# Patient Record
Sex: FEMALE | Race: BLACK OR AFRICAN AMERICAN | NOT HISPANIC OR LATINO | Employment: OTHER | URBAN - METROPOLITAN AREA
[De-identification: names, ages, dates, MRNs, and addresses within clinical notes are randomized per-mention and may not be internally consistent; named-entity substitution may affect disease eponyms.]

---

## 2017-04-10 ENCOUNTER — TELEPHONE (OUTPATIENT)
Dept: OBSTETRICS AND GYNECOLOGY | Facility: CLINIC | Age: 67
End: 2017-04-10

## 2017-04-10 NOTE — TELEPHONE ENCOUNTER
I spoke to the pt and she states that she is still in rehab and Dr sanchez wanted the rehab to consult a gyn to come kervin nd see the pt. When she gets out of rehab she can call and schedule an appointment to come in and see us.

## 2017-04-10 NOTE — TELEPHONE ENCOUNTER
Pt states that she had a stroke in the hospital and she is therapy and she had a cycle  And she had a D&C and the cycle came down again. Last month last part of march for 7 days and again in April and she had the cycle for 7 days. She has not had a cycle for 6 months and she thought it was over and now it cam back down and she wanted her records request from [facility].

## 2017-04-10 NOTE — TELEPHONE ENCOUNTER
----- Message from Jordyn Westbrook sent at 4/10/2017  1:58 PM CDT -----  Contact: CORINNA SPANN [[id number]]  x_  1st Request  _  2nd Request  _  3rd Request        Who: CORINNA SPANN [[id number]]    Why: Pt would like a call back says she is experiencing vaginal bleeding after stroke. Please call, Thanks!    What Number to Call Back: [phone]    When to Expect a call back: (Before the end of the day)   -- if the call is after 12:00, the call back will be tomorrow.

## 2019-10-02 ENCOUNTER — OFFICE VISIT (OUTPATIENT)
Dept: FAMILY MEDICINE | Facility: CLINIC | Age: 69
End: 2019-10-02
Payer: MEDICARE

## 2019-10-02 ENCOUNTER — LAB VISIT (OUTPATIENT)
Dept: LAB | Facility: HOSPITAL | Age: 69
End: 2019-10-02
Attending: INTERNAL MEDICINE
Payer: MEDICARE

## 2019-10-02 VITALS
TEMPERATURE: 98 F | BODY MASS INDEX: 44.87 KG/M2 | OXYGEN SATURATION: 96 % | WEIGHT: 279.19 LBS | DIASTOLIC BLOOD PRESSURE: 84 MMHG | HEART RATE: 52 BPM | HEIGHT: 66 IN | SYSTOLIC BLOOD PRESSURE: 144 MMHG

## 2019-10-02 DIAGNOSIS — Z79.4 TYPE 2 DIABETES MELLITUS WITH OTHER CIRCULATORY COMPLICATION, WITH LONG-TERM CURRENT USE OF INSULIN: ICD-10-CM

## 2019-10-02 DIAGNOSIS — E78.5 DYSLIPIDEMIA: ICD-10-CM

## 2019-10-02 DIAGNOSIS — I10 ESSENTIAL HYPERTENSION: ICD-10-CM

## 2019-10-02 DIAGNOSIS — E11.59 TYPE 2 DIABETES MELLITUS WITH OTHER CIRCULATORY COMPLICATION, WITH LONG-TERM CURRENT USE OF INSULIN: ICD-10-CM

## 2019-10-02 DIAGNOSIS — Z11.59 NEED FOR HEPATITIS C SCREENING TEST: ICD-10-CM

## 2019-10-02 DIAGNOSIS — E03.9 ACQUIRED HYPOTHYROIDISM: ICD-10-CM

## 2019-10-02 DIAGNOSIS — G89.29 CHRONIC LEFT SHOULDER PAIN: ICD-10-CM

## 2019-10-02 DIAGNOSIS — Z79.4 TYPE 2 DIABETES MELLITUS WITH OTHER CIRCULATORY COMPLICATION, WITH LONG-TERM CURRENT USE OF INSULIN: Primary | ICD-10-CM

## 2019-10-02 DIAGNOSIS — K59.04 CHRONIC IDIOPATHIC CONSTIPATION: ICD-10-CM

## 2019-10-02 DIAGNOSIS — E11.59 TYPE 2 DIABETES MELLITUS WITH OTHER CIRCULATORY COMPLICATION, WITH LONG-TERM CURRENT USE OF INSULIN: Primary | ICD-10-CM

## 2019-10-02 DIAGNOSIS — M25.512 CHRONIC LEFT SHOULDER PAIN: ICD-10-CM

## 2019-10-02 LAB
ALBUMIN SERPL BCP-MCNC: 3.5 G/DL (ref 3.5–5.2)
ALP SERPL-CCNC: 96 U/L (ref 55–135)
ALT SERPL W/O P-5'-P-CCNC: 9 U/L (ref 10–44)
ANION GAP SERPL CALC-SCNC: 9 MMOL/L (ref 8–16)
AST SERPL-CCNC: 11 U/L (ref 10–40)
BASOPHILS # BLD AUTO: 0.03 K/UL (ref 0–0.2)
BASOPHILS NFR BLD: 0.3 % (ref 0–1.9)
BILIRUB SERPL-MCNC: 0.4 MG/DL (ref 0.1–1)
BUN SERPL-MCNC: 21 MG/DL (ref 8–23)
CALCIUM SERPL-MCNC: 10 MG/DL (ref 8.7–10.5)
CHLORIDE SERPL-SCNC: 101 MMOL/L (ref 95–110)
CHOLEST SERPL-MCNC: 145 MG/DL (ref 120–199)
CHOLEST/HDLC SERPL: 3.2 {RATIO} (ref 2–5)
CO2 SERPL-SCNC: 28 MMOL/L (ref 23–29)
CREAT SERPL-MCNC: 1.7 MG/DL (ref 0.5–1.4)
DIFFERENTIAL METHOD: ABNORMAL
EOSINOPHIL # BLD AUTO: 0.2 K/UL (ref 0–0.5)
EOSINOPHIL NFR BLD: 1.8 % (ref 0–8)
ERYTHROCYTE [DISTWIDTH] IN BLOOD BY AUTOMATED COUNT: 19 % (ref 11.5–14.5)
EST. GFR  (AFRICAN AMERICAN): 34.7 ML/MIN/1.73 M^2
EST. GFR  (NON AFRICAN AMERICAN): 30.1 ML/MIN/1.73 M^2
ESTIMATED AVG GLUCOSE: 151 MG/DL (ref 68–131)
GLUCOSE SERPL-MCNC: 120 MG/DL (ref 70–110)
HBA1C MFR BLD HPLC: 6.9 % (ref 4–5.6)
HCT VFR BLD AUTO: 41.2 % (ref 37–48.5)
HDLC SERPL-MCNC: 45 MG/DL (ref 40–75)
HDLC SERPL: 31 % (ref 20–50)
HGB BLD-MCNC: 12 G/DL (ref 12–16)
IMM GRANULOCYTES # BLD AUTO: 0.03 K/UL (ref 0–0.04)
IMM GRANULOCYTES NFR BLD AUTO: 0.3 % (ref 0–0.5)
LDLC SERPL CALC-MCNC: 84 MG/DL (ref 63–159)
LYMPHOCYTES # BLD AUTO: 1.3 K/UL (ref 1–4.8)
LYMPHOCYTES NFR BLD: 14.8 % (ref 18–48)
MCH RBC QN AUTO: 19.6 PG (ref 27–31)
MCHC RBC AUTO-ENTMCNC: 29.1 G/DL (ref 32–36)
MCV RBC AUTO: 67 FL (ref 82–98)
MONOCYTES # BLD AUTO: 0.7 K/UL (ref 0.3–1)
MONOCYTES NFR BLD: 7.5 % (ref 4–15)
NEUTROPHILS # BLD AUTO: 6.6 K/UL (ref 1.8–7.7)
NEUTROPHILS NFR BLD: 75.3 % (ref 38–73)
NONHDLC SERPL-MCNC: 100 MG/DL
NRBC BLD-RTO: 0 /100 WBC
PLATELET # BLD AUTO: 166 K/UL (ref 150–350)
PMV BLD AUTO: ABNORMAL FL (ref 9.2–12.9)
POTASSIUM SERPL-SCNC: 4.1 MMOL/L (ref 3.5–5.1)
PROT SERPL-MCNC: 8 G/DL (ref 6–8.4)
RBC # BLD AUTO: 6.12 M/UL (ref 4–5.4)
SODIUM SERPL-SCNC: 138 MMOL/L (ref 136–145)
TRIGL SERPL-MCNC: 80 MG/DL (ref 30–150)
WBC # BLD AUTO: 8.8 K/UL (ref 3.9–12.7)

## 2019-10-02 PROCEDURE — 99204 PR OFFICE/OUTPT VISIT, NEW, LEVL IV, 45-59 MIN: ICD-10-PCS | Mod: S$PBB,,, | Performed by: INTERNAL MEDICINE

## 2019-10-02 PROCEDURE — 84439 ASSAY OF FREE THYROXINE: CPT

## 2019-10-02 PROCEDURE — 84443 ASSAY THYROID STIM HORMONE: CPT

## 2019-10-02 PROCEDURE — 80053 COMPREHEN METABOLIC PANEL: CPT

## 2019-10-02 PROCEDURE — 99999 PR PBB SHADOW E&M-EST. PATIENT-LVL III: CPT | Mod: PBBFAC,,, | Performed by: INTERNAL MEDICINE

## 2019-10-02 PROCEDURE — 99204 OFFICE O/P NEW MOD 45 MIN: CPT | Mod: S$PBB,,, | Performed by: INTERNAL MEDICINE

## 2019-10-02 PROCEDURE — 80061 LIPID PANEL: CPT

## 2019-10-02 PROCEDURE — 99999 PR PBB SHADOW E&M-EST. PATIENT-LVL III: ICD-10-PCS | Mod: PBBFAC,,, | Performed by: INTERNAL MEDICINE

## 2019-10-02 PROCEDURE — 85025 COMPLETE CBC W/AUTO DIFF WBC: CPT

## 2019-10-02 PROCEDURE — 86803 HEPATITIS C AB TEST: CPT

## 2019-10-02 PROCEDURE — 36415 COLL VENOUS BLD VENIPUNCTURE: CPT | Mod: PO

## 2019-10-02 PROCEDURE — 99213 OFFICE O/P EST LOW 20 MIN: CPT | Mod: PBBFAC,PO | Performed by: INTERNAL MEDICINE

## 2019-10-02 PROCEDURE — 83036 HEMOGLOBIN GLYCOSYLATED A1C: CPT

## 2019-10-02 RX ORDER — ROSUVASTATIN CALCIUM 20 MG/1
20 TABLET, COATED ORAL DAILY
Status: ON HOLD | COMMUNITY
End: 2019-10-29 | Stop reason: HOSPADM

## 2019-10-02 RX ORDER — LEVOTHYROXINE SODIUM 125 UG/1
125 TABLET ORAL DAILY
COMMUNITY
End: 2019-10-02

## 2019-10-02 RX ORDER — INSULIN ASPART 100 [IU]/ML
10 INJECTION, SOLUTION INTRAVENOUS; SUBCUTANEOUS
Status: ON HOLD | COMMUNITY
End: 2019-10-29 | Stop reason: HOSPADM

## 2019-10-02 RX ORDER — ASPIRIN 325 MG
325 TABLET, DELAYED RELEASE (ENTERIC COATED) ORAL
COMMUNITY
End: 2019-10-02

## 2019-10-02 RX ORDER — CLONIDINE HYDROCHLORIDE 0.3 MG/1
0.3 TABLET ORAL 3 TIMES DAILY
COMMUNITY
End: 2019-10-03 | Stop reason: ALTCHOICE

## 2019-10-02 RX ORDER — LUBIPROSTONE 24 UG/1
24 CAPSULE ORAL 2 TIMES DAILY WITH MEALS
Qty: 180 CAPSULE | Refills: 0 | Status: ON HOLD | OUTPATIENT
Start: 2019-10-02 | End: 2019-10-29 | Stop reason: HOSPADM

## 2019-10-02 RX ORDER — MELATONIN 10 MG
1 TABLET, SUBLINGUAL SUBLINGUAL DAILY
Status: ON HOLD | COMMUNITY
End: 2019-10-29 | Stop reason: HOSPADM

## 2019-10-02 RX ORDER — HYDRALAZINE HYDROCHLORIDE 50 MG/1
50 TABLET, FILM COATED ORAL 3 TIMES DAILY
Status: ON HOLD | COMMUNITY
End: 2019-10-29 | Stop reason: SDUPTHER

## 2019-10-02 RX ORDER — HYDRALAZINE HYDROCHLORIDE 100 MG/1
100 TABLET, FILM COATED ORAL 2 TIMES DAILY
Qty: 180 TABLET | Refills: 0 | Status: ON HOLD | OUTPATIENT
Start: 2019-10-02 | End: 2019-10-29 | Stop reason: HOSPADM

## 2019-10-02 RX ORDER — LEVOTHYROXINE SODIUM 125 UG/1
125 TABLET ORAL DAILY
COMMUNITY
End: 2019-10-04

## 2019-10-02 RX ORDER — HYDRALAZINE HYDROCHLORIDE 50 MG/1
50 TABLET, FILM COATED ORAL
COMMUNITY
End: 2019-10-02

## 2019-10-02 RX ORDER — RAMIPRIL 5 MG/1
5 CAPSULE ORAL DAILY
COMMUNITY
End: 2019-10-02

## 2019-10-02 RX ORDER — MECLIZINE HYDROCHLORIDE 25 MG/1
25 TABLET ORAL 3 TIMES DAILY PRN
Status: ON HOLD | COMMUNITY
End: 2019-10-29 | Stop reason: HOSPADM

## 2019-10-02 RX ORDER — CETIRIZINE HYDROCHLORIDE 5 MG/1
5 TABLET ORAL
COMMUNITY
End: 2019-10-02

## 2019-10-02 RX ORDER — NITROGLYCERIN 0.4 MG/1
0.4 TABLET SUBLINGUAL
Status: ON HOLD | COMMUNITY
End: 2019-10-29 | Stop reason: HOSPADM

## 2019-10-02 RX ORDER — CETIRIZINE HYDROCHLORIDE 5 MG/1
5 TABLET ORAL DAILY
Status: ON HOLD | COMMUNITY
End: 2019-10-29 | Stop reason: HOSPADM

## 2019-10-02 RX ORDER — INSULIN ASPART 100 [IU]/ML
12 INJECTION, SOLUTION INTRAVENOUS; SUBCUTANEOUS
COMMUNITY
End: 2019-10-02

## 2019-10-02 NOTE — PROGRESS NOTES
Subjective:        Chief Complaint  Chief Complaint   Patient presents with    Establish Care       HPI  Sherrie Alex is a 69 y.o. female with multiple medical diagnoses as listed in the medical history and problem list that presents for establishment of care.  Patient is new to me.    Patient has had diabetes over 20 years  No diabetic nerve pain   Diabetes is well controlled - less than 120 FBGs - checks twice daily  Right eye - possible cataract - Dr Alvarez  Seeing Neurology - Dr Combs  Had a fall 2-3 weeks   Left leg will 'give way'    BP regimen  Clonidine 0.3 mg TID  Hydralazine 75 mg TID  Amlodipine 5 mg QD    Has hospital bed    Chronic wound of leg      PAST MEDICAL HISTORY:  Past Medical History:   Diagnosis Date    Anemia     Anxiety     Diabetes mellitus     Hyperlipidemia     Hypertension     Obesity     Proteinuria     Sleep apnea        PAST SURGICAL HISTORY:  Past Surgical History:   Procedure Laterality Date    APPENDECTOMY       SECTION      CHOLECYSTECTOMY      DILATION AND CURETTAGE OF UTERUS      TUBAL LIGATION         SOCIAL HISTORY:  Social History     Socioeconomic History    Marital status:      Spouse name: Not on file    Number of children: Not on file    Years of education: Not on file    Highest education level: Not on file   Occupational History    Not on file   Social Needs    Financial resource strain: Not on file    Food insecurity:     Worry: Not on file     Inability: Not on file    Transportation needs:     Medical: Not on file     Non-medical: Not on file   Tobacco Use    Smoking status: Never Smoker   Substance and Sexual Activity    Alcohol use: No    Drug use: No    Sexual activity: Yes     Partners: Male   Lifestyle    Physical activity:     Days per week: Not on file     Minutes per session: Not on file    Stress: Not on file   Relationships    Social connections:     Talks on phone: Not on file     Gets together: Not on  "file     Attends [employer] service: Not on file     Active member of club or organization: Not on file     Attends meetings of clubs or organizations: Not on file     Relationship status: Not on file   Other Topics Concern    Not on file   Social History Narrative    Not on file       FAMILY HISTORY:  Family History   Problem Relation Age of Onset    Liver disease Father     Diabetes Brother     Hypertension Brother     Diabetes Sister        ALLERGIES AND MEDICATIONS: updated and reviewed.  Review of patient's allergies indicates:   Allergen Reactions    Codeine Anxiety    Ace inhibitors     Diphenhydramine hcl      Current Outpatient Medications   Medication Sig Dispense Refill    amlodipine (NORVASC) 5 MG tablet Take 5 mg by mouth once daily.        blood sugar diagnostic (GLUCOSE BLOOD) Strp by Misc.(Non-Drug; Combo Route) route.        clonidine (CATAPRES) 0.2 MG tablet Take 0.3 mg by mouth 3 (three) times daily.       ergocalciferol (VITAMIN D2) 50,000 unit Cap Take 50,000 Units by mouth every 7 days.        insulin glargine (LANTUS SOLOSTAR) 100 unit/mL (3 mL) InPn Inject 24 Units into the skin every evening.       insulin needles, disposable, (PEN NEEDLE) 31 X 5/16 " Ndle by Misc.(Non-Drug; Combo Route) route.        lacosamide (VIMPAT) 100 mg Tab Take 50 mg by mouth 2 (two) times daily.        LANCETS MISC by Misc.(Non-Drug; Combo Route) route 4 (four) times daily.        levothyroxine (SYNTHROID) 125 MCG tablet Take 125 mcg by mouth once daily.      linagliptin (TRADJENTA) 5 mg Tab tablet Take 5 mg by mouth once daily.        lubiprostone (AMITIZA) 24 MCG Cap Take 24 mcg by mouth 2 (two) times daily with meals.        meclizine (ANTIVERT) 25 mg tablet Take 25 mg by mouth 3 (three) times daily as needed.      metoprolol succinate (TOPROL-XL) 100 MG 24 hr tablet Take 100 mg by mouth 2 (two) times daily.        nitroGLYCERIN (NITROSTAT) 0.4 MG SL tablet Place 0.4 mg under the tongue.   " "   aspirin (ECOTRIN) 325 MG EC tablet Take 325 mg by mouth.      cetirizine (ZYRTEC) 5 MG tablet Take 5 mg by mouth.      doxazosin (CARDURA) 4 MG tablet Take 4 mg by mouth every evening.        hydrALAZINE (APRESOLINE) 50 MG tablet Take 50 mg by mouth.      insulin aspart U-100 (NOVOLOG) 100 unit/mL injection Inject 12 Units into the skin.      levothyroxine (SYNTHROID) 112 MCG tablet Take 112 mcg by mouth once daily.        lovastatin (MEVACOR) 40 MG tablet Take 40 mg by mouth every evening.        meclizine 12.5 mg Chew Take 2 tablets by mouth every 6 (six) hours as needed.        nystatin (MYCOSTATIN) powder Apply to affected area 3 times daily 60 g 0    pantoprazole (PROTONIX) 40 MG tablet Take 40 mg by mouth once daily.      ramipril (ALTACE) 5 MG capsule Take 5 mg by mouth once daily.      rosuvastatin (CRESTOR) 20 MG tablet Take 20 mg by mouth once daily.      spironolactone (ALDACTONE) 25 MG tablet Take 25 mg by mouth once daily.       No current facility-administered medications for this visit.          ROS  Review of Systems   Constitutional: Negative for appetite change, chills, fever and unexpected weight change.   Respiratory: Negative for cough and shortness of breath.    Cardiovascular: Negative for chest pain, palpitations and leg swelling.   Gastrointestinal: Negative for abdominal pain, constipation, diarrhea, nausea and vomiting.   Musculoskeletal: Positive for arthralgias.   Skin: Negative.    Neurological: Positive for weakness. Negative for dizziness, light-headedness and headaches.   Psychiatric/Behavioral: Negative for dysphoric mood. The patient is not nervous/anxious.          Objective:     Physical Exam  Vitals:    10/02/19 1123   BP: (!) 144/84   BP Location: Right arm   Patient Position: Sitting   BP Method: Large (Manual)   Pulse: (!) 52   Temp: 97.8 °F (36.6 °C)   TempSrc: Oral   SpO2: 96%   Weight: 126.6 kg (279 lb 3.4 oz)   Height: 5' 6" (1.676 m)    Body mass index is " "45.07 kg/m².  Weight: 126.6 kg (279 lb 3.4 oz)   Height: 5' 6" (167.6 cm)   Physical Exam   Constitutional: She appears well-developed. No distress.   Obese habitus, sitting in wheelchair   HENT:   Head: Normocephalic and atraumatic.   Right Ear: External ear normal.   Left Ear: External ear normal.   Nose: Nose normal.   Mouth/Throat: Oropharynx is clear and moist. No oropharyngeal exudate.   Eyes: Pupils are equal, round, and reactive to light. Conjunctivae and EOM are normal.   Neck: Normal range of motion. Neck supple. No thyromegaly present.   Cardiovascular: Normal rate, normal heart sounds and intact distal pulses. Exam reveals no gallop and no friction rub.   No murmur heard.  Pulmonary/Chest: Effort normal and breath sounds normal. No respiratory distress. She has no wheezes.   Abdominal: Soft. She exhibits no distension and no mass. There is no tenderness. There is no rebound and no guarding. No hernia.   Musculoskeletal: She exhibits no edema or deformity.   Limitation of ROM of left shoulder    Lymphadenopathy:     She has no cervical adenopathy.   Neurological: She is alert. No cranial nerve deficit.   Weakness of LLE with hip flexion/extension and weakness of LUE with elevation   Skin: Skin is warm and dry. No rash noted. No erythema.   Psychiatric: She has a normal mood and affect. Her behavior is normal.   Vitals reviewed.      Assessment:     1. Type 2 diabetes mellitus with other circulatory complication, with long-term current use of insulin    2. Essential hypertension    3. Need for hepatitis C screening test    4. Chronic left shoulder pain    5. Chronic idiopathic constipation    6. Dyslipidemia    7. Acquired hypothyroidism      Plan:     Sherrie Castaneda was seen today for establish care.    Diagnoses and all orders for this visit:    Type 2 diabetes mellitus with other circulatory complication, with long-term current use of insulin  Dyslipidemia  A1c ordered - well controlled  The current " medical regimen is effective;  continue present plan and medications.  -     Hemoglobin A1c; Future    Essential hypertension  Blood pressure is not controlled today. We discussed low salt diet and regular exercise. Medication changes made today: discontinue clonidine, increase hydralazine as noted. Patient will come back in 2-4 weeks for recheck of blood pressure. Patient also asked to check blood pressure at home and bring recordings to next office visit.   -     hydrALAZINE (APRESOLINE) 100 MG tablet; Take 1 tablet (100 mg total) by mouth 2 (two) times daily. (Patient not taking: Reported on 10/2/2019)  -     CBC auto differential; Future  -     Comprehensive metabolic panel; Future  -     Lipid panel; Future    Need for hepatitis C screening test  Patient born between 1946 and 1966 and consents to screening as recommended by USPSTF - Hepatitis C screening obtained  -     Hepatitis C antibody; Future    Hypothyroidism  Patient on thyroid replacement therapy with recent thyroid indices ordered - continue current Synthroid dosage    Chronic left shoulder pain  Discussed - obtain imaging as noted prior to disposition  -     X-Ray Shoulder 2 or More Views Left; Future    Chronic idiopathic constipation  Discussed - Sent medication refill to patient's preferred pharmacy on file.  -     lubiprostone (AMITIZA) 24 MCG Cap; Take 1 capsule (24 mcg total) by mouth 2 (two) times daily with meals.        Health Maintenance       Date Due Completion Date    Hepatitis C Screening 1950 ---    TETANUS VACCINE 07/25/1967 ---    DEXA SCAN 07/25/1989 ---    Shingles Vaccine (1 of 2) 07/25/1999 ---    Colonoscopy 07/25/1999 ---    Pneumococcal Vaccine (65+ Low/Medium Risk) (1 of 2 - PCV13) 07/25/2014 ---    Mammogram 02/10/2016 2/10/2014    Influenza Vaccine (1) 09/01/2019 ---    Lipid Panel 09/22/2022 9/22/2017            Health Maintenance reviewed, addressed as per orders    Follow-up: Follow up in about 2 weeks (around  10/16/2019) for shoulder pain.    The patient expressed understanding and no barriers to adherence were identified.     1. The patient indicates understanding of these issues and agrees with the plan. Brief care plan is updated and reviewed with the patient as applicable.     2. The patient is given an After Visit Summary that lists all medications with directions, allergies, orders placed during this encounter and follow-up instructions.     3. I have reviewed the patient's medical information including past medical, family, and social history sections including the medications and allergies.     4. We discussed the patient's current medications. I reconciled the patient's medication list and prepared and supplied needed refills.       Desmond Yang MD  Internal Medicine-Pediatrics

## 2019-10-03 PROBLEM — E03.9 ACQUIRED HYPOTHYROIDISM: Status: ACTIVE | Noted: 2019-10-03

## 2019-10-03 LAB — HCV AB SERPL QL IA: NEGATIVE

## 2019-10-04 ENCOUNTER — TELEPHONE (OUTPATIENT)
Dept: FAMILY MEDICINE | Facility: CLINIC | Age: 69
End: 2019-10-04

## 2019-10-04 DIAGNOSIS — E11.9 TYPE 2 DIABETES MELLITUS WITHOUT COMPLICATION: ICD-10-CM

## 2019-10-04 DIAGNOSIS — E03.9 ACQUIRED HYPOTHYROIDISM: Primary | ICD-10-CM

## 2019-10-04 DIAGNOSIS — E11.9 TYPE 2 DIABETES MELLITUS WITHOUT COMPLICATION, UNSPECIFIED WHETHER LONG TERM INSULIN USE: ICD-10-CM

## 2019-10-04 DIAGNOSIS — Z12.39 BREAST CANCER SCREENING: ICD-10-CM

## 2019-10-04 LAB
T4 FREE SERPL-MCNC: 1.02 NG/DL (ref 0.71–1.51)
TSH SERPL DL<=0.005 MIU/L-ACNC: 5.72 UIU/ML (ref 0.4–4)

## 2019-10-04 RX ORDER — LEVOTHYROXINE SODIUM 137 UG/1
137 TABLET ORAL DAILY
Qty: 90 TABLET | Refills: 1 | Status: SHIPPED | OUTPATIENT
Start: 2019-10-04 | End: 2019-10-04 | Stop reason: SDUPTHER

## 2019-10-04 RX ORDER — LEVOTHYROXINE SODIUM 137 UG/1
137 TABLET ORAL DAILY
Qty: 90 TABLET | Refills: 1 | Status: SHIPPED | OUTPATIENT
Start: 2019-10-04

## 2019-10-04 NOTE — TELEPHONE ENCOUNTER
----- Message from Cheryl Wright sent at 10/4/2019  9:04 AM CDT -----  Contact: Self   Type: Patient Call Back    What is the request in detail: Pt calling to speak to a nurse regarding what pharmacy was her meds sent.      Can the clinic reply by [facility]? No    Would the patient rather a call back or a response via [facility]? Call back     Best call back number: [phone]

## 2019-10-04 NOTE — TELEPHONE ENCOUNTER
Pt was concerned where medication was sent to. Pt wanted medication to be sent to [facility] in [place].

## 2019-10-04 NOTE — PROGRESS NOTES
Please call the patient regarding results:    Thyroid hormone level is out of range - I am INCREASING Synthroid to 137 mcg daily. Patient's prescription sent to [facility] on [address].    A1c is 6.9% - diabetes and cholesterol control is excellent. Continue current medications    Hepatitis C screening is negative    Complete blood count is within acceptable limits    Let me know if you have any questions or concerns.    Desmond Yang MD  Internal Medicine-Pediatrics

## 2019-10-04 NOTE — TELEPHONE ENCOUNTER
----- Message from Desmond Yang MD sent at 10/4/2019  8:18 AM CDT -----  Please call the patient regarding results:    Thyroid hormone level is out of range - I am INCREASING Synthroid to 137 mcg daily. Patient's prescription sent to [facility] on [address].    A1c is 6.9% - diabetes and cholesterol control is excellent. Continue current medications    Hepatitis C screening is negative    Complete blood count is within acceptable limits    Let me know if you have any questions or concerns.    Desmond Yang MD  Internal Medicine-Pediatrics

## 2019-10-11 ENCOUNTER — HOSPITAL ENCOUNTER (INPATIENT)
Facility: HOSPITAL | Age: 69
LOS: 18 days | Discharge: NURSING, REHAB OR LONG-TERM CARE | DRG: 004 | End: 2019-10-29
Attending: EMERGENCY MEDICINE | Admitting: HOSPITALIST
Payer: MEDICARE

## 2019-10-11 ENCOUNTER — ANESTHESIA EVENT (OUTPATIENT)
Dept: EMERGENCY MEDICINE | Facility: HOSPITAL | Age: 69
DRG: 004 | End: 2019-10-11
Payer: MEDICARE

## 2019-10-11 ENCOUNTER — ANESTHESIA (OUTPATIENT)
Dept: EMERGENCY MEDICINE | Facility: HOSPITAL | Age: 69
DRG: 004 | End: 2019-10-11
Payer: MEDICARE

## 2019-10-11 DIAGNOSIS — T78.40XA ALLERGIC REACTION, INITIAL ENCOUNTER: ICD-10-CM

## 2019-10-11 DIAGNOSIS — Z99.11 VENTILATOR DEPENDENT: ICD-10-CM

## 2019-10-11 DIAGNOSIS — T78.3XXA ANGIO-EDEMA: ICD-10-CM

## 2019-10-11 DIAGNOSIS — Z98.890 REQUIRED EMERGENCY INTUBATION: ICD-10-CM

## 2019-10-11 DIAGNOSIS — T78.3XXA ANGIOEDEMA, INITIAL ENCOUNTER: Primary | ICD-10-CM

## 2019-10-11 DIAGNOSIS — J96.90 RESPIRATORY FAILURE: ICD-10-CM

## 2019-10-11 DIAGNOSIS — G93.41 ENCEPHALOPATHY, METABOLIC: ICD-10-CM

## 2019-10-11 DIAGNOSIS — J96.20 ACUTE ON CHRONIC RESPIRATORY FAILURE, UNSPECIFIED WHETHER WITH HYPOXIA OR HYPERCAPNIA: ICD-10-CM

## 2019-10-11 DIAGNOSIS — I63.9 STROKE: ICD-10-CM

## 2019-10-11 DIAGNOSIS — J96.20 ACUTE ON CHRONIC RESPIRATORY FAILURE: ICD-10-CM

## 2019-10-11 DIAGNOSIS — R56.9 SEIZURE: ICD-10-CM

## 2019-10-11 DIAGNOSIS — I63.9 CEREBROVASCULAR ACCIDENT (CVA), UNSPECIFIED MECHANISM: ICD-10-CM

## 2019-10-11 PROBLEM — N17.9 AKI (ACUTE KIDNEY INJURY): Status: ACTIVE | Noted: 2019-10-11

## 2019-10-11 LAB
ALBUMIN SERPL BCP-MCNC: 3.5 G/DL (ref 3.5–5.2)
ALLENS TEST: ABNORMAL
ALP SERPL-CCNC: 87 U/L (ref 55–135)
ALT SERPL W/O P-5'-P-CCNC: 9 U/L (ref 10–44)
ANION GAP SERPL CALC-SCNC: 10 MMOL/L (ref 8–16)
AST SERPL-CCNC: 11 U/L (ref 10–40)
BASOPHILS # BLD AUTO: 0.03 K/UL (ref 0–0.2)
BASOPHILS NFR BLD: 0.3 % (ref 0–1.9)
BILIRUB SERPL-MCNC: 0.7 MG/DL (ref 0.1–1)
BUN SERPL-MCNC: 24 MG/DL (ref 8–23)
CALCIUM SERPL-MCNC: 9.9 MG/DL (ref 8.7–10.5)
CHLORIDE SERPL-SCNC: 103 MMOL/L (ref 95–110)
CO2 SERPL-SCNC: 24 MMOL/L (ref 23–29)
CREAT SERPL-MCNC: 1.8 MG/DL (ref 0.5–1.4)
DELSYS: ABNORMAL
DIFFERENTIAL METHOD: ABNORMAL
EOSINOPHIL # BLD AUTO: 0.1 K/UL (ref 0–0.5)
EOSINOPHIL NFR BLD: 1 % (ref 0–8)
ERYTHROCYTE [DISTWIDTH] IN BLOOD BY AUTOMATED COUNT: 18.1 % (ref 11.5–14.5)
ERYTHROCYTE [SEDIMENTATION RATE] IN BLOOD BY WESTERGREN METHOD: 12 MM/H
EST. GFR  (AFRICAN AMERICAN): 32 ML/MIN/1.73 M^2
EST. GFR  (NON AFRICAN AMERICAN): 28 ML/MIN/1.73 M^2
FIO2: 40
GLUCOSE SERPL-MCNC: 143 MG/DL (ref 70–110)
HCO3 UR-SCNC: 23 MMOL/L (ref 24–28)
HCT VFR BLD AUTO: 38.8 % (ref 37–48.5)
HGB BLD-MCNC: 11.5 G/DL (ref 12–16)
IMM GRANULOCYTES # BLD AUTO: 0.04 K/UL (ref 0–0.04)
IMM GRANULOCYTES NFR BLD AUTO: 0.4 % (ref 0–0.5)
INR PPP: 1.1 (ref 0.8–1.2)
LYMPHOCYTES # BLD AUTO: 1.6 K/UL (ref 1–4.8)
LYMPHOCYTES NFR BLD: 16 % (ref 18–48)
MCH RBC QN AUTO: 19.8 PG (ref 27–31)
MCHC RBC AUTO-ENTMCNC: 29.6 G/DL (ref 32–36)
MCV RBC AUTO: 67 FL (ref 82–98)
MODE: ABNORMAL
MONOCYTES # BLD AUTO: 0.8 K/UL (ref 0.3–1)
MONOCYTES NFR BLD: 8.4 % (ref 4–15)
NEUTROPHILS # BLD AUTO: 7.2 K/UL (ref 1.8–7.7)
NEUTROPHILS NFR BLD: 73.9 % (ref 38–73)
NRBC BLD-RTO: 0 /100 WBC
PCO2 BLDA: 37.9 MMHG (ref 35–45)
PEEP: 5
PH SMN: 7.39 [PH] (ref 7.35–7.45)
PLATELET # BLD AUTO: 158 K/UL (ref 150–350)
PMV BLD AUTO: ABNORMAL FL (ref 9.2–12.9)
PO2 BLDA: 107 MMHG (ref 80–100)
POC BE: -2 MMOL/L
POC SATURATED O2: 98 % (ref 95–100)
POC TCO2: 24 MMOL/L (ref 23–27)
POCT GLUCOSE: 185 MG/DL (ref 70–110)
POCT GLUCOSE: 198 MG/DL (ref 70–110)
POCT GLUCOSE: 204 MG/DL (ref 70–110)
POCT GLUCOSE: 219 MG/DL (ref 70–110)
POTASSIUM SERPL-SCNC: 3.7 MMOL/L (ref 3.5–5.1)
PROT SERPL-MCNC: 7.6 G/DL (ref 6–8.4)
PROTHROMBIN TIME: 11.9 SEC (ref 9–12.5)
RBC # BLD AUTO: 5.81 M/UL (ref 4–5.4)
SAMPLE: ABNORMAL
SITE: ABNORMAL
SODIUM SERPL-SCNC: 137 MMOL/L (ref 136–145)
TROPONIN I SERPL DL<=0.01 NG/ML-MCNC: 0.01 NG/ML (ref 0–0.03)
TSH SERPL DL<=0.005 MIU/L-ACNC: 3.66 UIU/ML (ref 0.4–4)
VT: 450
WBC # BLD AUTO: 9.69 K/UL (ref 3.9–12.7)

## 2019-10-11 PROCEDURE — 63600175 PHARM REV CODE 636 W HCPCS: Performed by: HOSPITALIST

## 2019-10-11 PROCEDURE — 20000000 HC ICU ROOM

## 2019-10-11 PROCEDURE — 93010 EKG 12-LEAD: ICD-10-PCS | Mod: ,,, | Performed by: INTERNAL MEDICINE

## 2019-10-11 PROCEDURE — 93005 ELECTROCARDIOGRAM TRACING: CPT

## 2019-10-11 PROCEDURE — 25000003 PHARM REV CODE 250: Performed by: ANESTHESIOLOGY

## 2019-10-11 PROCEDURE — 36600 WITHDRAWAL OF ARTERIAL BLOOD: CPT

## 2019-10-11 PROCEDURE — 99291 CRITICAL CARE FIRST HOUR: CPT | Mod: 25

## 2019-10-11 PROCEDURE — 94761 N-INVAS EAR/PLS OXIMETRY MLT: CPT

## 2019-10-11 PROCEDURE — 27000221 HC OXYGEN, UP TO 24 HOURS

## 2019-10-11 PROCEDURE — 96375 TX/PRO/DX INJ NEW DRUG ADDON: CPT

## 2019-10-11 PROCEDURE — 51702 INSERT TEMP BLADDER CATH: CPT

## 2019-10-11 PROCEDURE — 94003 VENT MGMT INPAT SUBQ DAY: CPT

## 2019-10-11 PROCEDURE — 25000003 PHARM REV CODE 250: Performed by: HOSPITALIST

## 2019-10-11 PROCEDURE — 99291 PR CRITICAL CARE, E/M 30-74 MINUTES: ICD-10-PCS | Mod: ,,, | Performed by: EMERGENCY MEDICINE

## 2019-10-11 PROCEDURE — 31500 INSERT EMERGENCY AIRWAY: CPT | Performed by: ANESTHESIOLOGY

## 2019-10-11 PROCEDURE — 99900035 HC TECH TIME PER 15 MIN (STAT)

## 2019-10-11 PROCEDURE — 63600175 PHARM REV CODE 636 W HCPCS: Performed by: INTERNAL MEDICINE

## 2019-10-11 PROCEDURE — 25000003 PHARM REV CODE 250: Performed by: EMERGENCY MEDICINE

## 2019-10-11 PROCEDURE — C1769 GUIDE WIRE: HCPCS | Performed by: ANESTHESIOLOGY

## 2019-10-11 PROCEDURE — 99900026 HC AIRWAY MAINTENANCE (STAT)

## 2019-10-11 PROCEDURE — 82962 GLUCOSE BLOOD TEST: CPT

## 2019-10-11 PROCEDURE — 94002 VENT MGMT INPAT INIT DAY: CPT

## 2019-10-11 PROCEDURE — 63600175 PHARM REV CODE 636 W HCPCS

## 2019-10-11 PROCEDURE — S0028 INJECTION, FAMOTIDINE, 20 MG: HCPCS | Performed by: EMERGENCY MEDICINE

## 2019-10-11 PROCEDURE — 25000003 PHARM REV CODE 250: Performed by: INTERNAL MEDICINE

## 2019-10-11 PROCEDURE — 25000003 PHARM REV CODE 250

## 2019-10-11 PROCEDURE — C9399 UNCLASSIFIED DRUGS OR BIOLOG: HCPCS | Performed by: HOSPITALIST

## 2019-10-11 PROCEDURE — 97802 MEDICAL NUTRITION INDIV IN: CPT

## 2019-10-11 PROCEDURE — 93010 ELECTROCARDIOGRAM REPORT: CPT | Mod: ,,, | Performed by: INTERNAL MEDICINE

## 2019-10-11 PROCEDURE — 99222 1ST HOSP IP/OBS MODERATE 55: CPT | Mod: ,,, | Performed by: PSYCHIATRY & NEUROLOGY

## 2019-10-11 PROCEDURE — S0028 INJECTION, FAMOTIDINE, 20 MG: HCPCS | Performed by: INTERNAL MEDICINE

## 2019-10-11 PROCEDURE — 82803 BLOOD GASES ANY COMBINATION: CPT

## 2019-10-11 PROCEDURE — 63600175 PHARM REV CODE 636 W HCPCS: Performed by: ANESTHESIOLOGY

## 2019-10-11 PROCEDURE — 99222 PR INITIAL HOSPITAL CARE,LEVL II: ICD-10-PCS | Mod: ,,, | Performed by: PSYCHIATRY & NEUROLOGY

## 2019-10-11 PROCEDURE — 63600175 PHARM REV CODE 636 W HCPCS: Performed by: EMERGENCY MEDICINE

## 2019-10-11 PROCEDURE — 31500 INSERT EMERGENCY AIRWAY: CPT

## 2019-10-11 PROCEDURE — 31500 INTUBATION: ICD-10-PCS | Mod: ,,, | Performed by: ANESTHESIOLOGY

## 2019-10-11 PROCEDURE — 96374 THER/PROPH/DIAG INJ IV PUSH: CPT

## 2019-10-11 PROCEDURE — 99291 CRITICAL CARE FIRST HOUR: CPT | Mod: ,,, | Performed by: EMERGENCY MEDICINE

## 2019-10-11 PROCEDURE — 27200966 HC CLOSED SUCTION SYSTEM

## 2019-10-11 RX ORDER — GLUCAGON 1 MG
1 KIT INJECTION
Status: DISCONTINUED | OUTPATIENT
Start: 2019-10-11 | End: 2019-10-13

## 2019-10-11 RX ORDER — MIDAZOLAM HYDROCHLORIDE 1 MG/ML
INJECTION, SOLUTION INTRAMUSCULAR; INTRAVENOUS
Status: DISCONTINUED | OUTPATIENT
Start: 2019-10-11 | End: 2019-10-16 | Stop reason: HOSPADM

## 2019-10-11 RX ORDER — FAMOTIDINE 10 MG/ML
20 INJECTION INTRAVENOUS DAILY
Status: DISCONTINUED | OUTPATIENT
Start: 2019-10-11 | End: 2019-10-25

## 2019-10-11 RX ORDER — AMLODIPINE BESYLATE 5 MG/1
5 TABLET ORAL DAILY
Status: DISCONTINUED | OUTPATIENT
Start: 2019-10-11 | End: 2019-10-12

## 2019-10-11 RX ORDER — HEPARIN SODIUM 5000 [USP'U]/ML
5000 INJECTION, SOLUTION INTRAVENOUS; SUBCUTANEOUS EVERY 12 HOURS
Status: DISCONTINUED | OUTPATIENT
Start: 2019-10-11 | End: 2019-10-28

## 2019-10-11 RX ORDER — HYDRALAZINE HYDROCHLORIDE 20 MG/ML
10 INJECTION INTRAMUSCULAR; INTRAVENOUS ONCE
Status: COMPLETED | OUTPATIENT
Start: 2019-10-11 | End: 2019-10-11

## 2019-10-11 RX ORDER — MORPHINE SULFATE 10 MG/ML
2 INJECTION INTRAMUSCULAR; INTRAVENOUS; SUBCUTANEOUS EVERY 4 HOURS PRN
Status: DISCONTINUED | OUTPATIENT
Start: 2019-10-11 | End: 2019-10-16

## 2019-10-11 RX ORDER — AMOXICILLIN 250 MG
1 CAPSULE ORAL 2 TIMES DAILY
Status: DISCONTINUED | OUTPATIENT
Start: 2019-10-11 | End: 2019-10-29

## 2019-10-11 RX ORDER — MIDAZOLAM HYDROCHLORIDE 1 MG/ML
INJECTION INTRAMUSCULAR; INTRAVENOUS
Status: COMPLETED
Start: 2019-10-11 | End: 2019-10-11

## 2019-10-11 RX ORDER — ACETAMINOPHEN 325 MG/1
650 TABLET ORAL EVERY 8 HOURS PRN
Status: DISCONTINUED | OUTPATIENT
Start: 2019-10-11 | End: 2019-10-29 | Stop reason: HOSPADM

## 2019-10-11 RX ORDER — PROPOFOL 10 MG/ML
10 INJECTION, EMULSION INTRAVENOUS CONTINUOUS
Status: DISCONTINUED | OUTPATIENT
Start: 2019-10-11 | End: 2019-10-13

## 2019-10-11 RX ORDER — LACOSAMIDE 50 MG/1
50 TABLET ORAL 2 TIMES DAILY
Status: DISCONTINUED | OUTPATIENT
Start: 2019-10-11 | End: 2019-10-29 | Stop reason: HOSPADM

## 2019-10-11 RX ORDER — INSULIN ASPART 100 [IU]/ML
0-5 INJECTION, SOLUTION INTRAVENOUS; SUBCUTANEOUS EVERY 4 HOURS
Status: DISCONTINUED | OUTPATIENT
Start: 2019-10-11 | End: 2019-10-13

## 2019-10-11 RX ORDER — KETAMINE HYDROCHLORIDE 100 MG/ML
INJECTION, SOLUTION INTRAMUSCULAR; INTRAVENOUS
Status: DISCONTINUED | OUTPATIENT
Start: 2019-10-11 | End: 2019-10-16 | Stop reason: HOSPADM

## 2019-10-11 RX ORDER — FAMOTIDINE 20 MG/1
20 TABLET, FILM COATED ORAL 2 TIMES DAILY
Status: DISCONTINUED | OUTPATIENT
Start: 2019-10-11 | End: 2019-10-11

## 2019-10-11 RX ORDER — ONDANSETRON 2 MG/ML
4 INJECTION INTRAMUSCULAR; INTRAVENOUS EVERY 8 HOURS PRN
Status: DISCONTINUED | OUTPATIENT
Start: 2019-10-11 | End: 2019-10-29 | Stop reason: HOSPADM

## 2019-10-11 RX ORDER — HYDRALAZINE HYDROCHLORIDE 20 MG/ML
10 INJECTION INTRAMUSCULAR; INTRAVENOUS EVERY 8 HOURS PRN
Status: DISCONTINUED | OUTPATIENT
Start: 2019-10-11 | End: 2019-10-12

## 2019-10-11 RX ORDER — FAMOTIDINE 20 MG/1
20 TABLET, FILM COATED ORAL DAILY
Status: DISCONTINUED | OUTPATIENT
Start: 2019-10-11 | End: 2019-10-11

## 2019-10-11 RX ORDER — SUCCINYLCHOLINE CHLORIDE 20 MG/ML
INJECTION INTRAMUSCULAR; INTRAVENOUS
Status: DISCONTINUED | OUTPATIENT
Start: 2019-10-11 | End: 2019-10-16 | Stop reason: HOSPADM

## 2019-10-11 RX ORDER — EPINEPHRINE 0.3 MG/.3ML
INJECTION SUBCUTANEOUS
Status: COMPLETED
Start: 2019-10-11 | End: 2019-10-11

## 2019-10-11 RX ORDER — FAMOTIDINE 10 MG/ML
20 INJECTION INTRAVENOUS
Status: COMPLETED | OUTPATIENT
Start: 2019-10-11 | End: 2019-10-11

## 2019-10-11 RX ORDER — SODIUM CHLORIDE 9 MG/ML
INJECTION, SOLUTION INTRAVENOUS CONTINUOUS
Status: DISCONTINUED | OUTPATIENT
Start: 2019-10-11 | End: 2019-10-14

## 2019-10-11 RX ORDER — CLONIDINE HYDROCHLORIDE 0.1 MG/1
0.3 TABLET ORAL 2 TIMES DAILY
Status: DISCONTINUED | OUTPATIENT
Start: 2019-10-11 | End: 2019-10-29 | Stop reason: HOSPADM

## 2019-10-11 RX ORDER — HYDRALAZINE HYDROCHLORIDE 25 MG/1
100 TABLET, FILM COATED ORAL 2 TIMES DAILY
Status: DISCONTINUED | OUTPATIENT
Start: 2019-10-11 | End: 2019-10-12

## 2019-10-11 RX ORDER — SODIUM CHLORIDE 0.9 % (FLUSH) 0.9 %
10 SYRINGE (ML) INJECTION
Status: DISCONTINUED | OUTPATIENT
Start: 2019-10-11 | End: 2019-10-29 | Stop reason: HOSPADM

## 2019-10-11 RX ORDER — METHYLPREDNISOLONE SOD SUCC 125 MG
125 VIAL (EA) INJECTION
Status: COMPLETED | OUTPATIENT
Start: 2019-10-11 | End: 2019-10-11

## 2019-10-11 RX ADMIN — SENNOSIDES, DOCUSATE SODIUM 1 TABLET: 50; 8.6 TABLET, FILM COATED ORAL at 09:10

## 2019-10-11 RX ADMIN — INSULIN DETEMIR 12 UNITS: 100 INJECTION, SOLUTION SUBCUTANEOUS at 09:10

## 2019-10-11 RX ADMIN — EPINEPHRINE 0.3 MG: 0.3 INJECTION INTRAMUSCULAR at 12:10

## 2019-10-11 RX ADMIN — HEPARIN SODIUM 5000 UNITS: 5000 INJECTION INTRAVENOUS; SUBCUTANEOUS at 09:10

## 2019-10-11 RX ADMIN — HYDRALAZINE HYDROCHLORIDE 100 MG: 25 TABLET ORAL at 08:10

## 2019-10-11 RX ADMIN — METHYLPREDNISOLONE SODIUM SUCCINATE 40 MG: 40 INJECTION, POWDER, FOR SOLUTION INTRAMUSCULAR; INTRAVENOUS at 01:10

## 2019-10-11 RX ADMIN — SODIUM CHLORIDE: 0.9 INJECTION, SOLUTION INTRAVENOUS at 01:10

## 2019-10-11 RX ADMIN — INSULIN ASPART 2 UNITS: 100 INJECTION, SOLUTION INTRAVENOUS; SUBCUTANEOUS at 07:10

## 2019-10-11 RX ADMIN — CLONIDINE HYDROCHLORIDE 0.3 MG: 0.1 TABLET ORAL at 08:10

## 2019-10-11 RX ADMIN — SENNOSIDES, DOCUSATE SODIUM 1 TABLET: 50; 8.6 TABLET, FILM COATED ORAL at 08:10

## 2019-10-11 RX ADMIN — PROPOFOL 15 MCG/KG/MIN: 10 INJECTION, EMULSION INTRAVENOUS at 05:10

## 2019-10-11 RX ADMIN — MIDAZOLAM HYDROCHLORIDE 2 MG: 1 INJECTION, SOLUTION INTRAMUSCULAR; INTRAVENOUS at 01:10

## 2019-10-11 RX ADMIN — FAMOTIDINE 20 MG: 10 INJECTION INTRAVENOUS at 01:10

## 2019-10-11 RX ADMIN — HYDRALAZINE HYDROCHLORIDE 100 MG: 25 TABLET ORAL at 09:10

## 2019-10-11 RX ADMIN — KETAMINE HYDROCHLORIDE 5 MG: 100 INJECTION, SOLUTION, CONCENTRATE INTRAMUSCULAR; INTRAVENOUS at 01:10

## 2019-10-11 RX ADMIN — FAMOTIDINE 20 MG: 10 INJECTION INTRAVENOUS at 09:10

## 2019-10-11 RX ADMIN — PROPOFOL 10 MCG/KG/MIN: 10 INJECTION, EMULSION INTRAVENOUS at 02:10

## 2019-10-11 RX ADMIN — AMLODIPINE BESYLATE 5 MG: 5 TABLET ORAL at 09:10

## 2019-10-11 RX ADMIN — PROPOFOL 10 MCG/KG/MIN: 10 INJECTION, EMULSION INTRAVENOUS at 05:10

## 2019-10-11 RX ADMIN — SODIUM CHLORIDE: 0.9 INJECTION, SOLUTION INTRAVENOUS at 05:10

## 2019-10-11 RX ADMIN — SUCCINYLCHOLINE CHLORIDE 120 MG: 20 INJECTION, SOLUTION INTRAMUSCULAR; INTRAVENOUS at 02:10

## 2019-10-11 RX ADMIN — LACOSAMIDE 50 MG: 50 TABLET, FILM COATED ORAL at 09:10

## 2019-10-11 RX ADMIN — KETAMINE HYDROCHLORIDE 5 MG: 100 INJECTION, SOLUTION, CONCENTRATE INTRAMUSCULAR; INTRAVENOUS at 02:10

## 2019-10-11 RX ADMIN — LACOSAMIDE 50 MG: 50 TABLET, FILM COATED ORAL at 08:10

## 2019-10-11 RX ADMIN — Medication 1 SPRAY: at 01:10

## 2019-10-11 RX ADMIN — METHYLPREDNISOLONE SODIUM SUCCINATE 125 MG: 125 INJECTION, POWDER, FOR SOLUTION INTRAMUSCULAR; INTRAVENOUS at 01:10

## 2019-10-11 RX ADMIN — METHYLPREDNISOLONE SODIUM SUCCINATE 40 MG: 40 INJECTION, POWDER, FOR SOLUTION INTRAMUSCULAR; INTRAVENOUS at 06:10

## 2019-10-11 RX ADMIN — HYDRALAZINE HYDROCHLORIDE 10 MG: 20 INJECTION INTRAMUSCULAR; INTRAVENOUS at 07:10

## 2019-10-11 RX ADMIN — HYDRALAZINE HYDROCHLORIDE 10 MG: 20 INJECTION INTRAMUSCULAR; INTRAVENOUS at 11:10

## 2019-10-11 RX ADMIN — PROPOFOL 40 MCG/KG/MIN: 10 INJECTION, EMULSION INTRAVENOUS at 05:10

## 2019-10-11 RX ADMIN — INSULIN ASPART 2 UNITS: 100 INJECTION, SOLUTION INTRAVENOUS; SUBCUTANEOUS at 12:10

## 2019-10-11 RX ADMIN — BENZOCAINE, BUTAMBEN, AND TETRACAINE HYDROCHLORIDE 1 SPRAY: .028; .004; .004 AEROSOL, SPRAY TOPICAL at 01:10

## 2019-10-11 RX ADMIN — PROPOFOL 20 MCG/KG/MIN: 10 INJECTION, EMULSION INTRAVENOUS at 11:10

## 2019-10-11 RX ADMIN — SODIUM CHLORIDE: 0.9 INJECTION, SOLUTION INTRAVENOUS at 08:10

## 2019-10-11 RX ADMIN — CLONIDINE HYDROCHLORIDE 0.3 MG: 0.1 TABLET ORAL at 09:10

## 2019-10-11 RX ADMIN — HEPARIN SODIUM 5000 UNITS: 5000 INJECTION INTRAVENOUS; SUBCUTANEOUS at 06:10

## 2019-10-11 RX ADMIN — METHYLPREDNISOLONE SODIUM SUCCINATE 40 MG: 40 INJECTION, POWDER, FOR SOLUTION INTRAMUSCULAR; INTRAVENOUS at 09:10

## 2019-10-11 NOTE — ANESTHESIA PROCEDURE NOTES
Intubation    Diagnosis: Angioedema  Doctor requesting consult: ED physician  Patient location during procedure: ED  Procedure start time: 10/11/2019 1:40 AM  Timeout: 10/11/2019 1:30 AM  Procedure end time: 10/11/2019 2:05 AM    Staffing  Authorizing Provider: Teetee Duncan MD  Performing Provider: Teetee Duncan MD    Anesthesiologist was present at the time of the procedure.  Preanesthetic Checklist  Completed: patient identified, site marked, surgical consent, pre-op evaluation, timeout performed, IV checked, risks and benefits discussed, monitors and equipment checked and anesthesia consent given  Intubation  Indication: airway protection  Pre-oxygenation. Induction: intravenous, mask ventilation: easy with oral airway.  Intubation: postinduction, laryngoscopy glidescope, Glidescope.  Endotracheal Tube: oral, 7.0 mm ID, cuffed (inflated to minimal occlusive pressure)  Attempts: 4 or more, Grade II - cords partially seen  Complicating Factors: large/floppy epiglottis, obesity, short neck and anterior larynx  Tube secured at 25 cm at the lips.  Findings post-intubation: bilateral breath sounds, atraumatic / condition of teeth unchanged  Position Confirmation: auscultation  Additional Notes  Attempted fiberoptic intubation awake nasal then oral with multiple attempts due to copious secretions and patient movement. Tongue swelling not severe and patient maintained patent airway and sats % throughout the procedure. ED physician elected to try oral ETT with glidescope S3 blade and got it on second attempt.

## 2019-10-11 NOTE — ASSESSMENT & PLAN NOTE
Suspect mostly related to sedation. Baseline LUE weakness from prior CVA. CT imaging brain is normal. W/D to pain on all extremities on my evaluation. Does have reported underlying seizure d/o on vimpat.     -- Hold all sedation  -- If no appropriate improvement would consider MRI +/- EEG   -- Neurology consult noted   -- Continue Vimpat for now  -- As above, her MS remains main barrier to extubation at present.

## 2019-10-11 NOTE — HPI
"patient is a 69 y.o female who presents to the ED complaining of tongue swelling that began PTA. Patient has slurred speech, drooling, and tongue swelling. Patient denies any pain, itching, CP, nausea, vomiting, or fever. Per sisters, patient is normally talkative and active. Her sisters are unaware of any cause of onset, stating that the patient lives alone. PMHx of CVA 1 yr ago, DM, HTN, HLD, and obesity. Patient is allergic to codeine and ace inhibitors.      The history is provided by the patient and a relative. History limited by: Acuity of condition. No  was used.     Pt intubated in ED for airway protection ?laryngeal edema - not noted in ED documentation.  Pt started on IV steroids and H2 blocker on vent. In ICU, pt showed sluggish neuro response to pain and unable to follow commands. Propofol taken off and neuro status improved. Pulm consulted for vent management. Neuro consulted - h/o seizures.  Seen in ED yesterday am with "leg shaking" and weakness and dc'd home. H/o seizures on vimpat.    "

## 2019-10-11 NOTE — PLAN OF CARE
SW contacted patient's son Davis to complete discharge assessment over the phone. Patient was intubated and could not complete assessment. Prior to beginning the discharge planning assessment, patient verified name and date of birth. SW educated patient on the discharge process and explained that discharge planning begins at admission. Son stated that he would like to put patient in an nursing home. Son stated that it is getting really hard to care for patient. SW inquired about placement preferences.Son stated that he would create a list and call SW back.      10/11/19 8636   Discharge Assessment   Assessment Type Discharge Planning Assessment   Confirmed/corrected address and phone number on facesheet? Yes   Assessment information obtained from? Caregiver   Expected Length of Stay (days) 3   Communicated expected length of stay with patient/caregiver no   Prior to hospitilization cognitive status: Alert/Oriented   Prior to hospitalization functional status: Needs Assistance   Current cognitive status: Unable to Assess   Current Functional Status: Completely Dependent   Facility Arrived From: Home   Lives With alone   Able to Return to Prior Arrangements yes   Is patient able to care for self after discharge? Yes   Patient's perception of discharge disposition home or selfcare   Readmission Within the Last 30 Days no previous admission in last 30 days   Patient currently being followed by outpatient case management? No   Patient currently receives any other outside agency services? No   Equipment Currently Used at Home bedside commode;walker, rolling;wheelchair;hospital bed   Do you have any problems affording any of your prescribed medications? No   Is the patient taking medications as prescribed? yes   Does the patient have transportation home? Yes   Transportation Anticipated family or friend will provide   Does the patient receive services at the [facility]? No   Discharge Plan A New Nursing Home placement  - [facility] care facility   Discharge Plan B New Nursing Home placement - [facility] care facility   DME Needed Upon Discharge  none   Patient/Family in Agreement with Plan yes   Desmond Yang MD    [facility] - [place], [place] [address]  [address]  [place] [address]  Phone: [phone] Fax: [phone]    [facility] [address] [facility]  [address] [place] [address]  [address]  [facility] [address]  Phone: [phone] Fax: [phone]

## 2019-10-11 NOTE — H&P
"[facility] Medicine  History & Physical    Patient Name: Sherrie Alex  MRN: [id number]  Admission Date: 10/11/2019  Attending Physician: Elena Bauer MD   Primary Care Provider: Desmond Yang MD         Patient information was obtained from ER records.     Subjective:     Principal Problem:Angio-edema    Chief Complaint:   Chief Complaint   Patient presents with    Angioedema     pt in with complaint of throat and tongue swelling.denies pain or sob at present         HPI: patient is a 69 y.o female who presents to the ED complaining of tongue swelling that began PTA. Patient has slurred speech, drooling, and tongue swelling. Patient denies any pain, itching, CP, nausea, vomiting, or fever. Per sisters, patient is normally talkative and active. Her sisters are unaware of any cause of onset, stating that the patient lives alone. PMHx of CVA 1 yr ago, DM, HTN, HLD, and obesity. Patient is allergic to codeine and ace inhibitors.      The history is provided by the patient and a relative. History limited by: Acuity of condition. No  was used.     Pt intubated in ED for airway protection ?laryngeal edema - not noted in ED documentation.  Pt started on IV steroids and H2 blocker on vent. In ICU, pt showed sluggish neuro response to pain and unable to follow commands. Propofol taken off and neuro status improved. Pulm consulted for vent management. Neuro consulted - h/o seizures.  Seen in ED yesterday am with "leg shaking" and weakness and dc'd home. H/o seizures on vimpat.      Past Medical History:   Diagnosis Date    Anemia     Anxiety     Diabetes mellitus     Hyperlipidemia     Hypertension     Obesity     Proteinuria     Sleep apnea        Past Surgical History:   Procedure Laterality Date    APPENDECTOMY       SECTION      CHOLECYSTECTOMY      DILATION AND CURETTAGE OF UTERUS      TUBAL LIGATION         Review of patient's allergies " "indicates:   Allergen Reactions    Codeine Anxiety    Ace inhibitors     Diphenhydramine hcl        Current Facility-Administered Medications on File Prior to Encounter   Medication    [COMPLETED] cloNIDine tablet 0.3 mg     Current Outpatient Medications on File Prior to Encounter   Medication Sig    amlodipine (NORVASC) 5 MG tablet Take 5 mg by mouth once daily.      blood sugar diagnostic (GLUCOSE BLOOD) Strp by Misc.(Non-Drug; Combo Route) route.      cetirizine (ZYRTEC) 5 MG tablet Take 5 mg by mouth once daily.    cholecalciferol, vitamin D3, 10,000 unit Tab Take 1 tablet by mouth once daily.    cloNIDine (CATAPRES) 0.3 MG tablet Take 0.3 mg by mouth 2 (two) times daily.    ergocalciferol (VITAMIN D2) 50,000 unit Cap Take 50,000 Units by mouth every 7 days.      hydrALAZINE (APRESOLINE) 100 MG tablet Take 1 tablet (100 mg total) by mouth 2 (two) times daily.    hydrALAZINE (APRESOLINE) 50 MG tablet Take 50 mg by mouth 3 (three) times daily.    insulin aspart U-100 (NOVOLOG) 100 unit/mL injection Inject 10 Units into the skin 3 (three) times daily before meals.    insulin detemir U-100 (LEVEMIR) 100 unit/mL injection Inject 23 Units into the skin every evening.    insulin glargine (LANTUS SOLOSTAR) 100 unit/mL (3 mL) InPn Inject 24 Units into the skin every evening.     insulin needles, disposable, (PEN NEEDLE) 31 X 5/16 " Ndle by Misc.(Non-Drug; Combo Route) route.      lacosamide (VIMPAT) 100 mg Tab Take 50 mg by mouth 2 (two) times daily.      LANCETS MISC by Misc.(Non-Drug; Combo Route) route 4 (four) times daily.      levothyroxine (SYNTHROID) 137 MCG Tab tablet Take 1 tablet (137 mcg total) by mouth once daily.    linagliptin (TRADJENTA) 5 mg Tab tablet Take 5 mg by mouth once daily.      lubiprostone (AMITIZA) 24 MCG Cap Take 1 capsule (24 mcg total) by mouth 2 (two) times daily with meals.    meclizine (ANTIVERT) 25 mg tablet Take 25 mg by mouth 3 (three) times daily as needed.    " metoprolol succinate (TOPROL-XL) 100 MG 24 hr tablet Take 100 mg by mouth 2 (two) times daily.      nitroGLYCERIN (NITROSTAT) 0.4 MG SL tablet Place 0.4 mg under the tongue.    rosuvastatin (CRESTOR) 20 MG tablet Take 20 mg by mouth once daily.     Family History     Problem Relation (Age of Onset)    Diabetes Brother, Sister    Hypertension Brother    Liver disease Father        Tobacco Use    Smoking status: Never Smoker   Substance and Sexual Activity    Alcohol use: No    Drug use: No    Sexual activity: Yes     Partners: Male     Review of Systems   Unable to perform ROS: Intubated     Objective:     Vital Signs (Most Recent):  Temp: 97.8 °F (36.6 °C) (10/11/19 1104)  Pulse: 70 (10/11/19 1145)  Resp: 17 (10/11/19 1145)  BP: (!) 213/96 (10/11/19 1145)  SpO2: 99 % (10/11/19 1145) Vital Signs (24h Range):  Temp:  [97.4 °F (36.3 °C)-98.2 °F (36.8 °C)] 97.8 °F (36.6 °C)  Pulse:  [52-70] 70  Resp:  [13-22] 17  SpO2:  [97 %-100 %] 99 %  BP: (128-242)/() 213/96     Weight: 125.9 kg (277 lb 9 oz)  Body mass index is 46.19 kg/m².    Physical Exam   Constitutional: She appears well-developed and well-nourished.   HENT:   Head: Normocephalic and atraumatic.   ETT in place with drooling from mouth   Eyes: Pupils are equal, round, and reactive to light. EOM are normal.   Neck: Normal range of motion. Neck supple.   Cardiovascular: Normal rate, regular rhythm, normal heart sounds and intact distal pulses.   Pulmonary/Chest: No respiratory distress. She has no wheezes. She has no rales.   Abdominal: Soft. Bowel sounds are normal. She exhibits no distension.   Musculoskeletal: Normal range of motion. She exhibits no edema.   Neurological:   On vent, off sedation, following commands, Left  < right. Strong thumbs up of right than left. Bilateral strength equal with plantar and dorsiflexion of  Feet. Responds to pain x 4    Skin: Skin is warm and dry. Capillary refill takes less than 2 seconds.         CRANIAL  NERVES     CN III, IV, VI   Pupils are equal, round, and reactive to light.  Extraocular motions are normal.        Significant Labs:   A1C:   Recent Labs   Lab 10/02/19  1220   HGBA1C 6.9*     ABGs:   Recent Labs   Lab 10/11/19  0426   PH 7.392   PCO2 37.9   HCO3 23.0*   POCSATURATED 98   BE -2     CBC:   Recent Labs   Lab 10/10/19  0053 10/10/19  1015   WBC 9.69 8.78   HGB 11.5* 11.7*   HCT 38.8 39.5    153     CMP:   Recent Labs   Lab 10/10/19  0053 10/10/19  1015    138   K 3.7 3.9    102   CO2 24 25   * 121*   BUN 24* 28*   CREATININE 1.8* 1.9*   CALCIUM 9.9 10.2   PROT 7.6 8.0   ALBUMIN 3.5 3.7   BILITOT 0.7 0.7   ALKPHOS 87 94   AST 11 11   ALT 9* 10   ANIONGAP 10 11   EGFRNONAA 28* 26*     Cardiac Markers: No results for input(s): CKMB, MYOGLOBIN, BNP, TROPISTAT in the last 48 hours.  Coagulation:   Recent Labs   Lab 10/10/19  0053   INR 1.1     Lactic Acid: No results for input(s): LACTATE in the last 48 hours.  Magnesium:   Recent Labs   Lab 10/10/19  1015   MG 2.0     POCT Glucose:   Recent Labs   Lab 10/11/19  0650   POCTGLUCOSE 204*     Troponin:   Recent Labs   Lab 10/10/19  0053   TROPONINI 0.015     TSH:   Recent Labs   Lab 10/10/19  0053   TSH 3.658     Urine Studies:   Recent Labs   Lab 10/10/19  1015   COLORU Yellow   APPEARANCEUA Clear   PHUR 5.0   SPECGRAV 1.010   PROTEINUA 1+*   GLUCUA Negative   KETONESU Negative   BILIRUBINUA Negative   OCCULTUA Negative   NITRITE Negative   UROBILINOGEN Negative   LEUKOCYTESUR Trace*   RBCUA 0   WBCUA 6*   BACTERIA Occasional   SQUAMEPITHEL 2   HYALINECASTS 3*       Significant Imaging: Head CT  Impression       No CT evidence of acute intracranial abnormality. Clinical correlation and further evaluation as warranted.    Chronic senescent and microvascular ischemic changes.     Impression CT soft tissue of neck       1.  Limited assessment of the aerodigestive tract secondary to the presence of an endotracheal tube and lack of IV  contrast.  No evidence of subcutaneous emphysema or focal fluid collection within the deep spaces of the neck.    2.  Secretions within the posterior nasopharynx presumably relating to intubated state.    3.  Subcentimeter low-attenuation right thyroid lobe nodule.    4.  Mild bilateral dependent opacities within the visualized lungs which may relate to atelectasis, although edema or developing infection not excluded.     FINDINGS: CXR  There has been interval placement of an endotracheal tube with tip terminating approximately 2.0 cm above the cici.  There is an esophagogastric tube now present coursing below the diaphragm and outside the field of view of the examination.  Cardiomediastinal silhouette remains mildly enlarged.  There are low lung volumes bilaterally limiting assessment with patchy bibasilar subsegmental opacities.  No evidence of pneumothorax.      Assessment/Plan:     * Angio-edema  Possible etiology to compromised airway- unclear at this point  IV steroid x 2 days  Allergy to benadryl  On H2 blocker  Intubated and pulm consulted for vent management       RYLEE (acute kidney injury)  Cr slightly more elevated at 1.9, baseline 1.4-1.7  IVF and bmp in am       Seizure  Resume vimpat  Unsure if seizure activity was involved for this admission  Neurology consulted       Acquired hypothyroidism  Resume synthroid   Check TFTs    Essential hypertension  Uncontrolled  Resume home meds: norvasc, clonidine, hydralazine, toprol  IV PRN hydralazine      Type 2 diabetes mellitus with circulatory disorder, with long-term current use of insulin  Uncontrolled  A1c 6.9%  Resume levemir and SSI      Anxiety  Propofol for sedation       Morbid obesity  Discuss weight management after extubation       Dyslipidemia  Resume statin        VTE Risk Mitigation (From admission, onward)         Ordered     heparin (porcine) injection 5,000 Units  Every 12 hours      10/11/19 0617     IP VTE HIGH RISK PATIENT  Once       10/11/19 0532     Place ELIEZER hose  Until discontinued      10/11/19 0532     Place sequential compression device  Until discontinued      10/11/19 0532              Critical care time spent on the evaluation and treatment of severe organ dysfunction, review of pertinent labs and imaging studies, discussions with consulting providers and discussions with patient/family: 30 minutes.     Elena Bauer MD  Department of Hospital Medicine   [facility]

## 2019-10-11 NOTE — ASSESSMENT & PLAN NOTE
Possible etiology to compromised airway- unclear at this point  IV steroid x 2 days  Allergy to benadryl  On H2 blocker  Intubated and pulm consulted for vent management

## 2019-10-11 NOTE — HPI
68 y/o female with multiple prior CVA in past. Baseline wheelchair bound with residual Lt. Sided deficits. HTN. HLD and DM. Presented to ED yesterday morning secondary to LE weakness and difficulty getting out of recliner at home. She was evaluated in ED with slightly elevated Cr and managed as volume depletion. RT ED latter that evening with onset of reported tongue swelling drooling and dysarthria. No clear precipitant prior to onset. Does have h/o ACEi related angioedema in past but not currently on medication. Due to concern for potential airway compromise she underwent intubation- 4 attempts (fiber optic, nasotracheal, 2 DL.). No reported edema or significant hypoxia with procedure. Family is currently at bedside and reports adjustments to meds by PCP about 1 week prior.     She remains intubated in ICU. I have been asked to evaluate patient and assist in patient care, vent cortney.

## 2019-10-11 NOTE — NURSING
Patient hypertensive; current pressure 187/92, MD Ricketts aware; verbalized permissive HTN <210 okay, pending MRI head.

## 2019-10-11 NOTE — SIGNIFICANT EVENT
Therapist called to ED room 11 for patient intubation.  Upon arrival, patient was pre-oxygenated with 100% oxygen.  Patient was then intubated by ED doctor - Dr. Colón.  A size 7.0 ET tube was placed and was secured at the 25 cm erick at the lips in the center of the mouth.  Tube placement was verified via use of a CO2 detector device with positive color change.  Patient was then placed on a Servo I Ventilator with settings as follows:  PRVC 12/ 400/ +5 PEEP/ 40%.  Ventilator alarms are set and functional and the AMBU bag is at the HOB.

## 2019-10-11 NOTE — CARE UPDATE
[facility]  ICU Multidisciplinary Bedside Rounds     UPDATE     Date: 10/11/2019      Plan of care reviewed with the following, Nurse, Charge Nurse, Physician, , Resp. Therapist, Infection Prevention and Dietician.       Needs/ Goals for the day: Daily awakening trial and full neuro assessment pending. Will continue on ventilator since she was technically intubated this AM. Hypertension resolved with one time hydralazine IV. Son was at bedside this morning to review POC with CC doctor.       Level of Care: Critical Care

## 2019-10-11 NOTE — ASSESSMENT & PLAN NOTE
Unclear precipitant. Oropharyngeal exam with minimal edema on my assessment. + cuff leak.     -- continue H2 antagonist, corticosteroids.  -- Allergy to benadryl.. Can initiate Zyrtec as alternative   -- Maintain airway protection today as mental status remains main barrier.

## 2019-10-11 NOTE — PROGRESS NOTES
Pt received on Servo I vent with settings as followed: PRVC 12/450/+5 and 40%.  Size 6.5 ETT in place and secure at 24 cm at the lip.  Ambu bag and mask at bedside and all alarms on and functioning. NARN. RT will continue to monitor pt status.

## 2019-10-11 NOTE — PROVIDER TRANSFER
Patient returned to room _17_ from transport.  Patient placed back on ventilator on previous settings.  Ventilator and alarms on and functioning.  AMBU bag and mask at bedside.  Nurse notified

## 2019-10-11 NOTE — PLAN OF CARE
Recommendations    Recommendation/Intervention:   1. If medically able initate TF of  Diabetisource @ 40mL/hr to provide 1152 kcal, 58g pro, 785 mL free fluid. Additional fluid per MD.   2. If extubated, recommend 1500 ADA diet.     Goals: Initate nutrition intervention within 48 hrs  Nutrition Goal Status: new  Communication of RD Recs: other (comment)(POC)

## 2019-10-11 NOTE — CARE UPDATE
[facility]  ICU Multidisciplinary Bedside Rounds   SUMMARY     Date: 10/11/2019    Prehospitalization: Home  Admit Date / LOS : 10/11/2019/ 0 days    Diagnosis: <principal problem not specified>    Consults:        Active: Pulm CC       Needed: N/A     Code Status: Full Code   Advanced Directive: <no information>    LDA: Guevara, OG, PIV and Vent       Central Lines/Site/Justification:Patient Does Not Have Central Line       Urinary Cath/Order/Justification:Critically Ill in ICU    Vasopressors/Infusions:    sodium chloride 0.9% 125 mL/hr at 10/11/19 0600    propofol 25 mcg/kg/min (10/11/19 0800)          GOALS: Volume/ Hemodynamic: N/A                     RASS: -2  light sedation, briefly awakes to voice (eye opening)    CAM ICU: N/A  Pain Management: IV       Pain Controlled: yes     Rhythm: SB    Respiratory Device: Vent    Vent Mode: PRVC  Oxygen Concentration (%):  [40] 40  Resp Rate Total:  [13 br/min-17 br/min] 14 br/min  Vt Set:  [400 mL-450 mL] 450 mL  PEEP/CPAP:  [5 cmH20] 5 cmH20  Mean Airway Pressure:  [8 cmH20-10.1 cmH20] 9 cmH20             Most Recent SBT/ SAT: N/A       MOVE Screen: FAIL    VTE Prophylaxis: Pharm and Mechanical  Mobility: Bedrest  Stress Ulcer Prophylaxis: Yes    Dietary: NPO  Tolerance: n/a  /  Advancement: no    Isolation: No active isolations    Restraints: Yes    Significant Dates:  Post Op Date: N/A  Rescue Date: N/A  Imaging/ Diagnostics: CT head and neck 10/11/19    Noteworthy Labs:  BUN 28, creat 1.9    CBC/Anemia Labs: Coags:    Recent Labs   Lab 10/10/19  0053 10/10/19  1015   WBC 9.69 8.78   HGB 11.5* 11.7*   HCT 38.8 39.5    153   MCV 67* 67*   RDW 18.1* 17.6*    Recent Labs   Lab 10/10/19  0053   INR 1.1        Chemistries:   Recent Labs   Lab 10/10/19  0053 10/10/19  1015    138   K 3.7 3.9    102   CO2 24 25   BUN 24* 28*   CREATININE 1.8* 1.9*   CALCIUM 9.9 10.2   PROT 7.6 8.0   BILITOT 0.7 0.7   ALKPHOS 87 94   ALT 9* 10   AST 11  11   MG  --  2.0        Cardiac Enzymes: Ejection Fractions:    Recent Labs     10/10/19  0053   TROPONINI 0.015    No results found for: EF     POCT Glucose: HbA1c:    Recent Labs   Lab 10/11/19  0650   POCTGLUCOSE 204*    Hemoglobin A1C   Date Value Ref Range Status   10/02/2019 6.9 (H) 4.0 - 5.6 % Final     Comment:     ADA Screening Guidelines:  5.7-6.4%  Consistent with prediabetes  >or=6.5%  Consistent with diabetes  High levels of fetal hemoglobin interfere with the HbA1C  assay. Heterozygous hemoglobin variants (HbS, HgC, etc)do  not significantly interfere with this assay.   However, presence of multiple variants may affect accuracy.     06/03/2008 6.7 (H) 4.0 - 6.2 % Final   02/13/2006 6.2 4.5 - 6.2 % Final        Needs from Care Team: monitor swelling, wean vent, control blood pressure     ICU LOS 2h  Level of Care: Critical Care

## 2019-10-11 NOTE — ED PROVIDER NOTES
Encounter Date: 10/11/2019       History     Chief Complaint   Patient presents with    Angioedema     pt in with complaint of throat and tongue swelling.denies pain or sob at present      CC: Angioedema    HPI: The patient is a 69 y.o female who presents to the ED complaining of tongue swelling that began PTA. Patient has slurred speech, drooling, and tongue swelling. Patient denies any pain, itching, CP, nausea, vomiting, or fever. Per sisters, patient is normally talkative and active. Her sisters are unaware of any cause of onset, stating that the patient lives alone. PMHx of CVA 1 yr ago, DM, HTN, HLD, and obesity. Patient is allergic to codeine and ace inhibitors.     The history is provided by the patient and a relative. History limited by: Acuity of condition. No  was used.     Review of patient's allergies indicates:   Allergen Reactions    Codeine Anxiety    Ace inhibitors     Diphenhydramine hcl      Past Medical History:   Diagnosis Date    Anemia     Anxiety     Diabetes mellitus     Hyperlipidemia     Hypertension     Obesity     Proteinuria     Sleep apnea      Past Surgical History:   Procedure Laterality Date    APPENDECTOMY       SECTION      CHOLECYSTECTOMY      DILATION AND CURETTAGE OF UTERUS      TUBAL LIGATION       Family History   Problem Relation Age of Onset    Liver disease Father     Diabetes Brother     Hypertension Brother     Diabetes Sister      Social History     Tobacco Use    Smoking status: Never Smoker   Substance Use Topics    Alcohol use: No    Drug use: No     Review of Systems   Unable to perform ROS: Acuity of condition   HENT:        Tongue swelling.    Neurological: Positive for speech difficulty.       Physical Exam     Initial Vitals [10/11/19 0041]   BP Pulse Resp Temp SpO2   (!) 196/93 (!) 56 20 98.1 °F (36.7 °C) 98 %      MAP       --         Physical Exam    Nursing note and vitals reviewed.  Constitutional: She  appears well-developed and well-nourished. She is not diaphoretic. No distress.   HENT:   Head: Normocephalic and atraumatic.   Tongue hanging out of mouth. Appears to be edematous. No lip swelling. Unable to tolerate secretions. Muffled voice. No definite elevation of the floor of her mouth.    Eyes: EOM are normal. Pupils are equal, round, and reactive to light.   Neck: Normal range of motion. Neck supple.   Cardiovascular: Normal rate and regular rhythm.   Pulmonary/Chest: She has rales.   Bilateral rales.    Abdominal: Soft. There is no tenderness.   Musculoskeletal: She exhibits no edema.   Neurological: She is alert and oriented to person, place, and time. She has normal strength. No cranial nerve deficit. GCS score is 15. GCS eye subscore is 4. GCS verbal subscore is 5. GCS motor subscore is 6.   Moving all extremities. No facial asymmetry. Left upper and left lower extremity strength decreased compared to right, chronic per patient and patient's sisters   Skin: Skin is warm and dry. Capillary refill takes less than 2 seconds.   Psychiatric: She has a normal mood and affect. Her behavior is normal.         ED Course   Critical Care  Date/Time: 10/11/2019 3:31 AM  Performed by: Raza Colón MD  Authorized by: Raza Colón MD   Direct patient critical care time: 45 minutes  Total critical care time (exclusive of procedural time) : 45 minutes  Critical care was necessary to treat or prevent imminent or life-threatening deterioration of the following conditions: respiratory failure.    Intubation  Date/Time: 10/11/2019 3:32 AM  Performed by: Raza Colón MD  Authorized by: Raza Colón MD   Consent Done: Emergent Situation  Indications: respiratory distress and  airway protection  Intubation method: video-assisted  Patient status: paralyzed (RSI)  Preoxygenation: BVM  Tube size: 7.0 mm  Tube type: cuffed  Number of attempts: 2  Cricoid pressure: yes  Cords visualized:  yes  Post-procedure assessment: chest rise and CO2 detector  Breath sounds: equal and absent over the epigastrium  Cuff inflated: yes  Tube secured with: ETT melo  Chest x-ray findings: endotracheal tube in appropriate position  Comments: Intubation performed as part of attempt for awake fiberoptic intubation.  Please see medical decision making note for course.  Patient sedated with midazolam and ketamine per anesthesia.  After failed fiberoptic intubation, initially attempted intubation without paralysis by glide scope.  Patient did not tolerate.  Succinylcholine administered.  Sec attempt successful without difficulty.  No desaturation in between attempts.        Labs Reviewed   CBC W/ AUTO DIFFERENTIAL - Abnormal; Notable for the following components:       Result Value    RBC 5.81 (*)     Hemoglobin 11.5 (*)     Mean Corpuscular Volume 67 (*)     Mean Corpuscular Hemoglobin 19.8 (*)     Mean Corpuscular Hemoglobin Conc 29.6 (*)     RDW 18.1 (*)     Gran% 73.9 (*)     Lymph% 16.0 (*)     All other components within normal limits   COMPREHENSIVE METABOLIC PANEL - Abnormal; Notable for the following components:    Glucose 143 (*)     BUN, Bld 24 (*)     Creatinine 1.8 (*)     ALT 9 (*)     eGFR if  32 (*)     eGFR if non  28 (*)     All other components within normal limits   PROTIME-INR   TSH   TROPONIN I   POCT GLUCOSE, HAND-HELD DEVICE          Imaging Results          X-Ray Chest AP Portable (Final result)  Result time 10/11/19 03:07:13    Final result by Edgardo Donaldson MD (10/11/19 03:07:13)                 Impression:      As above.      Electronically signed by: Edgardo Donaldson MD  Date:    10/11/2019  Time:    03:07             Narrative:    EXAMINATION:  XR CHEST AP PORTABLE    CLINICAL HISTORY:  Other specified postprocedural states    TECHNIQUE:  Single frontal view of the chest was performed.    COMPARISON:  10/11/2019 0109 hours    FINDINGS:  There has been  interval placement of an endotracheal tube with tip terminating approximately 2.0 cm above the cici.  There is an esophagogastric tube now present coursing below the diaphragm and outside the field of view of the examination.  Cardiomediastinal silhouette remains mildly enlarged.  There are low lung volumes bilaterally limiting assessment with patchy bibasilar subsegmental opacities.  No evidence of pneumothorax.                               CT Head Without Contrast (In process)                CT Soft Tissue Neck WO Contrast (In process)                X-Ray Chest AP Portable (Final result)  Result time 10/11/19 01:13:53    Final result by Vasile Cha MD (10/11/19 01:13:53)                 Impression:      No acute cardiopulmonary process identified.      Electronically signed by: Vasile Cha MD  Date:    10/11/2019  Time:    01:13             Narrative:    EXAMINATION:  XR CHEST AP PORTABLE    CLINICAL HISTORY:  Stroke;    TECHNIQUE:  Single frontal view of the chest was performed.    COMPARISON:  None    FINDINGS:  Cardiac silhouette is mildly enlarged, noting magnification on this single AP view.  Lungs are symmetrically expanded.  There is mild right hilar prominence.  No evidence of focal consolidative process, pneumothorax, or significant effusion.  No acute osseous abnormality identified.                                 Medical Decision Making:   History:   Old Medical Records: I decided to obtain old medical records.  Initial Assessment:   69-year-old female with history of stroke, hypertension, diabetes, obesity presenting with acute onset tongue swelling. On arrival, patient has tongue hanging out of mouth.  Not tolerating secretions.  Muffled voice, unable to significantly keep tongue inside mouth.  No floor edema or swelling. Compartments underneath the chin soft.  Symptoms rapidly progressing since approximately 830 at night.  Patient with some wheezing, rales.  Patient reports shortness of  breath. Differential includes angioedema versus anaphylaxis.  Less likely Jean's.  Afebrile, not tachycardic.  Anesthesia consulted for awake fiberoptic intubation.  Verbal consent given given emergent situation.  Anesthesia attempted fiberoptic intubation both nasally and orally with difficulty for approximately 45 min.  During this time, adequate sedation was maintained using midazolam and ketamine. Given difficulty fiberoptically, we elected to attempt intubation with glide scope.  The patient did not desaturate during any of the attempts and was breathing spontaneously on her own.  A good grade 1 view was obtained on initial attempt without paralysis.  The tube was unable to be passed due to significant cough and gag reflex. Succinylcholine was administered with a 2nd attempt easily successful.  The patient did not desaturate in between attempts.     The patient was recently evaluated here for lower extremity shaking or weakness. I suspect angioedema versus acute allergic reaction.  I have less of a concern for stroke given she is at her neurologic baseline.  Presentation was slightly odd given her reluctance to talk, and some degree of somnolence.  Family at bedside states this is her baseline.  The patient denies any acute complaints besides the tongue swelling. Vital signs are normal or slightly hypertensive.  Patient was sedated using propofol with good sedation achieved.  She will be admitted to the ICU for likely angioedema.  CT of her head neck is being performed to further rule out stroke as well as assess for other causes of airway swelling. Patient will be admitted to the ICU for further evaluation and treatment of airway swelling.            Scribe Attestation:   Scribe #1: I performed the above scribed service and the documentation accurately describes the services I performed. I attest to the accuracy of the note.           I, Raza Colón, personally performed the services described in this  documentation. All medical record entries made by the scribe were at my direction and in my presence.  I have reviewed the chart and agree that the record reflects my personal performance and is accurate and complete.       Clinical Impression:       ICD-10-CM ICD-9-CM   1. Stroke I63.9 434.91   2. Required emergency intubation Z98.890 V72.83         Disposition:   Disposition: Admitted  Condition: Stable                        Rzaa Colón MD  10/11/19 0341

## 2019-10-11 NOTE — PROGRESS NOTES
Patient returned to room _280___ from transport.  Patient placed back on ventilator on previous settings.  Ventilator and alarms on and functioning.  AMBU bag and mask at bedside.  Nurse notified

## 2019-10-11 NOTE — ASSESSMENT & PLAN NOTE
Intubated for airway protection and concern for angioedema. Cuff leak this AM. Minimal vent support and gas exchange/lung mechanics appropriate. Mental status remains main barrier to extubation at present.. CT imaging with motion artifact. Some scattered GGO likely atelectasis in setting of profound obesity. Normal EF on prior echo with DD and mildly elevated PAP    -- LPV. Wean FiO2 to maintain SpO2 >88%   -- SAT/SBT when mental status allows.   -- Has history of DEYANIRA without evidence of significant prior hypercapnia.. Plan for extubation to NIPPV.   -- Check BNP   -- Repeat echo   -- Keep euvolemic

## 2019-10-11 NOTE — CONSULTS
[facility]  Pulmonology  Consult Note    Patient Name: Sherrie Alex  MRN: [id number]  Admission Date: 10/11/2019  Hospital Length of Stay: 0 days  Code Status: Full Code  Attending Physician: Elena Bauer MD  Primary Care Provider: Desmond Yang MD   Principal Problem: Angio-edema    Consults  Subjective:     HPI:  70 y/o female with multiple prior CVA in past. Baseline wheelchair bound with residual Lt. Sided deficits. HTN. HLD and DM. Presented to ED yesterday morning secondary to LE weakness and difficulty getting out of recliner at home. She was evaluated in ED with slightly elevated Cr and managed as volume depletion. RT ED latter that evening with onset of reported tongue swelling drooling and dysarthria. No clear precipitant prior to onset. Does have h/o ACEi related angioedema in past but not currently on medication. Due to concern for potential airway compromise she underwent intubation- 4 attempts (fiber optic, nasotracheal, 2 DL.). No reported edema or significant hypoxia with procedure. Family is currently at bedside and reports adjustments to meds by PCP about 1 week prior.     She remains intubated in ICU. I have been asked to evaluate patient and assist in patient care, vent [facility].     Past Medical History:   Diagnosis Date    Anemia     Anxiety     Diabetes mellitus     Hyperlipidemia     Hypertension     Obesity     Proteinuria     Sleep apnea        Past Surgical History:   Procedure Laterality Date    APPENDECTOMY       SECTION      CHOLECYSTECTOMY      DILATION AND CURETTAGE OF UTERUS      TUBAL LIGATION         Review of patient's allergies indicates:   Allergen Reactions    Codeine Anxiety    Ace inhibitors     Diphenhydramine hcl        Family History     Problem Relation (Age of Onset)    Diabetes Brother, Sister    Hypertension Brother    Liver disease Father        Tobacco Use    Smoking status: Never Smoker   Substance and Sexual  Activity    Alcohol use: No    Drug use: No    Sexual activity: Yes     Partners: Male         Review of Systems   Unable to perform ROS: Intubated     Objective:     Vital Signs (Most Recent):  Temp: 97.8 °F (36.6 °C) (10/11/19 1104)  Pulse: (!) 58 (10/11/19 1104)  Resp: 15 (10/11/19 1104)  BP: (!) 187/92 (10/11/19 1104)  SpO2: 100 % (10/11/19 1104) Vital Signs (24h Range):  Temp:  [97.4 °F (36.3 °C)-98.2 °F (36.8 °C)] 97.8 °F (36.6 °C)  Pulse:  [52-67] 58  Resp:  [13-22] 15  SpO2:  [97 %-100 %] 100 %  BP: (128-222)/() 187/92     Weight: 125.9 kg (277 lb 9 oz)  Body mass index is 46.19 kg/m².      Intake/Output Summary (Last 24 hours) at 10/11/2019 1109  Last data filed at 10/11/2019 0900  Gross per 24 hour   Intake 106.43 ml   Output 1100 ml   Net -993.57 ml       Physical Exam   Constitutional: She appears well-developed. No distress.   HENT:   Head: Normocephalic and atraumatic.   Right Ear: External ear normal.   Left Ear: External ear normal.   Nose: Nose normal.   ET tube in place. Lips/tongue without significant edema. Mild tongue protrusion. + slight audible cuff cleak    Eyes: Pupils are equal, round, and reactive to light. Conjunctivae and EOM are normal. Right eye exhibits no discharge. Left eye exhibits no discharge. No scleral icterus.   Flutters eyes to voice.    Neck: Normal range of motion. Neck supple. No JVD present. No tracheal deviation present. No thyromegaly present.   Cardiovascular: Normal rate, regular rhythm and normal heart sounds. Exam reveals no gallop and no friction rub.   No murmur heard.  Pulmonary/Chest: No stridor. She has no wheezes. She has no rales. She exhibits no tenderness.   Intubated on MV    Abdominal: Soft. Bowel sounds are normal. She exhibits no distension and no mass. There is no tenderness. There is no rebound and no guarding. No hernia.   Musculoskeletal: She exhibits no edema, tenderness or deformity.   Lymphadenopathy:     She has no cervical adenopathy.    Neurological:   She flutters eyes to voice.   No command following on my assessment.   Withdraws to pain in all 4 extremities.    Skin: Skin is warm and dry. Capillary refill takes less than 2 seconds. No rash noted. She is not diaphoretic. No erythema. No pallor.   Nursing note and vitals reviewed.      Vents:  Vent Mode: PRVC (10/11/19 0747)  Ventilator Initiated: Yes (10/11/19 0213)  Set Rate: 12 bmp (10/11/19 0747)  Vt Set: 450 mL (10/11/19 0747)  PEEP/CPAP: 5 cmH20 (10/11/19 0747)  Oxygen Concentration (%): 30 (10/11/19 1104)  Peak Airway Pressure: 25.9 cmH2O (10/11/19 0747)  Total Ve: 6.1 mL (10/11/19 0747)  F/VT Ratio<105 (RSBI): (!) 32.26 (10/11/19 0213)    Lines/Drains/Airways     Drain                 NG/OG Tube 10/11/19 0215 orogastric 18 Fr. Left mouth less than 1 day         Urethral Catheter 10/11/19 0230 16 Fr. less than 1 day          Airway                 Airway - Non-Surgical 10/11/19 0204 Endotracheal Tube-Hi/Lo less than 1 day          Peripheral Intravenous Line                 Peripheral IV - Single Lumen 10/10/19 0901 18 G Right Antecubital 1 day         Peripheral IV - Single Lumen 10/11/19 18 G Right Antecubital less than 1 day                Significant Labs:    CBC/Anemia Profile:  Recent Labs   Lab 10/10/19  0053 10/10/19  1015   WBC 9.69 8.78   HGB 11.5* 11.7*   HCT 38.8 39.5    153   MCV 67* 67*   RDW 18.1* 17.6*        Chemistries:  Recent Labs   Lab 10/10/19  0053 10/10/19  1015    138   K 3.7 3.9    102   CO2 24 25   BUN 24* 28*   CREATININE 1.8* 1.9*   CALCIUM 9.9 10.2   ALBUMIN 3.5 3.7   PROT 7.6 8.0   BILITOT 0.7 0.7   ALKPHOS 87 94   ALT 9* 10   AST 11 11   MG  --  2.0       POC PH 7.35 - 7.45 7.392    POC PCO2 35 - 45 mmHg 37.9    POC PO2 80 - 100 mmHg 107High     POC HCO3 24 - 28 mmol/L 23.0Low     POC BE -2 to 2 mmol/L -2    POC SATURATED O2 95 - 100 % 98    POC TCO2 23 - 27 mmol/L 24    Rate  12    Sample  ARTERIAL      Troponin I 0.000 - 0.026 ng/mL  0.015      TSH 0.400 - 4.000 uIU/mL 3.658      Prothrombin Time 9.0 - 12.5 sec 11.9    INR 0.8 - 1.2 1.1      UA- normal       Microbiology-- None     Significant Imaging:   I have reviewed all pertinent imaging results/findings within the past 24 hours.  I have reviewed and interpreted all pertinent imaging results/findings within the past 24 hours.     CXR- There has been interval placement of an endotracheal tube with tip terminating approximately 2.0 cm above the cici.  There is an esophagogastric tube now present coursing below the diaphragm and outside the field of view of the examination.  Cardiomediastinal silhouette remains mildly enlarged.  There are low lung volumes bilaterally limiting assessment with patchy bibasilar subsegmental opacities.  No evidence of pneumothorax    CT Neck soft tissue-   1.  Limited assessment of the aerodigestive tract secondary to the presence of an endotracheal tube and lack of IV contrast.  No evidence of subcutaneous emphysema or focal fluid collection within the deep spaces of the neck.  2.  Secretions within the posterior nasopharynx presumably relating to intubated state.  3.  Subcentimeter low-attenuation right thyroid lobe nodule.  4.  Mild bilateral dependent opacities within the visualized lungs which may relate to atelectasis, although edema or developing infection not excluded.    CT Chest-   No CT evidence of acute intracranial abnormality. Clinical correlation and further evaluation as warranted.  Chronic senescent and microvascular ischemic changes.    Assessment/Plan:     * Angio-edema  Unclear precipitant. Oropharyngeal exam with minimal edema on my assessment. + cuff leak.     -- continue H2 antagonist, corticosteroids.  -- Allergy to benadryl.. Can initiate Zyrtec as alternative   -- Maintain airway protection today as mental status remains main barrier.     Encephalopathy, metabolic  Suspect mostly related to sedation. Baseline LUE weakness from prior CVA.  CT imaging brain is normal. W/D to pain on all extremities on my evaluation. Does have reported underlying seizure d/o on vimpat.     -- Hold all sedation  -- If no appropriate improvement would consider MRI +/- EEG   -- Neurology consult noted   -- Continue Vimpat for now  -- As above, her MS remains main barrier to extubation at present.     On mechanically assisted ventilation  Intubated for airway protection and concern for angioedema. Cuff leak this AM. Minimal vent support and gas exchange/lung mechanics appropriate. Mental status remains main barrier to extubation at present.. CT imaging with motion artifact. Some scattered GGO likely atelectasis in setting of profound obesity. Normal EF on prior echo with DD and mildly elevated PAP    -- LPV. Wean FiO2 to maintain SpO2 >88%   -- SAT/SBT when mental status allows.   -- Has history of DEYANIRA without evidence of significant prior hypercapnia.. Plan for extubation to NIPPV.   -- Check BNP   -- Repeat echo   -- Keep euvolemic     Essential hypertension  Anti-HTN per primary.     Type 2 diabetes mellitus with circulatory disorder, with long-term current use of insulin  SSI- goal 140-180     GI PPX- H2 antagonist     DVT PPX- heparin     Nutrition- NPO D#1       Thank you for your consult. I will follow-up with patient. Please contact us if you have any additional questions.       Critical Care Time: 50 minutes  Critical care was time spent personally by me on the following activities: evaluating this patient's organ dysfunction, development of treatment plan, discussing treatment plan with patient or surrogate and bedside caregivers, discussions with consultants, evaluation of patient's response to treatment, examination of patient, ordering and performing treatments and interventions, ordering and review of laboratory studies, ordering and review of radiographic studies, re-evaluation of patient's condition. This critical care time did not overlap with that of any  other provider or involve time for any procedures.      Gold Ricketts MD  Pulmonology/Critical Care Medicine   [facility]

## 2019-10-11 NOTE — PLAN OF CARE
Pt admitted overnight for angioedema. Intubated in ER, remains on vent. Propofol for sedation. Small amount of swelling to lower lip and tongue. Sinus josy, HR 50s. Hypertensive this AM, received 10 mg hydralazine IV once. Afebrile. Soft wrist restraints to prevent removal of artificial airway. Free from falls, trauma, and injury.

## 2019-10-11 NOTE — PROGRESS NOTES
Pharmacist Renal Dose Adjustment Note    Sherrie Alex is a 69 y.o. female being treated with the medication pepcid    Patient Data:    Vital Signs (Most Recent):  Temp: 98.2 °F (36.8 °C) (10/11/19 0434)  Pulse: (!) 54 (10/11/19 0440)  Resp: 14 (10/11/19 0434)  BP: (!) 172/97 (10/11/19 0434)  SpO2: 100 % (10/11/19 0440)   Vital Signs (72h Range):  Temp:  [97.8 °F (36.6 °C)-98.2 °F (36.8 °C)]   Pulse:  [53-67]   Resp:  [13-20]   BP: (128-215)/()   SpO2:  [97 %-100 %]      Recent Labs   Lab 10/10/19  0053 10/10/19  1015   CREATININE 1.8* 1.9*     Serum creatinine: 1.9 mg/dL (H) 10/10/19 1015  Estimated creatinine clearance: 36.7 mL/min (A)    Medication:Pepcid 20mg Twice a day will be changed to pepcid 20mg daily.    Pharmacist's Name: Tommy Hill  Pharmacist's Extension: [phone]

## 2019-10-11 NOTE — EICU
Brief new admit note:    69 yr old female, obese with CC: tongue/throat swelling.  HTN, CVA, DM, HLD.  Allergy to ACE/Codeine/benadryl.  Hypertensive in ED.    Awake nasal/oral Fiberoptic intubation-multiple attempts, then oral intubation with 7 ET, 2 nd attempt.     Data:  Reviewed. Creatinine 1.8,   CxR report seen, film seen. Bi basla haziness. OG tube traversing not clear below chest due to body habitus/obesity. ET in place. No Pneumo.   UA neg.CT head: no acute changes. CT neck-sub optimal due to ET tube.  Basal atelectasis.   7.39/37/107. 23.   EKG: sinus josy. qtc 418.   sec  Video:  On Vent, sedated on propofol.  450/5/12/40%. PIP 26, Ve 6.  HR 52, 162/89, 100% sats.    A/P:  1. Angioedema. Intubated for airway protection. Not clear from what?. Home meds seen. On Vimpat.  - Methylprednisone 40 mg q 8 hr for 2 days.  - POCT Glucose q 4 hrs  - Pepcid IV as SUP  - Heparin q12 sq BID as VTE ordered.    2. Unlikely  CVA. Hx of CVA. Obese.  Consider MRI if not better.    3. HTN.   - has BP meds. Goal to keep < 140.    4. DM.  Start SSI. Got steroids.  - goal to keep < 180.     5. Get KUB for OG placement confirmation.

## 2019-10-11 NOTE — SUBJECTIVE & OBJECTIVE
"Past Medical History:   Diagnosis Date    Anemia     Anxiety     Diabetes mellitus     Hyperlipidemia     Hypertension     Obesity     Proteinuria     Sleep apnea        Past Surgical History:   Procedure Laterality Date    APPENDECTOMY       SECTION      CHOLECYSTECTOMY      DILATION AND CURETTAGE OF UTERUS      TUBAL LIGATION         Review of patient's allergies indicates:   Allergen Reactions    Codeine Anxiety    Ace inhibitors     Diphenhydramine hcl        Current Facility-Administered Medications on File Prior to Encounter   Medication    [COMPLETED] cloNIDine tablet 0.3 mg     Current Outpatient Medications on File Prior to Encounter   Medication Sig    amlodipine (NORVASC) 5 MG tablet Take 5 mg by mouth once daily.      blood sugar diagnostic (GLUCOSE BLOOD) Strp by Misc.(Non-Drug; Combo Route) route.      cetirizine (ZYRTEC) 5 MG tablet Take 5 mg by mouth once daily.    cholecalciferol, vitamin D3, 10,000 unit Tab Take 1 tablet by mouth once daily.    cloNIDine (CATAPRES) 0.3 MG tablet Take 0.3 mg by mouth 2 (two) times daily.    ergocalciferol (VITAMIN D2) 50,000 unit Cap Take 50,000 Units by mouth every 7 days.      hydrALAZINE (APRESOLINE) 100 MG tablet Take 1 tablet (100 mg total) by mouth 2 (two) times daily.    hydrALAZINE (APRESOLINE) 50 MG tablet Take 50 mg by mouth 3 (three) times daily.    insulin aspart U-100 (NOVOLOG) 100 unit/mL injection Inject 10 Units into the skin 3 (three) times daily before meals.    insulin detemir U-100 (LEVEMIR) 100 unit/mL injection Inject 23 Units into the skin every evening.    insulin glargine (LANTUS SOLOSTAR) 100 unit/mL (3 mL) InPn Inject 24 Units into the skin every evening.     insulin needles, disposable, (PEN NEEDLE) 31 X 5/16 " Ndle by Misc.(Non-Drug; Combo Route) route.      lacosamide (VIMPAT) 100 mg Tab Take 50 mg by mouth 2 (two) times daily.      LANCETS MISC by Misc.(Non-Drug; Combo Route) route 4 (four) times " daily.      levothyroxine (SYNTHROID) 137 MCG Tab tablet Take 1 tablet (137 mcg total) by mouth once daily.    linagliptin (TRADJENTA) 5 mg Tab tablet Take 5 mg by mouth once daily.      lubiprostone (AMITIZA) 24 MCG Cap Take 1 capsule (24 mcg total) by mouth 2 (two) times daily with meals.    meclizine (ANTIVERT) 25 mg tablet Take 25 mg by mouth 3 (three) times daily as needed.    metoprolol succinate (TOPROL-XL) 100 MG 24 hr tablet Take 100 mg by mouth 2 (two) times daily.      nitroGLYCERIN (NITROSTAT) 0.4 MG SL tablet Place 0.4 mg under the tongue.    rosuvastatin (CRESTOR) 20 MG tablet Take 20 mg by mouth once daily.     Family History     Problem Relation (Age of Onset)    Diabetes Brother, Sister    Hypertension Brother    Liver disease Father        Tobacco Use    Smoking status: Never Smoker   Substance and Sexual Activity    Alcohol use: No    Drug use: No    Sexual activity: Yes     Partners: Male     Review of Systems   Unable to perform ROS: Intubated     Objective:     Vital Signs (Most Recent):  Temp: 97.8 °F (36.6 °C) (10/11/19 1104)  Pulse: 70 (10/11/19 1145)  Resp: 17 (10/11/19 1145)  BP: (!) 213/96 (10/11/19 1145)  SpO2: 99 % (10/11/19 1145) Vital Signs (24h Range):  Temp:  [97.4 °F (36.3 °C)-98.2 °F (36.8 °C)] 97.8 °F (36.6 °C)  Pulse:  [52-70] 70  Resp:  [13-22] 17  SpO2:  [97 %-100 %] 99 %  BP: (128-242)/() 213/96     Weight: 125.9 kg (277 lb 9 oz)  Body mass index is 46.19 kg/m².    Physical Exam   Constitutional: She appears well-developed and well-nourished.   HENT:   Head: Normocephalic and atraumatic.   ETT in place with drooling from mouth   Eyes: Pupils are equal, round, and reactive to light. EOM are normal.   Neck: Normal range of motion. Neck supple.   Cardiovascular: Normal rate, regular rhythm, normal heart sounds and intact distal pulses.   Pulmonary/Chest: No respiratory distress. She has no wheezes. She has no rales.   Abdominal: Soft. Bowel sounds are normal.  She exhibits no distension.   Musculoskeletal: Normal range of motion. She exhibits no edema.   Neurological:   On vent, off sedation, following commands, Left  < right. Strong thumbs up of right than left. Bilateral strength equal with plantar and dorsiflexion of  Feet. Responds to pain x 4    Skin: Skin is warm and dry. Capillary refill takes less than 2 seconds.         CRANIAL NERVES     CN III, IV, VI   Pupils are equal, round, and reactive to light.  Extraocular motions are normal.        Significant Labs:   A1C:   Recent Labs   Lab 10/02/19  1220   HGBA1C 6.9*     ABGs:   Recent Labs   Lab 10/11/19  0426   PH 7.392   PCO2 37.9   HCO3 23.0*   POCSATURATED 98   BE -2     CBC:   Recent Labs   Lab 10/10/19  0053 10/10/19  1015   WBC 9.69 8.78   HGB 11.5* 11.7*   HCT 38.8 39.5    153     CMP:   Recent Labs   Lab 10/10/19  0053 10/10/19  1015    138   K 3.7 3.9    102   CO2 24 25   * 121*   BUN 24* 28*   CREATININE 1.8* 1.9*   CALCIUM 9.9 10.2   PROT 7.6 8.0   ALBUMIN 3.5 3.7   BILITOT 0.7 0.7   ALKPHOS 87 94   AST 11 11   ALT 9* 10   ANIONGAP 10 11   EGFRNONAA 28* 26*     Cardiac Markers: No results for input(s): CKMB, MYOGLOBIN, BNP, TROPISTAT in the last 48 hours.  Coagulation:   Recent Labs   Lab 10/10/19  0053   INR 1.1     Lactic Acid: No results for input(s): LACTATE in the last 48 hours.  Magnesium:   Recent Labs   Lab 10/10/19  1015   MG 2.0     POCT Glucose:   Recent Labs   Lab 10/11/19  0650   POCTGLUCOSE 204*     Troponin:   Recent Labs   Lab 10/10/19  0053   TROPONINI 0.015     TSH:   Recent Labs   Lab 10/10/19  0053   TSH 3.658     Urine Studies:   Recent Labs   Lab 10/10/19  1015   COLORU Yellow   APPEARANCEUA Clear   PHUR 5.0   SPECGRAV 1.010   PROTEINUA 1+*   GLUCUA Negative   KETONESU Negative   BILIRUBINUA Negative   OCCULTUA Negative   NITRITE Negative   UROBILINOGEN Negative   LEUKOCYTESUR Trace*   RBCUA 0   WBCUA 6*   BACTERIA Occasional   SQUAMEPITHEL 2    HYALINECASTS 3*       Significant Imaging: Head CT  Impression       No CT evidence of acute intracranial abnormality. Clinical correlation and further evaluation as warranted.    Chronic senescent and microvascular ischemic changes.     Impression CT soft tissue of neck       1.  Limited assessment of the aerodigestive tract secondary to the presence of an endotracheal tube and lack of IV contrast.  No evidence of subcutaneous emphysema or focal fluid collection within the deep spaces of the neck.    2.  Secretions within the posterior nasopharynx presumably relating to intubated state.    3.  Subcentimeter low-attenuation right thyroid lobe nodule.    4.  Mild bilateral dependent opacities within the visualized lungs which may relate to atelectasis, although edema or developing infection not excluded.     FINDINGS: CXR  There has been interval placement of an endotracheal tube with tip terminating approximately 2.0 cm above the cici.  There is an esophagogastric tube now present coursing below the diaphragm and outside the field of view of the examination.  Cardiomediastinal silhouette remains mildly enlarged.  There are low lung volumes bilaterally limiting assessment with patchy bibasilar subsegmental opacities.  No evidence of pneumothorax.

## 2019-10-11 NOTE — CONSULTS
"  [facility]  Adult Nutrition  Consult Note    SUMMARY     Recommendations    Recommendation/Intervention:   1. If medically able initate TF of  Diabetisource @ 40mL/hr to provide 1152 kcal, 58g pro, 785 mL free fluid. Additional fluid per MD.   2. If extubated, recommend 1500 ADA diet.     Goals: Initate nutrition intervention within 48 hrs  Nutrition Goal Status: new  Communication of RD Recs: other (comment)(POC)    Reason for Assessment    Reason For Assessment: consult  Diagnosis: other (see comments)(angio-edema)  Relevant Medical History: DM2, hypothroidism, dyslipidemia, CVA, seizures  Interdisciplinary Rounds: did not attend  General Information Comments: Pt intubated this AM possibly due to a compromised airway.  Pts weight has remained stable over the past 10 months. NFPE completed, no s/s of malnutrition at this time.   Nutrition Discharge Planning: d/c on diabetic diet     Nutrition Risk Screen    Nutrition Risk Screen: no indicators present    Nutrition/Diet History    Factors Affecting Nutritional Intake: NPO, on mechanical ventilation    Anthropometrics    Temp: 97.8 °F (36.6 °C)  Height Method: Estimated  Height: 5' 5" (165.1 cm)  Height (inches): 65 in  Weight Method: Stated  Weight: 125.9 kg (277 lb 9 oz)  Weight (lb): 277.56 lb  Ideal Body Weight (IBW), Female: 125 lb  % Ideal Body Weight, Female (lb): 222.05 lb  BMI (Calculated): 46.3  BMI Grade: greater than 40 - morbid obesity  Usual Body Weight (UBW), k.6 kg  % Usual Body Weight: 98.11  % Weight Change From Usual Weight: -2.1 %    Lab/Procedures/Meds    Pertinent Labs Reviewed: reviewed  Pertinent Labs Comments: BUN 28, Crt 1.9, Glu 121  Pertinent Medications Reviewed: reviewed  Pertinent Medications Comments:    amLODIPine    cloNIDine    famotidine (PF)    hydrALAZINE    insulin aspart U-100    insulin detemir U-100    senna-docusate 8.6-50 mg     Estimated/Assessed Needs    Weight Used For Calorie " Calculations: 74.1 kg (163 lb 5.4 oz)(ABW)  Energy Calorie Requirements (kcal): 1301.77 kcal/day  Energy Need Method: [place] (modified)  Protein Requirements: 114 g/day(2.0 g/kg)  Weight Used For Protein Calculations: 56.8 kg (125 lb 4.3 oz)(IBW)  Fluid Requirements (mL): 1 mL/kcal or PER MD   Estimated Fluid Requirement Method: RDA Method  RDA Method (mL): 1301.77  CHO Requirement: 50% of meals    Nutrition Prescription Ordered    Current Diet Order: NPO    Evaluation of Received Nutrient/Fluid Intake    Other Calories (kcal): 396(propofol @ 15mL/hr)  I/O: 107/700  Energy Calories Required: not meeting needs  Protein Required: not meeting needs  Fluid Required: not meeting needs  % Intake of Estimated Energy Needs: 0 - 25 %  % Meal Intake: NPO    Nutrition Risk    Level of Risk/Frequency of Follow-up: high(2x/week)     Assessment and Plan    Nutrition Problem  Inadequate energy intake    Related to (etiology):   Obstructed airway    Signs and Symptoms (as evidenced by):   NPO/intubation without nutrition support    Interventions:  Collaboration with other providers     Nutrition Diagnosis Status:   New    Monitor and Evaluation    Food and Nutrient Intake: energy intake, food and beverage intake, enteral nutrition intake  Food and Nutrient Adminstration: diet order, enteral and parenteral nutrition administration  Physical Activity and Function: nutrition-related ADLs and IADLs  Anthropometric Measurements: weight, weight change, body mass index  Biochemical Data, Medical Tests and Procedures: electrolyte and renal panel, gastrointestinal profile, glucose/endocrine profile, inflammatory profile, lipid profile  Nutrition-Focused Physical Findings: overall appearance     Malnutrition Assessment    Subcutaneous Fat Loss (Final Summary): well nourished  Muscle Loss Evaluation (Final Summary): well nourished       Nutrition Follow-Up    RD Follow-up?: Yes

## 2019-10-11 NOTE — SUBJECTIVE & OBJECTIVE
Past Medical History:   Diagnosis Date    Anemia     Anxiety     Diabetes mellitus     Hyperlipidemia     Hypertension     Obesity     Proteinuria     Sleep apnea        Past Surgical History:   Procedure Laterality Date    APPENDECTOMY       SECTION      CHOLECYSTECTOMY      DILATION AND CURETTAGE OF UTERUS      TUBAL LIGATION         Review of patient's allergies indicates:   Allergen Reactions    Codeine Anxiety    Ace inhibitors     Diphenhydramine hcl        Family History     Problem Relation (Age of Onset)    Diabetes Brother, Sister    Hypertension Brother    Liver disease Father        Tobacco Use    Smoking status: Never Smoker   Substance and Sexual Activity    Alcohol use: No    Drug use: No    Sexual activity: Yes     Partners: Male         Review of Systems   Unable to perform ROS: Intubated     Objective:     Vital Signs (Most Recent):  Temp: 97.8 °F (36.6 °C) (10/11/19 1104)  Pulse: (!) 58 (10/11/19 110)  Resp: 15 (10/11/19 1104)  BP: (!) 187/92 (10/11/19 1104)  SpO2: 100 % (10/11/19 1104) Vital Signs (24h Range):  Temp:  [97.4 °F (36.3 °C)-98.2 °F (36.8 °C)] 97.8 °F (36.6 °C)  Pulse:  [52-67] 58  Resp:  [13-22] 15  SpO2:  [97 %-100 %] 100 %  BP: (128-222)/() 187/92     Weight: 125.9 kg (277 lb 9 oz)  Body mass index is 46.19 kg/m².      Intake/Output Summary (Last 24 hours) at 10/11/2019 1109  Last data filed at 10/11/2019 0900  Gross per 24 hour   Intake 106.43 ml   Output 1100 ml   Net -993.57 ml       Physical Exam   Constitutional: She appears well-developed. No distress.   HENT:   Head: Normocephalic and atraumatic.   Right Ear: External ear normal.   Left Ear: External ear normal.   Nose: Nose normal.   ET tube in place. Lips/tongue without significant edema. Mild tongue protrusion. + slight audible cuff cleak    Eyes: Pupils are equal, round, and reactive to light. Conjunctivae and EOM are normal. Right eye exhibits no discharge. Left eye exhibits no  discharge. No scleral icterus.   Flutters eyes to voice.    Neck: Normal range of motion. Neck supple. No JVD present. No tracheal deviation present. No thyromegaly present.   Cardiovascular: Normal rate, regular rhythm and normal heart sounds. Exam reveals no gallop and no friction rub.   No murmur heard.  Pulmonary/Chest: No stridor. She has no wheezes. She has no rales. She exhibits no tenderness.   Intubated on MV    Abdominal: Soft. Bowel sounds are normal. She exhibits no distension and no mass. There is no tenderness. There is no rebound and no guarding. No hernia.   Musculoskeletal: She exhibits no edema, tenderness or deformity.   Lymphadenopathy:     She has no cervical adenopathy.   Neurological:   She flutters eyes to voice.   No command following on my assessment.   Withdraws to pain in all 4 extremities.    Skin: Skin is warm and dry. Capillary refill takes less than 2 seconds. No rash noted. She is not diaphoretic. No erythema. No pallor.   Nursing note and vitals reviewed.      Vents:  Vent Mode: PRVC (10/11/19 0747)  Ventilator Initiated: Yes (10/11/19 0213)  Set Rate: 12 bmp (10/11/19 0747)  Vt Set: 450 mL (10/11/19 0747)  PEEP/CPAP: 5 cmH20 (10/11/19 0747)  Oxygen Concentration (%): 30 (10/11/19 1104)  Peak Airway Pressure: 25.9 cmH2O (10/11/19 0747)  Total Ve: 6.1 mL (10/11/19 0747)  F/VT Ratio<105 (RSBI): (!) 32.26 (10/11/19 0213)    Lines/Drains/Airways     Drain                 NG/OG Tube 10/11/19 0215 orogastric 18 Fr. Left mouth less than 1 day         Urethral Catheter 10/11/19 0230 16 Fr. less than 1 day          Airway                 Airway - Non-Surgical 10/11/19 0204 Endotracheal Tube-Hi/Lo less than 1 day          Peripheral Intravenous Line                 Peripheral IV - Single Lumen 10/10/19 0901 18 G Right Antecubital 1 day         Peripheral IV - Single Lumen 10/11/19 18 G Right Antecubital less than 1 day                Significant Labs:    CBC/Anemia Profile:  Recent Labs   Lab  10/10/19  0053 10/10/19  1015   WBC 9.69 8.78   HGB 11.5* 11.7*   HCT 38.8 39.5    153   MCV 67* 67*   RDW 18.1* 17.6*        Chemistries:  Recent Labs   Lab 10/10/19  0053 10/10/19  1015    138   K 3.7 3.9    102   CO2 24 25   BUN 24* 28*   CREATININE 1.8* 1.9*   CALCIUM 9.9 10.2   ALBUMIN 3.5 3.7   PROT 7.6 8.0   BILITOT 0.7 0.7   ALKPHOS 87 94   ALT 9* 10   AST 11 11   MG  --  2.0       POC PH 7.35 - 7.45 7.392    POC PCO2 35 - 45 mmHg 37.9    POC PO2 80 - 100 mmHg 107High     POC HCO3 24 - 28 mmol/L 23.0Low     POC BE -2 to 2 mmol/L -2    POC SATURATED O2 95 - 100 % 98    POC TCO2 23 - 27 mmol/L 24    Rate  12    Sample  ARTERIAL      Troponin I 0.000 - 0.026 ng/mL 0.015      TSH 0.400 - 4.000 uIU/mL 3.658      Prothrombin Time 9.0 - 12.5 sec 11.9    INR 0.8 - 1.2 1.1      UA- normal       Microbiology-- None     Significant Imaging:   I have reviewed all pertinent imaging results/findings within the past 24 hours.  I have reviewed and interpreted all pertinent imaging results/findings within the past 24 hours.     CXR- There has been interval placement of an endotracheal tube with tip terminating approximately 2.0 cm above the cici.  There is an esophagogastric tube now present coursing below the diaphragm and outside the field of view of the examination.  Cardiomediastinal silhouette remains mildly enlarged.  There are low lung volumes bilaterally limiting assessment with patchy bibasilar subsegmental opacities.  No evidence of pneumothorax    CT Neck soft tissue-   1.  Limited assessment of the aerodigestive tract secondary to the presence of an endotracheal tube and lack of IV contrast.  No evidence of subcutaneous emphysema or focal fluid collection within the deep spaces of the neck.  2.  Secretions within the posterior nasopharynx presumably relating to intubated state.  3.  Subcentimeter low-attenuation right thyroid lobe nodule.  4.  Mild bilateral dependent opacities within the  visualized lungs which may relate to atelectasis, although edema or developing infection not excluded.    CT Chest-   No CT evidence of acute intracranial abnormality. Clinical correlation and further evaluation as warranted.  Chronic senescent and microvascular ischemic changes.

## 2019-10-12 PROBLEM — I63.9 CVA (CEREBRAL VASCULAR ACCIDENT): Status: ACTIVE | Noted: 2019-10-12

## 2019-10-12 LAB
ALLENS TEST: ABNORMAL
ANION GAP SERPL CALC-SCNC: 8 MMOL/L (ref 8–16)
BASOPHILS # BLD AUTO: 0.01 K/UL (ref 0–0.2)
BASOPHILS NFR BLD: 0.1 % (ref 0–1.9)
BUN SERPL-MCNC: 21 MG/DL (ref 8–23)
CALCIUM SERPL-MCNC: 9.6 MG/DL (ref 8.7–10.5)
CHLORIDE SERPL-SCNC: 107 MMOL/L (ref 95–110)
CO2 SERPL-SCNC: 23 MMOL/L (ref 23–29)
CREAT SERPL-MCNC: 1.6 MG/DL (ref 0.5–1.4)
DELSYS: ABNORMAL
DIFFERENTIAL METHOD: ABNORMAL
EOSINOPHIL # BLD AUTO: 0 K/UL (ref 0–0.5)
EOSINOPHIL NFR BLD: 0 % (ref 0–8)
ERYTHROCYTE [DISTWIDTH] IN BLOOD BY AUTOMATED COUNT: 18.3 % (ref 11.5–14.5)
ERYTHROCYTE [SEDIMENTATION RATE] IN BLOOD BY WESTERGREN METHOD: 12 MM/H
EST. GFR  (AFRICAN AMERICAN): 37 ML/MIN/1.73 M^2
EST. GFR  (NON AFRICAN AMERICAN): 32 ML/MIN/1.73 M^2
FIO2: 30
GLUCOSE SERPL-MCNC: 181 MG/DL (ref 70–110)
HCO3 UR-SCNC: 21.4 MMOL/L (ref 24–28)
HCT VFR BLD AUTO: 41.1 % (ref 37–48.5)
HGB BLD-MCNC: 12.2 G/DL (ref 12–16)
IMM GRANULOCYTES # BLD AUTO: 0.08 K/UL (ref 0–0.04)
IMM GRANULOCYTES NFR BLD AUTO: 0.6 % (ref 0–0.5)
LYMPHOCYTES # BLD AUTO: 1 K/UL (ref 1–4.8)
LYMPHOCYTES NFR BLD: 8 % (ref 18–48)
MCH RBC QN AUTO: 19.8 PG (ref 27–31)
MCHC RBC AUTO-ENTMCNC: 29.7 G/DL (ref 32–36)
MCV RBC AUTO: 67 FL (ref 82–98)
MIN VOL: 7.4
MODE: ABNORMAL
MONOCYTES # BLD AUTO: 0.5 K/UL (ref 0.3–1)
MONOCYTES NFR BLD: 3.6 % (ref 4–15)
NEUTROPHILS # BLD AUTO: 11.4 K/UL (ref 1.8–7.7)
NEUTROPHILS NFR BLD: 87.7 % (ref 38–73)
NRBC BLD-RTO: 0 /100 WBC
PCO2 BLDA: 38.7 MMHG (ref 35–45)
PEEP: 5
PH SMN: 7.35 [PH] (ref 7.35–7.45)
PIP: 23
PLATELET # BLD AUTO: 165 K/UL (ref 150–350)
PMV BLD AUTO: ABNORMAL FL (ref 9.2–12.9)
PO2 BLDA: 93 MMHG (ref 80–100)
POC BE: -4 MMOL/L
POC SATURATED O2: 97 % (ref 95–100)
POC TCO2: 23 MMOL/L (ref 23–27)
POCT GLUCOSE: 170 MG/DL (ref 70–110)
POCT GLUCOSE: 172 MG/DL (ref 70–110)
POCT GLUCOSE: 177 MG/DL (ref 70–110)
POCT GLUCOSE: 185 MG/DL (ref 70–110)
POCT GLUCOSE: 199 MG/DL (ref 70–110)
POTASSIUM SERPL-SCNC: 4 MMOL/L (ref 3.5–5.1)
RBC # BLD AUTO: 6.17 M/UL (ref 4–5.4)
SAMPLE: ABNORMAL
SITE: ABNORMAL
SODIUM SERPL-SCNC: 138 MMOL/L (ref 136–145)
SP02: 100
VT: 450
WBC # BLD AUTO: 12.99 K/UL (ref 3.9–12.7)

## 2019-10-12 PROCEDURE — 63600175 PHARM REV CODE 636 W HCPCS: Performed by: HOSPITALIST

## 2019-10-12 PROCEDURE — 99232 PR SUBSEQUENT HOSPITAL CARE,LEVL II: ICD-10-PCS | Mod: ,,, | Performed by: PSYCHIATRY & NEUROLOGY

## 2019-10-12 PROCEDURE — 94003 VENT MGMT INPAT SUBQ DAY: CPT

## 2019-10-12 PROCEDURE — 63600175 PHARM REV CODE 636 W HCPCS: Performed by: EMERGENCY MEDICINE

## 2019-10-12 PROCEDURE — 36415 COLL VENOUS BLD VENIPUNCTURE: CPT

## 2019-10-12 PROCEDURE — 99291 CRITICAL CARE FIRST HOUR: CPT | Mod: ,,, | Performed by: EMERGENCY MEDICINE

## 2019-10-12 PROCEDURE — 36600 WITHDRAWAL OF ARTERIAL BLOOD: CPT

## 2019-10-12 PROCEDURE — 27000221 HC OXYGEN, UP TO 24 HOURS

## 2019-10-12 PROCEDURE — 99291 PR CRITICAL CARE, E/M 30-74 MINUTES: ICD-10-PCS | Mod: ,,, | Performed by: EMERGENCY MEDICINE

## 2019-10-12 PROCEDURE — 27200966 HC CLOSED SUCTION SYSTEM

## 2019-10-12 PROCEDURE — 99232 SBSQ HOSP IP/OBS MODERATE 35: CPT | Mod: ,,, | Performed by: PSYCHIATRY & NEUROLOGY

## 2019-10-12 PROCEDURE — 99900026 HC AIRWAY MAINTENANCE (STAT)

## 2019-10-12 PROCEDURE — 99900035 HC TECH TIME PER 15 MIN (STAT)

## 2019-10-12 PROCEDURE — 25000003 PHARM REV CODE 250: Performed by: HOSPITALIST

## 2019-10-12 PROCEDURE — 82803 BLOOD GASES ANY COMBINATION: CPT

## 2019-10-12 PROCEDURE — 20000000 HC ICU ROOM

## 2019-10-12 PROCEDURE — 85025 COMPLETE CBC W/AUTO DIFF WBC: CPT

## 2019-10-12 PROCEDURE — 80048 BASIC METABOLIC PNL TOTAL CA: CPT

## 2019-10-12 PROCEDURE — 63600175 PHARM REV CODE 636 W HCPCS

## 2019-10-12 PROCEDURE — S0028 INJECTION, FAMOTIDINE, 20 MG: HCPCS | Performed by: HOSPITALIST

## 2019-10-12 PROCEDURE — 25000003 PHARM REV CODE 250: Performed by: PSYCHIATRY & NEUROLOGY

## 2019-10-12 RX ORDER — ASPIRIN 325 MG
325 TABLET ORAL DAILY
Status: DISCONTINUED | OUTPATIENT
Start: 2019-10-12 | End: 2019-10-29 | Stop reason: HOSPADM

## 2019-10-12 RX ORDER — HYDRALAZINE HYDROCHLORIDE 20 MG/ML
10 INJECTION INTRAMUSCULAR; INTRAVENOUS EVERY 8 HOURS PRN
Status: DISCONTINUED | OUTPATIENT
Start: 2019-10-12 | End: 2019-10-16

## 2019-10-12 RX ORDER — ROSUVASTATIN CALCIUM 10 MG/1
20 TABLET, COATED ORAL NIGHTLY
Status: DISCONTINUED | OUTPATIENT
Start: 2019-10-12 | End: 2019-10-29 | Stop reason: HOSPADM

## 2019-10-12 RX ORDER — LORAZEPAM 2 MG/ML
INJECTION INTRAMUSCULAR
Status: COMPLETED
Start: 2019-10-12 | End: 2019-10-12

## 2019-10-12 RX ORDER — LORAZEPAM 2 MG/ML
2 INJECTION INTRAMUSCULAR ONCE
Status: COMPLETED | OUTPATIENT
Start: 2019-10-12 | End: 2019-10-12

## 2019-10-12 RX ORDER — LORAZEPAM 2 MG/ML
1 INJECTION INTRAMUSCULAR ONCE
Status: COMPLETED | OUTPATIENT
Start: 2019-10-12 | End: 2019-10-12

## 2019-10-12 RX ADMIN — SENNOSIDES, DOCUSATE SODIUM 1 TABLET: 50; 8.6 TABLET, FILM COATED ORAL at 08:10

## 2019-10-12 RX ADMIN — AMLODIPINE BESYLATE 5 MG: 5 TABLET ORAL at 08:10

## 2019-10-12 RX ADMIN — LORAZEPAM 2 MG: 2 INJECTION INTRAMUSCULAR; INTRAVENOUS at 12:10

## 2019-10-12 RX ADMIN — LACOSAMIDE 50 MG: 50 TABLET, FILM COATED ORAL at 08:10

## 2019-10-12 RX ADMIN — SODIUM CHLORIDE: 0.9 INJECTION, SOLUTION INTRAVENOUS at 04:10

## 2019-10-12 RX ADMIN — METHYLPREDNISOLONE SODIUM SUCCINATE 40 MG: 40 INJECTION, POWDER, FOR SOLUTION INTRAMUSCULAR; INTRAVENOUS at 05:10

## 2019-10-12 RX ADMIN — CLONIDINE HYDROCHLORIDE 0.3 MG: 0.1 TABLET ORAL at 08:10

## 2019-10-12 RX ADMIN — LORAZEPAM 1 MG: 2 INJECTION INTRAMUSCULAR at 01:10

## 2019-10-12 RX ADMIN — PROPOFOL 20 MCG/KG/MIN: 10 INJECTION, EMULSION INTRAVENOUS at 01:10

## 2019-10-12 RX ADMIN — HYDRALAZINE HYDROCHLORIDE 10 MG: 20 INJECTION INTRAMUSCULAR; INTRAVENOUS at 05:10

## 2019-10-12 RX ADMIN — FAMOTIDINE 20 MG: 10 INJECTION INTRAVENOUS at 08:10

## 2019-10-12 RX ADMIN — PROPOFOL 25 MCG/KG/MIN: 10 INJECTION, EMULSION INTRAVENOUS at 02:10

## 2019-10-12 RX ADMIN — METHYLPREDNISOLONE SODIUM SUCCINATE 40 MG: 40 INJECTION, POWDER, FOR SOLUTION INTRAMUSCULAR; INTRAVENOUS at 01:10

## 2019-10-12 RX ADMIN — SODIUM CHLORIDE: 0.9 INJECTION, SOLUTION INTRAVENOUS at 11:10

## 2019-10-12 RX ADMIN — ROSUVASTATIN CALCIUM 20 MG: 10 TABLET, COATED ORAL at 08:10

## 2019-10-12 RX ADMIN — HEPARIN SODIUM 5000 UNITS: 5000 INJECTION INTRAVENOUS; SUBCUTANEOUS at 08:10

## 2019-10-12 RX ADMIN — METHYLPREDNISOLONE SODIUM SUCCINATE 40 MG: 40 INJECTION, POWDER, FOR SOLUTION INTRAMUSCULAR; INTRAVENOUS at 09:10

## 2019-10-12 RX ADMIN — ASPIRIN 325 MG ORAL TABLET 325 MG: 325 PILL ORAL at 05:10

## 2019-10-12 RX ADMIN — PROPOFOL 25 MCG/KG/MIN: 10 INJECTION, EMULSION INTRAVENOUS at 08:10

## 2019-10-12 RX ADMIN — LORAZEPAM 1 MG: 2 INJECTION INTRAMUSCULAR; INTRAVENOUS at 01:10

## 2019-10-12 RX ADMIN — HEPARIN SODIUM 5000 UNITS: 5000 INJECTION INTRAVENOUS; SUBCUTANEOUS at 09:10

## 2019-10-12 RX ADMIN — INSULIN DETEMIR 12 UNITS: 100 INJECTION, SOLUTION SUBCUTANEOUS at 09:10

## 2019-10-12 RX ADMIN — HYDRALAZINE HYDROCHLORIDE 100 MG: 25 TABLET ORAL at 08:10

## 2019-10-12 RX ADMIN — SODIUM CHLORIDE: 0.9 INJECTION, SOLUTION INTRAVENOUS at 09:10

## 2019-10-12 RX ADMIN — MORPHINE SULFATE 2 MG: 10 INJECTION INTRAVENOUS at 01:10

## 2019-10-12 NOTE — PLAN OF CARE
Remains in ICU on vent.  Propofol infusing at 15.  Afebrile; BP elevated with little effect from PRN medications; MD aware.  SB on tele.  Moderate amounts of secretions; suction as needed.  Pt alert and follows commands.  Guevara draining adequate urine.  POC reviewed with pt; no evidence of learning.   Problem: Adult Inpatient Plan of Care  Goal: Plan of Care Review  Outcome: Ongoing, Progressing  Goal: Patient-Specific Goal (Individualization)  Outcome: Ongoing, Progressing  Goal: Absence of Hospital-Acquired Illness or Injury  Outcome: Ongoing, Progressing  Goal: Optimal Comfort and Wellbeing  Outcome: Ongoing, Progressing  Goal: Readiness for Transition of Care  Outcome: Ongoing, Progressing  Goal: Rounds/Family Conference  Outcome: Ongoing, Progressing     Problem: Infection  Goal: Infection Symptom Resolution  Outcome: Ongoing, Progressing     Problem: Diabetes Comorbidity  Goal: Blood Glucose Level Within Desired Range  Outcome: Ongoing, Progressing     Problem: Communication Impairment (Mechanical Ventilation, Invasive)  Goal: Effective Communication  Outcome: Ongoing, Progressing     Problem: Device-Related Complication Risk (Mechanical Ventilation, Invasive)  Goal: Optimal Device Function  Outcome: Ongoing, Progressing     Problem: Inability to Wean (Mechanical Ventilation, Invasive)  Goal: Mechanical Ventilation Liberation  Outcome: Ongoing, Progressing     Problem: Nutrition Impairment (Mechanical Ventilation, Invasive)  Goal: Optimal Nutrition Delivery  Outcome: Ongoing, Progressing     Problem: Skin and Tissue Injury (Mechanical Ventilation, Invasive)  Goal: Absence of Device-Related Skin and Tissue Injury  Outcome: Ongoing, Progressing     Problem: Ventilator-Induced Lung Injury (Mechanical Ventilation, Invasive)  Goal: Absence of Ventilator-Induced Lung Injury  Outcome: Ongoing, Progressing     Problem: Communication Impairment (Artificial Airway)  Goal: Effective Communication  Outcome: Ongoing,  Progressing     Problem: Device-Related Complication Risk (Artificial Airway)  Goal: Optimal Device Function  Outcome: Ongoing, Progressing     Problem: Skin and Tissue Injury (Artificial Airway)  Goal: Absence of Device-Related Skin or Tissue Injury  Outcome: Ongoing, Progressing     Problem: Fall Injury Risk  Goal: Absence of Fall and Fall-Related Injury  Outcome: Ongoing, Progressing     Problem: Restraint, Nonbehavioral (Nonviolent)  Goal: Discontinuation Criteria Achieved  Outcome: Ongoing, Progressing  Goal: Personal Dignity and Safety Maintained  Outcome: Ongoing, Progressing     Problem: Skin Injury Risk Increased  Goal: Skin Health and Integrity  Outcome: Ongoing, Progressing

## 2019-10-12 NOTE — PROGRESS NOTES
[facility]  Neurology  Progress Note    Patient Name: Sherrie Alex  MRN: [id number]  Admission Date: 10/11/2019  Hospital Length of Stay: 1 days  Code Status: Full Code   Attending Provider: Elena Bauer MD  Primary Care Physician: Desmond Yang MD   Principal Problem:Angio-edema    Subjective:     Interval History: 70 y/o female with medical Hx as listed below presented to ED complaining swelling of tongue. Pt was intubated for airway protection. Etiology of her edema if not clear as she does has reported allergic reaction to ACE inhibitors but is not currently taking any of these type of antihypertensives.      Ms. Alex has Hx stroke. [place] [facility] [place] expressive aphasia on Jan/2019 that resolved beg Dx with TIA at that point. Notes from neurology clinic state that she has baseline left sided weakness?    -10/12/19: Overnight no new issues.    Current Neurological Medications:     Current Facility-Administered Medications   Medication Dose Route Frequency Provider Last Rate Last Dose    0.9%  NaCl infusion   Intravenous Continuous Elena Bauer  mL/hr at 10/12/19 1030      acetaminophen tablet 650 mg  650 mg Oral Q8H PRN Elena Bauer MD        amLODIPine tablet 5 mg  5 mg Oral Daily Elena Bauer MD   5 mg at 10/12/19 0801    cloNIDine tablet 0.3 mg  0.3 mg Oral BID Elena Bauer MD   0.3 mg at 10/12/19 0801    dextrose 50% injection 12.5 g  12.5 g Intravenous PRN Elena Bauer MD        famotidine (PF) injection 20 mg  20 mg Intravenous Daily Elena Bauer MD   20 mg at 10/12/19 0802    glucagon (human recombinant) injection 1 mg  1 mg Intramuscular PRN Elena Bauer MD        heparin (porcine) injection 5,000 Units  5,000 Units Subcutaneous Q12H Elena Bauer MD   5,000 Units at 10/12/19 0801    hydrALAZINE injection 10 mg  10 mg Intravenous Q8H PRN Elena Bauer MD   10 mg at 10/12/19 0501     hydrALAZINE tablet 100 mg  100 mg Oral BID Elena Bauer MD   100 mg at 10/12/19 0801    insulin aspart U-100 pen 0-5 Units  0-5 Units Subcutaneous Q4H Elena Bauer MD   2 Units at 10/11/19 1240    insulin detemir U-100 pen 12 Units  12 Units Subcutaneous QHS Elena Bauer MD   12 Units at 10/11/19 2107    lacosamide tablet 50 mg  50 mg Oral BID Elena Bauer MD   50 mg at 10/12/19 0801    methylPREDNISolone sodium succinate injection 40 mg  40 mg Intravenous Q8H Elena Bauer MD   40 mg at 10/12/19 0556    morphine injection 2 mg  2 mg Intravenous Q4H PRN Elena Bauer MD        ondansetron injection 4 mg  4 mg Intravenous Q8H PRN Elena Bauer MD        propofol (DIPRIVAN) 10 mg/mL infusion  10 mcg/kg/min Intravenous Continuous Elena Bauer MD 18.8 mL/hr at 10/12/19 1030 24.947 mcg/kg/min at 10/12/19 1030    senna-docusate 8.6-50 mg per tablet 1 tablet  1 tablet Oral BID Elena Bauer MD   1 tablet at 10/12/19 0802    sodium chloride 0.9% flush 10 mL  10 mL Intravenous PRN Elena Bauer MD         Facility-Administered Medications Ordered in Other Encounters   Medication Dose Route Frequency Provider Last Rate Last Dose    butamben-tetracaine-benzocaine 2%-2%-14% (200 mg/sec) spray    PRN Teetee Duncan MD   1 spray at 10/11/19 0130    ketamine injection    PRN Teetee Duncan MD   5 mg at 10/11/19 0205    midazolam injection   Intravenous PRN Teetee Duncan MD   2 mg at 10/11/19 0155    phenylephrine HCL 0.25% nasal spray    PRN Teetee Duncan MD   1 spray at 10/11/19 0130    succinylcholine injection    PRN Teetee Duncan MD   120 mg at 10/11/19 0205       Review of Systems   Unable to perform ROS: Intubated     Objective:     Vital Signs (Most Recent):  Temp: 98.8 °F (37.1 °C) (10/12/19 0800)  Pulse: 101 (10/12/19 1030)  Resp: 16 (10/12/19 1030)  BP: (!) 190/121 (10/12/19 1030)  SpO2: 100 % (10/12/19 1030) Vital Signs (24h Range):  Temp:   [97.8 °F (36.6 °C)-98.8 °F (37.1 °C)] 98.8 °F (37.1 °C)  Pulse:  [] 101  Resp:  [8-36] 16  SpO2:  [98 %-100 %] 100 %  BP: (122-242)/() 190/121     Weight: 126.6 kg (279 lb 1.6 oz)  Body mass index is 46.45 kg/m².    Physical Exam  Constitutional: She appears well-developed.   HENT:   Head: Normocephalic.   Eyes: Right eye exhibits no discharge. Left eye exhibits no discharge.   Neck: Neck supple.   Cardiovascular: Normal rate.   Pulmonary/Chest:   Symmetrical chest expansion   Abdominal: Soft.         NEUROLOGICAL EXAMINATION:      MENTAL STATUS        Sedated but opens eyes briefly upon verbal stimuli  Follows simple commands      CRANIAL NERVES      CN III, IV, VI   Right pupil: Size: 2 mm. Shape: regular.   Left pupil: Size: 2 mm. Shape: regular.   Nystagmus: none   Conjugate gaze: present     MOTOR EXAM        On command there is AROM on left; no AROM on right  There is neglect of right side           Significant Labs:   CBC:   Recent Labs   Lab 10/12/19  0411   WBC 12.99*   HGB 12.2   HCT 41.1        CMP:   Recent Labs   Lab 10/12/19  0411   *      K 4.0      CO2 23   BUN 21   CREATININE 1.6*   CALCIUM 9.6   ANIONGAP 8   EGFRNONAA 32*     LIPIDS/LFTS:  Recent Labs   Lab 10/02/19  1220   Cholesterol 145   Triglycerides 80   HDL 45   LDL Cholesterol 84.0   Non-HDL Cholesterol 100         Assessment and Plan:     68 y/o female consulted for neurological changes:     1. Right hemiplegia: signs of left MCA stroke? Seems that pt was dysarthric and drooling when arrived to ED. This could had been initial symptoms of stroke which were masked by her tongue swelling?           Initial head CT shows no large area infarction or signs of ICH.           -Symptoms seem to be left MCA?    -MRI/MRA brain.     2. Seizure: no reported episodes. On lacoasmide 100 mg BID.        UPDATE:   MRI  Impression       Moderate-sized area of subacute infarction involving the left subinsular white matter  extending to the periventricular white matter.  No MR evidence for hemorrhage.    Moderate involutional change and microvascular changes.      Electronically signed by: Shadia Gar  Date: 10/12/2019  Time: 14:10          -Permissive HTN. Treat if > 220/110.  -MRA not reliable due to motion artifact.  -ASA and statin.        Active Diagnoses:    Diagnosis Date Noted POA    PRINCIPAL PROBLEM:  Angio-edema [T78.3XXA] 10/11/2019 Yes    Seizure [R56.9] 10/11/2019 Yes    RYLEE (acute kidney injury) [N17.9] 10/11/2019 Yes    On mechanically assisted ventilation [Z99.11] 10/11/2019 Not Applicable    Encephalopathy, metabolic [G93.41] 10/11/2019 Yes    Acquired hypothyroidism [E03.9] 10/03/2019 Yes    Essential hypertension [I10] 10/02/2019 Yes    Type 2 diabetes mellitus with circulatory disorder, with long-term current use of insulin [E11.59, Z79.4] 10/02/2019 Not Applicable    Anxiety [F41.9]  Yes    Morbid obesity [E66.01]  Yes    Dyslipidemia [E78.5]  Yes      Problems Resolved During this Admission:       VTE Risk Mitigation (From admission, onward)         Ordered     heparin (porcine) injection 5,000 Units  Every 12 hours      10/11/19 0617     IP VTE HIGH RISK PATIENT  Once      10/11/19 0532     Place ELIEZER hose  Until discontinued      10/11/19 0532     Place sequential compression device  Until discontinued      10/11/19 0532                Jcarlos Holguin MD  Neurology  [facility]

## 2019-10-12 NOTE — PROGRESS NOTES
"[facility] Medicine  Progress Note    Patient Name: Sherrie Alex  MRN: [id number]  Patient Class: IP- Inpatient   Admission Date: 10/11/2019  Length of Stay: 1 days  Attending Physician: Elena Bauer MD  Primary Care Provider: Desmond Yang MD        Subjective:     Principal Problem:Angio-edema        HPI:  patient is a 69 y.o female who presents to the ED complaining of tongue swelling that began PTA. Patient has slurred speech, drooling, and tongue swelling. Patient denies any pain, itching, CP, nausea, vomiting, or fever. Per sisters, patient is normally talkative and active. Her sisters are unaware of any cause of onset, stating that the patient lives alone. PMHx of CVA 1 yr ago, DM, HTN, HLD, and obesity. Patient is allergic to codeine and ace inhibitors.      The history is provided by the patient and a relative. History limited by: Acuity of condition. No  was used.     Pt intubated in ED for airway protection ?laryngeal edema - not noted in ED documentation.  Pt started on IV steroids and H2 blocker on vent. In ICU, pt showed sluggish neuro response to pain and unable to follow commands. Propofol taken off and neuro status improved. Pulm consulted for vent management. Neuro consulted - h/o seizures.  Seen in ED yesterday am with "leg shaking" and weakness and dc'd home. H/o seizures on vimpat.      Overview/Hospital Course:  Pt admitted acute neuro changes and resp compromise. Intubated in ED. Serial neuro testing off sedation suggest right sided weakness > left. MRI:  Moderate-sized area of subacute infarction involving the left subinsular white matter extending to the periventricular white matter. No MR evidence for hemorrhage.    Neurology notified of findings. Allow for permissive HTN.     Interval History: intubated on sedation - + cva on imaging c/w right sided weakness     Review of Systems   Unable to perform ROS: Intubated "     Objective:     Vital Signs (Most Recent):  Temp: 98.8 °F (37.1 °C) (10/12/19 1530)  Pulse: (!) 58 (10/12/19 1630)  Resp: 12 (10/12/19 1630)  BP: (!) 159/86 (10/12/19 1630)  SpO2: 98 % (10/12/19 1630) Vital Signs (24h Range):  Temp:  [97.8 °F (36.6 °C)-98.8 °F (37.1 °C)] 98.8 °F (37.1 °C)  Pulse:  [] 58  Resp:  [8-36] 12  SpO2:  [98 %-100 %] 98 %  BP: (130-223)/() 159/86     Weight: 126.6 kg (279 lb 1.6 oz)  Body mass index is 46.45 kg/m².    Intake/Output Summary (Last 24 hours) at 10/12/2019 1710  Last data filed at 10/12/2019 1530  Gross per 24 hour   Intake 3597.49 ml   Output 1115 ml   Net 2482.49 ml      Physical Exam   Constitutional: She appears well-developed and well-nourished.   HENT:   Head: Normocephalic and atraumatic.   ETT in place with drooling from mouth   Eyes: Pupils are equal, round, and reactive to light. EOM are normal.   Neck: Normal range of motion. Neck supple.   Cardiovascular: Normal rate, regular rhythm, normal heart sounds and intact distal pulses.   Pulmonary/Chest: No respiratory distress. She has no wheezes. She has no rales.   Abdominal: Soft. Bowel sounds are normal. She exhibits no distension.   Musculoskeletal: Normal range of motion. She exhibits no edema.   Neurological:   Off propofol, right >left weakness UE and LE    Skin: Skin is warm and dry. Capillary refill takes less than 2 seconds.       Significant Labs:   BMP:   Recent Labs   Lab 10/12/19  0411   *      K 4.0      CO2 23   BUN 21   CREATININE 1.6*   CALCIUM 9.6     CBC:   Recent Labs   Lab 10/12/19  0411   WBC 12.99*   HGB 12.2   HCT 41.1          Significant Imaging: I have reviewed and interpreted all pertinent imaging results/findings within the past 24 hours.      Assessment/Plan:      * Angio-edema  Possible etiology to compromised airway- unclear at this point  IV steroid x 2 days  Allergy to benadryl  On H2 blocker  Intubated and pulm consulted for vent management        CVA (cerebral vascular accident)    MRI:  Moderate-sized area of subacute infarction involving the left subinsular white matter extending to the periventricular white matter. No MR evidence for hemorrhage.    Moderate involutional change and microvascular changes.      Aspirin, statin  Permissive HTN  Weaning from vent may be hindered by new CVA    RYLEE (acute kidney injury)  Cr slightly more elevated at 1.9, baseline 1.4-1.7  IVF and bmp in am       Seizure  Resume vimpat  Unsure if seizure activity was involved for this admission  Neurology consulted       Acquired hypothyroidism  Resume synthroid   Check TFTs    Essential hypertension  Uncontrolled  Resume home meds: norvasc, clonidine, hydralazine, toprol  IV PRN hydralazine      Type 2 diabetes mellitus with circulatory disorder, with long-term current use of insulin  Uncontrolled  A1c 6.9%  Resume levemir and SSI      Anxiety  Propofol for sedation       Morbid obesity  Discuss weight management after extubation       Dyslipidemia  Resume statin        VTE Risk Mitigation (From admission, onward)         Ordered     heparin (porcine) injection 5,000 Units  Every 12 hours      10/11/19 0617     IP VTE HIGH RISK PATIENT  Once      10/11/19 0532     Place ELIEZER hose  Until discontinued      10/11/19 0532     Place sequential compression device  Until discontinued      10/11/19 0532                Critical care time spent on the evaluation and treatment of severe organ dysfunction, review of pertinent labs and imaging studies, discussions with consulting providers and discussions with patient/family: 40 minutes.      Elena Bauer MD  Department of Hospital Medicine   [facility]

## 2019-10-12 NOTE — ASSESSMENT & PLAN NOTE
MRI:  Moderate-sized area of subacute infarction involving the left subinsular white matter extending to the periventricular white matter. No MR evidence for hemorrhage.    Moderate involutional change and microvascular changes.      Aspirin, statin  Permissive HTN  Weaning from vent may be hindered by new CVA

## 2019-10-12 NOTE — PLAN OF CARE
"Pt remains in ICU on ventilator. MRI done today showing "Moderate-sized area of subacute infarction involving the left subinsular white matter extending to the periventricular white matter", results relayed to neurology Dr Holguin and primary Dr Bauer. New orders noted. Remains on ventilator. Did wake up and follow commands but was unable to move RLE. Propofol for sedation. Ativan given during MRI for sedation. Guevara with adequate output. MIVF's at 125 mL/hr. NSR on the monitor. Remains free from fall, injury, or breakdown throughout shift.   "

## 2019-10-12 NOTE — PROGRESS NOTES
[facility]  Pulmonology  Progress Note    Patient Name: Sherrie Alex  MRN: [id number]  Admission Date: 10/11/2019  Hospital Length of Stay: 1 days  Code Status: Full Code  Attending Provider: Elena Bauer MD  Primary Care Provider: Desmond Yang MD   Principal Problem: Angio-edema    Subjective:     INTERVAL HISTORY-     No acute events. Slow to wake up. Restarted on propofol at 25 O/N.. No reported agitation. Remains HTN with prn requirements. Afebrile. 30% FiO2.     Review of Systems   Unable to perform ROS: Intubated     Objective:     Vital Signs (Most Recent):  Temp: 98.8 °F (37.1 °C) (10/12/19 0800)  Pulse: 84 (10/12/19 0900)  Resp: 16 (10/12/19 0900)  BP: (!) 155/79 (10/12/19 0900)  SpO2: 100 % (10/12/19 0900) Vital Signs (24h Range):  Temp:  [97.8 °F (36.6 °C)-98.8 °F (37.1 °C)] 98.8 °F (37.1 °C)  Pulse:  [] 84  Resp:  [8-36] 16  SpO2:  [98 %-100 %] 100 %  BP: (122-242)/() 155/79     Weight: 126.6 kg (279 lb 1.6 oz)  Body mass index is 46.45 kg/m².      Intake/Output Summary (Last 24 hours) at 10/12/2019 0920  Last data filed at 10/12/2019 0800  Gross per 24 hour   Intake 3974 ml   Output 1565 ml   Net 2409 ml       Cumulative Fluid Balance- 1.79L since admission.     Physical Exam   Constitutional: She appears well-developed. No distress.   HENT:   Head: Normocephalic and atraumatic.   Right Ear: External ear normal.   Left Ear: External ear normal.   Nose: Nose normal.   ET tube in place. Lips/tongue without significant edema. Mild tongue protrusion. + slight audible cuff cleak    Eyes: Pupils are equal, round, and reactive to light. Conjunctivae and EOM are normal. Right eye exhibits no discharge. Left eye exhibits no discharge. No scleral icterus.   Flutters eyes to voice.    Neck: Normal range of motion. Neck supple. No JVD present. No tracheal deviation present. No thyromegaly present.   Cardiovascular: Normal rate, regular rhythm and normal heart sounds.  Exam reveals no gallop and no friction rub.   No murmur heard.  Pulmonary/Chest: No stridor. She has no wheezes. She has no rales. She exhibits no tenderness.   Intubated on MV    Abdominal: Soft. Bowel sounds are normal. She exhibits no distension and no mass. There is no tenderness. There is no rebound and no guarding. No hernia.   Musculoskeletal: She exhibits no edema, tenderness or deformity.   Lymphadenopathy:     She has no cervical adenopathy.   Neurological:   She flutters eyes to voice.   No command following on my assessment.   Withdraws to pain in all 4 extremities.    Skin: Skin is warm and dry. Capillary refill takes less than 2 seconds. No rash noted. She is not diaphoretic. No erythema. No pallor.   Nursing note and vitals reviewed.      Vents:  Vent Mode: PRVC (10/12/19 0718)  Ventilator Initiated: Yes (10/11/19 0213)  Set Rate: 12 bmp (10/12/19 0718)  Vt Set: 450 mL (10/12/19 0718)  PEEP/CPAP: 5 cmH20 (10/12/19 0718)  Oxygen Concentration (%): 30 (10/12/19 0900)  Peak Airway Pressure: 24.9 cmH2O (10/12/19 0718)  Total Ve: 6.3 mL (10/12/19 0718)  F/VT Ratio<105 (RSBI): (!) 32.48 (10/11/19 2354)    Lines/Drains/Airways     Drain                 NG/OG Tube 10/11/19 0215 orogastric 18 Fr. Left mouth 1 day         Urethral Catheter 10/11/19 0230 16 Fr. 1 day          Airway                 Airway - Non-Surgical 10/11/19 0204 Endotracheal Tube-Hi/Lo 1 day          Peripheral Intravenous Line                 Peripheral IV - Single Lumen 10/10/19 0901 18 G Right Antecubital 2 days         Peripheral IV - Single Lumen 10/11/19 18 G Right Antecubital 1 day                Significant Labs:    CBC/Anemia Profile:  Recent Labs   Lab 10/10/19  1015 10/12/19  0411   WBC 8.78 12.99*   HGB 11.7* 12.2   HCT 39.5 41.1    165   MCV 67* 67*   RDW 17.6* 18.3*        Chemistries:  Recent Labs   Lab 10/10/19  1015 10/12/19  0411    138   K 3.9 4.0    107   CO2 25 23   BUN 28* 21   CREATININE 1.9* 1.6*    CALCIUM 10.2 9.6   ALBUMIN 3.7  --    PROT 8.0  --    BILITOT 0.7  --    ALKPHOS 94  --    ALT 10  --    AST 11  --    MG 2.0  --          Troponin I 0.000 - 0.026 ng/mL 0.015      TSH 0.400 - 4.000 uIU/mL 3.658      Prothrombin Time 9.0 - 12.5 sec 11.9    INR 0.8 - 1.2 1.1       Ref. Range 10/12/2019 04:12   POC PH Latest Ref Range: 7.35 - 7.45  7.351   POC PCO2 Latest Ref Range: 35 - 45 mmHg 38.7   POC PO2 Latest Ref Range: 80 - 100 mmHg 93   POC BE Latest Ref Range: -2 to 2 mmol/L -4   POC HCO3 Latest Ref Range: 24 - 28 mmol/L 21.4 (L)   POC SATURATED O2 Latest Ref Range: 95 - 100 % 97         UA- normal       Microbiology-- None     Significant Imaging:   I have reviewed all pertinent imaging results/findings within the past 24 hours.  I have reviewed and interpreted all pertinent imaging results/findings within the past 24 hours.     CXR- There has been interval placement of an endotracheal tube with tip terminating approximately 2.0 cm above the cici.  There is an esophagogastric tube now present coursing below the diaphragm and outside the field of view of the examination.  Cardiomediastinal silhouette remains mildly enlarged.  There are low lung volumes bilaterally limiting assessment with patchy bibasilar subsegmental opacities.  No evidence of pneumothorax    CT Neck soft tissue-   1.  Limited assessment of the aerodigestive tract secondary to the presence of an endotracheal tube and lack of IV contrast.  No evidence of subcutaneous emphysema or focal fluid collection within the deep spaces of the neck.  2.  Secretions within the posterior nasopharynx presumably relating to intubated state.  3.  Subcentimeter low-attenuation right thyroid lobe nodule.  4.  Mild bilateral dependent opacities within the visualized lungs which may relate to atelectasis, although edema or developing infection not excluded.    CT Chest-   No CT evidence of acute intracranial abnormality. Clinical correlation and further  evaluation as warranted.  Chronic senescent and microvascular ischemic changes.    Assessment/Plan:     * Angio-edema  Unclear precipitant. Oropharyngeal exam with minimal edema on my assessment. + cuff leak.     -- continue H2 antagonist, corticosteroids additional 24 hours.   -- Allergy to benadryl.. Can initiate Zyrtec as alternative       Encephalopathy, metabolic  Suspect mostly related to sedation. Baseline LUE weakness from prior CVA. CT imaging brain is normal. W/D to pain on all extremities on my evaluation. Does have reported underlying seizure d/o on vimpat. Restarted on sedation overnight!     -- Hold all sedation  -- If no appropriate improvement would consider MRI +/- EEG   -- Neurology consult noted-- they will re-valuate today   -- Continue Vimpat for now      On mechanically assisted ventilation  Intubated for airway protection and concern for angioedema. Cuff leak this AM. Minimal vent support and gas exchange/lung mechanics appropriate. Mental status remains main barrier to extubation at present.. CT imaging with motion artifact. Some scattered GGO likely atelectasis in setting of profound obesity. Normal EF on prior echo with DD and mildly elevated PAP    -- LPV. Wean FiO2 to maintain SpO2 >88%   -- SAT/SBT when mental status allows. Stop all sedation now.   -- Has history of DEYANIRA without evidence of significant prior hypercapnia.. Plan for extubation to NIPPV.   -- Keep euvolemic     Essential hypertension  Anti-HTN per primary.     Type 2 diabetes mellitus with circulatory disorder, with long-term current use of insulin  SSI- goal 140-180       GI PPX- H2 antagonist      DVT PPX- heparin      Nutrition- NPO D#2- if unable to extubate will initiate TF         Critical Care Time: 35 minutes  Critical care was time spent personally by me on the following activities: evaluating this patient's organ dysfunction, development of treatment plan, discussing treatment plan with patient or surrogate and  bedside caregivers, discussions with consultants, evaluation of patient's response to treatment, examination of patient, ordering and performing treatments and interventions, ordering and review of laboratory studies, ordering and review of radiographic studies, re-evaluation of patient's condition. This critical care time did not overlap with that of any other provider or involve time for any procedures.       Gold Ricketts MD  Pulmonology/Critical Care Medicine  [facility]

## 2019-10-12 NOTE — ASSESSMENT & PLAN NOTE
Intubated for airway protection and concern for angioedema. Cuff leak this AM. Minimal vent support and gas exchange/lung mechanics appropriate. Mental status remains main barrier to extubation at present.. CT imaging with motion artifact. Some scattered GGO likely atelectasis in setting of profound obesity. Normal EF on prior echo with DD and mildly elevated PAP    -- LPV. Wean FiO2 to maintain SpO2 >88%   -- SAT/SBT when mental status allows. Stop all sedation now.   -- Has history of DEYANIRA without evidence of significant prior hypercapnia.. Plan for extubation to NIPPV.   -- Keep euvolemic

## 2019-10-12 NOTE — ASSESSMENT & PLAN NOTE
Unclear precipitant. Oropharyngeal exam with minimal edema on my assessment. + cuff leak.     -- continue H2 antagonist, corticosteroids additional 24 hours.   -- Allergy to benadryl.. Can initiate Zyrtec as alternative

## 2019-10-12 NOTE — HOSPITAL COURSE
Ms. Alex was admitted acute neurological changes and respiratory compromise. Intubated in ED. Serial neurological testing off sedation suggested right sided weakness > left. MRI showed:Moderate-sized area of subacute infarction involving the left subinsular white matter extending to the periventricular white matter. No MR evidence for hemorrhage.  Patient treated for an acute CVA and Neurology consult. Allowed for permissive HTN.  Later resumed blood pressure medication after appropriate time elapsed for permissive hypertension.  Her neurological exam was a barrier to extubation.  She was also treated with IV lasix for diuresis, ET sputum culture sent for climbing WBCs, concerning for possible aspiration. Dr. Bauer met with family and discussed plan to continue attempts to wean from vent, though neuro status/effects of CVA may be limiting factor.  The patient was unable to be weaned from the vent, ENT consulted for trach and GI consulted for PEG tube on 10/22.  was consulted for LTAC.  The patient was changed to trach collar which she is tolerating well. She is tolerating tube feeding. Palliative care met with the family to discuss quality of life going forward and goals of care. Family has decided on LTAC and Full Code. The patient was accepted at [facility] and transferred after discussion with son and LEFTY Cannon Alex on 10/29. She will need ongoing trach care and weaning, tube feeding and free water via PEG, consults with PT/OT/Speech, and consult with Urology for urinary retention.

## 2019-10-12 NOTE — SUBJECTIVE & OBJECTIVE
INTERVAL HISTORY-     No acute events. Slow to wake up. Restarted on propofol at 25 O/N.. No reported agitation. Remains HTN with prn requirements. Afebrile. 30% FiO2.     Review of Systems   Unable to perform ROS: Intubated     Objective:     Vital Signs (Most Recent):  Temp: 98.8 °F (37.1 °C) (10/12/19 0800)  Pulse: 84 (10/12/19 0900)  Resp: 16 (10/12/19 0900)  BP: (!) 155/79 (10/12/19 0900)  SpO2: 100 % (10/12/19 0900) Vital Signs (24h Range):  Temp:  [97.8 °F (36.6 °C)-98.8 °F (37.1 °C)] 98.8 °F (37.1 °C)  Pulse:  [] 84  Resp:  [8-36] 16  SpO2:  [98 %-100 %] 100 %  BP: (122-242)/() 155/79     Weight: 126.6 kg (279 lb 1.6 oz)  Body mass index is 46.45 kg/m².      Intake/Output Summary (Last 24 hours) at 10/12/2019 0920  Last data filed at 10/12/2019 0800  Gross per 24 hour   Intake 3974 ml   Output 1565 ml   Net 2409 ml       Cumulative Fluid Balance- 1.79L since admission.     Physical Exam   Constitutional: She appears well-developed. No distress.   HENT:   Head: Normocephalic and atraumatic.   Right Ear: External ear normal.   Left Ear: External ear normal.   Nose: Nose normal.   ET tube in place. Lips/tongue without significant edema. Mild tongue protrusion. + slight audible cuff cleak    Eyes: Pupils are equal, round, and reactive to light. Conjunctivae and EOM are normal. Right eye exhibits no discharge. Left eye exhibits no discharge. No scleral icterus.   Flutters eyes to voice.    Neck: Normal range of motion. Neck supple. No JVD present. No tracheal deviation present. No thyromegaly present.   Cardiovascular: Normal rate, regular rhythm and normal heart sounds. Exam reveals no gallop and no friction rub.   No murmur heard.  Pulmonary/Chest: No stridor. She has no wheezes. She has no rales. She exhibits no tenderness.   Intubated on MV    Abdominal: Soft. Bowel sounds are normal. She exhibits no distension and no mass. There is no tenderness. There is no rebound and no guarding. No hernia.    Musculoskeletal: She exhibits no edema, tenderness or deformity.   Lymphadenopathy:     She has no cervical adenopathy.   Neurological:   She flutters eyes to voice.   No command following on my assessment.   Withdraws to pain in all 4 extremities.    Skin: Skin is warm and dry. Capillary refill takes less than 2 seconds. No rash noted. She is not diaphoretic. No erythema. No pallor.   Nursing note and vitals reviewed.      Vents:  Vent Mode: PRVC (10/12/19 0718)  Ventilator Initiated: Yes (10/11/19 0213)  Set Rate: 12 bmp (10/12/19 0718)  Vt Set: 450 mL (10/12/19 0718)  PEEP/CPAP: 5 cmH20 (10/12/19 0718)  Oxygen Concentration (%): 30 (10/12/19 0900)  Peak Airway Pressure: 24.9 cmH2O (10/12/19 0718)  Total Ve: 6.3 mL (10/12/19 0718)  F/VT Ratio<105 (RSBI): (!) 32.48 (10/11/19 2354)    Lines/Drains/Airways     Drain                 NG/OG Tube 10/11/19 0215 orogastric 18 Fr. Left mouth 1 day         Urethral Catheter 10/11/19 0230 16 Fr. 1 day          Airway                 Airway - Non-Surgical 10/11/19 0204 Endotracheal Tube-Hi/Lo 1 day          Peripheral Intravenous Line                 Peripheral IV - Single Lumen 10/10/19 0901 18 G Right Antecubital 2 days         Peripheral IV - Single Lumen 10/11/19 18 G Right Antecubital 1 day                Significant Labs:    CBC/Anemia Profile:  Recent Labs   Lab 10/10/19  1015 10/12/19  0411   WBC 8.78 12.99*   HGB 11.7* 12.2   HCT 39.5 41.1    165   MCV 67* 67*   RDW 17.6* 18.3*        Chemistries:  Recent Labs   Lab 10/10/19  1015 10/12/19  0411    138   K 3.9 4.0    107   CO2 25 23   BUN 28* 21   CREATININE 1.9* 1.6*   CALCIUM 10.2 9.6   ALBUMIN 3.7  --    PROT 8.0  --    BILITOT 0.7  --    ALKPHOS 94  --    ALT 10  --    AST 11  --    MG 2.0  --          Troponin I 0.000 - 0.026 ng/mL 0.015      TSH 0.400 - 4.000 uIU/mL 3.658      Prothrombin Time 9.0 - 12.5 sec 11.9    INR 0.8 - 1.2 1.1       Ref. Range 10/12/2019 04:12   POC PH Latest Ref  Range: 7.35 - 7.45  7.351   POC PCO2 Latest Ref Range: 35 - 45 mmHg 38.7   POC PO2 Latest Ref Range: 80 - 100 mmHg 93   POC BE Latest Ref Range: -2 to 2 mmol/L -4   POC HCO3 Latest Ref Range: 24 - 28 mmol/L 21.4 (L)   POC SATURATED O2 Latest Ref Range: 95 - 100 % 97         UA- normal       Microbiology-- None     Significant Imaging:   I have reviewed all pertinent imaging results/findings within the past 24 hours.  I have reviewed and interpreted all pertinent imaging results/findings within the past 24 hours.     CXR- There has been interval placement of an endotracheal tube with tip terminating approximately 2.0 cm above the cici.  There is an esophagogastric tube now present coursing below the diaphragm and outside the field of view of the examination.  Cardiomediastinal silhouette remains mildly enlarged.  There are low lung volumes bilaterally limiting assessment with patchy bibasilar subsegmental opacities.  No evidence of pneumothorax    CT Neck soft tissue-   1.  Limited assessment of the aerodigestive tract secondary to the presence of an endotracheal tube and lack of IV contrast.  No evidence of subcutaneous emphysema or focal fluid collection within the deep spaces of the neck.  2.  Secretions within the posterior nasopharynx presumably relating to intubated state.  3.  Subcentimeter low-attenuation right thyroid lobe nodule.  4.  Mild bilateral dependent opacities within the visualized lungs which may relate to atelectasis, although edema or developing infection not excluded.    CT Chest-   No CT evidence of acute intracranial abnormality. Clinical correlation and further evaluation as warranted.  Chronic senescent and microvascular ischemic changes.

## 2019-10-12 NOTE — SUBJECTIVE & OBJECTIVE
Interval History: intubated on sedation - + cva on imaging c/w right sided weakness     Review of Systems   Unable to perform ROS: Intubated     Objective:     Vital Signs (Most Recent):  Temp: 98.8 °F (37.1 °C) (10/12/19 1530)  Pulse: (!) 58 (10/12/19 1630)  Resp: 12 (10/12/19 1630)  BP: (!) 159/86 (10/12/19 1630)  SpO2: 98 % (10/12/19 1630) Vital Signs (24h Range):  Temp:  [97.8 °F (36.6 °C)-98.8 °F (37.1 °C)] 98.8 °F (37.1 °C)  Pulse:  [] 58  Resp:  [8-36] 12  SpO2:  [98 %-100 %] 98 %  BP: (130-223)/() 159/86     Weight: 126.6 kg (279 lb 1.6 oz)  Body mass index is 46.45 kg/m².    Intake/Output Summary (Last 24 hours) at 10/12/2019 1710  Last data filed at 10/12/2019 1530  Gross per 24 hour   Intake 3597.49 ml   Output 1115 ml   Net 2482.49 ml      Physical Exam   Constitutional: She appears well-developed and well-nourished.   HENT:   Head: Normocephalic and atraumatic.   ETT in place with drooling from mouth   Eyes: Pupils are equal, round, and reactive to light. EOM are normal.   Neck: Normal range of motion. Neck supple.   Cardiovascular: Normal rate, regular rhythm, normal heart sounds and intact distal pulses.   Pulmonary/Chest: No respiratory distress. She has no wheezes. She has no rales.   Abdominal: Soft. Bowel sounds are normal. She exhibits no distension.   Musculoskeletal: Normal range of motion. She exhibits no edema.   Neurological:   Off propofol, right >left weakness UE and LE    Skin: Skin is warm and dry. Capillary refill takes less than 2 seconds.       Significant Labs:   BMP:   Recent Labs   Lab 10/12/19  0411   *      K 4.0      CO2 23   BUN 21   CREATININE 1.6*   CALCIUM 9.6     CBC:   Recent Labs   Lab 10/12/19  0411   WBC 12.99*   HGB 12.2   HCT 41.1          Significant Imaging: I have reviewed and interpreted all pertinent imaging results/findings within the past 24 hours.

## 2019-10-12 NOTE — ASSESSMENT & PLAN NOTE
Suspect mostly related to sedation. Baseline LUE weakness from prior CVA. CT imaging brain is normal. W/D to pain on all extremities on my evaluation. Does have reported underlying seizure d/o on vimpat. Restarted on sedation overnight!     -- Hold all sedation  -- If no appropriate improvement would consider MRI +/- EEG   -- Neurology consult noted-- they will re-valuate today   -- Continue Vimpat for now

## 2019-10-12 NOTE — CARE UPDATE
[facility]  ICU Multidisciplinary Bedside Rounds   SUMMARY     Date: 10/12/2019    Prehospitalization: Home  Admit Date / LOS : 10/11/2019/ 1 days    Diagnosis: Angio-edema    Consults:        Active: Neuro and Pulm CC       Needed: N/A     Code Status: Full Code   Advanced Directive: <no information>    LDA: Guevara, OG, PIV and Vent       Central Lines/Site/Justification:Patient Does Not Have Central Line       Urinary Cath/Order/Justification:Critically Ill in ICU    Vasopressors/Infusions:    sodium chloride 0.9% 125 mL/hr at 10/12/19 0600    propofol 20.037 mcg/kg/min (10/12/19 0600)          GOALS: Volume/ Hemodynamic: N/A                     RASS: -2  light sedation, briefly awakes to voice (eye opening)    CAM ICU: Negative  Pain Management: none       Pain Controlled: not applicable     Rhythm: SB    Respiratory Device: Vent    Vent Mode: PRVC  Oxygen Concentration (%):  [30-40] 30  Resp Rate Total:  [12 br/min-31 br/min] 18 br/min  Vt Set:  [450 mL] 450 mL  PEEP/CPAP:  [5 cmH20] 5 cmH20  Mean Airway Pressure:  [8.1 raX00-54.7 cmH20] 8.1 cmH20             Most Recent SBT/ SAT: Pass       MOVE Screen: PASS    VTE Prophylaxis: Pharm and Mechanical  Mobility: Bedrest  Stress Ulcer Prophylaxis: Yes    Dietary: NPO  Tolerance: not applicable    Isolation: No active isolations    Restraints: Yes    Significant Dates:  Post Op Date: N/A  Rescue Date: N/A  Imaging/ Diagnostics: N/A    Noteworthy Labs:  none    CBC/Anemia Labs: Coags:    Recent Labs   Lab 10/10/19  0053 10/10/19  1015 10/12/19  0411   WBC 9.69 8.78 12.99*   HGB 11.5* 11.7* 12.2   HCT 38.8 39.5 41.1    153 165   MCV 67* 67* 67*   RDW 18.1* 17.6* 18.3*    Recent Labs   Lab 10/10/19  0053   INR 1.1        Chemistries:   Recent Labs   Lab 10/10/19  0053 10/10/19  1015 10/12/19  0411    138 138   K 3.7 3.9 4.0    102 107   CO2 24 25 23   BUN 24* 28* 21   CREATININE 1.8* 1.9* 1.6*   CALCIUM 9.9 10.2 9.6   PROT 7.6 8.0   --    BILITOT 0.7 0.7  --    ALKPHOS 87 94  --    ALT 9* 10  --    AST 11 11  --    MG  --  2.0  --         Cardiac Enzymes: Ejection Fractions:    Recent Labs     10/10/19  0053   TROPONINI 0.015    No results found for: EF     POCT Glucose: HbA1c:    Recent Labs   Lab 10/11/19  0650 10/11/19  1239 10/11/19  1603 10/11/19  2027 10/11/19  2327 10/12/19  0500   POCTGLUCOSE 204* 219* 198* 185* 170* 177*    Hemoglobin A1C   Date Value Ref Range Status   10/02/2019 6.9 (H) 4.0 - 5.6 % Final     Comment:     ADA Screening Guidelines:  5.7-6.4%  Consistent with prediabetes  >or=6.5%  Consistent with diabetes  High levels of fetal hemoglobin interfere with the HbA1C  assay. Heterozygous hemoglobin variants (HbS, HgC, etc)do  not significantly interfere with this assay.   However, presence of multiple variants may affect accuracy.     06/03/2008 6.7 (H) 4.0 - 6.2 % Final   02/13/2006 6.2 4.5 - 6.2 % Final        Needs from Care Team: none     ICU LOS 1d 1h  Level of Care: Critical Care

## 2019-10-12 NOTE — CARE UPDATE
Transport patient to MRI with a transport vent.  ETT secured.  AMBU and mask at bedside.  Patient sats currently 97%

## 2019-10-12 NOTE — CARE UPDATE
Pt received orally intubated with a 6.5 ET tube, secured at 24 cm at the lips. Pt on servo I ventilator, on documented settings. Ambu bag and mask at the bedside. NARN at this time, will continue to monitor and wean as tolerated.

## 2019-10-12 NOTE — CONSULTS
[facility]  Neurology  Consult Note    Patient Name: Sherrie Alex  MRN: [id number]  Admission Date: 10/11/2019  Hospital Length of Stay: 1 days  Code Status: Full Code   Attending Provider: Elena Bauer MD   Consulting Provider: Jcarlos Holguin MD  Primary Care Physician: Desmond Yang MD  Principal Problem:Angio-edema    Consults  Subjective:     Chief Complaint/Reason for consult: Neurological changes     HPI: 68 y/o female with medical Hx as listed below presented to ED complaining swelling of tongue. Pt was intubated for airway protection. Etiology of her edema if not clear as she does has reported allergic reaction to ACE inhibitors but is not currently taking any of these type of antihypertensives.     Ms. Aelx has Hx stroke. According to chart review from [facility] pt had expressive aphasia on  that resolved beg Dx with TIA at that point. Notes from neurology clinic state that she has baseline left sided weakness?    Past Medical History:   Diagnosis Date    Anemia     Anxiety     Diabetes mellitus     Hyperlipidemia     Hypertension     Obesity     Proteinuria     Sleep apnea        Past Surgical History:   Procedure Laterality Date    APPENDECTOMY       SECTION      CHOLECYSTECTOMY      DILATION AND CURETTAGE OF UTERUS      TUBAL LIGATION         Review of patient's allergies indicates:   Allergen Reactions    Codeine Anxiety    Ace inhibitors     Diphenhydramine hcl        Current Neurological Medications:     No current facility-administered medications on file prior to encounter.      Current Outpatient Medications on File Prior to Encounter   Medication Sig    amlodipine (NORVASC) 5 MG tablet Take 5 mg by mouth once daily.      blood sugar diagnostic (GLUCOSE BLOOD) Strp by Misc.(Non-Drug; Combo Route) route.      cetirizine (ZYRTEC) 5 MG tablet Take 5 mg by mouth once daily.    cholecalciferol, vitamin D3, 10,000 unit Tab Take 1 tablet by  "mouth once daily.    cloNIDine (CATAPRES) 0.3 MG tablet Take 0.3 mg by mouth 2 (two) times daily.    ergocalciferol (VITAMIN D2) 50,000 unit Cap Take 50,000 Units by mouth every 7 days.      hydrALAZINE (APRESOLINE) 100 MG tablet Take 1 tablet (100 mg total) by mouth 2 (two) times daily.    hydrALAZINE (APRESOLINE) 50 MG tablet Take 50 mg by mouth 3 (three) times daily.    insulin aspart U-100 (NOVOLOG) 100 unit/mL injection Inject 10 Units into the skin 3 (three) times daily before meals.    insulin detemir U-100 (LEVEMIR) 100 unit/mL injection Inject 23 Units into the skin every evening.    insulin glargine (LANTUS SOLOSTAR) 100 unit/mL (3 mL) InPn Inject 24 Units into the skin every evening.     insulin needles, disposable, (PEN NEEDLE) 31 X 5/16 " Ndle by Misc.(Non-Drug; Combo Route) route.      lacosamide (VIMPAT) 100 mg Tab Take 50 mg by mouth 2 (two) times daily.      LANCETS MISC by Misc.(Non-Drug; Combo Route) route 4 (four) times daily.      levothyroxine (SYNTHROID) 137 MCG Tab tablet Take 1 tablet (137 mcg total) by mouth once daily.    linagliptin (TRADJENTA) 5 mg Tab tablet Take 5 mg by mouth once daily.      lubiprostone (AMITIZA) 24 MCG Cap Take 1 capsule (24 mcg total) by mouth 2 (two) times daily with meals.    meclizine (ANTIVERT) 25 mg tablet Take 25 mg by mouth 3 (three) times daily as needed.    metoprolol succinate (TOPROL-XL) 100 MG 24 hr tablet Take 100 mg by mouth 2 (two) times daily.      nitroGLYCERIN (NITROSTAT) 0.4 MG SL tablet Place 0.4 mg under the tongue.    rosuvastatin (CRESTOR) 20 MG tablet Take 20 mg by mouth once daily.      Family History     Problem Relation (Age of Onset)    Diabetes Brother, Sister    Hypertension Brother    Liver disease Father        Tobacco Use    Smoking status: Never Smoker   Substance and Sexual Activity    Alcohol use: No    Drug use: No    Sexual activity: Yes     Partners: Male     Review of Systems   Unable to perform ROS: " Intubated     Objective:     Vital Signs (Most Recent):  Temp: 97.9 °F (36.6 °C) (10/12/19 0301)  Pulse: 78 (10/12/19 0500)  Resp: 16 (10/12/19 0500)  BP: (!) 213/103 (10/12/19 0500)  SpO2: 100 % (10/12/19 0500) Vital Signs (24h Range):  Temp:  [97.4 °F (36.3 °C)-98 °F (36.7 °C)] 97.9 °F (36.6 °C)  Pulse:  [52-94] 78  Resp:  [12-36] 16  SpO2:  [98 %-100 %] 100 %  BP: (122-242)/() 213/103     Weight: 126.6 kg (279 lb 1.6 oz)  Body mass index is 46.45 kg/m².    Physical Exam   Constitutional: She appears well-developed.   HENT:   Head: Normocephalic.   Eyes: Right eye exhibits no discharge. Left eye exhibits no discharge.   Neck: Neck supple.   Cardiovascular: Normal rate.   Pulmonary/Chest:   Symmetrical chest expansion   Abdominal: Soft.       NEUROLOGICAL EXAMINATION:     MENTAL STATUS        Sedated but opens eyes briefly upon verbal stimuli  Follows simple commands     CRANIAL NERVES     CN III, IV, VI   Right pupil: Size: 2 mm. Shape: regular.   Left pupil: Size: 2 mm. Shape: regular.   Nystagmus: none   Conjugate gaze: present    MOTOR EXAM        On command there is AROM on left: slight movement of fingers on right hand       Significant Labs:   CBC:   Recent Labs   Lab 10/10/19  1015 10/12/19  0411   WBC 8.78 12.99*   HGB 11.7* 12.2   HCT 39.5 41.1    165     CMP:   Recent Labs   Lab 10/10/19  1015 10/12/19  0411   * 181*    138   K 3.9 4.0    107   CO2 25 23   BUN 28* 21   CREATININE 1.9* 1.6*   CALCIUM 10.2 9.6   MG 2.0  --    PROT 8.0  --    ALBUMIN 3.7  --    BILITOT 0.7  --    ALKPHOS 94  --    AST 11  --    ALT 10  --    ANIONGAP 11 8   EGFRNONAA 26* 32*       Significant Imaging:  Head CT    demonstrate no acute osseous abnormality.      Impression       No CT evidence of acute intracranial abnormality. Clinical correlation and further evaluation as warranted.    Chronic senescent and microvascular ischemic changes.      Electronically signed by: Edgardo Donaldson,  MD  Date: 10/11/2019  Time: 04:21            Assessment and Plan:     70 y/o female consulted for neurological changes    1. Neurological evaluation: t seems to have right hemiparesis (uncertain if old). She has MRI of brain on 7/2019 showing no acute infarction but an old one in right basal ganglia (this would left sided motor symptoms and on exam she is hypoactive on right.   Initial head CT shows no large area infarction or signs of ICH.   -Will reassess once sedation is stopped.    2. Seizure: no reported episodes. On lacoasmide 100 mg BID.    Active Diagnoses:    Diagnosis Date Noted POA    PRINCIPAL PROBLEM:  Angio-edema [T78.3XXA] 10/11/2019 Yes    Seizure [R56.9] 10/11/2019 Yes    RYLEE (acute kidney injury) [N17.9] 10/11/2019 Yes    On mechanically assisted ventilation [Z99.11] 10/11/2019 Not Applicable    Encephalopathy, metabolic [G93.41] 10/11/2019 Yes    Acquired hypothyroidism [E03.9] 10/03/2019 Yes    Essential hypertension [I10] 10/02/2019 Yes    Type 2 diabetes mellitus with circulatory disorder, with long-term current use of insulin [E11.59, Z79.4] 10/02/2019 Not Applicable    Anxiety [F41.9]  Yes    Morbid obesity [E66.01]  Yes    Dyslipidemia [E78.5]  Yes      Problems Resolved During this Admission:       VTE Risk Mitigation (From admission, onward)         Ordered     heparin (porcine) injection 5,000 Units  Every 12 hours      10/11/19 0617     IP VTE HIGH RISK PATIENT  Once      10/11/19 0532     Place ELIEZER hose  Until discontinued      10/11/19 0532     Place sequential compression device  Until discontinued      10/11/19 0532                Thank you for your consult. I will follow-up with patient. Please contact us if you have any additional questions.    Jcarlos Holguin MD  Neurology  [facility]

## 2019-10-13 LAB
ALLENS TEST: ABNORMAL
ANION GAP SERPL CALC-SCNC: 10 MMOL/L (ref 8–16)
BASOPHILS # BLD AUTO: 0.01 K/UL (ref 0–0.2)
BASOPHILS NFR BLD: 0.1 % (ref 0–1.9)
BUN SERPL-MCNC: 22 MG/DL (ref 8–23)
CALCIUM SERPL-MCNC: 9.1 MG/DL (ref 8.7–10.5)
CHLORIDE SERPL-SCNC: 111 MMOL/L (ref 95–110)
CO2 SERPL-SCNC: 19 MMOL/L (ref 23–29)
CREAT SERPL-MCNC: 1.6 MG/DL (ref 0.5–1.4)
DELSYS: ABNORMAL
DIFFERENTIAL METHOD: ABNORMAL
EOSINOPHIL # BLD AUTO: 0 K/UL (ref 0–0.5)
EOSINOPHIL NFR BLD: 0 % (ref 0–8)
ERYTHROCYTE [DISTWIDTH] IN BLOOD BY AUTOMATED COUNT: 18.6 % (ref 11.5–14.5)
ERYTHROCYTE [SEDIMENTATION RATE] IN BLOOD BY WESTERGREN METHOD: 12 MM/H
EST. GFR  (AFRICAN AMERICAN): 37 ML/MIN/1.73 M^2
EST. GFR  (NON AFRICAN AMERICAN): 32 ML/MIN/1.73 M^2
FIO2: 30
GLUCOSE SERPL-MCNC: 190 MG/DL (ref 70–110)
HCO3 UR-SCNC: 21.9 MMOL/L (ref 24–28)
HCT VFR BLD AUTO: 42.1 % (ref 37–48.5)
HGB BLD-MCNC: 12.5 G/DL (ref 12–16)
IMM GRANULOCYTES # BLD AUTO: 0.12 K/UL (ref 0–0.04)
IMM GRANULOCYTES NFR BLD AUTO: 0.9 % (ref 0–0.5)
LYMPHOCYTES # BLD AUTO: 1.2 K/UL (ref 1–4.8)
LYMPHOCYTES NFR BLD: 9.5 % (ref 18–48)
MCH RBC QN AUTO: 19.9 PG (ref 27–31)
MCHC RBC AUTO-ENTMCNC: 29.7 G/DL (ref 32–36)
MCV RBC AUTO: 67 FL (ref 82–98)
MIN VOL: 6.2
MODE: ABNORMAL
MONOCYTES # BLD AUTO: 0.5 K/UL (ref 0.3–1)
MONOCYTES NFR BLD: 4.1 % (ref 4–15)
NEUTROPHILS # BLD AUTO: 11 K/UL (ref 1.8–7.7)
NEUTROPHILS NFR BLD: 85.4 % (ref 38–73)
NRBC BLD-RTO: 0 /100 WBC
PCO2 BLDA: 41.4 MMHG (ref 35–45)
PEEP: 5
PH SMN: 7.33 [PH] (ref 7.35–7.45)
PIP: 21
PLATELET # BLD AUTO: 151 K/UL (ref 150–350)
PMV BLD AUTO: ABNORMAL FL (ref 9.2–12.9)
PO2 BLDA: 101 MMHG (ref 80–100)
POC BE: -4 MMOL/L
POC SATURATED O2: 97 % (ref 95–100)
POC TCO2: 23 MMOL/L (ref 23–27)
POCT GLUCOSE: 129 MG/DL (ref 70–110)
POCT GLUCOSE: 161 MG/DL (ref 70–110)
POCT GLUCOSE: 175 MG/DL (ref 70–110)
POCT GLUCOSE: 179 MG/DL (ref 70–110)
POCT GLUCOSE: 199 MG/DL (ref 70–110)
POCT GLUCOSE: 236 MG/DL (ref 70–110)
POTASSIUM SERPL-SCNC: 4.9 MMOL/L (ref 3.5–5.1)
RBC # BLD AUTO: 6.28 M/UL (ref 4–5.4)
SAMPLE: ABNORMAL
SITE: ABNORMAL
SODIUM SERPL-SCNC: 140 MMOL/L (ref 136–145)
SP02: 100
VT: 450
WBC # BLD AUTO: 12.86 K/UL (ref 3.9–12.7)

## 2019-10-13 PROCEDURE — 99232 PR SUBSEQUENT HOSPITAL CARE,LEVL II: ICD-10-PCS | Mod: ,,, | Performed by: PSYCHIATRY & NEUROLOGY

## 2019-10-13 PROCEDURE — 99291 CRITICAL CARE FIRST HOUR: CPT | Mod: ,,, | Performed by: EMERGENCY MEDICINE

## 2019-10-13 PROCEDURE — 36600 WITHDRAWAL OF ARTERIAL BLOOD: CPT

## 2019-10-13 PROCEDURE — S0028 INJECTION, FAMOTIDINE, 20 MG: HCPCS | Performed by: HOSPITALIST

## 2019-10-13 PROCEDURE — 25000003 PHARM REV CODE 250: Performed by: PSYCHIATRY & NEUROLOGY

## 2019-10-13 PROCEDURE — 27000221 HC OXYGEN, UP TO 24 HOURS

## 2019-10-13 PROCEDURE — 63600175 PHARM REV CODE 636 W HCPCS: Performed by: HOSPITALIST

## 2019-10-13 PROCEDURE — 25000003 PHARM REV CODE 250: Performed by: HOSPITALIST

## 2019-10-13 PROCEDURE — 82803 BLOOD GASES ANY COMBINATION: CPT

## 2019-10-13 PROCEDURE — 94761 N-INVAS EAR/PLS OXIMETRY MLT: CPT

## 2019-10-13 PROCEDURE — 99291 PR CRITICAL CARE, E/M 30-74 MINUTES: ICD-10-PCS | Mod: ,,, | Performed by: EMERGENCY MEDICINE

## 2019-10-13 PROCEDURE — 94003 VENT MGMT INPAT SUBQ DAY: CPT

## 2019-10-13 PROCEDURE — 20000000 HC ICU ROOM

## 2019-10-13 PROCEDURE — 36415 COLL VENOUS BLD VENIPUNCTURE: CPT

## 2019-10-13 PROCEDURE — 99232 SBSQ HOSP IP/OBS MODERATE 35: CPT | Mod: ,,, | Performed by: PSYCHIATRY & NEUROLOGY

## 2019-10-13 PROCEDURE — 85025 COMPLETE CBC W/AUTO DIFF WBC: CPT

## 2019-10-13 PROCEDURE — 99900035 HC TECH TIME PER 15 MIN (STAT)

## 2019-10-13 PROCEDURE — 99900026 HC AIRWAY MAINTENANCE (STAT)

## 2019-10-13 PROCEDURE — 80048 BASIC METABOLIC PNL TOTAL CA: CPT

## 2019-10-13 RX ORDER — FUROSEMIDE 10 MG/ML
40 INJECTION INTRAMUSCULAR; INTRAVENOUS 2 TIMES DAILY
Status: DISCONTINUED | OUTPATIENT
Start: 2019-10-13 | End: 2019-10-17

## 2019-10-13 RX ORDER — INSULIN ASPART 100 [IU]/ML
1-10 INJECTION, SOLUTION INTRAVENOUS; SUBCUTANEOUS EVERY 4 HOURS PRN
Status: DISCONTINUED | OUTPATIENT
Start: 2019-10-13 | End: 2019-10-29 | Stop reason: HOSPADM

## 2019-10-13 RX ORDER — GLYCOPYRROLATE 0.2 MG/ML
0.1 INJECTION INTRAMUSCULAR; INTRAVENOUS 4 TIMES DAILY
Status: DISCONTINUED | OUTPATIENT
Start: 2019-10-13 | End: 2019-10-16

## 2019-10-13 RX ORDER — PROPOFOL 10 MG/ML
10 INJECTION, EMULSION INTRAVENOUS CONTINUOUS
Status: DISCONTINUED | OUTPATIENT
Start: 2019-10-13 | End: 2019-10-16

## 2019-10-13 RX ADMIN — MORPHINE SULFATE 2 MG: 10 INJECTION INTRAVENOUS at 10:10

## 2019-10-13 RX ADMIN — PROPOFOL 50 MCG/KG/MIN: 10 INJECTION, EMULSION INTRAVENOUS at 06:10

## 2019-10-13 RX ADMIN — HEPARIN SODIUM 5000 UNITS: 5000 INJECTION INTRAVENOUS; SUBCUTANEOUS at 08:10

## 2019-10-13 RX ADMIN — SENNOSIDES, DOCUSATE SODIUM 1 TABLET: 50; 8.6 TABLET, FILM COATED ORAL at 09:10

## 2019-10-13 RX ADMIN — SODIUM CHLORIDE: 0.9 INJECTION, SOLUTION INTRAVENOUS at 04:10

## 2019-10-13 RX ADMIN — FAMOTIDINE 20 MG: 10 INJECTION INTRAVENOUS at 08:10

## 2019-10-13 RX ADMIN — GLYCOPYRROLATE 0.1 MG: 0.2 INJECTION, SOLUTION INTRAMUSCULAR; INTRAVENOUS at 09:10

## 2019-10-13 RX ADMIN — INSULIN ASPART 2 UNITS: 100 INJECTION, SOLUTION INTRAVENOUS; SUBCUTANEOUS at 12:10

## 2019-10-13 RX ADMIN — HYDRALAZINE HYDROCHLORIDE 10 MG: 20 INJECTION INTRAMUSCULAR; INTRAVENOUS at 09:10

## 2019-10-13 RX ADMIN — GLYCOPYRROLATE 0.1 MG: 0.2 INJECTION, SOLUTION INTRAMUSCULAR; INTRAVENOUS at 06:10

## 2019-10-13 RX ADMIN — PROPOFOL 50 MCG/KG/MIN: 10 INJECTION, EMULSION INTRAVENOUS at 11:10

## 2019-10-13 RX ADMIN — GLYCOPYRROLATE 0.1 MG: 0.2 INJECTION, SOLUTION INTRAMUSCULAR; INTRAVENOUS at 01:10

## 2019-10-13 RX ADMIN — SODIUM CHLORIDE: 0.9 INJECTION, SOLUTION INTRAVENOUS at 03:10

## 2019-10-13 RX ADMIN — PROPOFOL 40 MCG/KG/MIN: 10 INJECTION, EMULSION INTRAVENOUS at 12:10

## 2019-10-13 RX ADMIN — PROPOFOL 50 MCG/KG/MIN: 10 INJECTION, EMULSION INTRAVENOUS at 09:10

## 2019-10-13 RX ADMIN — SENNOSIDES, DOCUSATE SODIUM 1 TABLET: 50; 8.6 TABLET, FILM COATED ORAL at 08:10

## 2019-10-13 RX ADMIN — MORPHINE SULFATE 2 MG: 10 INJECTION INTRAVENOUS at 03:10

## 2019-10-13 RX ADMIN — PROPOFOL 25 MCG/KG/MIN: 10 INJECTION, EMULSION INTRAVENOUS at 06:10

## 2019-10-13 RX ADMIN — PROPOFOL 50 MCG/KG/MIN: 10 INJECTION, EMULSION INTRAVENOUS at 02:10

## 2019-10-13 RX ADMIN — LACOSAMIDE 50 MG: 50 TABLET, FILM COATED ORAL at 08:10

## 2019-10-13 RX ADMIN — FUROSEMIDE 40 MG: 10 INJECTION, SOLUTION INTRAVENOUS at 06:10

## 2019-10-13 RX ADMIN — LACOSAMIDE 50 MG: 50 TABLET, FILM COATED ORAL at 09:10

## 2019-10-13 RX ADMIN — ASPIRIN 325 MG ORAL TABLET 325 MG: 325 PILL ORAL at 08:10

## 2019-10-13 RX ADMIN — PROPOFOL 25 MCG/KG/MIN: 10 INJECTION, EMULSION INTRAVENOUS at 01:10

## 2019-10-13 RX ADMIN — INSULIN ASPART 2 UNITS: 100 INJECTION, SOLUTION INTRAVENOUS; SUBCUTANEOUS at 07:10

## 2019-10-13 RX ADMIN — HEPARIN SODIUM 5000 UNITS: 5000 INJECTION INTRAVENOUS; SUBCUTANEOUS at 09:10

## 2019-10-13 RX ADMIN — ROSUVASTATIN CALCIUM 20 MG: 10 TABLET, COATED ORAL at 09:10

## 2019-10-13 NOTE — CARE UPDATE
[facility]  ICU Multidisciplinary Bedside Rounds     UPDATE     Date: 10/13/2019      Plan of care reviewed with the following, Nurse, Charge Nurse and Physician.       Needs/ Goals for the day: Possibly extubate today. Need parameters for BP      Level of Care: Critical Care

## 2019-10-13 NOTE — PLAN OF CARE
On SAT/SBT she moves left side and follows commands. No right sided command following and some right sided neglect. Tachy to 140,s, tachypnea. Return to vent support. Diuresis, check echo.. Still with copious thin oral secretions.. Start glycopyrrolate to optimize.     Gold Ricketts M.D.   [facility]/Critical Care

## 2019-10-13 NOTE — PROGRESS NOTES
Pt received on Servo I vent with settings as followed: PRVC 12/450/+5 and 30%.  Size 6.5 ETT in place and secure at 24 cm at the lip.  Ambu bag and mask at bedside and all alarms on and functioning. NARN. RT will continue to monitor pt status.

## 2019-10-13 NOTE — PROGRESS NOTES
Results for ZANA, MS SPANN (MRN [id number]) as of 10/13/2019 05:12   Ref. Range 10/13/2019 05:03   POC PH Latest Ref Range: 7.35 - 7.45  7.332 (L)   POC PCO2 Latest Ref Range: 35 - 45 mmHg 41.4   POC PO2 Latest Ref Range: 80 - 100 mmHg 101 (H)   POC BE Latest Ref Range: -2 to 2 mmol/L -4   POC HCO3 Latest Ref Range: 24 - 28 mmol/L 21.9 (L)   POC SATURATED O2 Latest Ref Range: 95 - 100 % 97   POC TCO2 Latest Ref Range: 23 - 27 mmol/L 23   FiO2 Unknown 30   Vt Unknown 450   PiP Unknown 21   PEEP Unknown 5   Sample Unknown ARTERIAL   DelSys Unknown Adult Vent   Allens Test Unknown Pass   Site Unknown LR   Mode Unknown AC/PRVC   Rate Unknown 12

## 2019-10-13 NOTE — PROGRESS NOTES
[facility]  Neurology  Progress Note    Patient Name: Sherrie Alex  MRN: [id number]  Admission Date: 10/11/2019  Hospital Length of Stay: 2 days  Code Status: Full Code   Attending Provider: Elena Bauer MD  Primary Care Physician: Desmond Yang MD   Principal Problem:Angio-edema    Subjective:     Interval History: 70 y/o female with medical Hx as listed below presented to ED complaining swelling of tongue. Pt was intubated for airway protection. Etiology of her edema if not clear as she does has reported allergic reaction to ACE inhibitors but is not currently taking any of these type of antihypertensives.      Ms. Alex has Hx stroke. According to chart review from [facility] pt had expressive aphasia on Jan/2019 that resolved beg Dx with TIA at that point. Notes from neurology clinic state that she has baseline left sided weakness?     -10/12/19: Overnight no new issues.    -10/13/19: Pt remains intubated. No acute issues overnight.    Current Neurological Medications:     Current Facility-Administered Medications   Medication Dose Route Frequency Provider Last Rate Last Dose    0.9%  NaCl infusion   Intravenous Continuous Elena Bauer  mL/hr at 10/13/19 0600      acetaminophen tablet 650 mg  650 mg Oral Q8H PRN Elena Bauer MD        aspirin tablet 325 mg  325 mg Per OG tube Daily Jcarlos Holguin MD   325 mg at 10/13/19 0844    cloNIDine tablet 0.3 mg  0.3 mg Oral BID Elena Bauer MD   0.3 mg at 10/12/19 2058    dextrose 50% injection 12.5 g  12.5 g Intravenous PRN Elena Bauer MD        famotidine (PF) injection 20 mg  20 mg Intravenous Daily Elena Bauer MD   20 mg at 10/13/19 0845    furosemide injection 40 mg  40 mg Intravenous BID Elena Bauer MD        glucagon (human recombinant) injection 1 mg  1 mg Intramuscular PRN Elena Bauer MD        glycopyrrolate injection 0.1 mg  0.1 mg Intravenous QID Elena Bauer MD         heparin (porcine) injection 5,000 Units  5,000 Units Subcutaneous Q12H Elena Bauer MD   5,000 Units at 10/13/19 0843    hydrALAZINE injection 10 mg  10 mg Intravenous Q8H PRN Elena Bauer MD   10 mg at 10/13/19 0920    insulin aspart U-100 pen 0-5 Units  0-5 Units Subcutaneous Q4H Elena Bauer MD   2 Units at 10/13/19 0759    insulin detemir U-100 pen 12 Units  12 Units Subcutaneous QHS Elena Bauer MD   12 Units at 10/12/19 2102    lacosamide tablet 50 mg  50 mg Oral BID Elena Bauer MD   50 mg at 10/13/19 0844    morphine injection 2 mg  2 mg Intravenous Q4H PRN Elena Bauer MD   2 mg at 10/12/19 1356    ondansetron injection 4 mg  4 mg Intravenous Q8H PRN Elena Bauer MD        propofol (DIPRIVAN) 10 mg/mL infusion  10 mcg/kg/min Intravenous Continuous Elena Bauer MD 30.1 mL/hr at 10/13/19 1015 40 mcg/kg/min at 10/13/19 1015    rosuvastatin tablet 20 mg  20 mg Per OG tube QHS Jcarlos Holguin MD   20 mg at 10/12/19 2057    senna-docusate 8.6-50 mg per tablet 1 tablet  1 tablet Oral BID Elena Bauer MD   1 tablet at 10/13/19 0844    sodium chloride 0.9% flush 10 mL  10 mL Intravenous PRN Elena Bauer MD         Facility-Administered Medications Ordered in Other Encounters   Medication Dose Route Frequency Provider Last Rate Last Dose    butamben-tetracaine-benzocaine 2%-2%-14% (200 mg/sec) spray    PRN Teetee Duncan MD   1 spray at 10/11/19 0130    ketamine injection    PRN Teetee Duncan MD   5 mg at 10/11/19 0205    midazolam injection   Intravenous PRN Teetee Duncan MD   2 mg at 10/11/19 0155    phenylephrine HCL 0.25% nasal spray    PRN Teetee Duncan MD   1 spray at 10/11/19 0130    succinylcholine injection    PRN Teetee Duncan MD   120 mg at 10/11/19 0205       Review of Systems   Unable to obtain as pt is intubated    Objective:     Vital Signs (Most Recent):  Temp: 97.9 °F (36.6 °C) (10/13/19 0701)  Pulse: 76 (10/13/19  0900)  Resp: 14 (10/13/19 0900)  BP: (!) 241/113 (10/13/19 0920)  SpO2: 100 % (10/13/19 0900) Vital Signs (24h Range):  Temp:  [97.8 °F (36.6 °C)-98.8 °F (37.1 °C)] 97.9 °F (36.6 °C)  Pulse:  [56-87] 76  Resp:  [9-38] 14  SpO2:  [98 %-100 %] 100 %  BP: (130-241)/() 241/113     Weight: 126.6 kg (279 lb 1.6 oz)  Body mass index is 46.45 kg/m².    Physical Exam  Constitutional: She appears well-developed.   HENT:   Head: Normocephalic.   Eyes: Right eye exhibits no discharge. Left eye exhibits no discharge.   Neck: Neck supple.   Cardiovascular: Normal rate.   Pulmonary/Chest:   Symmetrical chest expansion   Abdominal: Soft.         NEUROLOGICAL EXAMINATION:      MENTAL STATUS        Sedated but opens eyes briefly upon verbal stimuli  Follows simple commands      CRANIAL NERVES      CN III, IV, VI   Right pupil: Size: 2 mm. Shape: regular.   Left pupil: Size: 2 mm. Shape: regular.   Nystagmus: none   Conjugate gaze: present     MOTOR EXAM        On command there is AROM on left; no AROM on right  There is neglect of right side           Significant Labs:   CBC:   Recent Labs   Lab 10/12/19  0411 10/13/19  0435   WBC 12.99* 12.86*   HGB 12.2 12.5   HCT 41.1 42.1    151     CMP:   Recent Labs   Lab 10/12/19  0411 10/13/19  0340   * 190*    140   K 4.0 4.9    111*   CO2 23 19*   BUN 21 22   CREATININE 1.6* 1.6*   CALCIUM 9.6 9.1   ANIONGAP 8 10   EGFRNONAA 32* 32*         Assessment and Plan:     68 y/o female consulted for neurological changes:     1. Right hemiplegia: infarction on left. [facility].   -Permissive HTN for now. Treat if > 220/110.   -Will obtain CTA head and neck when able.   -Echo in AM   -ASA and statin.       2. Seizure: no reported episodes. On lacosamide 100 mg BID.       Active Diagnoses:    Diagnosis Date Noted POA    PRINCIPAL PROBLEM:  Angio-edema [T78.3XXA] 10/11/2019 Yes    CVA (cerebral vascular accident) [I63.9] 10/12/2019 Yes    Seizure [R56.9]  10/11/2019 Yes    RYLEE (acute kidney injury) [N17.9] 10/11/2019 Yes    On mechanically assisted ventilation [Z99.11] 10/11/2019 Not Applicable    Encephalopathy, metabolic [G93.41] 10/11/2019 Yes    Acquired hypothyroidism [E03.9] 10/03/2019 Yes    Essential hypertension [I10] 10/02/2019 Yes    Type 2 diabetes mellitus with circulatory disorder, with long-term current use of insulin [E11.59, Z79.4] 10/02/2019 Not Applicable    Anxiety [F41.9]  Yes    Morbid obesity [E66.01]  Yes    Dyslipidemia [E78.5]  Yes      Problems Resolved During this Admission:       VTE Risk Mitigation (From admission, onward)         Ordered     heparin (porcine) injection 5,000 Units  Every 12 hours      10/11/19 0617     IP VTE HIGH RISK PATIENT  Once      10/11/19 0532     Place ELIEZER hose  Until discontinued      10/11/19 0532     Place sequential compression device  Until discontinued      10/11/19 0532                Jcarlos Holguin MD  Neurology  [facility]

## 2019-10-13 NOTE — ASSESSMENT & PLAN NOTE
Unclear precipitant. Oropharyngeal exam with minimal edema on my assessment. + cuff leak. Unclear if actual edema on presentation or drooling and dysarthria reflective of CVA. She has completed 48 hours of corticosteroids and H2 antagonist.  Would discontinue these therapies.

## 2019-10-13 NOTE — SUBJECTIVE & OBJECTIVE
INTERVAL HISTORY-     MRI with CVA. Returned to propofol O/N for agitation. Sedated this AM but responsive O/N per RN. BP remains elevated. No Sz activity. 4L + fluid balance.     Review of Systems   Unable to perform ROS: Intubated     Objective:     Vital Signs (Most Recent):  Temp: 97.8 °F (36.6 °C) (10/13/19 0301)  Pulse: 66 (10/13/19 0600)  Resp: 14 (10/13/19 0600)  BP: (!) 203/99 (10/13/19 0600)  SpO2: 100 % (10/13/19 0732) Vital Signs (24h Range):  Temp:  [97.8 °F (36.6 °C)-98.8 °F (37.1 °C)] 97.8 °F (36.6 °C)  Pulse:  [] 66  Resp:  [9-38] 14  SpO2:  [98 %-100 %] 100 %  BP: (130-225)/() 203/99     Weight: 126.6 kg (279 lb 1.6 oz)  Body mass index is 46.45 kg/m².      Intake/Output Summary (Last 24 hours) at 10/13/2019 0850  Last data filed at 10/13/2019 0600  Gross per 24 hour   Intake 3623.68 ml   Output 1315 ml   Net 2308.68 ml       Cumulative Fluid Balance-+4L since admission.     Physical Exam   Constitutional: She appears well-developed. No distress.   HENT:   Head: Normocephalic and atraumatic.   Right Ear: External ear normal.   Left Ear: External ear normal.   Nose: Nose normal.   ET tube in place. Lips/tongue without significant edema. Mild tongue protrusion. + slight audible cuff cleak    Eyes: Pupils are equal, round, and reactive to light. Conjunctivae and EOM are normal. Right eye exhibits no discharge. Left eye exhibits no discharge. No scleral icterus.   Flutters eyes to voice.    Neck: Normal range of motion. Neck supple. No JVD present. No tracheal deviation present. No thyromegaly present.   Cardiovascular: Normal rate, regular rhythm and normal heart sounds. Exam reveals no gallop and no friction rub.   No murmur heard.  Pulmonary/Chest: No stridor. She has no wheezes. She has no rales. She exhibits no tenderness.   Intubated on MV    Abdominal: Soft. Bowel sounds are normal. She exhibits no distension and no mass. There is no tenderness. There is no rebound and no guarding. No  hernia.   Musculoskeletal: She exhibits no edema, tenderness or deformity.   Lymphadenopathy:     She has no cervical adenopathy.   Neurological:   Sedated.    Skin: Skin is warm and dry. Capillary refill takes less than 2 seconds. No rash noted. She is not diaphoretic. No erythema. No pallor.   Nursing note and vitals reviewed.      Vents:  Vent Mode: PRVC (10/13/19 0732)  Ventilator Initiated: Yes (10/11/19 0213)  Set Rate: 12 bmp (10/13/19 0732)  Vt Set: 450 mL (10/13/19 0732)  PEEP/CPAP: 5 cmH20 (10/13/19 0732)  Oxygen Concentration (%): 30 (10/13/19 0732)  Peak Airway Pressure: 24.5 cmH2O (10/13/19 0732)  Total Ve: 6.5 mL (10/13/19 0732)  F/VT Ratio<105 (RSBI): (!) 30.84 (10/13/19 0503)    Lines/Drains/Airways     Drain                 NG/OG Tube 10/11/19 0215 orogastric 18 Fr. Left mouth 2 days         Urethral Catheter 10/11/19 0230 16 Fr. 2 days          Airway                 Airway - Non-Surgical 10/11/19 0204 Endotracheal Tube-Hi/Lo 2 days          Peripheral Intravenous Line                 Peripheral IV - Single Lumen 10/10/19 0901 18 G Right Antecubital 2 days         Peripheral IV - Single Lumen 10/11/19 18 G Right Antecubital 2 days                Significant Labs:    CBC/Anemia Profile:  Recent Labs   Lab 10/12/19  0411 10/13/19  0435   WBC 12.99* 12.86*   HGB 12.2 12.5   HCT 41.1 42.1    151   MCV 67* 67*   RDW 18.3* 18.6*        Chemistries:  Recent Labs   Lab 10/12/19  0411 10/13/19  0340    140   K 4.0 4.9    111*   CO2 23 19*   BUN 21 22   CREATININE 1.6* 1.6*   CALCIUM 9.6 9.1         Troponin I 0.000 - 0.026 ng/mL 0.015      TSH 0.400 - 4.000 uIU/mL 3.658      Prothrombin Time 9.0 - 12.5 sec 11.9    INR 0.8 - 1.2 1.1         Ref. Range 10/13/2019 05:03   POC PH Latest Ref Range: 7.35 - 7.45  7.332 (L)   POC PCO2 Latest Ref Range: 35 - 45 mmHg 41.4   POC PO2 Latest Ref Range: 80 - 100 mmHg 101 (H)   POC BE Latest Ref Range: -2 to 2 mmol/L -4   POC HCO3 Latest Ref Range: 24 -  28 mmol/L 21.9 (L)   POC SATURATED O2 Latest Ref Range: 95 - 100 % 97       UA- normal       Microbiology-- None     Significant Imaging:   I have reviewed all pertinent imaging results/findings within the past 24 hours.  I have reviewed and interpreted all pertinent imaging results/findings within the past 24 hours.     CXR- There has been interval placement of an endotracheal tube with tip terminating approximately 2.0 cm above the cici.  There is an esophagogastric tube now present coursing below the diaphragm and outside the field of view of the examination.  Cardiomediastinal silhouette remains mildly enlarged.  There are low lung volumes bilaterally limiting assessment with patchy bibasilar subsegmental opacities.  No evidence of pneumothorax    CT Neck soft tissue-   1.  Limited assessment of the aerodigestive tract secondary to the presence of an endotracheal tube and lack of IV contrast.  No evidence of subcutaneous emphysema or focal fluid collection within the deep spaces of the neck.  2.  Secretions within the posterior nasopharynx presumably relating to intubated state.  3.  Subcentimeter low-attenuation right thyroid lobe nodule.  4.  Mild bilateral dependent opacities within the visualized lungs which may relate to atelectasis, although edema or developing infection not excluded.    CT Chest-   No CT evidence of acute intracranial abnormality. Clinical correlation and further evaluation as warranted.  Chronic senescent and microvascular ischemic changes.    MRI- Moderate-sized area of subacute infarction involving the left subinsular white matter extending to the periventricular white matter.  No MR evidence for hemorrhage.    Moderate involutional change and microvascular changes.      ECHO 2019- [facility]    Technically difficult study.      Moderate left ventricular hypertrophy.     Hyperdynamic left ventricular systolic function.     Left ventricular ejection fraction is estimated at 75%.      Borderline RV enlargement.     Borderline pulmonary hypertension.     Aortic valve sclerosis without stenosis.     Mild tricuspid valve regurgitation.     A bubble study was done and appeared to be negative at rest and with valsalva.

## 2019-10-13 NOTE — ASSESSMENT & PLAN NOTE
Intubated for airway protection and concern for angioedema. Cuff leak this AM. Minimal vent support and gas exchange/lung mechanics appropriate. Mental status remains main barrier to extubation at present.. MRI completed yesterday and with new CVA.     -- LPV. Wean FiO2 to maintain SpO2 >88%   -- SAT/SBT when mental status allows. Stop all sedation now.   -- Has history of DEYANIRA without evidence of significant prior hypercapnia.. Plan for extubation to NIPPV.   -- Keep euvolemic.. Now about 4L positive from fluid balance.. Would initiate diuresis, check BNP and consider repeat ECHO.

## 2019-10-13 NOTE — ASSESSMENT & PLAN NOTE
Permissive HTN in setting of CVA. She is >24 hours from event and seems reasonable to begin optimization at this point.

## 2019-10-13 NOTE — PROGRESS NOTES
[facility]  Pulmonology  Progress Note    Patient Name: Sherrie Alex  MRN: [id number]  Admission Date: 10/11/2019  Hospital Length of Stay: 2 days  Code Status: Full Code  Attending Provider: Elena Bauer MD  Primary Care Provider: Desmond Yang MD   Principal Problem: Angio-edema    Subjective:     INTERVAL HISTORY-     MRI with CVA. Returned to propofol O/N for agitation. Sedated this AM but responsive O/N per RN. BP remains elevated. No Sz activity. 4L + fluid balance.     Review of Systems   Unable to perform ROS: Intubated     Objective:     Vital Signs (Most Recent):  Temp: 97.8 °F (36.6 °C) (10/13/19 0301)  Pulse: 66 (10/13/19 0600)  Resp: 14 (10/13/19 0600)  BP: (!) 203/99 (10/13/19 0600)  SpO2: 100 % (10/13/19 0732) Vital Signs (24h Range):  Temp:  [97.8 °F (36.6 °C)-98.8 °F (37.1 °C)] 97.8 °F (36.6 °C)  Pulse:  [] 66  Resp:  [9-38] 14  SpO2:  [98 %-100 %] 100 %  BP: (130-225)/() 203/99     Weight: 126.6 kg (279 lb 1.6 oz)  Body mass index is 46.45 kg/m².      Intake/Output Summary (Last 24 hours) at 10/13/2019 0850  Last data filed at 10/13/2019 0600  Gross per 24 hour   Intake 3623.68 ml   Output 1315 ml   Net 2308.68 ml       Cumulative Fluid Balance-+4L since admission.     Physical Exam   Constitutional: She appears well-developed. No distress.   HENT:   Head: Normocephalic and atraumatic.   Right Ear: External ear normal.   Left Ear: External ear normal.   Nose: Nose normal.   ET tube in place. Lips/tongue without significant edema. Mild tongue protrusion. + slight audible cuff cleak    Eyes: Pupils are equal, round, and reactive to light. Conjunctivae and EOM are normal. Right eye exhibits no discharge. Left eye exhibits no discharge. No scleral icterus.   Flutters eyes to voice.    Neck: Normal range of motion. Neck supple. No JVD present. No tracheal deviation present. No thyromegaly present.   Cardiovascular: Normal rate, regular rhythm and normal heart  sounds. Exam reveals no gallop and no friction rub.   No murmur heard.  Pulmonary/Chest: No stridor. She has no wheezes. She has no rales. She exhibits no tenderness.   Intubated on MV    Abdominal: Soft. Bowel sounds are normal. She exhibits no distension and no mass. There is no tenderness. There is no rebound and no guarding. No hernia.   Musculoskeletal: She exhibits no edema, tenderness or deformity.   Lymphadenopathy:     She has no cervical adenopathy.   Neurological:   Sedated.    Skin: Skin is warm and dry. Capillary refill takes less than 2 seconds. No rash noted. She is not diaphoretic. No erythema. No pallor.   Nursing note and vitals reviewed.      Vents:  Vent Mode: PRVC (10/13/19 0732)  Ventilator Initiated: Yes (10/11/19 0213)  Set Rate: 12 bmp (10/13/19 0732)  Vt Set: 450 mL (10/13/19 0732)  PEEP/CPAP: 5 cmH20 (10/13/19 0732)  Oxygen Concentration (%): 30 (10/13/19 0732)  Peak Airway Pressure: 24.5 cmH2O (10/13/19 0732)  Total Ve: 6.5 mL (10/13/19 0732)  F/VT Ratio<105 (RSBI): (!) 30.84 (10/13/19 0503)    Lines/Drains/Airways     Drain                 NG/OG Tube 10/11/19 0215 orogastric 18 Fr. Left mouth 2 days         Urethral Catheter 10/11/19 0230 16 Fr. 2 days          Airway                 Airway - Non-Surgical 10/11/19 0204 Endotracheal Tube-Hi/Lo 2 days          Peripheral Intravenous Line                 Peripheral IV - Single Lumen 10/10/19 0901 18 G Right Antecubital 2 days         Peripheral IV - Single Lumen 10/11/19 18 G Right Antecubital 2 days                Significant Labs:    CBC/Anemia Profile:  Recent Labs   Lab 10/12/19  0411 10/13/19  0435   WBC 12.99* 12.86*   HGB 12.2 12.5   HCT 41.1 42.1    151   MCV 67* 67*   RDW 18.3* 18.6*        Chemistries:  Recent Labs   Lab 10/12/19  0411 10/13/19  0340    140   K 4.0 4.9    111*   CO2 23 19*   BUN 21 22   CREATININE 1.6* 1.6*   CALCIUM 9.6 9.1         Troponin I 0.000 - 0.026 ng/mL 0.015      TSH 0.400 - 4.000  uIU/mL 3.658      Prothrombin Time 9.0 - 12.5 sec 11.9    INR 0.8 - 1.2 1.1         Ref. Range 10/13/2019 05:03   POC PH Latest Ref Range: 7.35 - 7.45  7.332 (L)   POC PCO2 Latest Ref Range: 35 - 45 mmHg 41.4   POC PO2 Latest Ref Range: 80 - 100 mmHg 101 (H)   POC BE Latest Ref Range: -2 to 2 mmol/L -4   POC HCO3 Latest Ref Range: 24 - 28 mmol/L 21.9 (L)   POC SATURATED O2 Latest Ref Range: 95 - 100 % 97       UA- normal       Microbiology-- None     Significant Imaging:   I have reviewed all pertinent imaging results/findings within the past 24 hours.  I have reviewed and interpreted all pertinent imaging results/findings within the past 24 hours.     CXR- There has been interval placement of an endotracheal tube with tip terminating approximately 2.0 cm above the cici.  There is an esophagogastric tube now present coursing below the diaphragm and outside the field of view of the examination.  Cardiomediastinal silhouette remains mildly enlarged.  There are low lung volumes bilaterally limiting assessment with patchy bibasilar subsegmental opacities.  No evidence of pneumothorax    CT Neck soft tissue-   1.  Limited assessment of the aerodigestive tract secondary to the presence of an endotracheal tube and lack of IV contrast.  No evidence of subcutaneous emphysema or focal fluid collection within the deep spaces of the neck.  2.  Secretions within the posterior nasopharynx presumably relating to intubated state.  3.  Subcentimeter low-attenuation right thyroid lobe nodule.  4.  Mild bilateral dependent opacities within the visualized lungs which may relate to atelectasis, although edema or developing infection not excluded.    CT Chest-   No CT evidence of acute intracranial abnormality. Clinical correlation and further evaluation as warranted.  Chronic senescent and microvascular ischemic changes.    MRI- Moderate-sized area of subacute infarction involving the left subinsular white matter extending to the  periventricular white matter.  No MR evidence for hemorrhage.    Moderate involutional change and microvascular changes.      ECHO 2019- [facility] [place] [facility]    Technically difficult study.      Moderate left ventricular hypertrophy.     Hyperdynamic left ventricular systolic function.     Left ventricular ejection fraction is estimated at 75%.     Borderline RV enlargement.     Borderline pulmonary hypertension.     Aortic valve sclerosis without stenosis.     Mild tricuspid valve regurgitation.     A bubble study was done and appeared to be negative at rest and with valsalva.     Assessment/Plan:     * Angio-edema  Unclear precipitant. Oropharyngeal exam with minimal edema on my assessment. + cuff leak. Unclear if actual edema on presentation or drooling and dysarthria reflective of CVA. She has completed 48 hours of corticosteroids and H2 antagonist.  Would discontinue these therapies.     CVA (cerebral vascular accident)  Statin/ASA. Permissive HTN. Neurology following.     On mechanically assisted ventilation  Intubated for airway protection and concern for angioedema. Cuff leak this AM. Minimal vent support and gas exchange/lung mechanics appropriate. Mental status remains main barrier to extubation at present.. MRI completed yesterday and with new CVA.     -- LPV. Wean FiO2 to maintain SpO2 >88%   -- SAT/SBT when mental status allows. Stop all sedation now.   -- Has history of DEYANIRA without evidence of significant prior hypercapnia.. Plan for extubation to NIPPV.   -- Keep euvolemic.. Now about 4L positive from fluid balance.. Would initiate diuresis, check BNP and consider repeat ECHO.     Seizure  By history. On vimpat. No Sz activity noted.     Acquired hypothyroidism  Continue synthroid     Essential hypertension  Permissive HTN in setting of CVA. She is >24 hours from event and seems reasonable to begin optimization at this point.     Type 2 diabetes mellitus with circulatory disorder, with long-term current use of  insulin  SSI- goal 140-180       GI PPX- H2 antagonist      DVT PPX- heparin      Nutrition- Initiate TF        Critical Care Time: 40 minutes  Critical care was time spent personally by me on the following activities: evaluating this patient's organ dysfunction, development of treatment plan, discussing treatment plan with patient or surrogate and bedside caregivers, discussions with consultants, evaluation of patient's response to treatment, examination of patient, ordering and performing treatments and interventions, ordering and review of laboratory studies, ordering and review of radiographic studies, re-evaluation of patient's condition. This critical care time did not overlap with that of any other provider or involve time for any procedures.       Gold Ricketts MD  Pulmonology/Critical Care Medicine   [facility]

## 2019-10-13 NOTE — CARE UPDATE
[facility]  ICU Multidisciplinary Bedside Rounds   SUMMARY     Date: 10/13/2019    Prehospitalization: Home  Admit Date / LOS : 10/11/2019/ 2 days    Diagnosis: Angio-edema    Consults:        Active: Neuro and Pulm CC       Needed: N/A     Code Status: Full Code   Advanced Directive: <no information>    LDA: Guevara, OG, PIV and Vent       Central Lines/Site/Justification:Patient Does Not Have Central Line       Urinary Cath/Order/Justification:Critically Ill in ICU    Vasopressors/Infusions:    sodium chloride 0.9% 125 mL/hr at 10/13/19 0500    propofol 24.947 mcg/kg/min (10/13/19 0500)          GOALS: Volume/ Hemodynamic: SBP < 220                     RASS: -2  light sedation, briefly awakes to voice (eye opening)    CAM ICU: Positive  Pain Management: none       Pain Controlled: yes     Rhythm: NSR    Respiratory Device: Vent    Vent Mode: PRVC  Oxygen Concentration (%):  [] 30  Resp Rate Total:  [12 br/min-33 br/min] 14 br/min  Vt Set:  [450 mL] 450 mL  PEEP/CPAP:  [5 cmH20] 5 cmH20  Mean Airway Pressure:  [8 gsD83-31.5 cmH20] 8.8 cmH20             Most Recent SBT/ SAT: Fail       MOVE Screen: FAIL    VTE Prophylaxis: Pharm and Mechanical  Mobility: Bedrest  Stress Ulcer Prophylaxis: Yes    Dietary: NPO  Tolerance: not applicable       Isolation: No active isolations    Restraints: Yes    Significant Dates:  Post Op Date: N/A  Rescue Date: N/A  Imaging/ Diagnostics: MRI head- CVA    Noteworthy Labs:  Cr 1.6    CBC/Anemia Labs: Coags:    Recent Labs   Lab 10/10/19  0053 10/10/19  1015 10/12/19  0411   WBC 9.69 8.78 12.99*   HGB 11.5* 11.7* 12.2   HCT 38.8 39.5 41.1    153 165   MCV 67* 67* 67*   RDW 18.1* 17.6* 18.3*    Recent Labs   Lab 10/10/19  0053   INR 1.1        Chemistries:   Recent Labs   Lab 10/10/19  0053 10/10/19  1015 10/12/19  0411 10/13/19  0340    138 138 140   K 3.7 3.9 4.0 4.9    102 107 111*   CO2 24 25 23 19*   BUN 24* 28* 21 22   CREATININE 1.8*  1.9* 1.6* 1.6*   CALCIUM 9.9 10.2 9.6 9.1   PROT 7.6 8.0  --   --    BILITOT 0.7 0.7  --   --    ALKPHOS 87 94  --   --    ALT 9* 10  --   --    AST 11 11  --   --    MG  --  2.0  --   --         Cardiac Enzymes: Ejection Fractions:    No results for input(s): CPK, CPKMB, MB, TROPONINI in the last 72 hours. No results found for: EF     POCT Glucose: HbA1c:    Recent Labs   Lab 10/12/19  0800 10/12/19  1108 10/12/19  1554 10/12/19  2005 10/13/19  0014 10/13/19  0455   POCTGLUCOSE 172* 199* 185* 199* 175* 179*    Hemoglobin A1C   Date Value Ref Range Status   10/02/2019 6.9 (H) 4.0 - 5.6 % Final     Comment:     ADA Screening Guidelines:  5.7-6.4%  Consistent with prediabetes  >or=6.5%  Consistent with diabetes  High levels of fetal hemoglobin interfere with the HbA1C  assay. Heterozygous hemoglobin variants (HbS, HgC, etc)do  not significantly interfere with this assay.   However, presence of multiple variants may affect accuracy.     06/03/2008 6.7 (H) 4.0 - 6.2 % Final   02/13/2006 6.2 4.5 - 6.2 % Final        Needs from Care Team: none     ICU LOS 1d 23h  Level of Care: Critical Care

## 2019-10-13 NOTE — PLAN OF CARE
Remains in ICU on vent.  Propofol infusing and titrated per orders.  BP >170 most of night, dropped below 160 after admin of scheduled clonidine. Guevara draining adequate urine.  IVF per orders.  Accu checks q4h; no insulin required.  NSR- SB on tele, HR 50s-60s.  Turned q2h.  Continues to have copious amounts of oral secretions; oral care provided as needed. Pt noted with right sided movement. POC reviewed with pt; no evidence of learning; family at bedside and POC discussed.    Problem: Adult Inpatient Plan of Care  Goal: Plan of Care Review  Outcome: Ongoing, Progressing  Goal: Patient-Specific Goal (Individualization)  Outcome: Ongoing, Progressing  Goal: Absence of Hospital-Acquired Illness or Injury  Outcome: Ongoing, Progressing  Goal: Optimal Comfort and Wellbeing  Outcome: Ongoing, Progressing  Goal: Readiness for Transition of Care  Outcome: Ongoing, Progressing  Goal: Rounds/Family Conference  Outcome: Ongoing, Progressing     Problem: Infection  Goal: Infection Symptom Resolution  Outcome: Ongoing, Progressing     Problem: Diabetes Comorbidity  Goal: Blood Glucose Level Within Desired Range  Outcome: Ongoing, Progressing     Problem: Communication Impairment (Mechanical Ventilation, Invasive)  Goal: Effective Communication  Outcome: Ongoing, Progressing     Problem: Device-Related Complication Risk (Mechanical Ventilation, Invasive)  Goal: Optimal Device Function  Outcome: Ongoing, Progressing     Problem: Inability to Wean (Mechanical Ventilation, Invasive)  Goal: Mechanical Ventilation Liberation  Outcome: Ongoing, Progressing     Problem: Nutrition Impairment (Mechanical Ventilation, Invasive)  Goal: Optimal Nutrition Delivery  Outcome: Ongoing, Progressing     Problem: Skin and Tissue Injury (Mechanical Ventilation, Invasive)  Goal: Absence of Device-Related Skin and Tissue Injury  Outcome: Ongoing, Progressing     Problem: Ventilator-Induced Lung Injury (Mechanical Ventilation, Invasive)  Goal:  Absence of Ventilator-Induced Lung Injury  Outcome: Ongoing, Progressing     Problem: Communication Impairment (Artificial Airway)  Goal: Effective Communication  Outcome: Ongoing, Progressing     Problem: Device-Related Complication Risk (Artificial Airway)  Goal: Optimal Device Function  Outcome: Ongoing, Progressing     Problem: Skin and Tissue Injury (Artificial Airway)  Goal: Absence of Device-Related Skin or Tissue Injury  Outcome: Ongoing, Progressing     Problem: Fall Injury Risk  Goal: Absence of Fall and Fall-Related Injury  Outcome: Ongoing, Progressing     Problem: Restraint, Nonbehavioral (Nonviolent)  Goal: Discontinuation Criteria Achieved  Outcome: Ongoing, Progressing  Goal: Personal Dignity and Safety Maintained  Outcome: Ongoing, Progressing     Problem: Skin Injury Risk Increased  Goal: Skin Health and Integrity  Outcome: Ongoing, Progressing

## 2019-10-14 LAB
ALLENS TEST: ABNORMAL
ANION GAP SERPL CALC-SCNC: 13 MMOL/L (ref 8–16)
ASCENDING AORTA: 3.34 CM
AV INDEX (PROSTH): 0.63
AV MEAN GRADIENT: 52 MMHG
AV PEAK GRADIENT: 93 MMHG
AV VALVE AREA: 3.03 CM2
AV VELOCITY RATIO: 0.65
BASOPHILS # BLD AUTO: 0.02 K/UL (ref 0–0.2)
BASOPHILS NFR BLD: 0.1 % (ref 0–1.9)
BILIRUB UR QL STRIP: NEGATIVE
BSA FOR ECHO PROCEDURE: 2.41 M2
BUN SERPL-MCNC: 20 MG/DL (ref 8–23)
CALCIUM SERPL-MCNC: 9.8 MG/DL (ref 8.7–10.5)
CHLORIDE SERPL-SCNC: 106 MMOL/L (ref 95–110)
CLARITY UR: CLEAR
CO2 SERPL-SCNC: 22 MMOL/L (ref 23–29)
COLOR UR: ABNORMAL
CREAT SERPL-MCNC: 1.6 MG/DL (ref 0.5–1.4)
CV ECHO LV RWT: 1.18 CM
DELSYS: ABNORMAL
DIFFERENTIAL METHOD: ABNORMAL
DOP CALC AO PEAK VEL: 4.82 M/S
DOP CALC AO VTI: 72.74 CM
DOP CALC LVOT AREA: 4.8 CM2
DOP CALC LVOT DIAMETER: 2.48 CM
DOP CALC LVOT PEAK VEL: 3.11 M/S
DOP CALC LVOT STROKE VOLUME: 220.35 CM3
DOP CALCLVOT PEAK VEL VTI: 45.64 CM
E WAVE DECELERATION TIME: 168.21 MSEC
E/A RATIO: 0.59
ECHO LV POSTERIOR WALL: 1.7 CM (ref 0.6–1.1)
EOSINOPHIL # BLD AUTO: 0 K/UL (ref 0–0.5)
EOSINOPHIL NFR BLD: 0.1 % (ref 0–8)
ERYTHROCYTE [DISTWIDTH] IN BLOOD BY AUTOMATED COUNT: 19.4 % (ref 11.5–14.5)
ERYTHROCYTE [SEDIMENTATION RATE] IN BLOOD BY WESTERGREN METHOD: 15 MM/H
EST. GFR  (AFRICAN AMERICAN): 37 ML/MIN/1.73 M^2
EST. GFR  (NON AFRICAN AMERICAN): 32 ML/MIN/1.73 M^2
FIO2: 30
FRACTIONAL SHORTENING: 47 % (ref 28–44)
GLUCOSE SERPL-MCNC: 156 MG/DL (ref 70–110)
GLUCOSE UR QL STRIP: NEGATIVE
HCO3 UR-SCNC: 25.7 MMOL/L (ref 24–28)
HCT VFR BLD AUTO: 45.5 % (ref 37–48.5)
HGB BLD-MCNC: 13.1 G/DL (ref 12–16)
HGB UR QL STRIP: ABNORMAL
IMM GRANULOCYTES # BLD AUTO: 0.14 K/UL (ref 0–0.04)
IMM GRANULOCYTES NFR BLD AUTO: 0.9 % (ref 0–0.5)
INTERVENTRICULAR SEPTUM: 2.14 CM (ref 0.6–1.1)
IVRT: 0.07 MSEC
KETONES UR QL STRIP: NEGATIVE
LA MAJOR: 6.29 CM
LA MINOR: 6.3 CM
LA WIDTH: 3.74 CM
LEFT ATRIUM SIZE: 3.93 CM
LEFT ATRIUM VOLUME INDEX: 34.5 ML/M2
LEFT ATRIUM VOLUME: 78.65 CM3
LEFT INTERNAL DIMENSION IN SYSTOLE: 1.53 CM (ref 2.1–4)
LEFT VENTRICLE DIASTOLIC VOLUME INDEX: 13.79 ML/M2
LEFT VENTRICLE DIASTOLIC VOLUME: 31.44 ML
LEFT VENTRICLE MASS INDEX: 102 G/M2
LEFT VENTRICLE SYSTOLIC VOLUME INDEX: 2.8 ML/M2
LEFT VENTRICLE SYSTOLIC VOLUME: 6.42 ML
LEFT VENTRICULAR INTERNAL DIMENSION IN DIASTOLE: 2.87 CM (ref 3.5–6)
LEFT VENTRICULAR MASS: 232.29 G
LEUKOCYTE ESTERASE UR QL STRIP: ABNORMAL
LYMPHOCYTES # BLD AUTO: 1.8 K/UL (ref 1–4.8)
LYMPHOCYTES NFR BLD: 11.2 % (ref 18–48)
MCH RBC QN AUTO: 19.6 PG (ref 27–31)
MCHC RBC AUTO-ENTMCNC: 28.8 G/DL (ref 32–36)
MCV RBC AUTO: 68 FL (ref 82–98)
MICROSCOPIC COMMENT: ABNORMAL
MIN VOL: 6.8
MODE: ABNORMAL
MONOCYTES # BLD AUTO: 2.7 K/UL (ref 0.3–1)
MONOCYTES NFR BLD: 16.8 % (ref 4–15)
MV PEAK A VEL: 1.29 M/S
MV PEAK E VEL: 0.76 M/S
NEUTROPHILS # BLD AUTO: 11.5 K/UL (ref 1.8–7.7)
NEUTROPHILS NFR BLD: 70.9 % (ref 38–73)
NITRITE UR QL STRIP: NEGATIVE
NRBC BLD-RTO: 0 /100 WBC
PCO2 BLDA: 48.2 MMHG (ref 35–45)
PEEP: 5
PH SMN: 7.33 [PH] (ref 7.35–7.45)
PH UR STRIP: 5 [PH] (ref 5–8)
PIP: 20
PISA TR MAX VEL: 1.65 M/S
PLATELET # BLD AUTO: 175 K/UL (ref 150–350)
PMV BLD AUTO: ABNORMAL FL (ref 9.2–12.9)
PO2 BLDA: 96 MMHG (ref 80–100)
POC BE: -1 MMOL/L
POC SATURATED O2: 97 % (ref 95–100)
POC TCO2: 27 MMOL/L (ref 23–27)
POCT GLUCOSE: 134 MG/DL (ref 70–110)
POCT GLUCOSE: 147 MG/DL (ref 70–110)
POCT GLUCOSE: 148 MG/DL (ref 70–110)
POCT GLUCOSE: 169 MG/DL (ref 70–110)
POCT GLUCOSE: 193 MG/DL (ref 70–110)
POCT GLUCOSE: 257 MG/DL (ref 70–110)
POTASSIUM SERPL-SCNC: 3.5 MMOL/L (ref 3.5–5.1)
PROT UR QL STRIP: NEGATIVE
PV PEAK VELOCITY: 1.2 CM/S
RA MAJOR: 6.63 CM
RA WIDTH: 4.99 CM
RBC # BLD AUTO: 6.7 M/UL (ref 4–5.4)
RBC #/AREA URNS HPF: 0 /HPF (ref 0–4)
RIGHT VENTRICULAR END-DIASTOLIC DIMENSION: 3.7 CM
RV TISSUE DOPPLER FREE WALL SYSTOLIC VELOCITY 1 (APICAL 4 CHAMBER VIEW): 24.69 CM/S
SAMPLE: ABNORMAL
SINUS: 3 CM
SITE: ABNORMAL
SODIUM SERPL-SCNC: 141 MMOL/L (ref 136–145)
SP GR UR STRIP: 1.01 (ref 1–1.03)
SP02: 97
SQUAMOUS #/AREA URNS HPF: ABNORMAL /HPF
STJ: 2.8 CM
TR MAX PG: 11 MMHG
TRICUSPID ANNULAR PLANE SYSTOLIC EXCURSION: 1.65 CM
URN SPEC COLLECT METH UR: ABNORMAL
UROBILINOGEN UR STRIP-ACNC: NEGATIVE EU/DL
VT: 400
WBC # BLD AUTO: 16.17 K/UL (ref 3.9–12.7)
WBC #/AREA URNS HPF: 6 /HPF (ref 0–5)

## 2019-10-14 PROCEDURE — 20000000 HC ICU ROOM

## 2019-10-14 PROCEDURE — 27000221 HC OXYGEN, UP TO 24 HOURS

## 2019-10-14 PROCEDURE — 95819 EEG AWAKE AND ASLEEP: CPT | Mod: 26,,, | Performed by: PSYCHIATRY & NEUROLOGY

## 2019-10-14 PROCEDURE — 95819 EEG AWAKE AND ASLEEP: CPT

## 2019-10-14 PROCEDURE — 99291 PR CRITICAL CARE, E/M 30-74 MINUTES: ICD-10-PCS | Mod: ,,, | Performed by: INTERNAL MEDICINE

## 2019-10-14 PROCEDURE — 87070 CULTURE OTHR SPECIMN AEROBIC: CPT

## 2019-10-14 PROCEDURE — 99232 SBSQ HOSP IP/OBS MODERATE 35: CPT | Mod: ,,, | Performed by: PSYCHIATRY & NEUROLOGY

## 2019-10-14 PROCEDURE — 94003 VENT MGMT INPAT SUBQ DAY: CPT

## 2019-10-14 PROCEDURE — 36415 COLL VENOUS BLD VENIPUNCTURE: CPT

## 2019-10-14 PROCEDURE — 25000003 PHARM REV CODE 250: Performed by: PSYCHIATRY & NEUROLOGY

## 2019-10-14 PROCEDURE — 81000 URINALYSIS NONAUTO W/SCOPE: CPT

## 2019-10-14 PROCEDURE — 25000003 PHARM REV CODE 250: Performed by: HOSPITALIST

## 2019-10-14 PROCEDURE — 87077 CULTURE AEROBIC IDENTIFY: CPT

## 2019-10-14 PROCEDURE — 87186 SC STD MICRODIL/AGAR DIL: CPT

## 2019-10-14 PROCEDURE — 85025 COMPLETE CBC W/AUTO DIFF WBC: CPT

## 2019-10-14 PROCEDURE — 95819 PR EEG,W/AWAKE & ASLEEP RECORD: ICD-10-PCS | Mod: 26,,, | Performed by: PSYCHIATRY & NEUROLOGY

## 2019-10-14 PROCEDURE — 82803 BLOOD GASES ANY COMBINATION: CPT

## 2019-10-14 PROCEDURE — 99291 CRITICAL CARE FIRST HOUR: CPT | Mod: ,,, | Performed by: INTERNAL MEDICINE

## 2019-10-14 PROCEDURE — 63600175 PHARM REV CODE 636 W HCPCS: Performed by: HOSPITALIST

## 2019-10-14 PROCEDURE — 99232 PR SUBSEQUENT HOSPITAL CARE,LEVL II: ICD-10-PCS | Mod: ,,, | Performed by: PSYCHIATRY & NEUROLOGY

## 2019-10-14 PROCEDURE — S0028 INJECTION, FAMOTIDINE, 20 MG: HCPCS | Performed by: HOSPITALIST

## 2019-10-14 PROCEDURE — 80048 BASIC METABOLIC PNL TOTAL CA: CPT

## 2019-10-14 PROCEDURE — 99900035 HC TECH TIME PER 15 MIN (STAT)

## 2019-10-14 PROCEDURE — 99900026 HC AIRWAY MAINTENANCE (STAT)

## 2019-10-14 PROCEDURE — 87205 SMEAR GRAM STAIN: CPT

## 2019-10-14 PROCEDURE — 94761 N-INVAS EAR/PLS OXIMETRY MLT: CPT

## 2019-10-14 PROCEDURE — 36600 WITHDRAWAL OF ARTERIAL BLOOD: CPT

## 2019-10-14 RX ORDER — DEXMEDETOMIDINE HYDROCHLORIDE 4 UG/ML
0.2 INJECTION, SOLUTION INTRAVENOUS CONTINUOUS
Status: DISCONTINUED | OUTPATIENT
Start: 2019-10-14 | End: 2019-10-29

## 2019-10-14 RX ORDER — DEXMEDETOMIDINE HYDROCHLORIDE 4 UG/ML
0.2 INJECTION INTRAVENOUS CONTINUOUS
Status: DISCONTINUED | OUTPATIENT
Start: 2019-10-14 | End: 2019-10-14 | Stop reason: CLARIF

## 2019-10-14 RX ORDER — LABETALOL HYDROCHLORIDE 5 MG/ML
10 INJECTION, SOLUTION INTRAVENOUS EVERY 6 HOURS PRN
Status: DISCONTINUED | OUTPATIENT
Start: 2019-10-14 | End: 2019-10-16

## 2019-10-14 RX ORDER — POTASSIUM CHLORIDE 20 MEQ/15ML
20 SOLUTION ORAL ONCE
Status: COMPLETED | OUTPATIENT
Start: 2019-10-14 | End: 2019-10-14

## 2019-10-14 RX ADMIN — GLYCOPYRROLATE 0.1 MG: 0.2 INJECTION, SOLUTION INTRAMUSCULAR; INTRAVENOUS at 02:10

## 2019-10-14 RX ADMIN — LABETALOL HYDROCHLORIDE 10 MG: 5 INJECTION INTRAVENOUS at 09:10

## 2019-10-14 RX ADMIN — SENNOSIDES, DOCUSATE SODIUM 1 TABLET: 50; 8.6 TABLET, FILM COATED ORAL at 09:10

## 2019-10-14 RX ADMIN — PROPOFOL 40 MCG/KG/MIN: 10 INJECTION, EMULSION INTRAVENOUS at 07:10

## 2019-10-14 RX ADMIN — POTASSIUM CHLORIDE 20 MEQ: 20 SOLUTION ORAL at 02:10

## 2019-10-14 RX ADMIN — FUROSEMIDE 40 MG: 10 INJECTION, SOLUTION INTRAVENOUS at 12:10

## 2019-10-14 RX ADMIN — HEPARIN SODIUM 5000 UNITS: 5000 INJECTION INTRAVENOUS; SUBCUTANEOUS at 08:10

## 2019-10-14 RX ADMIN — SENNOSIDES, DOCUSATE SODIUM 1 TABLET: 50; 8.6 TABLET, FILM COATED ORAL at 10:10

## 2019-10-14 RX ADMIN — INSULIN ASPART 2 UNITS: 100 INJECTION, SOLUTION INTRAVENOUS; SUBCUTANEOUS at 09:10

## 2019-10-14 RX ADMIN — LACOSAMIDE 50 MG: 50 TABLET, FILM COATED ORAL at 10:10

## 2019-10-14 RX ADMIN — GLYCOPYRROLATE 0.1 MG: 0.2 INJECTION, SOLUTION INTRAMUSCULAR; INTRAVENOUS at 08:10

## 2019-10-14 RX ADMIN — FUROSEMIDE 40 MG: 10 INJECTION, SOLUTION INTRAVENOUS at 05:10

## 2019-10-14 RX ADMIN — HEPARIN SODIUM 5000 UNITS: 5000 INJECTION INTRAVENOUS; SUBCUTANEOUS at 09:10

## 2019-10-14 RX ADMIN — ASPIRIN 325 MG ORAL TABLET 325 MG: 325 PILL ORAL at 10:10

## 2019-10-14 RX ADMIN — HYDRALAZINE HYDROCHLORIDE 10 MG: 20 INJECTION INTRAMUSCULAR; INTRAVENOUS at 01:10

## 2019-10-14 RX ADMIN — PROPOFOL 50 MCG/KG/MIN: 10 INJECTION, EMULSION INTRAVENOUS at 02:10

## 2019-10-14 RX ADMIN — MORPHINE SULFATE 2 MG: 10 INJECTION INTRAVENOUS at 09:10

## 2019-10-14 RX ADMIN — GLYCOPYRROLATE 0.1 MG: 0.2 INJECTION, SOLUTION INTRAMUSCULAR; INTRAVENOUS at 09:10

## 2019-10-14 RX ADMIN — CLONIDINE HYDROCHLORIDE 0.3 MG: 0.1 TABLET ORAL at 10:10

## 2019-10-14 RX ADMIN — DEXMEDETOMIDINE HYDROCHLORIDE 0.2 MCG/KG/HR: 4 INJECTION, SOLUTION INTRAVENOUS at 10:10

## 2019-10-14 RX ADMIN — INSULIN ASPART 6 UNITS: 100 INJECTION, SOLUTION INTRAVENOUS; SUBCUTANEOUS at 12:10

## 2019-10-14 RX ADMIN — GLYCOPYRROLATE 0.1 MG: 0.2 INJECTION, SOLUTION INTRAMUSCULAR; INTRAVENOUS at 04:10

## 2019-10-14 RX ADMIN — LACOSAMIDE 50 MG: 50 TABLET, FILM COATED ORAL at 09:10

## 2019-10-14 RX ADMIN — CLONIDINE HYDROCHLORIDE 0.3 MG: 0.1 TABLET ORAL at 09:10

## 2019-10-14 RX ADMIN — ROSUVASTATIN CALCIUM 20 MG: 10 TABLET, COATED ORAL at 09:10

## 2019-10-14 RX ADMIN — FAMOTIDINE 20 MG: 10 INJECTION INTRAVENOUS at 08:10

## 2019-10-14 RX ADMIN — PROPOFOL 50 MCG/KG/MIN: 10 INJECTION, EMULSION INTRAVENOUS at 05:10

## 2019-10-14 RX ADMIN — DEXMEDETOMIDINE HYDROCHLORIDE 0.2 MCG/KG/HR: 4 INJECTION, SOLUTION INTRAVENOUS at 07:10

## 2019-10-14 RX ADMIN — HYDRALAZINE HYDROCHLORIDE 10 MG: 20 INJECTION INTRAMUSCULAR; INTRAVENOUS at 08:10

## 2019-10-14 NOTE — PROCEDURES
DATE: 10/14/19    EEG NUMBER: OW     REFERRING PHYSICIAN:  Dr. Holguin      This EEG was performed to assess for subclinical seizures      ELECTROENCEPHALOGRAM REPORT     METHODOLOGY:  Electroencephalographic (EEG) is recorded with electrodes placed according to the International 10-20 placement system.  Thirty two (32) channels of digital signal (sampling rate of 512/sec), including T1 and T2, were simultaneously recorded from the scalp and may include EKG, EMG, and/or eye monitors.  Recording band pass was 0.1 to 512 Hz.  Digital video recording of the patient is simultaneously recorded with the EEG.  The patient is instructed to report clinical symptoms which may occur during the recording session.  EEG   and video recording are stored and archived in digital format.  Activation procedures, which include photic stimulation, hyperventilation and instructing patients to perform simple tasks, are done in selected patients.     The EEG is displayed on a monitor screen and can be reviewed using different montages.  Computer-assisted analysis is employed to detect spike and electrographic seizure activity.  The entire record is submitted for computer analysis.  The entire recording is visually reviewed, and the times identified by computer analysis as being spikes or seizures are reviewed again.     Compressed spectral analysis (CSA) is also performed on the activity recorded from each individual channel.  This is displayed as a power display of frequencies from 0 to 30 Hz over time.  The CSA is reviewed looking for asymmetries in power between homologous areas of the scalp, then compared with the original EEG recording.     [employer] software was also utilized in the review of this study.  This software suite analyzes the EEG recording in multiple domains.  Coherence and rhythmicity are computed to identify EEG sections which may contain organized seizures.  Each channel undergoes analysis to detect the presence of  spike and sharp waves which have special and morphological characteristics of epileptic activity.  The routine EEG recording is converted from special into frequency domain.  This is then displayed comparing homologous areas to identify areas of significant   asymmetry.  Algorithm to identify non-cortically generated artifact is used to separate artifact from the EEG.     EEG FINDINGS:  The recording was obtained with a number of standard bipolar and referential montages during wakefulness, drowsiness and sleep.  In the alert state, the posterior background rhythm was a symmetric, well-modulated non sustained 9 Hz activity, which reacted symmetrically to eye opening.  Activation procedures were not performed during this study.  Brief periods of generalized synchronous medium to high amplitude rhythmical delta activity were noted throughout the study.  During drowsiness, the background rhythm waxed and waned and there were periods of slowing.  During stage II sleep, symmetric V waves and sleep spindles were noted. There were no interictal epileptiform abnormalities and no clinical or electrographic seizures were recorded.    The EKG channel revealed a sinus rhythm.     IMPRESSION:  This is an abnormal EEG during wakefulness, drowsiness and sleep. Diffuse slowing was noted.      CLINICAL CORRELATION:  The patient is a69  year-old female who is being evaluated for altered sensorium. The patient is currently maintained on vimpat. This is an abnormal EEG during wakefulness, drowsiness and sleep.  The overall degree of disorganization and slowing for given age is suggestive of a mild encephalopathy, likely a toxic metabolic encephalopathy.  No seizures were recorded during this study.

## 2019-10-14 NOTE — PROGRESS NOTES
[facility]  Neurology  Progress Note    Patient Name: Sherrie Alex  MRN: [id number]  Admission Date: 10/11/2019  Hospital Length of Stay: 3 days  Code Status: Full Code   Attending Provider: Elena Bauer MD  Primary Care Physician: Desmond Yang MD   Principal Problem:Angio-edema    Subjective:     Interval History: 68 y/o female with medical Hx as listed below presented to ED complaining swelling of tongue. Pt was intubated for airway protection. Etiology of her edema if not clear as she does has reported allergic reaction to ACE inhibitors but is not currently taking any of these type of antihypertensives.      Ms. Alex has Hx stroke. According to chart review from [facility] pt had expressive aphasia on Jan/2019 that resolved beg Dx with TIA at that point. Notes from neurology clinic state that she has baseline left sided weakness?     -10/12/19: Overnight no new issues.     -10/13/19: Pt remains intubated. No acute issues overnight.    -10/14/19: Pt remains intubated. On no sedation follows commands. Tachycardic and markedly hypertensive this morning.    Current Neurological Medications:     Current Facility-Administered Medications   Medication Dose Route Frequency Provider Last Rate Last Dose    0.9%  NaCl infusion   Intravenous Continuous Elena Bauer MD 50 mL/hr at 10/14/19 0600      acetaminophen tablet 650 mg  650 mg Oral Q8H PRN Elena Bauer MD        aspirin tablet 325 mg  325 mg Per OG tube Daily Jcarlos Holguin MD   325 mg at 10/13/19 0844    cloNIDine tablet 0.3 mg  0.3 mg Oral BID Elena Bauer MD   0.3 mg at 10/12/19 2058    dexmedetomidine (PRECEDEX) 400mcg/100mL 0.9% NaCL infusion  0.2 mcg/kg/hr Intravenous Continuous Elena Bauer MD        famotidine (PF) injection 20 mg  20 mg Intravenous Daily Elena Bauer MD   20 mg at 10/14/19 0856    furosemide injection 40 mg  40 mg Intravenous BID Elena Bauer MD   40 mg at 10/13/19 1808     glycopyrrolate injection 0.1 mg  0.1 mg Intravenous QID Elena Bauer MD   0.1 mg at 10/14/19 0856    heparin (porcine) injection 5,000 Units  5,000 Units Subcutaneous Q12H Elena Bauer MD   5,000 Units at 10/14/19 0855    hydrALAZINE injection 10 mg  10 mg Intravenous Q8H PRN Elena Bauer MD   10 mg at 10/14/19 0844    insulin aspart U-100 pen 1-10 Units  1-10 Units Subcutaneous Q4H PRN Elena Bauer MD   2 Units at 10/13/19 1205    insulin detemir U-100 pen 15 Units  15 Units Subcutaneous QHS Elena Bauer MD   15 Units at 10/13/19 2117    labetalol injection 10 mg  10 mg Intravenous Q6H PRN Elena Bauer MD   10 mg at 10/14/19 0924    lacosamide tablet 50 mg  50 mg Oral BID Elena Bauer MD   50 mg at 10/13/19 2111    morphine injection 2 mg  2 mg Intravenous Q4H PRN Elena Bauer MD   2 mg at 10/13/19 2202    ondansetron injection 4 mg  4 mg Intravenous Q8H PRN Elena Bauer MD        propofol (DIPRIVAN) 10 mg/mL infusion  10 mcg/kg/min Intravenous Continuous Elena Bauer MD 30.1 mL/hr at 10/14/19 0747 40 mcg/kg/min at 10/14/19 0747    rosuvastatin tablet 20 mg  20 mg Per OG tube QHS Jcarlos Holguin MD   20 mg at 10/13/19 2111    senna-docusate 8.6-50 mg per tablet 1 tablet  1 tablet Oral BID Elena Bauer MD   1 tablet at 10/13/19 2111    sodium chloride 0.9% flush 10 mL  10 mL Intravenous PRN Elena Bauer MD         Facility-Administered Medications Ordered in Other Encounters   Medication Dose Route Frequency Provider Last Rate Last Dose    butamben-tetracaine-benzocaine 2%-2%-14% (200 mg/sec) spray    PRN Teetee Duncan MD   1 spray at 10/11/19 0130    ketamine injection    PRN Teetee Duncan MD   5 mg at 10/11/19 0205    midazolam injection   Intravenous PRN Teetee Duncan MD   2 mg at 10/11/19 0155    phenylephrine HCL 0.25% nasal spray    PRN Teetee Duncan MD   1 spray at 10/11/19 0130    succinylcholine injection    PRN  Teetee Duncan MD   120 mg at 10/11/19 0205       Review of Systems   Unable to obtain as pt is intubated.    Objective:     Vital Signs (Most Recent):  Temp: 99.1 °F (37.3 °C) (10/13/19 2301)  Pulse: 108 (10/14/19 0758)  Resp: 16 (10/14/19 0758)  BP: (!) 193/97 (10/14/19 0530)  SpO2: 99 % (10/14/19 0758) Vital Signs (24h Range):  Temp:  [98.1 °F (36.7 °C)-99.1 °F (37.3 °C)] 99.1 °F (37.3 °C)  Pulse:  [] 108  Resp:  [14-22] 16  SpO2:  [96 %-100 %] 99 %  BP: (139-231)/() 193/97     Weight: 126.6 kg (279 lb 1.6 oz)  Body mass index is 46.45 kg/m².    Physical Exam  Constitutional: She appears well-developed.   HENT:   Head: Normocephalic.   Eyes: Right eye exhibits no discharge. Left eye exhibits no discharge.   Neck: Neck supple.   Cardiovascular: Normal rate.   Pulmonary/Chest:   Symmetrical chest expansion   Abdominal: Soft.         NEUROLOGICAL EXAMINATION:      MENTAL STATUS        Sedated but opens eyes briefly upon verbal stimuli  Follows simple commands      CRANIAL NERVES      CN III, IV, VI   Right pupil: Size: 2 mm. Shape: regular.   Left pupil: Size: 2 mm. Shape: regular.   Nystagmus: none   Conjugate gaze: present     MOTOR EXAM        Spontaneous AROM on left; no AROM on right         Significant Labs:   CBC:   Recent Labs   Lab 10/13/19  0435 10/14/19  0423   WBC 12.86* 16.17*   HGB 12.5 13.1   HCT 42.1 45.5    175     CMP:   Recent Labs   Lab 10/13/19  0340 10/14/19  0423   * 156*    141   K 4.9 3.5   * 106   CO2 19* 22*   BUN 22 20   CREATININE 1.6* 1.6*   CALCIUM 9.1 9.8   ANIONGAP 10 13   EGFRNONAA 32* 32*         Assessment and Plan:     70 y/o female consulted for neurological changes:     1. Right hemiplegia: infarction on left. See official MRI report.   No arrhythmias on telemetry           -OK to -180's.           -Still unable to obtain CTA due to abnormal renal function. Carotid US.           -Echo ordered.           -ASA and statin.           2. Seizure: no reported episodes. Continue lacosamide 100 mg BID.    Active Diagnoses:    Diagnosis Date Noted POA    PRINCIPAL PROBLEM:  Angio-edema [T78.3XXA] 10/11/2019 Yes    CVA (cerebral vascular accident) [I63.9] 10/12/2019 Yes    Seizure [R56.9] 10/11/2019 Yes    RYLEE (acute kidney injury) [N17.9] 10/11/2019 Yes    On mechanically assisted ventilation [Z99.11] 10/11/2019 Not Applicable    Encephalopathy, metabolic [G93.41] 10/11/2019 Yes    Acquired hypothyroidism [E03.9] 10/03/2019 Yes    Essential hypertension [I10] 10/02/2019 Yes    Type 2 diabetes mellitus with circulatory disorder, with long-term current use of insulin [E11.59, Z79.4] 10/02/2019 Not Applicable    Anxiety [F41.9]  Yes    Morbid obesity [E66.01]  Yes    Dyslipidemia [E78.5]  Yes      Problems Resolved During this Admission:       VTE Risk Mitigation (From admission, onward)         Ordered     heparin (porcine) injection 5,000 Units  Every 12 hours      10/11/19 0617     IP VTE HIGH RISK PATIENT  Once      10/11/19 0532     Place ELIEZER hose  Until discontinued      10/11/19 0532     Place sequential compression device  Until discontinued      10/11/19 0532                Jcarlos Holguin MD  Neurology  [facility]

## 2019-10-14 NOTE — SUBJECTIVE & OBJECTIVE
Interval History: unable to extubate at this time, switched to precedex     Review of Systems   Unable to perform ROS: Intubated     Objective:     Vital Signs (Most Recent):  Temp: 98.5 °F (36.9 °C) (10/14/19 0800)  Pulse: 92 (10/14/19 1522)  Resp: (!) 21 (10/14/19 1350)  BP: 113/64 (10/14/19 1522)  SpO2: 96 % (10/14/19 1522) Vital Signs (24h Range):  Temp:  [98.5 °F (36.9 °C)-99.1 °F (37.3 °C)] 98.5 °F (36.9 °C)  Pulse:  [] 92  Resp:  [15-22] 21  SpO2:  [96 %-100 %] 96 %  BP: (113-231)/() 113/64     Weight: 126.6 kg (279 lb 1.6 oz)  Body mass index is 46.45 kg/m².    Intake/Output Summary (Last 24 hours) at 10/14/2019 1608  Last data filed at 10/14/2019 1200  Gross per 24 hour   Intake 2117.8 ml   Output 4050 ml   Net -1932.2 ml      Physical Exam   Constitutional: She appears well-developed. No distress.   HENT:   Head: Normocephalic and atraumatic.   Right Ear: External ear normal.   Left Ear: External ear normal.   Nose: Nose normal.   ET tube in place. Lips/tongue without significant edema. Mild tongue protrusion. + slight audible cuff cleak    Eyes: Pupils are equal, round, and reactive to light. Conjunctivae and EOM are normal. Right eye exhibits no discharge. Left eye exhibits no discharge. No scleral icterus.   Opened eyes to voice   Neck: Normal range of motion. Neck supple. No JVD present. No tracheal deviation present. No thyromegaly present.   Cardiovascular: Normal rate, regular rhythm and normal heart sounds. Exam reveals no gallop and no friction rub.   No murmur heard.  Pulmonary/Chest: No stridor. She has no wheezes. She has no rales. She exhibits no tenderness.   Intubated on MV    Abdominal: Soft. Bowel sounds are normal. She exhibits no distension and no mass. There is no tenderness. There is no rebound and no guarding. No hernia.   Musculoskeletal: She exhibits no edema, tenderness or deformity.   Lymphadenopathy:     She has no cervical adenopathy.   Neurological:   Sedated.     Skin: Skin is warm and dry. Capillary refill takes less than 2 seconds. No rash noted. She is not diaphoretic. No erythema. No pallor.   Nursing note and vitals reviewed.      Significant Labs:   Blood Culture: No results for input(s): LABBLOO in the last 48 hours.  BMP:   Recent Labs   Lab 10/14/19  0423   *      K 3.5      CO2 22*   BUN 20   CREATININE 1.6*   CALCIUM 9.8     CBC:   Recent Labs   Lab 10/13/19  0435 10/14/19  0423   WBC 12.86* 16.17*   HGB 12.5 13.1   HCT 42.1 45.5    175     POCT Glucose:   Recent Labs   Lab 10/13/19  2054 10/13/19  2322 10/14/19  0413   POCTGLUCOSE 129* 147* 169*     Urine Culture: No results for input(s): LABURIN in the last 48 hours.    Significant Imaging: I have reviewed and interpreted all pertinent imaging results/findings within the past 24 hours.

## 2019-10-14 NOTE — SUBJECTIVE & OBJECTIVE
INTERVAL HISTORY-     Propofol weaned off this am and patient was able to open eyes and squeeze with her left hand on command. Was not able to move her right side. She became tachycardic to the 150s after hydralazine was given for an SBP of 242, so propofol was restarted. Secretions slightly improved.     Review of Systems   Unable to perform ROS: Intubated     Objective:     Vital Signs (Most Recent):  Temp: 98.5 °F (36.9 °C) (10/14/19 0800)  Pulse: 92 (10/14/19 1522)  Resp: (!) 21 (10/14/19 1350)  BP: 113/64 (10/14/19 1522)  SpO2: 96 % (10/14/19 1522) Vital Signs (24h Range):  Temp:  [98.5 °F (36.9 °C)-99.1 °F (37.3 °C)] 98.5 °F (36.9 °C)  Pulse:  [] 92  Resp:  [15-22] 21  SpO2:  [96 %-100 %] 96 %  BP: (113-231)/() 113/64     Weight: 126.6 kg (279 lb 1.6 oz)  Body mass index is 46.45 kg/m².      Intake/Output Summary (Last 24 hours) at 10/14/2019 1544  Last data filed at 10/14/2019 1200  Gross per 24 hour   Intake 2244.04 ml   Output 4050 ml   Net -1805.96 ml       Cumulative Fluid Balance-+4L since admission.     Physical Exam   Constitutional: She appears well-developed. No distress.   HENT:   Head: Normocephalic and atraumatic.   Right Ear: External ear normal.   Left Ear: External ear normal.   Nose: Nose normal.   ET tube in place. Lips/tongue without significant edema. Mild tongue protrusion. + slight audible cuff cleak    Eyes: Pupils are equal, round, and reactive to light. Conjunctivae and EOM are normal. Right eye exhibits no discharge. Left eye exhibits no discharge. No scleral icterus.   Opened eyes to voice   Neck: Normal range of motion. Neck supple. No JVD present. No tracheal deviation present. No thyromegaly present.   Cardiovascular: Normal rate, regular rhythm and normal heart sounds. Exam reveals no gallop and no friction rub.   No murmur heard.  Pulmonary/Chest: No stridor. She has no wheezes. She has no rales. She exhibits no tenderness.   Intubated on MV    Abdominal: Soft. Bowel  sounds are normal. She exhibits no distension and no mass. There is no tenderness. There is no rebound and no guarding. No hernia.   Musculoskeletal: She exhibits no edema, tenderness or deformity.   Lymphadenopathy:     She has no cervical adenopathy.   Neurological:   Sedated.    Skin: Skin is warm and dry. Capillary refill takes less than 2 seconds. No rash noted. She is not diaphoretic. No erythema. No pallor.   Nursing note and vitals reviewed.      Vents:  Vent Mode: PRVC (10/14/19 1522)  Ventilator Initiated: Yes (10/11/19 0213)  Set Rate: 15 bmp (10/14/19 1522)  Vt Set: 400 mL (10/14/19 1522)  PEEP/CPAP: 5 cmH20 (10/14/19 1522)  Oxygen Concentration (%): 21 (10/14/19 1522)  Peak Airway Pressure: 25.8 cmH2O (10/14/19 1522)  Total Ve: 6 mL (10/14/19 1522)  F/VT Ratio<105 (RSBI): (!) 52.37 (10/14/19 1350)    Lines/Drains/Airways     Drain                 NG/OG Tube 10/11/19 0215 orogastric 18 Fr. Left mouth 3 days         Urethral Catheter 10/11/19 0230 16 Fr. 3 days          Airway                 Airway - Non-Surgical 10/11/19 0204 Endotracheal Tube-Hi/Lo 3 days          Peripheral Intravenous Line                 Peripheral IV - Single Lumen 10/10/19 0901 18 G Right Antecubital 4 days         Peripheral IV - Single Lumen 10/11/19 18 G Right Antecubital 3 days                Significant Labs:    CBC/Anemia Profile:  Recent Labs   Lab 10/13/19  0435 10/14/19  0423   WBC 12.86* 16.17*   HGB 12.5 13.1   HCT 42.1 45.5    175   MCV 67* 68*   RDW 18.6* 19.4*        Chemistries:  Recent Labs   Lab 10/13/19  0340 10/14/19  0423    141   K 4.9 3.5   * 106   CO2 19* 22*   BUN 22 20   CREATININE 1.6* 1.6*   CALCIUM 9.1 9.8         Troponin I 0.000 - 0.026 ng/mL 0.015      TSH 0.400 - 4.000 uIU/mL 3.658      Prothrombin Time 9.0 - 12.5 sec 11.9    INR 0.8 - 1.2 1.1         Ref. Range 10/13/2019 05:03   POC PH Latest Ref Range: 7.35 - 7.45  7.332 (L)   POC PCO2 Latest Ref Range: 35 - 45 mmHg 41.4   POC  PO2 Latest Ref Range: 80 - 100 mmHg 101 (H)   POC BE Latest Ref Range: -2 to 2 mmol/L -4   POC HCO3 Latest Ref Range: 24 - 28 mmol/L 21.9 (L)   POC SATURATED O2 Latest Ref Range: 95 - 100 % 97       UA- normal       Microbiology-- None     Significant Imaging:   I have reviewed all pertinent imaging results/findings within the past 24 hours.  I have reviewed and interpreted all pertinent imaging results/findings within the past 24 hours.     CXR- There has been interval placement of an endotracheal tube with tip terminating approximately 2.0 cm above the cici.  There is an esophagogastric tube now present coursing below the diaphragm and outside the field of view of the examination.  Cardiomediastinal silhouette remains mildly enlarged.  There are low lung volumes bilaterally limiting assessment with patchy bibasilar subsegmental opacities.  No evidence of pneumothorax    CT Neck soft tissue-   1.  Limited assessment of the aerodigestive tract secondary to the presence of an endotracheal tube and lack of IV contrast.  No evidence of subcutaneous emphysema or focal fluid collection within the deep spaces of the neck.  2.  Secretions within the posterior nasopharynx presumably relating to intubated state.  3.  Subcentimeter low-attenuation right thyroid lobe nodule.  4.  Mild bilateral dependent opacities within the visualized lungs which may relate to atelectasis, although edema or developing infection not excluded.    CT Chest-   No CT evidence of acute intracranial abnormality. Clinical correlation and further evaluation as warranted.  Chronic senescent and microvascular ischemic changes.    MRI- Moderate-sized area of subacute infarction involving the left subinsular white matter extending to the periventricular white matter.  No MR evidence for hemorrhage.    Moderate involutional change and microvascular changes.      ECHO 2019- [facility]    Technically difficult study.      Moderate left ventricular hypertrophy.      Hyperdynamic left ventricular systolic function.     Left ventricular ejection fraction is estimated at 75%.     Borderline RV enlargement.     Borderline pulmonary hypertension.     Aortic valve sclerosis without stenosis.     Mild tricuspid valve regurgitation.     A bubble study was done and appeared to be negative at rest and with valsalva.

## 2019-10-14 NOTE — PLAN OF CARE
Remains in ICU.  No changes noted this shift.  BP labile; Hydralazine admin X 1 per orders.  Urine noted with sediment and cloudy; sample sent to lab.  T-max 99.1 and WBCs elevated in labs this AM.  MD johnson.  Remains on propofol.  ST on tele with HR in the 110s-120s.  Continues with moderate amount of clear thin secretions.  Oral care provided as needed.  IVF infusing.  Accu checks q4h; no PRN insulin required.  POC reviewed with pt; no evidence of learning.    Problem: Adult Inpatient Plan of Care  Goal: Plan of Care Review  Outcome: Ongoing, Progressing  Goal: Patient-Specific Goal (Individualization)  Outcome: Ongoing, Progressing  Goal: Absence of Hospital-Acquired Illness or Injury  Outcome: Ongoing, Progressing  Goal: Optimal Comfort and Wellbeing  Outcome: Ongoing, Progressing  Goal: Readiness for Transition of Care  Outcome: Ongoing, Progressing  Goal: Rounds/Family Conference  Outcome: Ongoing, Progressing     Problem: Infection  Goal: Infection Symptom Resolution  Outcome: Ongoing, Progressing     Problem: Diabetes Comorbidity  Goal: Blood Glucose Level Within Desired Range  Outcome: Ongoing, Progressing     Problem: Communication Impairment (Mechanical Ventilation, Invasive)  Goal: Effective Communication  Outcome: Ongoing, Progressing     Problem: Device-Related Complication Risk (Mechanical Ventilation, Invasive)  Goal: Optimal Device Function  Outcome: Ongoing, Progressing     Problem: Inability to Wean (Mechanical Ventilation, Invasive)  Goal: Mechanical Ventilation Liberation  Outcome: Ongoing, Progressing     Problem: Nutrition Impairment (Mechanical Ventilation, Invasive)  Goal: Optimal Nutrition Delivery  Outcome: Ongoing, Progressing     Problem: Skin and Tissue Injury (Mechanical Ventilation, Invasive)  Goal: Absence of Device-Related Skin and Tissue Injury  Outcome: Ongoing, Progressing     Problem: Ventilator-Induced Lung Injury (Mechanical Ventilation, Invasive)  Goal: Absence of  Ventilator-Induced Lung Injury  Outcome: Ongoing, Progressing     Problem: Communication Impairment (Artificial Airway)  Goal: Effective Communication  Outcome: Ongoing, Progressing     Problem: Device-Related Complication Risk (Artificial Airway)  Goal: Optimal Device Function  Outcome: Ongoing, Progressing     Problem: Skin and Tissue Injury (Artificial Airway)  Goal: Absence of Device-Related Skin or Tissue Injury  Outcome: Ongoing, Progressing     Problem: Fall Injury Risk  Goal: Absence of Fall and Fall-Related Injury  Outcome: Ongoing, Progressing     Problem: Restraint, Nonbehavioral (Nonviolent)  Goal: Discontinuation Criteria Achieved  Outcome: Ongoing, Progressing  Goal: Personal Dignity and Safety Maintained  Outcome: Ongoing, Progressing     Problem: Skin Injury Risk Increased  Goal: Skin Health and Integrity  Outcome: Ongoing, Progressing

## 2019-10-14 NOTE — ASSESSMENT & PLAN NOTE
Suspect mostly related to sedation.     -- Hold all sedation  -- EEG pending  -- Neurology following  -- Continue Vimpat for now

## 2019-10-14 NOTE — PROGRESS NOTES
"[facility] Medicine  Progress Note    Patient Name: Sherrie Alex  MRN: [id number]  Patient Class: IP- Inpatient   Admission Date: 10/11/2019  Length of Stay: 3 days  Attending Physician: Elena Bauer MD  Primary Care Provider: Desmond Yang MD        Subjective:     Principal Problem:Angio-edema        HPI:  patient is a 69 y.o female who presents to the ED complaining of tongue swelling that began PTA. Patient has slurred speech, drooling, and tongue swelling. Patient denies any pain, itching, CP, nausea, vomiting, or fever. Per sisters, patient is normally talkative and active. Her sisters are unaware of any cause of onset, stating that the patient lives alone. PMHx of CVA 1 yr ago, DM, HTN, HLD, and obesity. Patient is allergic to codeine and ace inhibitors.      The history is provided by the patient and a relative. History limited by: Acuity of condition. No  was used.     Pt intubated in ED for airway protection ?laryngeal edema - not noted in ED documentation.  Pt started on IV steroids and H2 blocker on vent. In ICU, pt showed sluggish neuro response to pain and unable to follow commands. Propofol taken off and neuro status improved. Pulm consulted for vent management. Neuro consulted - h/o seizures.  Seen in ED yesterday am with "leg shaking" and weakness and dc'd home. H/o seizures on vimpat.      Overview/Hospital Course:  Pt admitted acute neuro changes and resp compromise. Intubated in ED. Serial neuro testing off sedation suggest right sided weakness > left. MRI:  Moderate-sized area of subacute infarction involving the left subinsular white matter extending to the periventricular white matter. No MR evidence for hemorrhage.    Neurology notified of findings. Allow for permissive HTN.     No new subjective & objective note has been filed under this hospital service since the last note was generated.      Assessment/Plan:      * " Angio-edema  Possible etiology to compromised airway- unclear at this point  IV steroid x 2 days  Allergy to benadryl  On H2 blocker  Intubated and pulm consulted for vent management       CVA (cerebral vascular accident)    MRI:  Moderate-sized area of subacute infarction involving the left subinsular white matter extending to the periventricular white matter. No MR evidence for hemorrhage.    Moderate involutional change and microvascular changes.      Aspirin, statin  Permissive HTN  Weaning from vent may be hindered by new CVA    RYLEE (acute kidney injury)  Cr slightly more elevated at 1.9, baseline 1.4-1.7  IVF and bmp in am       Seizure  Resume vimpat  Unsure if seizure activity was involved for this admission  Neurology consulted       Acquired hypothyroidism  Resume synthroid   Check TFTs    Essential hypertension  Uncontrolled  Resume home meds: norvasc, clonidine, hydralazine, toprol  IV PRN hydralazine      Type 2 diabetes mellitus with circulatory disorder, with long-term current use of insulin  Uncontrolled  A1c 6.9%  Resume levemir and SSI      Anxiety  Propofol for sedation       Morbid obesity  Discuss weight management after extubation       Dyslipidemia  Resume statin      VTE Risk Mitigation (From admission, onward)         Ordered     heparin (porcine) injection 5,000 Units  Every 12 hours      10/11/19 0617     IP VTE HIGH RISK PATIENT  Once      10/11/19 0532     Place ELIEZER hose  Until discontinued      10/11/19 0532     Place sequential compression device  Until discontinued      10/11/19 0532                Critical care time spent on the evaluation and treatment of severe organ dysfunction, review of pertinent labs and imaging studies, discussions with consulting providers and discussions with patient/family: 40 minutes.      Elena Bauer MD  Department of Hospital Medicine   [facility]

## 2019-10-14 NOTE — ASSESSMENT & PLAN NOTE
Resume vimpat  Unsure if seizure activity was involved for this admission  Neurology consulted   EEG completed

## 2019-10-14 NOTE — PROGRESS NOTES
Results for ZANA, MS SPANN (MRN [id number]) as of 10/14/2019 04:26   Ref. Range 10/14/2019 04:16   POC PH Latest Ref Range: 7.35 - 7.45  7.335 (L)   POC PCO2 Latest Ref Range: 35 - 45 mmHg 48.2 (H)   POC PO2 Latest Ref Range: 80 - 100 mmHg 96   POC BE Latest Ref Range: -2 to 2 mmol/L -1   POC HCO3 Latest Ref Range: 24 - 28 mmol/L 25.7   POC SATURATED O2 Latest Ref Range: 95 - 100 % 97   POC TCO2 Latest Ref Range: 23 - 27 mmol/L 27   FiO2 Unknown 30   Vt Unknown 400   PiP Unknown 20   PEEP Unknown 5   Sample Unknown ARTERIAL   DelSys Unknown Adult Vent   Allens Test Unknown Pass   Site Unknown LR   Mode Unknown AC/PRVC   Rate Unknown 15

## 2019-10-14 NOTE — PROGRESS NOTES
[facility]  Pulmonology  Progress Note    Patient Name: Sherrie Alex  MRN: [id number]  Admission Date: 10/11/2019  Hospital Length of Stay: 3 days  Code Status: Full Code  Attending Provider: Elena Bauer MD  Primary Care Provider: Desmond Yang MD   Principal Problem: Angio-edema    Subjective:     INTERVAL HISTORY-     Propofol weaned off this am and patient was able to open eyes and squeeze with her left hand on command. Was not able to move her right side. She became tachycardic to the 150s after hydralazine was given for an SBP of 242, so propofol was restarted. Secretions slightly improved.     Review of Systems   Unable to perform ROS: Intubated     Objective:     Vital Signs (Most Recent):  Temp: 98.5 °F (36.9 °C) (10/14/19 0800)  Pulse: 92 (10/14/19 1522)  Resp: (!) 21 (10/14/19 1350)  BP: 113/64 (10/14/19 1522)  SpO2: 96 % (10/14/19 1522) Vital Signs (24h Range):  Temp:  [98.5 °F (36.9 °C)-99.1 °F (37.3 °C)] 98.5 °F (36.9 °C)  Pulse:  [] 92  Resp:  [15-22] 21  SpO2:  [96 %-100 %] 96 %  BP: (113-231)/() 113/64     Weight: 126.6 kg (279 lb 1.6 oz)  Body mass index is 46.45 kg/m².      Intake/Output Summary (Last 24 hours) at 10/14/2019 1544  Last data filed at 10/14/2019 1200  Gross per 24 hour   Intake 2244.04 ml   Output 4050 ml   Net -1805.96 ml       Cumulative Fluid Balance-+4L since admission.     Physical Exam   Constitutional: She appears well-developed. No distress.   HENT:   Head: Normocephalic and atraumatic.   Right Ear: External ear normal.   Left Ear: External ear normal.   Nose: Nose normal.   ET tube in place. Lips/tongue without significant edema. Mild tongue protrusion. + slight audible cuff cleak    Eyes: Pupils are equal, round, and reactive to light. Conjunctivae and EOM are normal. Right eye exhibits no discharge. Left eye exhibits no discharge. No scleral icterus.   Opened eyes to voice   Neck: Normal range of motion. Neck supple. No JVD  present. No tracheal deviation present. No thyromegaly present.   Cardiovascular: Normal rate, regular rhythm and normal heart sounds. Exam reveals no gallop and no friction rub.   No murmur heard.  Pulmonary/Chest: No stridor. She has no wheezes. She has no rales. She exhibits no tenderness.   Intubated on MV    Abdominal: Soft. Bowel sounds are normal. She exhibits no distension and no mass. There is no tenderness. There is no rebound and no guarding. No hernia.   Musculoskeletal: She exhibits no edema, tenderness or deformity.   Lymphadenopathy:     She has no cervical adenopathy.   Neurological:   Sedated.    Skin: Skin is warm and dry. Capillary refill takes less than 2 seconds. No rash noted. She is not diaphoretic. No erythema. No pallor.   Nursing note and vitals reviewed.      Vents:  Vent Mode: PRVC (10/14/19 1522)  Ventilator Initiated: Yes (10/11/19 0213)  Set Rate: 15 bmp (10/14/19 1522)  Vt Set: 400 mL (10/14/19 1522)  PEEP/CPAP: 5 cmH20 (10/14/19 1522)  Oxygen Concentration (%): 21 (10/14/19 1522)  Peak Airway Pressure: 25.8 cmH2O (10/14/19 1522)  Total Ve: 6 mL (10/14/19 1522)  F/VT Ratio<105 (RSBI): (!) 52.37 (10/14/19 1350)    Lines/Drains/Airways     Drain                 NG/OG Tube 10/11/19 0215 orogastric 18 Fr. Left mouth 3 days         Urethral Catheter 10/11/19 0230 16 Fr. 3 days          Airway                 Airway - Non-Surgical 10/11/19 0204 Endotracheal Tube-Hi/Lo 3 days          Peripheral Intravenous Line                 Peripheral IV - Single Lumen 10/10/19 0901 18 G Right Antecubital 4 days         Peripheral IV - Single Lumen 10/11/19 18 G Right Antecubital 3 days                Significant Labs:    CBC/Anemia Profile:  Recent Labs   Lab 10/13/19  0435 10/14/19  0423   WBC 12.86* 16.17*   HGB 12.5 13.1   HCT 42.1 45.5    175   MCV 67* 68*   RDW 18.6* 19.4*        Chemistries:  Recent Labs   Lab 10/13/19  0340 10/14/19  0423    141   K 4.9 3.5   * 106   CO2 19*  22*   BUN 22 20   CREATININE 1.6* 1.6*   CALCIUM 9.1 9.8         Troponin I 0.000 - 0.026 ng/mL 0.015      TSH 0.400 - 4.000 uIU/mL 3.658      Prothrombin Time 9.0 - 12.5 sec 11.9    INR 0.8 - 1.2 1.1         Ref. Range 10/13/2019 05:03   POC PH Latest Ref Range: 7.35 - 7.45  7.332 (L)   POC PCO2 Latest Ref Range: 35 - 45 mmHg 41.4   POC PO2 Latest Ref Range: 80 - 100 mmHg 101 (H)   POC BE Latest Ref Range: -2 to 2 mmol/L -4   POC HCO3 Latest Ref Range: 24 - 28 mmol/L 21.9 (L)   POC SATURATED O2 Latest Ref Range: 95 - 100 % 97       UA- normal       Microbiology-- None     Significant Imaging:   I have reviewed all pertinent imaging results/findings within the past 24 hours.  I have reviewed and interpreted all pertinent imaging results/findings within the past 24 hours.     CXR- There has been interval placement of an endotracheal tube with tip terminating approximately 2.0 cm above the cici.  There is an esophagogastric tube now present coursing below the diaphragm and outside the field of view of the examination.  Cardiomediastinal silhouette remains mildly enlarged.  There are low lung volumes bilaterally limiting assessment with patchy bibasilar subsegmental opacities.  No evidence of pneumothorax    CT Neck soft tissue-   1.  Limited assessment of the aerodigestive tract secondary to the presence of an endotracheal tube and lack of IV contrast.  No evidence of subcutaneous emphysema or focal fluid collection within the deep spaces of the neck.  2.  Secretions within the posterior nasopharynx presumably relating to intubated state.  3.  Subcentimeter low-attenuation right thyroid lobe nodule.  4.  Mild bilateral dependent opacities within the visualized lungs which may relate to atelectasis, although edema or developing infection not excluded.    CT Chest-   No CT evidence of acute intracranial abnormality. Clinical correlation and further evaluation as warranted.  Chronic senescent and microvascular ischemic  changes.    MRI- Moderate-sized area of subacute infarction involving the left subinsular white matter extending to the periventricular white matter.  No MR evidence for hemorrhage.    Moderate involutional change and microvascular changes.      ECHO 2019- [facility]    Technically difficult study.      Moderate left ventricular hypertrophy.     Hyperdynamic left ventricular systolic function.     Left ventricular ejection fraction is estimated at 75%.     Borderline RV enlargement.     Borderline pulmonary hypertension.     Aortic valve sclerosis without stenosis.     Mild tricuspid valve regurgitation.     A bubble study was done and appeared to be negative at rest and with valsalva.     Assessment/Plan:     * Angio-edema  Unclear precipitant. Oropharyngeal exam with minimal edema on my assessment. + cuff leak. Unclear if actual edema on presentation or drooling and dysarthria reflective of CVA. She has completed 48 hours of corticosteroids and H2 antagonist.  Off therapy with no change in macroglossia    Encephalopathy, metabolic  Suspect mostly related to sedation.     -- Hold all sedation  -- EEG pending  -- Neurology following  -- Continue Vimpat for now      On mechanically assisted ventilation  Intubated for airway protection and concern for angioedema vs dysphagia related to new CVA (more likely). Continues to have good lung mechanics and has a cuff leak. Mental status was improved this am, but reassessment this afternoon shows worsening. Off of sedation. EEG and carotid US planned for today.      -- LPV. Wean FiO2 to maintain SpO2 >88%   -- SAT/SBT when mental status allows. Cont to hold sedation   - If sedation required, favor precedex  -- Has history of DEYANIRA without evidence of significant prior hypercapnia.. Plan for extubation to NIPPV.   -- Keep euvolemic.  -- High risk for reintubation   - If she fails extubation, she would need a tracheostomy      Essential hypertension  Permissive HTN in setting of CVA.  Would avoid hydralazine.       GI PPX- H2 antagonist      DVT PPX- heparin      Nutrition- Cont TF      Critical Care Time: 40 minutes  Critical care was time spent personally by me on the following activities: evaluating this patient's organ dysfunction, development of treatment plan, discussing treatment plan with patient or surrogate and bedside caregivers, discussions with consultants, evaluation of patient's response to treatment, examination of patient, ordering and performing treatments and interventions, ordering and review of laboratory studies, ordering and review of radiographic studies, re-evaluation of patient's condition. This critical care time did not overlap with that of any other provider or involve time for any procedures.        Darrel Castillo MD  Pulmonology  [facility]  cell - [phone]

## 2019-10-14 NOTE — PROGRESS NOTES
0840  Per Dr. Castillo, advance OG tube 10 cm and repeat CXR.  Ok to give PRN hydralazine early.  Done.    0900 Responds to voice and touch, follows commands, but still unable to move R side after propofol off for 45 minutes.    1000 OG tube placement confirmed via Abd XR and pH of stomach content of 4.    1030 Transitioned pt to precedex at minimum, EEG tech at bedside setting up.  Per Dr. Holguin, no CT angio due to kidney function.    1800  VSS at this time, but were labile during shift, afebrile, turned Q2 97% SpO2 on ventilator, ST HR, 150's SBP, responds to voice, follows commands, minimum prepcedex infusing as ordered, PICC team consulted, tongue and throat swollen, copious secretions, respiratory culture sent, RR 13, hemiplegia on R side, bed in low locked position, in view of nurses station, no needs at this time.

## 2019-10-14 NOTE — ASSESSMENT & PLAN NOTE
Intubated for airway protection and concern for angioedema vs dysphagia related to new CVA (more likely). Continues to have good lung mechanics and has a cuff leak. Mental status was improved this am, but reassessment this afternoon shows worsening. Off of sedation. EEG and carotid US planned for today.      -- LPV. Wean FiO2 to maintain SpO2 >88%   -- SAT/SBT when mental status allows. Cont to hold sedation   - If sedation required, favor precedex  -- Has history of DEYANIRA without evidence of significant prior hypercapnia.. Plan for extubation to NIPPV.   -- Keep euvolemic.  -- High risk for reintubation   - If she fails extubation, she would need a tracheostomy

## 2019-10-14 NOTE — CARE UPDATE
[facility]  ICU Multidisciplinary Bedside Rounds   SUMMARY     Date: 10/14/2019    Prehospitalization: Home  Admit Date / LOS : 10/11/2019/ 3 days    Diagnosis: Angio-edema    Consults:        Active: Neuro and Pulm CC       Needed: N/A     Code Status: Full Code   Advanced Directive: <no information>    LDA: Guevara, OG, PIV and Vent       Central Lines/Site/Justification:Patient Does Not Have Central Line       Urinary Cath/Order/Justification:Critically Ill in ICU    Vasopressors/Infusions:    sodium chloride 0.9% 50 mL/hr at 10/14/19 0600    propofol 50.027 mcg/kg/min (10/14/19 0600)          GOALS: Volume/ Hemodynamic: SBP < 220                     RASS: -2  light sedation, briefly awakes to voice (eye opening)    CAM ICU: Positive  Pain Management: none       Pain Controlled: yes     Rhythm: ST    Respiratory Device: Vent    Vent Mode: PRVC  Oxygen Concentration (%):  [30] 30  Resp Rate Total:  [12 br/min-23 br/min] 16 br/min  Vt Set:  [400 mL-450 mL] 400 mL  PEEP/CPAP:  [5 cmH20] 5 cmH20  Mean Airway Pressure:  [7.9 rrL11-42.5 cmH20] 8.6 cmH20             Most Recent SBT/ SAT: Fail       MOVE Screen: PASS    VTE Prophylaxis: Pharm and Mechanical  Mobility: Bedrest  Stress Ulcer Prophylaxis: Yes    Dietary: TF  Tolerance: yes  /  Advancement: @ goal    Isolation: No active isolations    Restraints: Yes    Significant Dates:  Post Op Date: N/A  Rescue Date: N/A  Imaging/ Diagnostics: MRI 10/12/19- CVA    Noteworthy Labs:  WBC 16.7    CBC/Anemia Labs: Coags:    Recent Labs   Lab 10/12/19  0411 10/13/19  0435 10/14/19  0423   WBC 12.99* 12.86* 16.17*   HGB 12.2 12.5 13.1   HCT 41.1 42.1 45.5    151 175   MCV 67* 67* 68*   RDW 18.3* 18.6* 19.4*    Recent Labs   Lab 10/10/19  0053   INR 1.1        Chemistries:   Recent Labs   Lab 10/10/19  0053 10/10/19  1015 10/12/19  0411 10/13/19  0340 10/14/19  0423    138 138 140 141   K 3.7 3.9 4.0 4.9 3.5    102 107 111* 106   CO2 24 25  23 19* 22*   BUN 24* 28* 21 22 20   CREATININE 1.8* 1.9* 1.6* 1.6* 1.6*   CALCIUM 9.9 10.2 9.6 9.1 9.8   PROT 7.6 8.0  --   --   --    BILITOT 0.7 0.7  --   --   --    ALKPHOS 87 94  --   --   --    ALT 9* 10  --   --   --    AST 11 11  --   --   --    MG  --  2.0  --   --   --         Cardiac Enzymes: Ejection Fractions:    No results for input(s): CPK, CPKMB, MB, TROPONINI in the last 72 hours. No results found for: EF     POCT Glucose: HbA1c:    Recent Labs   Lab 10/12/19  2005 10/13/19  0014 10/13/19  0455 10/13/19  0758 10/13/19  1203 10/13/19  2054   POCTGLUCOSE 199* 175* 179* 236* 161* 129*    Hemoglobin A1C   Date Value Ref Range Status   10/02/2019 6.9 (H) 4.0 - 5.6 % Final     Comment:     ADA Screening Guidelines:  5.7-6.4%  Consistent with prediabetes  >or=6.5%  Consistent with diabetes  High levels of fetal hemoglobin interfere with the HbA1C  assay. Heterozygous hemoglobin variants (HbS, HgC, etc)do  not significantly interfere with this assay.   However, presence of multiple variants may affect accuracy.     06/03/2008 6.7 (H) 4.0 - 6.2 % Final   02/13/2006 6.2 4.5 - 6.2 % Final        Needs from Care Team: Address WBC elevation     ICU LOS 3d  Level of Care: Critical Care

## 2019-10-14 NOTE — ASSESSMENT & PLAN NOTE
Unclear precipitant. Oropharyngeal exam with minimal edema on my assessment. + cuff leak. Unclear if actual edema on presentation or drooling and dysarthria reflective of CVA. She has completed 48 hours of corticosteroids and H2 antagonist.  Off therapy with no change in macroglossia

## 2019-10-14 NOTE — PROGRESS NOTES
"[facility]  Progress Note    Patient Name: Sherrie Alex  MRN: [id number]  Patient Class: IP- Inpatient   Admission Date: 10/11/2019  Length of Stay: 3 days  Attending Physician: Elena Bauer MD  Primary Care Provider: Desmond Yang MD        Subjective:     Principal Problem:Angio-edema        HPI:  patient is a 69 y.o female who presents to the ED complaining of tongue swelling that began PTA. Patient has slurred speech, drooling, and tongue swelling. Patient denies any pain, itching, CP, nausea, vomiting, or fever. Per sisters, patient is normally talkative and active. Her sisters are unaware of any cause of onset, stating that the patient lives alone. PMHx of CVA 1 yr ago, DM, HTN, HLD, and obesity. Patient is allergic to codeine and ace inhibitors.      The history is provided by the patient and a relative. History limited by: Acuity of condition. No  was used.     Pt intubated in ED for airway protection ?laryngeal edema - not noted in ED documentation.  Pt started on IV steroids and H2 blocker on vent. In ICU, pt showed sluggish neuro response to pain and unable to follow commands. Propofol taken off and neuro status improved. Pulm consulted for vent management. Neuro consulted - h/o seizures.  Seen in ED yesterday am with "leg shaking" and weakness and dc'd home. H/o seizures on vimpat.      Overview/Hospital Course:  Pt admitted acute neuro changes and resp compromise. Intubated in ED. Serial neuro testing off sedation suggest right sided weakness > left. MRI:  Moderate-sized area of subacute infarction involving the left subinsular white matter extending to the periventricular white matter. No MR evidence for hemorrhage.    Neurology notified of findings. Allow for permissive HTN initially and will start to resume BP meds.   Cannot extubate at this point in time, continue IV lasix diuresis, ET culture sent with climbing WBCs, possible " aspiration. picc line consult     Met with family and discussed plan to continue attempts to wean from vent, though neuro status/effects of CVA may be limiting factor. If unable to wean from vent, a tracheostomy and gastrostomy tube would be an option.     Interval History: unable to extubate at this time, switched to precedex     Review of Systems   Unable to perform ROS: Intubated     Objective:     Vital Signs (Most Recent):  Temp: 98.5 °F (36.9 °C) (10/14/19 0800)  Pulse: 92 (10/14/19 1522)  Resp: (!) 21 (10/14/19 1350)  BP: 113/64 (10/14/19 1522)  SpO2: 96 % (10/14/19 1522) Vital Signs (24h Range):  Temp:  [98.5 °F (36.9 °C)-99.1 °F (37.3 °C)] 98.5 °F (36.9 °C)  Pulse:  [] 92  Resp:  [15-22] 21  SpO2:  [96 %-100 %] 96 %  BP: (113-231)/() 113/64     Weight: 126.6 kg (279 lb 1.6 oz)  Body mass index is 46.45 kg/m².    Intake/Output Summary (Last 24 hours) at 10/14/2019 1608  Last data filed at 10/14/2019 1200  Gross per 24 hour   Intake 2117.8 ml   Output 4050 ml   Net -1932.2 ml      Physical Exam   Constitutional: She appears well-developed. No distress.   HENT:   Head: Normocephalic and atraumatic.   Right Ear: External ear normal.   Left Ear: External ear normal.   Nose: Nose normal.   ET tube in place. Lips/tongue without significant edema. Mild tongue protrusion. + slight audible cuff cleak    Eyes: Pupils are equal, round, and reactive to light. Conjunctivae and EOM are normal. Right eye exhibits no discharge. Left eye exhibits no discharge. No scleral icterus.   Opened eyes to voice   Neck: Normal range of motion. Neck supple. No JVD present. No tracheal deviation present. No thyromegaly present.   Cardiovascular: Normal rate, regular rhythm and normal heart sounds. Exam reveals no gallop and no friction rub.   No murmur heard.  Pulmonary/Chest: No stridor. She has no wheezes. She has no rales. She exhibits no tenderness.   Intubated on MV    Abdominal: Soft. Bowel sounds are normal. She  exhibits no distension and no mass. There is no tenderness. There is no rebound and no guarding. No hernia.   Musculoskeletal: She exhibits no edema, tenderness or deformity.   Lymphadenopathy:     She has no cervical adenopathy.   Neurological:   Sedated.    Skin: Skin is warm and dry. Capillary refill takes less than 2 seconds. No rash noted. She is not diaphoretic. No erythema. No pallor.   Nursing note and vitals reviewed.      Significant Labs:   Blood Culture: No results for input(s): LABBLOO in the last 48 hours.  BMP:   Recent Labs   Lab 10/14/19  0423   *      K 3.5      CO2 22*   BUN 20   CREATININE 1.6*   CALCIUM 9.8     CBC:   Recent Labs   Lab 10/13/19  0435 10/14/19  0423   WBC 12.86* 16.17*   HGB 12.5 13.1   HCT 42.1 45.5    175     POCT Glucose:   Recent Labs   Lab 10/13/19  2054 10/13/19  2322 10/14/19  0413   POCTGLUCOSE 129* 147* 169*     Urine Culture: No results for input(s): LABURIN in the last 48 hours.    Significant Imaging: I have reviewed and interpreted all pertinent imaging results/findings within the past 24 hours.      Assessment/Plan:      * Angio-edema  Possible etiology to compromised airway- unclear at this point  IV steroid x 2 days  Allergy to benadryl  On H2 blocker  Intubated and pulm consulted for vent management       CVA (cerebral vascular accident)    MRI:  Moderate-sized area of subacute infarction involving the left subinsular white matter extending to the periventricular white matter. No MR evidence for hemorrhage.    Moderate involutional change and microvascular changes.      Aspirin, statin  Permissive HTN  Weaning from vent may be hindered by new CVA    Encephalopathy, metabolic  Secondary to CVA      RYLEE (acute kidney injury)  Cr slightly more elevated at 1.9, baseline 1.4-1.7  IVF and bmp in am       Seizure  Resume vimpat  Unsure if seizure activity was involved for this admission  Neurology consulted   EEG completed       Acquired  hypothyroidism  Resume synthroid   Check TFTs    Essential hypertension  Uncontrolled  Resume home meds: norvasc, clonidine, hydralazine, toprol  IV PRN hydralazine      Type 2 diabetes mellitus with circulatory disorder, with long-term current use of insulin  Uncontrolled  A1c 6.9%  Resume levemir and SSI      Anxiety  Propofol for sedation       Morbid obesity  Discuss weight management after extubation       Dyslipidemia  Resume statin        VTE Risk Mitigation (From admission, onward)         Ordered     heparin (porcine) injection 5,000 Units  Every 12 hours      10/11/19 0617     IP VTE HIGH RISK PATIENT  Once      10/11/19 0532     Place ELIEZER hose  Until discontinued      10/11/19 0532     Place sequential compression device  Until discontinued      10/11/19 0532                Critical care time spent on the evaluation and treatment of severe organ dysfunction, review of pertinent labs and imaging studies, discussions with consulting providers and discussions with patient/family: 40 minutes.      Elena Bauer MD  Department of Hospital Medicine   [facility]

## 2019-10-14 NOTE — CARE UPDATE
Pt recieved intubated on Servo i ventilator with the following settings;. PRVC/ 400 TV/ 15 RR/ 30% FI02/ +5 Peep.   Alarms are set and functioning, Ambu bag at bedside, Pt without distress RT will continue to monitor and wean as tolerated.

## 2019-10-14 NOTE — PLAN OF CARE
Pt remains intubated. SBT attempted this AM but pt became tachycardiac.Pt with no skin breakdown noted. Pt remains on Diprivan at 50mcg. Pt remains on NS at 50ml/hr. Pt now on TF at 40ml. Pt with 's-170's most of day. She has remained afebrile today.

## 2019-10-15 LAB
ALLENS TEST: ABNORMAL
ANION GAP SERPL CALC-SCNC: 9 MMOL/L (ref 8–16)
BASOPHILS # BLD AUTO: 0.01 K/UL (ref 0–0.2)
BASOPHILS NFR BLD: 0.1 % (ref 0–1.9)
BUN SERPL-MCNC: 25 MG/DL (ref 8–23)
CALCIUM SERPL-MCNC: 9.8 MG/DL (ref 8.7–10.5)
CHLORIDE SERPL-SCNC: 103 MMOL/L (ref 95–110)
CO2 SERPL-SCNC: 30 MMOL/L (ref 23–29)
CREAT SERPL-MCNC: 1.8 MG/DL (ref 0.5–1.4)
DELSYS: ABNORMAL
DIFFERENTIAL METHOD: ABNORMAL
EOSINOPHIL # BLD AUTO: 0.1 K/UL (ref 0–0.5)
EOSINOPHIL NFR BLD: 0.7 % (ref 0–8)
ERYTHROCYTE [DISTWIDTH] IN BLOOD BY AUTOMATED COUNT: 19.2 % (ref 11.5–14.5)
ERYTHROCYTE [SEDIMENTATION RATE] IN BLOOD BY WESTERGREN METHOD: 15 MM/H
EST. GFR  (AFRICAN AMERICAN): 32 ML/MIN/1.73 M^2
EST. GFR  (NON AFRICAN AMERICAN): 28 ML/MIN/1.73 M^2
FIO2: 21
GLUCOSE SERPL-MCNC: 209 MG/DL (ref 70–110)
HCO3 UR-SCNC: 28.9 MMOL/L (ref 24–28)
HCT VFR BLD AUTO: 42.3 % (ref 37–48.5)
HGB BLD-MCNC: 12.5 G/DL (ref 12–16)
IMM GRANULOCYTES # BLD AUTO: 0.06 K/UL (ref 0–0.04)
IMM GRANULOCYTES NFR BLD AUTO: 0.5 % (ref 0–0.5)
LYMPHOCYTES # BLD AUTO: 0.9 K/UL (ref 1–4.8)
LYMPHOCYTES NFR BLD: 8 % (ref 18–48)
MCH RBC QN AUTO: 19.8 PG (ref 27–31)
MCHC RBC AUTO-ENTMCNC: 29.6 G/DL (ref 32–36)
MCV RBC AUTO: 67 FL (ref 82–98)
MODE: ABNORMAL
MONOCYTES # BLD AUTO: 1.1 K/UL (ref 0.3–1)
MONOCYTES NFR BLD: 9.7 % (ref 4–15)
NEUTROPHILS # BLD AUTO: 9 K/UL (ref 1.8–7.7)
NEUTROPHILS NFR BLD: 81 % (ref 38–73)
NRBC BLD-RTO: 0 /100 WBC
PCO2 BLDA: 42.4 MMHG (ref 35–45)
PEEP: 5
PH SMN: 7.44 [PH] (ref 7.35–7.45)
PLATELET # BLD AUTO: ABNORMAL K/UL (ref 150–350)
PMV BLD AUTO: ABNORMAL FL (ref 9.2–12.9)
PO2 BLDA: 67 MMHG (ref 80–100)
POC BE: 4 MMOL/L
POC SATURATED O2: 94 % (ref 95–100)
POC TCO2: 30 MMOL/L (ref 23–27)
POCT GLUCOSE: 192 MG/DL (ref 70–110)
POCT GLUCOSE: 206 MG/DL (ref 70–110)
POCT GLUCOSE: 226 MG/DL (ref 70–110)
POTASSIUM SERPL-SCNC: 4 MMOL/L (ref 3.5–5.1)
RBC # BLD AUTO: 6.31 M/UL (ref 4–5.4)
SAMPLE: ABNORMAL
SITE: ABNORMAL
SODIUM SERPL-SCNC: 142 MMOL/L (ref 136–145)
VT: 400
WBC # BLD AUTO: 11.11 K/UL (ref 3.9–12.7)

## 2019-10-15 PROCEDURE — 99291 PR CRITICAL CARE, E/M 30-74 MINUTES: ICD-10-PCS | Mod: ,,, | Performed by: NURSE PRACTITIONER

## 2019-10-15 PROCEDURE — 97161 PT EVAL LOW COMPLEX 20 MIN: CPT

## 2019-10-15 PROCEDURE — 99291 CRITICAL CARE FIRST HOUR: CPT | Mod: ,,, | Performed by: NURSE PRACTITIONER

## 2019-10-15 PROCEDURE — 63600175 PHARM REV CODE 636 W HCPCS: Performed by: HOSPITALIST

## 2019-10-15 PROCEDURE — 27000221 HC OXYGEN, UP TO 24 HOURS

## 2019-10-15 PROCEDURE — 25000003 PHARM REV CODE 250: Performed by: HOSPITALIST

## 2019-10-15 PROCEDURE — 85025 COMPLETE CBC W/AUTO DIFF WBC: CPT

## 2019-10-15 PROCEDURE — 20000000 HC ICU ROOM

## 2019-10-15 PROCEDURE — 97166 OT EVAL MOD COMPLEX 45 MIN: CPT

## 2019-10-15 PROCEDURE — 94761 N-INVAS EAR/PLS OXIMETRY MLT: CPT

## 2019-10-15 PROCEDURE — 36415 COLL VENOUS BLD VENIPUNCTURE: CPT

## 2019-10-15 PROCEDURE — 82803 BLOOD GASES ANY COMBINATION: CPT

## 2019-10-15 PROCEDURE — 36600 WITHDRAWAL OF ARTERIAL BLOOD: CPT

## 2019-10-15 PROCEDURE — S0028 INJECTION, FAMOTIDINE, 20 MG: HCPCS | Performed by: HOSPITALIST

## 2019-10-15 PROCEDURE — 25000003 PHARM REV CODE 250: Performed by: PSYCHIATRY & NEUROLOGY

## 2019-10-15 PROCEDURE — 80048 BASIC METABOLIC PNL TOTAL CA: CPT

## 2019-10-15 PROCEDURE — 99900026 HC AIRWAY MAINTENANCE (STAT)

## 2019-10-15 PROCEDURE — 99232 SBSQ HOSP IP/OBS MODERATE 35: CPT | Mod: ,,, | Performed by: PSYCHIATRY & NEUROLOGY

## 2019-10-15 PROCEDURE — 99232 PR SUBSEQUENT HOSPITAL CARE,LEVL II: ICD-10-PCS | Mod: ,,, | Performed by: PSYCHIATRY & NEUROLOGY

## 2019-10-15 PROCEDURE — 99900035 HC TECH TIME PER 15 MIN (STAT)

## 2019-10-15 PROCEDURE — 94003 VENT MGMT INPAT SUBQ DAY: CPT

## 2019-10-15 RX ORDER — DEXAMETHASONE SODIUM PHOSPHATE 4 MG/ML
8 INJECTION, SOLUTION INTRA-ARTICULAR; INTRALESIONAL; INTRAMUSCULAR; INTRAVENOUS; SOFT TISSUE EVERY 6 HOURS
Status: DISCONTINUED | OUTPATIENT
Start: 2019-10-15 | End: 2019-10-16

## 2019-10-15 RX ORDER — CHLORHEXIDINE GLUCONATE ORAL RINSE 1.2 MG/ML
15 SOLUTION DENTAL 2 TIMES DAILY
Status: DISCONTINUED | OUTPATIENT
Start: 2019-10-15 | End: 2019-10-29 | Stop reason: HOSPADM

## 2019-10-15 RX ORDER — FENTANYL CITRATE 50 UG/ML
25 INJECTION, SOLUTION INTRAMUSCULAR; INTRAVENOUS
Status: DISCONTINUED | OUTPATIENT
Start: 2019-10-15 | End: 2019-10-29

## 2019-10-15 RX ADMIN — HEPARIN SODIUM 5000 UNITS: 5000 INJECTION INTRAVENOUS; SUBCUTANEOUS at 09:10

## 2019-10-15 RX ADMIN — LACOSAMIDE 50 MG: 50 TABLET, FILM COATED ORAL at 08:10

## 2019-10-15 RX ADMIN — FENTANYL CITRATE 25 MCG: 50 INJECTION INTRAMUSCULAR; INTRAVENOUS at 09:10

## 2019-10-15 RX ADMIN — GLYCOPYRROLATE 0.1 MG: 0.2 INJECTION, SOLUTION INTRAMUSCULAR; INTRAVENOUS at 12:10

## 2019-10-15 RX ADMIN — ASPIRIN 325 MG ORAL TABLET 325 MG: 325 PILL ORAL at 08:10

## 2019-10-15 RX ADMIN — GLYCOPYRROLATE 0.1 MG: 0.2 INJECTION, SOLUTION INTRAMUSCULAR; INTRAVENOUS at 08:10

## 2019-10-15 RX ADMIN — FENTANYL CITRATE 25 MCG: 50 INJECTION INTRAMUSCULAR; INTRAVENOUS at 03:10

## 2019-10-15 RX ADMIN — CHLORHEXIDINE GLUCONATE 0.12% ORAL RINSE 15 ML: 1.2 LIQUID ORAL at 11:10

## 2019-10-15 RX ADMIN — FUROSEMIDE 40 MG: 10 INJECTION, SOLUTION INTRAVENOUS at 06:10

## 2019-10-15 RX ADMIN — GLYCOPYRROLATE 0.1 MG: 0.2 INJECTION, SOLUTION INTRAMUSCULAR; INTRAVENOUS at 04:10

## 2019-10-15 RX ADMIN — HYDRALAZINE HYDROCHLORIDE 10 MG: 20 INJECTION INTRAMUSCULAR; INTRAVENOUS at 06:10

## 2019-10-15 RX ADMIN — FAMOTIDINE 20 MG: 10 INJECTION INTRAVENOUS at 08:10

## 2019-10-15 RX ADMIN — CHLORHEXIDINE GLUCONATE 0.12% ORAL RINSE 15 ML: 1.2 LIQUID ORAL at 09:10

## 2019-10-15 RX ADMIN — FENTANYL CITRATE 25 MCG: 50 INJECTION INTRAMUSCULAR; INTRAVENOUS at 05:10

## 2019-10-15 RX ADMIN — LABETALOL HYDROCHLORIDE 10 MG: 5 INJECTION INTRAVENOUS at 04:10

## 2019-10-15 RX ADMIN — INSULIN ASPART 1 UNITS: 100 INJECTION, SOLUTION INTRAVENOUS; SUBCUTANEOUS at 12:10

## 2019-10-15 RX ADMIN — ROSUVASTATIN CALCIUM 20 MG: 10 TABLET, COATED ORAL at 09:10

## 2019-10-15 RX ADMIN — CLONIDINE HYDROCHLORIDE 0.3 MG: 0.1 TABLET ORAL at 09:10

## 2019-10-15 RX ADMIN — LACOSAMIDE 50 MG: 50 TABLET, FILM COATED ORAL at 09:10

## 2019-10-15 RX ADMIN — SENNOSIDES, DOCUSATE SODIUM 1 TABLET: 50; 8.6 TABLET, FILM COATED ORAL at 09:10

## 2019-10-15 RX ADMIN — INSULIN ASPART 4 UNITS: 100 INJECTION, SOLUTION INTRAVENOUS; SUBCUTANEOUS at 06:10

## 2019-10-15 RX ADMIN — DEXMEDETOMIDINE HYDROCHLORIDE 0.4 MCG/KG/HR: 4 INJECTION, SOLUTION INTRAVENOUS at 05:10

## 2019-10-15 RX ADMIN — GLYCOPYRROLATE 0.1 MG: 0.2 INJECTION, SOLUTION INTRAMUSCULAR; INTRAVENOUS at 09:10

## 2019-10-15 RX ADMIN — INSULIN ASPART 4 UNITS: 100 INJECTION, SOLUTION INTRAVENOUS; SUBCUTANEOUS at 05:10

## 2019-10-15 RX ADMIN — FUROSEMIDE 40 MG: 10 INJECTION, SOLUTION INTRAVENOUS at 08:10

## 2019-10-15 RX ADMIN — CLONIDINE HYDROCHLORIDE 0.3 MG: 0.1 TABLET ORAL at 08:10

## 2019-10-15 RX ADMIN — INSULIN ASPART 4 UNITS: 100 INJECTION, SOLUTION INTRAVENOUS; SUBCUTANEOUS at 11:10

## 2019-10-15 RX ADMIN — SENNOSIDES, DOCUSATE SODIUM 1 TABLET: 50; 8.6 TABLET, FILM COATED ORAL at 08:10

## 2019-10-15 RX ADMIN — HEPARIN SODIUM 5000 UNITS: 5000 INJECTION INTRAVENOUS; SUBCUTANEOUS at 08:10

## 2019-10-15 NOTE — PLAN OF CARE
Problem: Occupational Therapy Goal  Goal: Occupational Therapy Goal  Description  Goals to be met by: 11/15/19    Patient will increase functional independence with ADLs by performing:    UE Dressing with Maximum Assistance.  Grooming while in bed with Maximum Assistance.  Sitting at edge of bed : to be assessed as appropriate  Rolling to Bilateral with Maximum Assistance.   Supine to sit :to be assessed as appropriate  Upper extremity exercise program x10 reps per handout, with assistance as needed.     Outcome: Ongoing,   The patient was able to follow some commands to move LUE. OT will follow and modify care plan as appropriate.

## 2019-10-15 NOTE — CONSULTS
TN called son Davis Alex x2, left message [phone] regarding NH preferences that son would like..Thi Taveras RN, BSN, STN CCM  10/15/2019

## 2019-10-15 NOTE — CARE UPDATE
[facility]  ICU Multidisciplinary Bedside Rounds   SUMMARY     Date: 10/15/2019    Prehospitalization: Home  Admit Date / LOS : 10/11/2019/ 4 days    Diagnosis: Angio-edema    Consults:        Active: Neuro and Pulm CC       Needed: SW     Code Status: Full Code   Advanced Directive: <no information>    LDA: Guevara, OG, PIV and Vent       Central Lines/Site/Justification:Patient Does Not Have Central Line       Urinary Cath/Order/Justification:Critically Ill in ICU    Vasopressors/Infusions:    dexMEDEtomidine in 0.9 % NaCl 0.4 mcg/kg/hr (10/15/19 0506)    propofol Stopped (10/14/19 1035)          CAM ICU: Positive  Pain Management: IV       Pain Controlled: yes     Rhythm: NSR    Respiratory Device: Vent    Vent Mode: PRVC  Oxygen Concentration (%):  [21-30] 21  Resp Rate Total:  [15 br/min-31 br/min] 15 br/min  Vt Set:  [400 mL] 400 mL  PEEP/CPAP:  [5 cmH20] 5 cmH20  Mean Airway Pressure:  [8.6 cmH20-10 cmH20] 8.8 cmH20             Most Recent SBT/ SAT: Did not perform       MOVE Screen: PASS    VTE Prophylaxis: Pharm and Mechanical  Mobility: Bedrest  Stress Ulcer Prophylaxis: Yes    Dietary: TF  Tolerance: yes  /  Advancement: @ goal    Isolation: No active isolations    Restraints: No    Noteworthy Labs:  K+ 4.0, BUN: 25, creat: 1.8    CBC/Anemia Labs: Coags:    Recent Labs   Lab 10/13/19  0435 10/14/19  0423 10/15/19  0339   WBC 12.86* 16.17* 11.11   HGB 12.5 13.1 12.5   HCT 42.1 45.5 42.3    175 SEE COMMENT   MCV 67* 68* 67*   RDW 18.6* 19.4* 19.2*    Recent Labs   Lab 10/10/19  0053   INR 1.1        Chemistries:   Recent Labs   Lab 10/10/19  0053 10/10/19  1015  10/13/19  0340 10/14/19  0423 10/15/19  0339    138   < > 140 141 142   K 3.7 3.9   < > 4.9 3.5 4.0    102   < > 111* 106 103   CO2 24 25   < > 19* 22* 30*   BUN 24* 28*   < > 22 20 25*   CREATININE 1.8* 1.9*   < > 1.6* 1.6* 1.8*   CALCIUM 9.9 10.2   < > 9.1 9.8 9.8   PROT 7.6 8.0  --   --   --   --    BILITOT  0.7 0.7  --   --   --   --    ALKPHOS 87 94  --   --   --   --    ALT 9* 10  --   --   --   --    AST 11 11  --   --   --   --    MG  --  2.0  --   --   --   --     < > = values in this interval not displayed.        Cardiac Enzymes: Ejection Fractions:    No results for input(s): CPK, CPKMB, MB, TROPONINI in the last 72 hours. No results found for: EF     POCT Glucose: HbA1c:    Recent Labs   Lab 10/13/19  2322 10/14/19  0413 10/14/19  0834 10/14/19  1237 10/14/19  1728 10/15/19  0006   POCTGLUCOSE 147* 169* 193* 257* 148* 192*    Hemoglobin A1C   Date Value Ref Range Status   10/02/2019 6.9 (H) 4.0 - 5.6 % Final     Comment:     ADA Screening Guidelines:  5.7-6.4%  Consistent with prediabetes  >or=6.5%  Consistent with diabetes  High levels of fetal hemoglobin interfere with the HbA1C  assay. Heterozygous hemoglobin variants (HbS, HgC, etc)do  not significantly interfere with this assay.   However, presence of multiple variants may affect accuracy.     06/03/2008 6.7 (H) 4.0 - 6.2 % Final   02/13/2006 6.2 4.5 - 6.2 % Final        Needs from Care Team: none     ICU LOS 3d 23h  Level of Care: Critical Care

## 2019-10-15 NOTE — PLAN OF CARE
1800  Afebrile, VSS stable throughout most of shift, BP and HR increasing later in day, labetalol, fentanyl, hydralazine given, reassess at shift change, opens eyes spontaneously, follows commands, R side hemiplegia, voiding adequate clear yellow urine to Guevara catheter, no BM this shift, BS hypoactive, turned Q2, remains intubated for airway protection, SpO2 96% on ventilator, bed in low, locked position, call light within reach, able to make needs known; no needs at this time.

## 2019-10-15 NOTE — ASSESSMENT & PLAN NOTE
Unclear precipitant.   --Oropharyngeal exam with minimal edema on my assessment. + cuff leak.   --Unclear if actual edema on presentation or drooling and dysarthria reflective of CVA.   --s/p 48 hours of corticosteroids and H2 antagonist.   --Off therapy with no change in macroglossia  --Will pursue conversations related to tracheostomy if patient is unable to be extubated or fails extubation.

## 2019-10-15 NOTE — EICU
Notified of TF residual of 175 cc    Gave parameters to hold only if gastric residual volume of >400 and resume after 1 hour at previous rate of 40 cc/hr  Head of bed > 30 degrees always

## 2019-10-15 NOTE — PROGRESS NOTES
[facility]  Pulmonology  Progress Note    Patient Name: Sherrie Alex  MRN: [id number]  Admission Date: 10/11/2019  Hospital Length of Stay: 4 days  Code Status: Full Code  Attending Provider: Vee Arnett MD  Primary Care Provider: Desmond Yang MD   Principal Problem: Angio-edema    Subjective:     Interval History: Precedex d/c'd this am to assess mental status. She is lethargic and slow to respond, only able to open eyes briefly and does not follow commands    Objective:     Vital Signs (Most Recent):  Temp: 97.3 °F (36.3 °C) (10/15/19 0315)  Pulse: 107 (10/15/19 0937)  Resp: 20 (10/15/19 0937)  BP: (!) 162/90 (10/15/19 0600)  SpO2: 99 % (10/15/19 0937) Vital Signs (24h Range):  Temp:  [97.3 °F (36.3 °C)-98.4 °F (36.9 °C)] 97.3 °F (36.3 °C)  Pulse:  [] 107  Resp:  [14-23] 20  SpO2:  [95 %-99 %] 99 %  BP: ()/() 162/90     Weight: 126.6 kg (279 lb 1.6 oz)  Body mass index is 46.45 kg/m².      Intake/Output Summary (Last 24 hours) at 10/15/2019 1107  Last data filed at 10/15/2019 0600  Gross per 24 hour   Intake 649.68 ml   Output 3100 ml   Net -2450.32 ml       Physical Exam  Physical Exam   Constitutional: She appears well-developed. No distress.   HENT:   Head: Normocephalic and atraumatic.   Nose: Nose normal.   ET tube in place. Lips/tongue with moderate edema. Moderate tongue protrusion. + for cuff cleak    Eyes: Pupils are equal, round, and reactive to light. Conjunctivae and EOM are normal. Right eye exhibits no discharge. Left eye exhibits no discharge. No scleral icterus.   Opened eyes to voice   Neck: Normal range of motion. Neck supple, No tracheal deviation present.  Cardiovascular: Normal rate, regular rhythm and normal heart sounds. Exam reveals no gallop and no friction rub.   Pulmonary/Chest: No stridor. She has no wheezes. She has no rales. She exhibits no tenderness.   Intubated on Mechanical ventilation    Abdominal: Soft. Bowel sounds are normal.  She exhibits no distension and no mass. There is no tenderness. There is no rebound and no guarding. No palpable hernia.   Musculoskeletal: NO tenderness or deformity.    Neurological:   Opens eyes to voice, does not follow commands, does not spontaneously move any extremities  Skin: Skin is warm and dry. Capillary refill takes less than 2 seconds. No rash noted. She is not diaphoretic. No erythema. No pallor.   Nursing note and vitals reviewed.    Vents:  Vent Mode: PRVC (10/15/19 0937)  Ventilator Initiated: Yes (10/11/19 0213)  Set Rate: 15 bmp (10/15/19 0937)  Vt Set: 400 mL (10/15/19 0937)  PEEP/CPAP: 5 cmH20 (10/15/19 0937)  Oxygen Concentration (%): 21 (10/15/19 0937)  Peak Airway Pressure: 23.3 cmH2O (10/15/19 0937)  Total Ve: 7.1 mL (10/15/19 0937)  F/VT Ratio<105 (RSBI): (!) 51.81 (10/15/19 0937)    Lines/Drains/Airways     Drain                 NG/OG Tube 10/11/19 0215 orogastric 18 Fr. Left mouth 4 days         Urethral Catheter 10/11/19 0230 16 Fr. 4 days          Airway                 Airway - Non-Surgical 10/11/19 0204 Endotracheal Tube-Hi/Lo 4 days          Peripheral Intravenous Line                 Peripheral IV - Single Lumen 10/10/19 0901 18 G Right Wrist 5 days         Peripheral IV - Single Lumen 10/15/19 0419 20 G Anterior;Right Forearm less than 1 day                Significant Labs:    CBC/Anemia Profile:  Recent Labs   Lab 10/14/19  0423 10/15/19  0339   WBC 16.17* 11.11   HGB 13.1 12.5   HCT 45.5 42.3    SEE COMMENT   MCV 68* 67*   RDW 19.4* 19.2*        Chemistries:  Recent Labs   Lab 10/14/19  0423 10/15/19  0339    142   K 3.5 4.0    103   CO2 22* 30*   BUN 20 25*   CREATININE 1.6* 1.8*   CALCIUM 9.8 9.8       All pertinent labs within the past 24 hours have been reviewed.    Significant Imaging:  I have reviewed all pertinent imaging results/findings within the past 24 hours.    Assessment/Plan:     * Angio-edema  Unclear precipitant.   --Oropharyngeal exam with  minimal edema on my assessment. + cuff leak.   --Unclear if actual edema on presentation or drooling and dysarthria reflective of CVA.   --s/p 48 hours of corticosteroids and H2 antagonist.   --Off therapy with no change in macroglossia  --Will pursue conversations related to tracheostomy if patient is unable to be extubated or fails extubation.    CVA (cerebral vascular accident)  Statin/ASA. Permissive HTN. Neurology following.     Encephalopathy, metabolic  Suspect mostly related to sedation.     -- Hold all sedation  -- EEG abnormal with diffuse slowing, no seizures noted  -- Neurology following  -- Continue Vimpat for now      On mechanically assisted ventilation  Intubated for airway protection and concern for angioedema vs dysphagia related to new CVA (more likely). Continues to have good lung mechanics and has a cuff leak. Mental status was improved this am, but reassessment this afternoon shows worsening. Off of sedation. EEG and carotid US planned for today.      -- LPV. Wean FiO2 to maintain SpO2 >88%   -- SAT/SBT when mental status allows. Cont to hold sedation   - If sedation required, hold precedex and use intermittent fentanyl pushes  -- Has history of DEYANIRA without evidence of significant prior hypercapnia.. Plan for extubation to NIPPV.   -- Keep euvolemic.  -- High risk for reintubation   - If she fails extubation, she would need a tracheostomy  --will discuss with son sary and PEG as high concern she is unable to adequately protect airway 2/2 CVA      Seizure  By history. On vimpat. No Sz activity noted.     Acquired hypothyroidism  Continue synthroid     Essential hypertension  Permissive HTN in setting of CVA. Would avoid hydralazine.     Type 2 diabetes mellitus with circulatory disorder, with long-term current use of insulin  SSI- goal 140-180       PPI ppx: famotidine  VTE ppx: heparin sq  PT/OT     Above plan discussed with Dr. Castillo and Dr. Arnett on ICU rounds    Critical Care time 55  minutes       Osmin Dinh NP  Pulmonology  [facility]

## 2019-10-15 NOTE — ASSESSMENT & PLAN NOTE
Intubated for airway protection and concern for angioedema vs dysphagia related to new CVA (more likely). Continues to have good lung mechanics and has a cuff leak. Mental status was improved this am, but reassessment this afternoon shows worsening. Off of sedation. EEG and carotid US planned for today.      -- LPV. Wean FiO2 to maintain SpO2 >88%   -- SAT/SBT when mental status allows. Cont to hold sedation   - If sedation required, hold precedex and use intermittent fentanyl pushes  -- Has history of DEYANIRA without evidence of significant prior hypercapnia.. Plan for extubation to NIPPV.   -- Keep euvolemic.  -- High risk for reintubation   - If she fails extubation, she would need a tracheostomy  --will discuss with son sary and PEG as high concern she is unable to adequately protect airway 2/2 CVA

## 2019-10-15 NOTE — PROGRESS NOTES
TN sent snf referral through [facility]..Thi Taveras RN, BSN, STN [facility]  10/15/2019

## 2019-10-15 NOTE — NURSING
2212: Called Dr. Garzon to discuss possible TLC placement after failed PICC attempt. MD stated he cannot come to place line at the moment. Requested I call anesthesia to see if they are available to attempt line placement and if not Dr. Garzon will re-assess before end of shift.    2215: called anesthesia per Dr. Garzon request. Explained that line is non-emergent and pt has working IV's at this time but are due to  tomorrow. Anesthesia stated they do not have time to attempt placement if the line is not emergent at this time.    Will re-assess line placement need before shift ends

## 2019-10-15 NOTE — NURSING
0030:Pt tube feed residual was 175 ml, no parameters were in pt's chart. TF held and called eICU for parameters.    0100: eICU MD stated TF does not need to be held for <400ml and TF can be restarted. TF restarted at 0100. Will continue to monitor residuals. Awaiting eICU MD to place parameters in pts chart.

## 2019-10-15 NOTE — CARE UPDATE
[facility]  ICU Multidisciplinary Bedside Rounds     UPDATE     Date: 10/15/2019      Plan of care reviewed with the following, Nurse, Charge Nurse, Physician, Pulm CC, , Resp. Therapist and Infection Prevention.       Needs/ Goals for the day: Consult with anesthesia re: extubation and possible trach, address G+ cocci in clusters, chanins, and pairs, Abx?, CXR (done), chlorhexadine for oral care, miralax for constipation, Central Line access, free water flushes?,abg?, PRN fentanyl pushes, leave precedex off if possible, add steroids, consult SW, PT/OT, discuss plan of care with son.      Level of Care: Critical Care

## 2019-10-15 NOTE — PLAN OF CARE
Patient is currently on a Mechanical Ventilator with settings as documented.  Will continue to monitor.

## 2019-10-15 NOTE — PLAN OF CARE
10/15/19 1206   Discharge Reassessment   Assessment Type Discharge Planning Reassessment   Do you have any problems affording any of your prescribed medications? No   Discharge Plan A New Nursing Home placement - skilled nursing care facility   Discharge Plan B Skilled Nursing Facility   DME Needed Upon Discharge  none   Anticipated Discharge Disposition Int Care Fac   Can the patient answer the patient profile reliably? No, cognitively impaired   How does the patient rate their overall health at the present time? Fair   Describe the patient's ability to walk at the present time. Walks with the help of equipment   Number of comorbid conditions (as recorded on the chart) Three   During the past month, has the patient often been bothered by feeling down, depressed or hopeless? No   During the past month, has the patient often been bothered by little interest or pleasure in doing things? No   Post-Acute Status   Post-Acute Authorization Placement   Post-Acute Placement Status Awaiting Internal Medical Clearance   Discharge Delays (!) Patient and Family Barriers  ([place] calld son, Davis Alex, [phone], awiaiting call back. [place] spoke to patient's sister Pilar Salinas but she does not know son's full plan.)   Thi Taveras, RN, BSN, STN [facility]  10/15/2019

## 2019-10-15 NOTE — PROGRESS NOTES
Per Sandy EDEN, PICC team unable to plaxce PICC line due to pt body habitus.  D/c'ed all PICC related orders.  Will consult with Pulm team for central line or midline placement if appropriate.

## 2019-10-15 NOTE — PLAN OF CARE
Problem: Physical Therapy Goal  Goal: Physical Therapy Goal  Description  Goals to be met by:      Patient will increase functional independence with mobility by performin. Supine to sit to be assessed  2. Rolling to Left with Moderate Assistance.  3. Bed to chair transfer to be assessed  4. Sitting at edge of bed to be assessed  5. Lower extremity exercise program x10  reps per handout, with assistance as needed     Outcome: Ongoing, Progressing  Limited initial PT evaluation performed 2/2 pt intubated.   Pt could benefit from skilled PT services 3-5x/wk in order to maximize function prior to D/C.  Ongoing assessment pending pt progress for disposition and DME.

## 2019-10-15 NOTE — PT/OT/SLP EVAL
Physical Therapy Evaluation    Patient Name:  Sherrie Alex   MRN:  [id number]    Recommendations:     Discharge Recommendations:  (ongoing assessment pending pt progress)   Discharge Equipment Recommendations: (ongoing assessment)   Barriers to discharge: Decreased caregiver support and Pt intubatedin ICU at present time    Assessment:     Sherrie Alex is a 69 y.o. female admitted with a medical diagnosis of Angio-edema.  She presents with the following impairments/functional limitations:  weakness, impaired functional mobilty, decreased upper extremity function, decreased safety awareness, impaired coordination, impaired cardiopulmonary response to activity, impaired endurance, impaired self care skills, decreased lower extremity function, decreased ROM .    Rehab Prognosis: Fair; patient would benefit from acute skilled PT services to address these deficits and reach maximum level of function.    Recent Surgery: * No surgery found *      Plan:     During this hospitalization, patient to be seen (3-5x/wk) to address the identified rehab impairments via therapeutic activities, therapeutic exercises, neuromuscular re-education and progress toward the following goals:    · Plan of Care Expires:  10/29/19    Subjective     Chief Complaint: unable to state, intubated.  Unable to blink eyes or nod to yes or no questions  Patient/Family Comments/goals: Per chart, son is seeking placement.  No goals stated from son present in room at time of eval  Pain/Comfort:  Pain Rating 1: (Pt on vent, but no outward signs off pain)    Patients cultural, spiritual, Catholic conflicts given the current situation: no    Living Environment:  Per son in room, pt lives alone in a [address] with 1 ASHER   Prior to admission, patients level of function was per son in room, pt was Mod I with ambulation and ADL's.  Equipment used at home: wheelchair, walker, rolling, bedside commode, hospital bed.  DME owned (not currently used):  none.  Upon discharge, patient will have assistance from ?.    Objective:     Communicated with nsg prior to session.  Patient found HOB elevated with ventilator, pressure relief boots, SCD(ICU monitoring, feeding tube)  upon PT entry to room.    General Precautions: Standard, fall, respiratory   Orthopedic Precautions:N/A   Braces: N/A     Exams:  · Pt with eyes open, but not focusing, no tracking noted  · R LE with no volitional movement noted  · Pt able to perform AP's with L ankle on command     · Visible skin intact  · Able to follow simple commands for squeezing hand and wiggling foot.  Performs on l side no matter which side is asked.  Unable to perform head nod yes or no when asked.  Unable to blink eyes for yes/no    Functional Mobility:  · Bed Mobility:     · Rolling Left:  dependence and of 2 persons with drawsheet  · Rolling Right: N/T  · Scooting: dependence and of 2 persons with drawsheet to HOB with bed in trendelenberg      Therapeutic Activities and Exercises:   Pt placed with bed in chair position approx 8m and educated to perform L AP's and TKE's.  Pt able to perform AP x 10 reps, but not TKE.  BP increasing with bed in chair position.  Lowered back down to 30*        AM-PAC 6 CLICK MOBILITY  Total Score:7     Patient left HOB elevated to 30* with all lines intact, call button in reach, nsg notified and son present.and UE's propped up on pillows with boots back on    GOALS:   Multidisciplinary Problems     Physical Therapy Goals        Problem: Physical Therapy Goal    Goal Priority Disciplines Outcome Goal Variances Interventions   Physical Therapy Goal     PT, PT/OT Ongoing, Progressing     Description:  Goals to be met by:      Patient will increase functional independence with mobility by performin. Supine to sit to be assessed  2. Rolling to Left with Moderate Assistance.  3. Bed to chair transfer to be assessed  4. Sitting at edge of bed to be assessed  5. Lower extremity  exercise program x10  reps per handout, with assistance as needed                      History:     Past Medical History:   Diagnosis Date    Anemia     Anxiety     Diabetes mellitus     Hyperlipidemia     Hypertension     Obesity     Proteinuria     Sleep apnea        Past Surgical History:   Procedure Laterality Date    APPENDECTOMY       SECTION      CHOLECYSTECTOMY      DILATION AND CURETTAGE OF UTERUS      TUBAL LIGATION         Time Tracking:     PT Received On: 10/15/19  PT Start Time: 1330     PT Stop Time: 1401  PT Total Time (min): 31 min     Billable Minutes: Evaluation 20  and Therapeutic Activity 11      Susan West, PT  10/15/2019

## 2019-10-15 NOTE — SUBJECTIVE & OBJECTIVE
Interval History: Precedex d/c'd this am to assess mental status. She is lethargic and slow to respond, only able to open eyes briefly and does not follow commands    Objective:     Vital Signs (Most Recent):  Temp: 97.3 °F (36.3 °C) (10/15/19 0315)  Pulse: 107 (10/15/19 0937)  Resp: 20 (10/15/19 0937)  BP: (!) 162/90 (10/15/19 0600)  SpO2: 99 % (10/15/19 0937) Vital Signs (24h Range):  Temp:  [97.3 °F (36.3 °C)-98.4 °F (36.9 °C)] 97.3 °F (36.3 °C)  Pulse:  [] 107  Resp:  [14-23] 20  SpO2:  [95 %-99 %] 99 %  BP: ()/() 162/90     Weight: 126.6 kg (279 lb 1.6 oz)  Body mass index is 46.45 kg/m².      Intake/Output Summary (Last 24 hours) at 10/15/2019 1107  Last data filed at 10/15/2019 0600  Gross per 24 hour   Intake 649.68 ml   Output 3100 ml   Net -2450.32 ml       Physical Exam  Physical Exam   Constitutional: She appears well-developed. No distress.   HENT:   Head: Normocephalic and atraumatic.   Nose: Nose normal.   ET tube in place. Lips/tongue with moderate edema. Moderate tongue protrusion. + for cuff cleak    Eyes: Pupils are equal, round, and reactive to light. Conjunctivae and EOM are normal. Right eye exhibits no discharge. Left eye exhibits no discharge. No scleral icterus.   Opened eyes to voice   Neck: Normal range of motion. Neck supple, No tracheal deviation present.  Cardiovascular: Normal rate, regular rhythm and normal heart sounds. Exam reveals no gallop and no friction rub.   Pulmonary/Chest: No stridor. She has no wheezes. She has no rales. She exhibits no tenderness.   Intubated on Mechanical ventilation    Abdominal: Soft. Bowel sounds are normal. She exhibits no distension and no mass. There is no tenderness. There is no rebound and no guarding. No palpable hernia.   Musculoskeletal: NO tenderness or deformity.    Neurological:   Opens eyes to voice, does not follow commands, does not spontaneously move any extremities  Skin: Skin is warm and dry. Capillary refill takes  less than 2 seconds. No rash noted. She is not diaphoretic. No erythema. No pallor.   Nursing note and vitals reviewed.    Vents:  Vent Mode: PRVC (10/15/19 0937)  Ventilator Initiated: Yes (10/11/19 0213)  Set Rate: 15 bmp (10/15/19 0937)  Vt Set: 400 mL (10/15/19 0937)  PEEP/CPAP: 5 cmH20 (10/15/19 0937)  Oxygen Concentration (%): 21 (10/15/19 0937)  Peak Airway Pressure: 23.3 cmH2O (10/15/19 0937)  Total Ve: 7.1 mL (10/15/19 0937)  F/VT Ratio<105 (RSBI): (!) 51.81 (10/15/19 0937)    Lines/Drains/Airways     Drain                 NG/OG Tube 10/11/19 0215 orogastric 18 Fr. Left mouth 4 days         Urethral Catheter 10/11/19 0230 16 Fr. 4 days          Airway                 Airway - Non-Surgical 10/11/19 0204 Endotracheal Tube-Hi/Lo 4 days          Peripheral Intravenous Line                 Peripheral IV - Single Lumen 10/10/19 0901 18 G Right Wrist 5 days         Peripheral IV - Single Lumen 10/15/19 0419 20 G Anterior;Right Forearm less than 1 day                Significant Labs:    CBC/Anemia Profile:  Recent Labs   Lab 10/14/19  0423 10/15/19  0339   WBC 16.17* 11.11   HGB 13.1 12.5   HCT 45.5 42.3    SEE COMMENT   MCV 68* 67*   RDW 19.4* 19.2*        Chemistries:  Recent Labs   Lab 10/14/19  0423 10/15/19  0339    142   K 3.5 4.0    103   CO2 22* 30*   BUN 20 25*   CREATININE 1.6* 1.8*   CALCIUM 9.8 9.8       All pertinent labs within the past 24 hours have been reviewed.    Significant Imaging:  I have reviewed all pertinent imaging results/findings within the past 24 hours.

## 2019-10-15 NOTE — PROGRESS NOTES
patient is obese and hard to access the only deep vein visible in RUE. once accessed, unable to thread to SVC. Unable to visualize vein in LUE. Reported off to Sandy EDEN, central line recommended.

## 2019-10-15 NOTE — ASSESSMENT & PLAN NOTE
Suspect mostly related to sedation.     -- Hold all sedation  -- EEG abnormal with diffuse slowing, no seizures noted  -- Neurology following  -- Continue Vimpat for now

## 2019-10-15 NOTE — PROGRESS NOTES
[phone] cell phone, Ambrocio Alex and Bakari Alex is the eldest son and Rashaad.  They all agree that [facility] is first preference but would like to remain on the [facility]...Thi Taveras RN, BSN, [facility]  10/15/2019

## 2019-10-15 NOTE — NURSING
Spoke to Pt's son, Davis to obtain consent for PICC placement. Procedure, risks, and benefits explained to Mr. Cannon who verbalized understanding and gave verbal consent on the phone.

## 2019-10-15 NOTE — PROGRESS NOTES
[facility]  Neurology  Progress Note    Patient Name: Sherrie Alex  MRN: [id number]  Admission Date: 10/11/2019  Hospital Length of Stay: 4 days  Code Status: Full Code   Attending Provider: Vee Arnett MD  Primary Care Physician: Desmond Yang MD   Principal Problem:Angio-edema    Subjective:     Interval History: 68 y/o female with medical Hx as listed below presented to ED complaining swelling of tongue. Pt was intubated for airway protection. Etiology of her edema if not clear as she does has reported allergic reaction to ACE inhibitors but is not currently taking any of these type of antihypertensives.      Ms. Alex has Hx stroke. According to chart review from [facility] pt had expressive aphasia on Jan/2019 that resolved beg Dx with TIA at that point. Notes from neurology clinic state that she has baseline left sided weakness?     -10/12/19: Overnight no new issues.     -10/13/19: Pt remains intubated. No acute issues overnight.     -10/14/19: Pt remains intubated. On no sedation follows commands. Tachycardic and markedly hypertensive this morning.     -10/15/19: On no sedation pt is able to follow commands.    Current Neurological Medications:     Current Facility-Administered Medications   Medication Dose Route Frequency Provider Last Rate Last Dose    acetaminophen tablet 650 mg  650 mg Oral Q8H PRN lEena Bauer MD        aspirin tablet 325 mg  325 mg Per OG tube Daily Jcarlos Holguin MD   325 mg at 10/15/19 0846    chlorhexidine 0.12 % solution 15 mL  15 mL Mouth/Throat BID Vee Arnett MD   15 mL at 10/15/19 1117    cloNIDine tablet 0.3 mg  0.3 mg Oral BID Elena Bauer MD   0.3 mg at 10/15/19 0845    dexmedetomidine (PRECEDEX) 400mcg/100mL 0.9% NaCL infusion  0.2 mcg/kg/hr Intravenous Continuous Elena Bauer MD   Stopped at 10/15/19 0900    famotidine (PF) injection 20 mg  20 mg Intravenous Daily Elena Bauer MD   20 mg at 10/15/19 0844    fentaNYL  injection 25 mcg  25 mcg Intravenous Q1H PRN Vee Arnett MD   Stopped at 10/15/19 1618    furosemide injection 40 mg  40 mg Intravenous BID Elena Buaer MD   40 mg at 10/15/19 0844    glycopyrrolate injection 0.1 mg  0.1 mg Intravenous QID Elena Bauer MD   0.1 mg at 10/15/19 1618    heparin (porcine) injection 5,000 Units  5,000 Units Subcutaneous Q12H Elena Bauer MD   5,000 Units at 10/15/19 0843    hydrALAZINE injection 10 mg  10 mg Intravenous Q8H PRN Elena Bauer MD   10 mg at 10/14/19 0844    insulin aspart U-100 pen 1-10 Units  1-10 Units Subcutaneous Q4H PRN Elena Bauer MD   4 Units at 10/15/19 1141    insulin detemir U-100 pen 15 Units  15 Units Subcutaneous QHS Elena Bauer MD   15 Units at 10/14/19 2108    labetalol injection 10 mg  10 mg Intravenous Q6H PRN Elena Bauer MD   10 mg at 10/15/19 1621    lacosamide tablet 50 mg  50 mg Oral BID Elena Bauer MD   50 mg at 10/15/19 0844    morphine injection 2 mg  2 mg Intravenous Q4H PRN Elena Bauer MD   2 mg at 10/14/19 2105    ondansetron injection 4 mg  4 mg Intravenous Q8H PRN Elena Bauer MD        propofol (DIPRIVAN) 10 mg/mL infusion  10 mcg/kg/min Intravenous Continuous Elena Bauer MD   Stopped at 10/14/19 1035    rosuvastatin tablet 20 mg  20 mg Per OG tube QHS Jcarlos Holguin MD   20 mg at 10/14/19 2106    senna-docusate 8.6-50 mg per tablet 1 tablet  1 tablet Oral BID Elena Bauer MD   1 tablet at 10/15/19 0846    sodium chloride 0.9% flush 10 mL  10 mL Intravenous PRN Elena Bauer MD         Facility-Administered Medications Ordered in Other Encounters   Medication Dose Route Frequency Provider Last Rate Last Dose    butamben-tetracaine-benzocaine 2%-2%-14% (200 mg/sec) spray    PRN Teetee Duncan MD   1 spray at 10/11/19 0130    ketamine injection    PRN Teetee Duncan MD   5 mg at 10/11/19 0205    midazolam injection   Intravenous PRN Teetee Duncan MD    2 mg at 10/11/19 0155    phenylephrine HCL 0.25% nasal spray    PRN Teetee Duncan MD   1 spray at 10/11/19 0130    succinylcholine injection    PRN Teetee Duncan MD   120 mg at 10/11/19 0205       Review of Systems   Unable to perform ROS: Intubated     Objective:     Vital Signs (Most Recent):  Temp: 99.8 °F (37.7 °C) (10/15/19 1200)  Pulse: 94 (10/15/19 1600)  Resp: 13 (10/15/19 1600)  BP: (!) 184/91 (10/15/19 1600)  SpO2: 98 % (10/15/19 1600) Vital Signs (24h Range):  Temp:  [97.3 °F (36.3 °C)-99.8 °F (37.7 °C)] 99.8 °F (37.7 °C)  Pulse:  [] 94  Resp:  [8-23] 13  SpO2:  [95 %-100 %] 98 %  BP: ()/() 184/91     Weight: 126.6 kg (279 lb 1.6 oz)  Body mass index is 46.45 kg/m².    Physical Exam  Constitutional: She appears well-developed.   HENT:   Head: Normocephalic.   Eyes: Right eye exhibits no discharge. Left eye exhibits no discharge.   Neck: Neck supple.   Cardiovascular: Normal rate.   Pulmonary/Chest:   Symmetrical chest expansion   Abdominal: Soft.         NEUROLOGICAL EXAMINATION:      MENTAL STATUS        Sedated but opens eyes briefly upon verbal stimuli  Follows simple commands      CRANIAL NERVES      CN III, IV, VI   Right pupil: Size: 2 mm. Shape: regular.   Left pupil: Size: 2 mm. Shape: regular.   Nystagmus: none   Conjugate gaze: present     MOTOR EXAM        AROM on left on command; no AROM on right          Significant Labs:   CBC:   Recent Labs   Lab 10/14/19  0423 10/15/19  0339   WBC 16.17* 11.11   HGB 13.1 12.5   HCT 45.5 42.3    SEE COMMENT     CMP:   Recent Labs   Lab 10/14/19  0423 10/15/19  0339   * 209*    142   K 3.5 4.0    103   CO2 22* 30*   BUN 20 25*   CREATININE 1.6* 1.8*   CALCIUM 9.8 9.8   ANIONGAP 13 9   EGFRNONAA 32* 28*         Assessment and Plan:     68 y/o female consulted for neurological changes:     1. Right hemiplegia: infarction on left. See official MRI report.           No arrhythmias on telemetry. No stenotic  disease of carotids.           -OK to 's-180's           -ASA and statin.        Remains intubated.     2. Seizure: no reported episodes. Continue lacosamide 100 mg BID.    Active Diagnoses:    Diagnosis Date Noted POA    PRINCIPAL PROBLEM:  Angio-edema [T78.3XXA] 10/11/2019 Yes    CVA (cerebral vascular accident) [I63.9] 10/12/2019 Yes    Seizure [R56.9] 10/11/2019 Yes    RYLEE (acute kidney injury) [N17.9] 10/11/2019 Yes    On mechanically assisted ventilation [Z99.11] 10/11/2019 Not Applicable    Encephalopathy, metabolic [G93.41] 10/11/2019 Yes    Acquired hypothyroidism [E03.9] 10/03/2019 Yes    Essential hypertension [I10] 10/02/2019 Yes    Type 2 diabetes mellitus with circulatory disorder, with long-term current use of insulin [E11.59, Z79.4] 10/02/2019 Not Applicable    Anxiety [F41.9]  Yes    Morbid obesity [E66.01]  Yes    Dyslipidemia [E78.5]  Yes      Problems Resolved During this Admission:       VTE Risk Mitigation (From admission, onward)         Ordered     heparin (porcine) injection 5,000 Units  Every 12 hours      10/11/19 0617     IP VTE HIGH RISK PATIENT  Once      10/11/19 0532     Place ELIEZER hose  Until discontinued      10/11/19 0532     Place sequential compression device  Until discontinued      10/11/19 0532                Jcarlos Holguin MD  Neurology  [facility]

## 2019-10-16 PROBLEM — J96.90 RESPIRATORY FAILURE: Status: ACTIVE | Noted: 2019-10-11

## 2019-10-16 LAB
ALLENS TEST: ABNORMAL
ANION GAP SERPL CALC-SCNC: 8 MMOL/L (ref 8–16)
BACTERIA SPEC AEROBE CULT: ABNORMAL
BASOPHILS # BLD AUTO: 0.02 K/UL (ref 0–0.2)
BASOPHILS NFR BLD: 0.1 % (ref 0–1.9)
BUN SERPL-MCNC: 32 MG/DL (ref 8–23)
CALCIUM SERPL-MCNC: 10.1 MG/DL (ref 8.7–10.5)
CHLORIDE SERPL-SCNC: 99 MMOL/L (ref 95–110)
CO2 SERPL-SCNC: 33 MMOL/L (ref 23–29)
CREAT SERPL-MCNC: 1.8 MG/DL (ref 0.5–1.4)
DELSYS: ABNORMAL
DIFFERENTIAL METHOD: ABNORMAL
EOSINOPHIL # BLD AUTO: 0.1 K/UL (ref 0–0.5)
EOSINOPHIL NFR BLD: 0.6 % (ref 0–8)
ERYTHROCYTE [DISTWIDTH] IN BLOOD BY AUTOMATED COUNT: 18.4 % (ref 11.5–14.5)
ERYTHROCYTE [SEDIMENTATION RATE] IN BLOOD BY WESTERGREN METHOD: 15 MM/H
EST. GFR  (AFRICAN AMERICAN): 32 ML/MIN/1.73 M^2
EST. GFR  (NON AFRICAN AMERICAN): 28 ML/MIN/1.73 M^2
FIO2: 21
GLUCOSE SERPL-MCNC: 235 MG/DL (ref 70–110)
GRAM STN SPEC: ABNORMAL
HCO3 UR-SCNC: 33.6 MMOL/L (ref 24–28)
HCT VFR BLD AUTO: 42.3 % (ref 37–48.5)
HGB BLD-MCNC: 12.6 G/DL (ref 12–16)
IMM GRANULOCYTES # BLD AUTO: 0.14 K/UL (ref 0–0.04)
IMM GRANULOCYTES NFR BLD AUTO: 0.9 % (ref 0–0.5)
LYMPHOCYTES # BLD AUTO: 0.6 K/UL (ref 1–4.8)
LYMPHOCYTES NFR BLD: 4.1 % (ref 18–48)
MCH RBC QN AUTO: 19.7 PG (ref 27–31)
MCHC RBC AUTO-ENTMCNC: 29.8 G/DL (ref 32–36)
MCV RBC AUTO: 66 FL (ref 82–98)
MODE: ABNORMAL
MONOCYTES # BLD AUTO: 0.9 K/UL (ref 0.3–1)
MONOCYTES NFR BLD: 6 % (ref 4–15)
NEUTROPHILS # BLD AUTO: 13.7 K/UL (ref 1.8–7.7)
NEUTROPHILS NFR BLD: 88.3 % (ref 38–73)
NRBC BLD-RTO: 0 /100 WBC
PCO2 BLDA: 48.9 MMHG (ref 35–45)
PEEP: 5
PH SMN: 7.45 [PH] (ref 7.35–7.45)
PLATELET # BLD AUTO: 147 K/UL (ref 150–350)
PMV BLD AUTO: ABNORMAL FL (ref 9.2–12.9)
PO2 BLDA: 65 MMHG (ref 80–100)
POC BE: 8 MMOL/L
POC SATURATED O2: 93 % (ref 95–100)
POC TCO2: 35 MMOL/L (ref 23–27)
POCT GLUCOSE: 213 MG/DL (ref 70–110)
POCT GLUCOSE: 231 MG/DL (ref 70–110)
POCT GLUCOSE: 289 MG/DL (ref 70–110)
POCT GLUCOSE: 298 MG/DL (ref 70–110)
POTASSIUM SERPL-SCNC: 3.8 MMOL/L (ref 3.5–5.1)
RBC # BLD AUTO: 6.4 M/UL (ref 4–5.4)
SAMPLE: ABNORMAL
SITE: ABNORMAL
SODIUM SERPL-SCNC: 140 MMOL/L (ref 136–145)
VT: 400
WBC # BLD AUTO: 15.57 K/UL (ref 3.9–12.7)

## 2019-10-16 PROCEDURE — 99900026 HC AIRWAY MAINTENANCE (STAT)

## 2019-10-16 PROCEDURE — 99900035 HC TECH TIME PER 15 MIN (STAT)

## 2019-10-16 PROCEDURE — 36600 WITHDRAWAL OF ARTERIAL BLOOD: CPT

## 2019-10-16 PROCEDURE — 94761 N-INVAS EAR/PLS OXIMETRY MLT: CPT

## 2019-10-16 PROCEDURE — 20000000 HC ICU ROOM

## 2019-10-16 PROCEDURE — 25000003 PHARM REV CODE 250: Performed by: HOSPITALIST

## 2019-10-16 PROCEDURE — 99232 SBSQ HOSP IP/OBS MODERATE 35: CPT | Mod: ,,, | Performed by: PSYCHIATRY & NEUROLOGY

## 2019-10-16 PROCEDURE — 99232 PR SUBSEQUENT HOSPITAL CARE,LEVL II: ICD-10-PCS | Mod: ,,, | Performed by: PSYCHIATRY & NEUROLOGY

## 2019-10-16 PROCEDURE — 27200966 HC CLOSED SUCTION SYSTEM

## 2019-10-16 PROCEDURE — 82803 BLOOD GASES ANY COMBINATION: CPT

## 2019-10-16 PROCEDURE — 99291 CRITICAL CARE FIRST HOUR: CPT | Mod: ,,, | Performed by: NURSE PRACTITIONER

## 2019-10-16 PROCEDURE — 80048 BASIC METABOLIC PNL TOTAL CA: CPT

## 2019-10-16 PROCEDURE — 36415 COLL VENOUS BLD VENIPUNCTURE: CPT

## 2019-10-16 PROCEDURE — 63600175 PHARM REV CODE 636 W HCPCS: Performed by: HOSPITALIST

## 2019-10-16 PROCEDURE — 27000221 HC OXYGEN, UP TO 24 HOURS

## 2019-10-16 PROCEDURE — S0028 INJECTION, FAMOTIDINE, 20 MG: HCPCS | Performed by: HOSPITALIST

## 2019-10-16 PROCEDURE — 97803 MED NUTRITION INDIV SUBSEQ: CPT

## 2019-10-16 PROCEDURE — 94640 AIRWAY INHALATION TREATMENT: CPT

## 2019-10-16 PROCEDURE — 25000003 PHARM REV CODE 250: Performed by: NURSE PRACTITIONER

## 2019-10-16 PROCEDURE — 25000003 PHARM REV CODE 250: Performed by: PSYCHIATRY & NEUROLOGY

## 2019-10-16 PROCEDURE — 94003 VENT MGMT INPAT SUBQ DAY: CPT

## 2019-10-16 PROCEDURE — 25000242 PHARM REV CODE 250 ALT 637 W/ HCPCS: Performed by: NURSE PRACTITIONER

## 2019-10-16 PROCEDURE — 99291 PR CRITICAL CARE, E/M 30-74 MINUTES: ICD-10-PCS | Mod: ,,, | Performed by: NURSE PRACTITIONER

## 2019-10-16 PROCEDURE — 85025 COMPLETE CBC W/AUTO DIFF WBC: CPT

## 2019-10-16 RX ORDER — LABETALOL HYDROCHLORIDE 5 MG/ML
20 INJECTION, SOLUTION INTRAVENOUS EVERY 6 HOURS PRN
Status: DISCONTINUED | OUTPATIENT
Start: 2019-10-16 | End: 2019-10-29 | Stop reason: HOSPADM

## 2019-10-16 RX ORDER — CEFAZOLIN SODIUM 1 G/50ML
1 SOLUTION INTRAVENOUS
Status: DISCONTINUED | OUTPATIENT
Start: 2019-10-16 | End: 2019-10-24

## 2019-10-16 RX ORDER — AMLODIPINE BESYLATE 5 MG/1
5 TABLET ORAL DAILY
Status: DISCONTINUED | OUTPATIENT
Start: 2019-10-16 | End: 2019-10-17

## 2019-10-16 RX ORDER — POLYETHYLENE GLYCOL 3350 17 G/17G
17 POWDER, FOR SOLUTION ORAL DAILY
Status: DISCONTINUED | OUTPATIENT
Start: 2019-10-16 | End: 2019-10-29

## 2019-10-16 RX ORDER — METOPROLOL TARTRATE 25 MG/1
25 TABLET, FILM COATED ORAL 2 TIMES DAILY
Status: DISCONTINUED | OUTPATIENT
Start: 2019-10-16 | End: 2019-10-16

## 2019-10-16 RX ORDER — METOPROLOL TARTRATE 50 MG/1
50 TABLET ORAL 2 TIMES DAILY
Status: DISCONTINUED | OUTPATIENT
Start: 2019-10-16 | End: 2019-10-17

## 2019-10-16 RX ORDER — IPRATROPIUM BROMIDE AND ALBUTEROL SULFATE 2.5; .5 MG/3ML; MG/3ML
3 SOLUTION RESPIRATORY (INHALATION) EVERY 6 HOURS
Status: DISCONTINUED | OUTPATIENT
Start: 2019-10-16 | End: 2019-10-29 | Stop reason: HOSPADM

## 2019-10-16 RX ORDER — CEFAZOLIN SODIUM 1 G/3ML
1 INJECTION, POWDER, FOR SOLUTION INTRAMUSCULAR; INTRAVENOUS
Status: DISCONTINUED | OUTPATIENT
Start: 2019-10-16 | End: 2019-10-16

## 2019-10-16 RX ADMIN — ROSUVASTATIN CALCIUM 20 MG: 10 TABLET, COATED ORAL at 08:10

## 2019-10-16 RX ADMIN — ASPIRIN 325 MG ORAL TABLET 325 MG: 325 PILL ORAL at 09:10

## 2019-10-16 RX ADMIN — LACOSAMIDE 50 MG: 50 TABLET, FILM COATED ORAL at 09:10

## 2019-10-16 RX ADMIN — INSULIN ASPART 6 UNITS: 100 INJECTION, SOLUTION INTRAVENOUS; SUBCUTANEOUS at 05:10

## 2019-10-16 RX ADMIN — LACOSAMIDE 50 MG: 50 TABLET, FILM COATED ORAL at 08:10

## 2019-10-16 RX ADMIN — FUROSEMIDE 40 MG: 10 INJECTION, SOLUTION INTRAVENOUS at 05:10

## 2019-10-16 RX ADMIN — CHLORHEXIDINE GLUCONATE 0.12% ORAL RINSE 15 ML: 1.2 LIQUID ORAL at 09:10

## 2019-10-16 RX ADMIN — IPRATROPIUM BROMIDE AND ALBUTEROL SULFATE 3 ML: .5; 3 SOLUTION RESPIRATORY (INHALATION) at 01:10

## 2019-10-16 RX ADMIN — CLONIDINE HYDROCHLORIDE 0.3 MG: 0.1 TABLET ORAL at 08:10

## 2019-10-16 RX ADMIN — CEFAZOLIN SODIUM 1 G: 1 SOLUTION INTRAVENOUS at 11:10

## 2019-10-16 RX ADMIN — INSULIN ASPART 8 UNITS: 100 INJECTION, SOLUTION INTRAVENOUS; SUBCUTANEOUS at 06:10

## 2019-10-16 RX ADMIN — INSULIN ASPART 10 UNITS: 100 INJECTION, SOLUTION INTRAVENOUS; SUBCUTANEOUS at 11:10

## 2019-10-16 RX ADMIN — DEXAMETHASONE SODIUM PHOSPHATE 8 MG: 4 INJECTION, SOLUTION INTRA-ARTICULAR; INTRALESIONAL; INTRAMUSCULAR; INTRAVENOUS; SOFT TISSUE at 12:10

## 2019-10-16 RX ADMIN — HYDRALAZINE HYDROCHLORIDE 10 MG: 20 INJECTION INTRAMUSCULAR; INTRAVENOUS at 07:10

## 2019-10-16 RX ADMIN — SENNOSIDES, DOCUSATE SODIUM 1 TABLET: 50; 8.6 TABLET, FILM COATED ORAL at 09:10

## 2019-10-16 RX ADMIN — DEXAMETHASONE SODIUM PHOSPHATE 8 MG: 4 INJECTION, SOLUTION INTRA-ARTICULAR; INTRALESIONAL; INTRAMUSCULAR; INTRAVENOUS; SOFT TISSUE at 05:10

## 2019-10-16 RX ADMIN — METOPROLOL TARTRATE 25 MG: 25 TABLET ORAL at 11:10

## 2019-10-16 RX ADMIN — LABETALOL HYDROCHLORIDE 10 MG: 5 INJECTION INTRAVENOUS at 02:10

## 2019-10-16 RX ADMIN — CLONIDINE HYDROCHLORIDE 0.3 MG: 0.1 TABLET ORAL at 09:10

## 2019-10-16 RX ADMIN — CHLORHEXIDINE GLUCONATE 0.12% ORAL RINSE 15 ML: 1.2 LIQUID ORAL at 08:10

## 2019-10-16 RX ADMIN — POLYETHYLENE GLYCOL 3350 17 G: 17 POWDER, FOR SOLUTION ORAL at 08:10

## 2019-10-16 RX ADMIN — HEPARIN SODIUM 5000 UNITS: 5000 INJECTION INTRAVENOUS; SUBCUTANEOUS at 09:10

## 2019-10-16 RX ADMIN — FENTANYL CITRATE 25 MCG: 50 INJECTION INTRAMUSCULAR; INTRAVENOUS at 05:10

## 2019-10-16 RX ADMIN — FUROSEMIDE 40 MG: 10 INJECTION, SOLUTION INTRAVENOUS at 09:10

## 2019-10-16 RX ADMIN — FAMOTIDINE 20 MG: 10 INJECTION INTRAVENOUS at 09:10

## 2019-10-16 RX ADMIN — CEFAZOLIN SODIUM 1 G: 1 SOLUTION INTRAVENOUS at 06:10

## 2019-10-16 RX ADMIN — SENNOSIDES, DOCUSATE SODIUM 1 TABLET: 50; 8.6 TABLET, FILM COATED ORAL at 08:10

## 2019-10-16 RX ADMIN — FENTANYL CITRATE 25 MCG: 50 INJECTION INTRAMUSCULAR; INTRAVENOUS at 02:10

## 2019-10-16 RX ADMIN — FENTANYL CITRATE 25 MCG: 50 INJECTION INTRAMUSCULAR; INTRAVENOUS at 04:10

## 2019-10-16 RX ADMIN — HEPARIN SODIUM 5000 UNITS: 5000 INJECTION INTRAVENOUS; SUBCUTANEOUS at 08:10

## 2019-10-16 RX ADMIN — INSULIN ASPART 2 UNITS: 100 INJECTION, SOLUTION INTRAVENOUS; SUBCUTANEOUS at 12:10

## 2019-10-16 RX ADMIN — METOPROLOL TARTRATE 50 MG: 50 TABLET ORAL at 08:10

## 2019-10-16 RX ADMIN — FENTANYL CITRATE 25 MCG: 50 INJECTION INTRAMUSCULAR; INTRAVENOUS at 09:10

## 2019-10-16 RX ADMIN — IPRATROPIUM BROMIDE AND ALBUTEROL SULFATE 3 ML: .5; 3 SOLUTION RESPIRATORY (INHALATION) at 07:10

## 2019-10-16 RX ADMIN — AMLODIPINE BESYLATE 5 MG: 5 TABLET ORAL at 11:10

## 2019-10-16 NOTE — PT/OT/SLP PROGRESS
Physical Therapy      Patient Name:  Sherrie Alex   MRN:  [id number]    Patient not seen today secondary to BP exceeding permissive range in AM and in PM.  Will follow-up As able.    Susan West, PT

## 2019-10-16 NOTE — PLAN OF CARE
Recommendations    Recommendation/Intervention:   1. Continue diabetisource AC @ 40 mL/hr; considering adding a fiber flush to aid in resolving constipation.    Goals: TF to provide > 85% EEN, EPN daily  Nutrition Goal Status: new  Communication of RD Recs: other (comment)(POC)

## 2019-10-16 NOTE — PLAN OF CARE
Plan of care reviewed. No falls/injuries this shift. Skin assessed, skin folds with cracked skin, moisture barrier applied, left and right calf with healing scabs red circular in color, no drainage, periwound area dry intact, present on admit, foam drsgs intact. SCD's and heel boots in place. BP medications adjusted. Treating pt for discomfort as needed. Blood sugars monitored, elevated, no change to orders, previous decadron order discontinued, blood sugars expected to improve. IV access obtained to bilat arms. No Bm, on stool softener. Tolerating tube feeding, max residual 60 ml. Suctioned oral and ETT as needed, thick yellow secretions. Antibiotics added due to results of sputum culture.

## 2019-10-16 NOTE — ASSESSMENT & PLAN NOTE
Suspect mostly related to sedation.     -- Hold sedation, PRN fentanyl for pain   -- EEG abnormal with diffuse slowing, no seizures noted  -- Neurology following  -- Hx of seizures, Continue Vimpat for now

## 2019-10-16 NOTE — CARE UPDATE
[facility]  ICU Multidisciplinary Bedside Rounds   SUMMARY     Date: 10/16/2019    Prehospitalization: Home  Admit Date / LOS : 10/11/2019/ 5 days    Diagnosis: Angio-edema    Consults:        Active: Neuro and Pulm CC       Needed: N/A     Code Status: Full Code   Advanced Directive: <no information>    LDA: Guevara, OG, PIV and Vent       Central Lines/Site/Justification:Patient Does Not Have Central Line       Urinary Cath/Order/Justification:Critically Ill in ICU    Vasopressors/Infusions:    dexMEDEtomidine in 0.9 % NaCl Stopped (10/15/19 0900)    propofol Stopped (10/14/19 1035)        CAM ICU: Positive  Pain Management: IV       Pain Controlled: no     Rhythm: ST/ NSR    Respiratory Device: Vent    Vent Mode: PRVC  Oxygen Concentration (%):  [21] 21  Resp Rate Total:  [15 br/min-21 br/min] 17 br/min  Vt Set:  [400 mL] 400 mL  PEEP/CPAP:  [5 cmH20] 5 cmH20  Mean Airway Pressure:  [8.9 zmW78-47.5 cmH20] 11.2 cmH20                   MOVE Screen: PASS    VTE Prophylaxis: Pharm and Mechanical  Mobility: Bedrest  Stress Ulcer Prophylaxis: Yes    Dietary: NPO and TF  Tolerance: yes  /  Advancement: @ goal    Isolation: No active isolations    Restraints: No    Noteworthy Labs:  WBC 15.57,     CBC/Anemia Labs: Coags:    Recent Labs   Lab 10/14/19  0423 10/15/19  0339 10/16/19  0209   WBC 16.17* 11.11 15.57*   HGB 13.1 12.5 12.6   HCT 45.5 42.3 42.3    SEE COMMENT 147*   MCV 68* 67* 66*   RDW 19.4* 19.2* 18.4*    Recent Labs   Lab 10/10/19  0053   INR 1.1        Chemistries:   Recent Labs   Lab 10/10/19  0053 10/10/19  1015  10/14/19  0423 10/15/19  0339 10/16/19  0209    138   < > 141 142 140   K 3.7 3.9   < > 3.5 4.0 3.8    102   < > 106 103 99   CO2 24 25   < > 22* 30* 33*   BUN 24* 28*   < > 20 25* 32*   CREATININE 1.8* 1.9*   < > 1.6* 1.8* 1.8*   CALCIUM 9.9 10.2   < > 9.8 9.8 10.1   PROT 7.6 8.0  --   --   --   --    BILITOT 0.7 0.7  --   --   --   --    ALKPHOS 87 94   --   --   --   --    ALT 9* 10  --   --   --   --    AST 11 11  --   --   --   --    MG  --  2.0  --   --   --   --     < > = values in this interval not displayed.        Cardiac Enzymes: Ejection Fractions:    No results for input(s): CPK, CPKMB, MB, TROPONINI in the last 72 hours. No results found for: EF     POCT Glucose: HbA1c:    Recent Labs   Lab 10/15/19  0006 10/15/19  0607 10/15/19  1133 10/16/19  0014 10/16/19  0530 10/16/19  0533   POCTGLUCOSE 192* 226* 206* 231* 289* 298*    Hemoglobin A1C   Date Value Ref Range Status   10/02/2019 6.9 (H) 4.0 - 5.6 % Final     Comment:     ADA Screening Guidelines:  5.7-6.4%  Consistent with prediabetes  >or=6.5%  Consistent with diabetes  High levels of fetal hemoglobin interfere with the HbA1C  assay. Heterozygous hemoglobin variants (HbS, HgC, etc)do  not significantly interfere with this assay.   However, presence of multiple variants may affect accuracy.     06/03/2008 6.7 (H) 4.0 - 6.2 % Final   02/13/2006 6.2 4.5 - 6.2 % Final        Needs from Care Team: improved glucose and BP control, central line placement?     ICU LOS 5d  Level of Care: Critical Care

## 2019-10-16 NOTE — ASSESSMENT & PLAN NOTE
No identified precipitant of angioedema. Seems more likely that drooling and dysarthria are reflective of CVA  --Oropharyngeal exam without edema on assessment but poor oral tone. + cuff leak.   --s/p 48 hours of corticosteroids and H2 antagonist, off therapy with no change in macroglossia  --Will pursue conversations related to tracheostomy if patient is unable to be extubated or fails extubation.  --discontinue steroids and glycopyrrolate

## 2019-10-16 NOTE — PROGRESS NOTES
Pt received on Servo I vent with settings as followed: PRVC 15/400/+5 and 21%.  Size 6.5 ETT in place and secure at 24 cm at the lip.  Ambu bag and mask at bedside and all alarms on and functioning. NARN. RT will continue to monitor pt status.

## 2019-10-16 NOTE — SUBJECTIVE & OBJECTIVE
Interval History: Hypertensive overnight. Did well on SBT today, but concern remains for ability to protect airway. + staph in sputum, started on cefazolin.     Review of Systems   Unable to perform ROS: Intubated       Objective:     Vital Signs (Most Recent):  Temp: 98.5 °F (36.9 °C) (10/16/19 0745)  Pulse: (!) 125 (10/16/19 0930)  Resp: (!) 27 (10/16/19 0930)  BP: (!) 223/107 (10/16/19 0926)  SpO2: 96 % (10/16/19 0930) Vital Signs (24h Range):  Temp:  [98.5 °F (36.9 °C)-99.8 °F (37.7 °C)] 98.5 °F (36.9 °C)  Pulse:  [] 125  Resp:  [0-29] 27  SpO2:  [95 %-98 %] 96 %  BP: (135-261)/() 223/107     Weight: 126.6 kg (279 lb 1.6 oz)  Body mass index is 46.45 kg/m².      Intake/Output Summary (Last 24 hours) at 10/16/2019 1007  Last data filed at 10/16/2019 0539  Gross per 24 hour   Intake 1220 ml   Output 2820 ml   Net -1600 ml       Physical Exam   Constitutional: She appears well-developed. No distress. She is intubated.   Obese    HENT:   Head: Normocephalic and atraumatic.   No oral swelling appreciated.    Eyes: Pupils are equal, round, and reactive to light. No scleral icterus.   Neck: No tracheal deviation present.   Cardiovascular: Regular rhythm and normal heart sounds. Tachycardia present.   No murmur heard.  Pulses:       Radial pulses are 3+ on the right side, and 3+ on the left side.        Dorsalis pedis pulses are 2+ on the right side, and 2+ on the left side.   Pulmonary/Chest: No tachypnea. She is intubated. No respiratory distress. She has wheezes. She has rhonchi.   Thick yellow secretions suctioned from ETT.   Abdominal: Soft. Bowel sounds are normal. She exhibits no distension. There is no tenderness. There is no rigidity and no guarding.   Genitourinary:   Genitourinary Comments: Guevara in place   Neurological:   PERRL. Opens eyes to voice. Right side neglect. Follows commands in left upper and left lower extremity. No movement in right upper of right lower extremities; only grimaces to  pain on right.      Skin: She is not diaphoretic.   Nursing note and vitals reviewed.      Vents:  Vent Mode: PRVC (10/16/19 0806)  Ventilator Initiated: Yes (10/11/19 0213)  Set Rate: 15 bmp (10/16/19 0806)  Vt Set: 400 mL (10/16/19 0806)  PEEP/CPAP: 5 cmH20 (10/16/19 0500)  Oxygen Concentration (%): 21 (10/16/19 0930)  Peak Airway Pressure: 22.8 cmH2O (10/16/19 0806)  Total Ve: 9.9 mL (10/16/19 0806)  F/VT Ratio<105 (RSBI): (!) 43.15 (10/16/19 0518)    Lines/Drains/Airways     Drain                 NG/OG Tube 10/11/19 0215 orogastric 18 Fr. Left mouth 5 days         Urethral Catheter 10/11/19 0230 16 Fr. 5 days          Airway                 Airway - Non-Surgical 10/11/19 0204 Endotracheal Tube-Hi/Lo 5 days          Peripheral Intravenous Line                 Peripheral IV - Single Lumen 10/10/19 0901 18 G Right Wrist 6 days         Peripheral IV - Single Lumen 10/15/19 0800 20 G Anterior;Left Upper Arm 1 day                Significant Labs:    CBC/Anemia Profile:  Recent Labs   Lab 10/15/19  0339 10/16/19  0209   WBC 11.11 15.57*   HGB 12.5 12.6   HCT 42.3 42.3   PLT SEE COMMENT 147*   MCV 67* 66*   RDW 19.2* 18.4*        Chemistries:  Recent Labs   Lab 10/15/19  0339 10/16/19  0209    140   K 4.0 3.8    99   CO2 30* 33*   BUN 25* 32*   CREATININE 1.8* 1.8*   CALCIUM 9.8 10.1       All pertinent labs within the past 24 hours have been reviewed.    Significant Imaging:  I have reviewed all pertinent imaging results/findings within the past 24 hours.

## 2019-10-16 NOTE — ASSESSMENT & PLAN NOTE
Intubated for airway protection and concern for angioedema vs dysphagia related to new CVA (more likely). Good lung mechanics and has a cuff leak. Increased sputum production with staph in sputum.     -- LPV. Wean FiO2 to maintain SpO2 >88%   -- SAT/SBT daily  -- extubation precluded by concern for airway protection with poor oropharyngeal tone 2/2 CVA  -- High risk for reintubation; if she fails extubation she would need a tracheostomy  -- Possibility of need for trach and PEG discussed with son   -- Has history of DEYANIRA without evidence of significant prior hypercapnia.. May benefit from extubation to NIPPV.   -- Continue diuresis to keep euvolemic  -- + staph in sputum, start cefazolin   -- duonebs

## 2019-10-16 NOTE — CARE UPDATE
[facility]  ICU Multidisciplinary Bedside Rounds     UPDATE     Date: 10/16/2019      Plan of care reviewed with the following, Nurse, Charge Nurse, Physician, Pulm CC, Resp. Therapist and Infection Prevention.       Needs/ Goals for the day: BP remains elevated, Resp culture resulted, secretions thick/yellow, difficult to suction at times, no bm since 10/9, cr 1.8.        Level of Care: Critical Care

## 2019-10-16 NOTE — PROGRESS NOTES
"[facility]  Adult Nutrition  Progress Note    SUMMARY       Recommendations    Recommendation/Intervention:   1. Continue diabetisource AC @ 40 mL/hr; considering adding a fiber flush to aid in resolving constipation.    Goals: TF to provide > 85% EEN, EPN daily  Nutrition Goal Status: new  Communication of RD Recs: other (comment)(POC)    Reason for Assessment    Reason For Assessment: RD follow-up  Diagnosis: other (see comments)(angio-edema)  Relevant Medical History: DM2, hypothroidism, dyslipidemia, CVA, seizures  Interdisciplinary Rounds: did not attend  General Information Comments: Pt intubated 2/2 angio-edema with an unknown etiology. Pt likely had a CVA. Awaiting extubation when medically able. Pt will be high risk for re-intubation if extubation fails. If extubation fails, Trach and PEG placement will occur. Hyperglycemia noted. Tf running at goal rate. Pt experiencing constipation x 1 week.  Nutrition Discharge Planning: too soon to determine     Nutrition Risk Screen    Nutrition Risk Screen: tube feeding or parenteral nutrition    Nutrition/Diet History    Spiritual, Cultural Beliefs, Mormon Practices, Values that Affect Care: no  Factors Affecting Nutritional Intake: NPO, on mechanical ventilation    Anthropometrics    Temp: 98 °F (36.7 °C)  Height Method: Estimated  Height: 5' 5" (165.1 cm)  Height (inches): 65 in  Weight Method: Bed Scale  Weight: 126.6 kg (279 lb 1.6 oz)  Weight (lb): 279.11 lb  Ideal Body Weight (IBW), Female: 125 lb  % Ideal Body Weight, Female (lb): 223.29 lb  BMI (Calculated): 46.5  BMI Grade: greater than 40 - morbid obesity  Usual Body Weight (UBW), k.6 kg  % Usual Body Weight: 98.11  % Weight Change From Usual Weight: -2.1 %       Lab/Procedures/Meds    Pertinent Labs Reviewed: reviewed  Pertinent Labs Comments: BUN 32, Crt 1.8, Glu 235, A1C 6.9  Pertinent Medications Reviewed: reviewed  Pertinent Medications Comments:   amLODIPine   chlorhexidine "   cloNIDine   famotidine (PF)   furosemide   insulin detemir U-100   lacosamide   metoprolol tartrate   rosuvastatin   senna-docusate 8.6-50 mg     Estimated/Assessed Needs    Weight Used For Calorie Calculations: 74.1 kg (163 lb 5.4 oz)(ABW)  Energy Calorie Requirements (kcal): 1521 kcal/day  Energy Need Method: [place] (modified)  Protein Requirements: 114 g/day(2.0 g/kg)  Weight Used For Protein Calculations: 56.8 kg (125 lb 4.3 oz)(IBW)  Fluid Requirements (mL): 1 mL/kcal or PER MD   Estimated Fluid Requirement Method: RDA Method  RDA Method (mL): 1521  CHO Requirement: 50% of meals    Nutrition Prescription Ordered    Current Diet Order: NPO    Evaluation of Received Nutrient/Fluid Intake    Enteral Calories (kcal): 1296  Enteral Protein (gm): 65  Enteral (Free Water) Fluid (mL): 883  Other Calories (kcal): 0  I/O: 1233/3370  Energy Calories Required: meeting needs  Protein Required: meeting needs  Fluid Required: meeting needs  Comments: LBM: 10/9  % Intake of Estimated Energy Needs: 75 - 100 %   % Meal Intake: NPO    Nutrition Risk    Level of Risk/Frequency of Follow-up: high(2x/week)     Assessment and Plan    Nutrition Problem  Inadequate oral intake     Related to (etiology):   Intubation      Signs and Symptoms (as evidenced by):   NPO     Interventions:  Collaboration with other providers      Nutrition Diagnosis Status:   Continues     Monitor and Evaluation    Food and Nutrient Intake: enteral nutrition intake  Food and Nutrient Adminstration: enteral and parenteral nutrition administration  Physical Activity and Function: nutrition-related ADLs and IADLs  Anthropometric Measurements: weight, weight change, body mass index  Biochemical Data, Medical Tests and Procedures: electrolyte and renal panel, gastrointestinal profile, glucose/endocrine profile, inflammatory profile, lipid profile  Nutrition-Focused Physical Findings: overall appearance     Malnutrition Assessment    Subcutaneous Fat Loss  (Final Summary): well nourished  Muscle Loss Evaluation (Final Summary): well nourished       Nutrition Follow-Up    RD Follow-up?: Yes

## 2019-10-16 NOTE — PROGRESS NOTES
[facility]  Neurology  Progress Note    Patient Name: Sherrie Alex  MRN: [id number]  Admission Date: 10/11/2019  Hospital Length of Stay: 5 days  Code Status: Full Code   Attending Provider: Vee Arnett MD  Primary Care Physician: Desmond Yang MD   Principal Problem:Respiratory failure    Subjective:     Interval History: 70 y/o female with medical Hx as listed below presented to ED complaining swelling of tongue. Pt was intubated for airway protection. Etiology of her edema if not clear as she does has reported allergic reaction to ACE inhibitors but is not currently taking any of these type of antihypertensives.      Ms. Alex has Hx stroke. [facility] [place] [facility] pt had expressive aphasia on Jan/2019 that resolved beg Dx with TIA at that point. Notes from neurology clinic state that she has baseline left sided weakness?     -10/12/19: Overnight no new issues.     -10/13/19: Pt remains intubated. No acute issues overnight.     -10/14/19: Pt remains intubated. On no sedation follows commands. Tachycardic and markedly hypertensive this morning.     -10/15/19: On no sedation pt is able to follow commands.     -10/16/19: Mechanically ventilated.     Current Neurological Medications:     Current Facility-Administered Medications   Medication Dose Route Frequency Provider Last Rate Last Dose    acetaminophen tablet 650 mg  650 mg Oral Q8H PRN Elena Bauer MD        albuterol-ipratropium 2.5 mg-0.5 mg/3 mL nebulizer solution 3 mL  3 mL Nebulization Q6H Saumya H. Cole, NP   3 mL at 10/16/19 1354    amLODIPine tablet 5 mg  5 mg Per OG tube Daily Saumya H. Cole, NP   5 mg at 10/16/19 1156    aspirin tablet 325 mg  325 mg Per OG tube Daily Jcarlos Holguin MD   325 mg at 10/16/19 0927    ceFAZolin (ANCEF) 1 gram in dextrose 5 % 50 mL IVPB (premix)  1 g Intravenous Q8H Vee Arnett  mL/hr at 10/16/19 1157 1 g at 10/16/19 1157    chlorhexidine 0.12 % solution 15 mL  15  mL Mouth/Throat BID Vee Arnett MD   15 mL at 10/16/19 0927    cloNIDine tablet 0.3 mg  0.3 mg Oral BID Elena Bauer MD   0.3 mg at 10/16/19 0927    dexmedetomidine (PRECEDEX) 400mcg/100mL 0.9% NaCL infusion  0.2 mcg/kg/hr Intravenous Continuous Elena Bauer MD   Stopped at 10/15/19 0900    famotidine (PF) injection 20 mg  20 mg Intravenous Daily Elena Bauer MD   20 mg at 10/16/19 0928    fentaNYL injection 25 mcg  25 mcg Intravenous Q1H PRN Vee Arnett MD   25 mcg at 10/16/19 1408    furosemide injection 40 mg  40 mg Intravenous BID Elena Bauer MD   40 mg at 10/16/19 0927    heparin (porcine) injection 5,000 Units  5,000 Units Subcutaneous Q12H Elena Bauer MD   5,000 Units at 10/16/19 0927    insulin aspart U-100 pen 1-10 Units  1-10 Units Subcutaneous Q4H PRN Elena Bauer MD   10 Units at 10/16/19 1158    insulin detemir U-100 pen 15 Units  15 Units Subcutaneous QHS Elena Bauer MD   15 Units at 10/15/19 2143    labetalol injection 10 mg  10 mg Intravenous Q6H PRN Elena Bauer MD   10 mg at 10/16/19 0223    lacosamide tablet 50 mg  50 mg Oral BID Elena Bauer MD   50 mg at 10/16/19 0927    metoprolol tartrate (LOPRESSOR) tablet 25 mg  25 mg Per OG tube BID Saumya H. Cole, NP   25 mg at 10/16/19 1156    ondansetron injection 4 mg  4 mg Intravenous Q8H PRN Elena Bauer MD        rosuvastatin tablet 20 mg  20 mg Per OG tube QHS Jcarlos Holguin MD   20 mg at 10/15/19 2140    senna-docusate 8.6-50 mg per tablet 1 tablet  1 tablet Oral BID Elena Bauer MD   1 tablet at 10/16/19 0927    sodium chloride 0.9% flush 10 mL  10 mL Intravenous PRN Elena Bauer MD           Review of Systems   Unable to perform as pt is intubated    Objective:     Vital Signs (Most Recent):  Temp: 98 °F (36.7 °C) (10/16/19 1156)  Pulse: 106 (10/16/19 1400)  Resp: 15 (10/16/19 1400)  BP: (!) 195/122 (10/16/19 1400)  SpO2: 100 % (10/16/19 1400) Vital Signs (24h  Range):  Temp:  [98 °F (36.7 °C)-99.6 °F (37.6 °C)] 98 °F (36.7 °C)  Pulse:  [] 106  Resp:  [0-29] 15  SpO2:  [94 %-100 %] 100 %  BP: (135-261)/() 195/122     Weight: 126.6 kg (279 lb 1.6 oz)  Body mass index is 46.45 kg/m².    Physical Exam  Constitutional: She appears well-developed.   HENT:   Head: Normocephalic.   Eyes: Right eye exhibits no discharge. Left eye exhibits no discharge.   Neck: Neck supple.   Cardiovascular: Normal rate.   Pulmonary/Chest:   Symmetrical chest expansion   Abdominal: Soft.         NEUROLOGICAL EXAMINATION:      MENTAL STATUS        Opens eyes upon verbal stimuli; able to follow commands     CRANIAL NERVES      CN III, IV, VI   Right pupil: Size: 2 mm. Shape: regular.   Left pupil: Size: 2 mm. Shape: regular.   Nystagmus: none   Conjugate gaze: present  Left gaze preference     MOTOR EXAM        AROM on left on command; no AROM on right           Significant Labs:   CBC:   Recent Labs   Lab 10/15/19  0339 10/16/19  0209   WBC 11.11 15.57*   HGB 12.5 12.6   HCT 42.3 42.3   PLT SEE COMMENT 147*     CMP:   Recent Labs   Lab 10/15/19  0339 10/16/19  0209   * 235*    140   K 4.0 3.8    99   CO2 30* 33*   BUN 25* 32*   CREATININE 1.8* 1.8*   CALCIUM 9.8 10.1   ANIONGAP 9 8   EGFRNONAA 28* 28*         Assessment and Plan:     70 y/o female consulted for neurological changes:     1. Stroke: infarction on left subinsular cortex.           No arrhythmias on telemetry. No stenotic disease of carotids.           -OK to control BP.           -ASA and statin.        Remains intubated.      2. Seizure: no reported episodes. Continue lacosamide 100 mg BID.   -EEG shoed no ongoing seizures.    Active Diagnoses:    Diagnosis Date Noted POA    PRINCIPAL PROBLEM:  Respiratory failure [J96.90] 10/11/2019 Unknown    CVA (cerebral vascular accident) [I63.9] 10/12/2019 Yes    Angio-edema [T78.3XXA] 10/11/2019 Yes    Seizure [R56.9] 10/11/2019 Yes    RYLEE (acute kidney  injury) [N17.9] 10/11/2019 Yes    Encephalopathy, metabolic [G93.41] 10/11/2019 Yes    Acquired hypothyroidism [E03.9] 10/03/2019 Yes    Essential hypertension [I10] 10/02/2019 Yes    Type 2 diabetes mellitus with circulatory disorder, with long-term current use of insulin [E11.59, Z79.4] 10/02/2019 Not Applicable    Anxiety [F41.9]  Yes    Morbid obesity [E66.01]  Yes    Dyslipidemia [E78.5]  Yes      Problems Resolved During this Admission:       VTE Risk Mitigation (From admission, onward)         Ordered     heparin (porcine) injection 5,000 Units  Every 12 hours      10/11/19 0617     IP VTE HIGH RISK PATIENT  Once      10/11/19 0532     Place ELIEZER hose  Until discontinued      10/11/19 0532     Place sequential compression device  Until discontinued      10/11/19 0532                Jcarlos Holguin MD  Neurology  [facility]

## 2019-10-16 NOTE — PROGRESS NOTES
[facility]  Pulmonology  Progress Note    Patient Name: Sherrie Alex  MRN: [id number]  Admission Date: 10/11/2019  Hospital Length of Stay: 5 days  Code Status: Full Code  Attending Provider: Vee Arnett MD  Primary Care Provider: Desmond Yang MD   Principal Problem: Respiratory failure    Subjective:     Interval History: Hypertensive overnight. Did well on SBT today, but concern remains for ability to protect airway. + staph in sputum, started on cefazolin.     Review of Systems   Unable to perform ROS: Intubated       Objective:     Vital Signs (Most Recent):  Temp: 98.5 °F (36.9 °C) (10/16/19 0745)  Pulse: (!) 125 (10/16/19 0930)  Resp: (!) 27 (10/16/19 0930)  BP: (!) 223/107 (10/16/19 0926)  SpO2: 96 % (10/16/19 0930) Vital Signs (24h Range):  Temp:  [98.5 °F (36.9 °C)-99.8 °F (37.7 °C)] 98.5 °F (36.9 °C)  Pulse:  [] 125  Resp:  [0-29] 27  SpO2:  [95 %-98 %] 96 %  BP: (135-261)/() 223/107     Weight: 126.6 kg (279 lb 1.6 oz)  Body mass index is 46.45 kg/m².      Intake/Output Summary (Last 24 hours) at 10/16/2019 1007  Last data filed at 10/16/2019 0539  Gross per 24 hour   Intake 1220 ml   Output 2820 ml   Net -1600 ml       Physical Exam   Constitutional: She appears well-developed. No distress. She is intubated.   Obese    HENT:   Head: Normocephalic and atraumatic.   No oral swelling appreciated.    Eyes: Pupils are equal, round, and reactive to light. No scleral icterus.   Neck: No tracheal deviation present.   Cardiovascular: Regular rhythm and normal heart sounds. Tachycardia present.   No murmur heard.  Pulses:       Radial pulses are 3+ on the right side, and 3+ on the left side.        Dorsalis pedis pulses are 2+ on the right side, and 2+ on the left side.   Pulmonary/Chest: No tachypnea. She is intubated. No respiratory distress. She has wheezes. She has rhonchi.   Thick yellow secretions suctioned from ETT.   Abdominal: Soft. Bowel sounds are normal. She  exhibits no distension. There is no tenderness. There is no rigidity and no guarding.   Genitourinary:   Genitourinary Comments: Guevara in place   Neurological:   PERRL. Opens eyes to voice. Right side neglect. Follows commands in left upper and left lower extremity. No movement in right upper of right lower extremities; only grimaces to pain on right.      Skin: She is not diaphoretic.   Nursing note and vitals reviewed.      Vents:  Vent Mode: PRVC (10/16/19 0806)  Ventilator Initiated: Yes (10/11/19 0213)  Set Rate: 15 bmp (10/16/19 0806)  Vt Set: 400 mL (10/16/19 0806)  PEEP/CPAP: 5 cmH20 (10/16/19 0500)  Oxygen Concentration (%): 21 (10/16/19 0930)  Peak Airway Pressure: 22.8 cmH2O (10/16/19 0806)  Total Ve: 9.9 mL (10/16/19 0806)  F/VT Ratio<105 (RSBI): (!) 43.15 (10/16/19 0518)    Lines/Drains/Airways     Drain                 NG/OG Tube 10/11/19 0215 orogastric 18 Fr. Left mouth 5 days         Urethral Catheter 10/11/19 0230 16 Fr. 5 days          Airway                 Airway - Non-Surgical 10/11/19 0204 Endotracheal Tube-Hi/Lo 5 days          Peripheral Intravenous Line                 Peripheral IV - Single Lumen 10/10/19 0901 18 G Right Wrist 6 days         Peripheral IV - Single Lumen 10/15/19 0800 20 G Anterior;Left Upper Arm 1 day                Significant Labs:    CBC/Anemia Profile:  Recent Labs   Lab 10/15/19  0339 10/16/19  0209   WBC 11.11 15.57*   HGB 12.5 12.6   HCT 42.3 42.3   PLT SEE COMMENT 147*   MCV 67* 66*   RDW 19.2* 18.4*        Chemistries:  Recent Labs   Lab 10/15/19  0339 10/16/19  0209    140   K 4.0 3.8    99   CO2 30* 33*   BUN 25* 32*   CREATININE 1.8* 1.8*   CALCIUM 9.8 10.1       All pertinent labs within the past 24 hours have been reviewed.    Significant Imaging:  I have reviewed all pertinent imaging results/findings within the past 24 hours.    Assessment/Plan:     * Respiratory failure  Intubated for airway protection and concern for angioedema vs dysphagia  related to new CVA (more likely). Good lung mechanics and has a cuff leak. Increased sputum production with staph in sputum.     -- LPV. Wean FiO2 to maintain SpO2 >88%   -- SAT/SBT daily  -- extubation precluded by concern for airway protection with poor oropharyngeal tone 2/2 CVA  -- High risk for reintubation; if she fails extubation she would need a tracheostomy  -- Possibility of need for trach and PEG discussed with son   -- Has history of DEYANIRA without evidence of significant prior hypercapnia.. May benefit from extubation to NIPPV.   -- Continue diuresis to keep euvolemic  -- + staph in sputum, start cefazolin   -- duonebs      CVA (cerebral vascular accident)  --Statin/ASA.   --Neurology following.   --treat BP, no longer need for permissive HTN.     Encephalopathy, metabolic  Suspect mostly related to sedation.     -- Hold sedation, PRN fentanyl for pain   -- EEG abnormal with diffuse slowing, no seizures noted  -- Neurology following  -- Hx of seizures, Continue Vimpat for now     Seizure  By history. On vimpat. No Sz activity noted.     Angio-edema  No identified precipitant of angioedema. Seems more likely that drooling and dysarthria are reflective of CVA  --Oropharyngeal exam without edema on assessment but poor oral tone. + cuff leak.   --s/p 48 hours of corticosteroids and H2 antagonist, off therapy with no change in macroglossia  --Will pursue conversations related to tracheostomy if patient is unable to be extubated or fails extubation.  --discontinue steroids and glycopyrrolate     Acquired hypothyroidism  --Continue synthroid     Essential hypertension  --Amlodipine and Lopressor started 10/16  --BP goal 140-180  --PRN labetalol     Type 2 diabetes mellitus with circulatory disorder, with long-term current use of insulin  SSI- goal 140-180   --likely elevated 2/2 steroids, now discontinued     PPI ppx: famotidine  VTE ppx: heparin   PT/OT      Plan discussed with Dr. Castillo.    Critical Care  Time: 60 mins      Saumya Galvan NP  Pulmonology  [facility]

## 2019-10-16 NOTE — PT/OT/SLP EVAL
Occupational Therapy   Evaluation    Name: Sherrie Alex  MRN: [id number]  Admitting Diagnosis:  Angio-edema      Recommendations:     Discharge Recommendations: (OT at next level of care)  Discharge Equipment Recommendations:  (TBD)  Barriers to discharge:  Other (Comment)(The patient is currently on the vent)    Assessment:     Sherrie Alex is a 69 y.o. female with a medical diagnosis of Angio-edema. The patient was alert and able to follow simple commands to assist with ROM to the LUE. The patient did not visually track to the left or right or respond to head nods or eye blinks for yes/no response. Performance deficits affecting function: weakness, impaired self care skills, impaired functional mobilty, impaired balance, decreased coordination, visual deficits, decreased lower extremity function, decreased upper extremity function, decreased safety awareness, abnormal tone, impaired cardiopulmonary response to activity, decreased ROM, impaired fine motor, edema.      Rehab Prognosis: TBD; patient would benefit from acute skilled OT services to address these deficits and reach maximum level of function.       Plan:     Patient to be seen 3 x/week, 5 x/week to address the above listed problems via self-care/home management, therapeutic activities, therapeutic exercises, neuromuscular re-education  · Plan of Care Expires: 11/15/19  · Plan of Care Reviewed with: patient    Subjective     Chief Complaint: none  Patient/Family Comments/goals: The patient's son was present during OT eval    Occupational Profile:  Living Environment: per son, the patient lives alone in a SS house with 1 ASHER.  Previous level of function: The son reports the patient was mod I with amb and self care but per chart, the patient required assistance.  Roles and Routines: unknown  Equipment Used at Home:  wheelchair, walker, rolling, bedside commode, hospital bed  Assistance upon Discharge: 2 sons    Pain/Comfort:  · Pain Rating  1: (The patient did not appear to have pain. Unable to stste pain 2* vent)    Patients cultural, spiritual, Shinto conflicts given the current situation: no    Objective:     Communicated with: nurse, Lilia prior to session.  Patient found HOB elevated with telemetry, pulse ox (continuous), ventilator, peripheral IV, shepard catheter, blood pressure cuff , tube feeding upon OT entry to room.    General Precautions: Standard, fall, respiratory   Orthopedic Precautions:N/A   Braces: N/A     Occupational Performance:    Bed Mobility:    · The patient was dependent for drawsheet lift to the top of the bed. The patient was unable to assist with repositioning in bed.    Functional Mobility/Transfers:  · Functional Mobility: not appropriate     Activities of Daily Living:  · The patient is dependent for all self care.    Cognitive/Visual Perceptual:  Cognitive/Psychosocial Skills:     -       Oriented to: unable to assess 2* non-verbal. The patient did not respond to yes/no via nods or eye blinks   -       Follows Commands/attention:Follows one-step commands and to move LUE only  -       Communication: non verbal 2* vent  -       Memory: unable to assess  -       Safety awareness/insight to disability: impaired   -       Mood/Affect/Coping skills/emotional control: Appropriate to situation  Visual/Perceptual:      -The patient was alert with eyes open but did not visually track    Physical Exam:  Balance: -       The patient leaned to the right with the HOB elevated. The patient was dependent to reposition  Skin integrity: Visible skin intact  Edema:  minimal in B hands  Upper Extremity Range of Motion:     -       Right Upper Extremity: WFL  -       Left Upper Extremity: WFL  Upper Extremity Strength:    -       Right Upper Extremity: 0/5  -       Left Upper Extremity: The patient was able to minimally assist with ROM with active flinger ext and flexion.   Strength:    -       Right Upper Extremity: none  -       Left  Upper Extremity: able to   Neurological: -       The RUE is flaccid. LUE has slight increased tone    AMPAC 6 Click ADL:  AMPAC Total Score: 6    Treatment & Education:  The OT eval was complete with the son present. The son was educated re: OT role and POC. The patient tolerated bed in chair position ~8 minutes.   Education:    Patient left HOB elevated at 30* with BUE elevated on pillows with all lines intact, call button in reach, nurse, Lilia notified and son present    GOALS:   Multidisciplinary Problems     Occupational Therapy Goals        Problem: Occupational Therapy Goal    Goal Priority Disciplines Outcome Interventions   Occupational Therapy Goal     OT, PT/OT Ongoing, Progressing    Description:  Goals to be met by: 11/15/19    Patient will increase functional independence with ADLs by performing:    UE Dressing with Maximum Assistance.  Grooming while in bed with Maximum Assistance.  Sitting at edge of bed : to be assessed as appropriate  Rolling to Bilateral with Maximum Assistance.   Supine to sit :to be assessed as appropriate  Upper extremity exercise program x10 reps per handout, with assistance as needed.                      History:     Past Medical History:   Diagnosis Date    Anemia     Anxiety     Diabetes mellitus     Hyperlipidemia     Hypertension     Obesity     Proteinuria     Sleep apnea        Past Surgical History:   Procedure Laterality Date    APPENDECTOMY       SECTION      CHOLECYSTECTOMY      DILATION AND CURETTAGE OF UTERUS      TUBAL LIGATION         Time Tracking:     OT Date of Treatment: 10/15/19  OT Start Time: 1330  OT Stop Time: 1401  OT Total Time (min): 31 min    Billable Minutes:Evaluation 20 (with PT)  Total Time 31    Annika Parson OT  10/16/2019

## 2019-10-17 ENCOUNTER — TELEPHONE (OUTPATIENT)
Dept: OTOLARYNGOLOGY | Facility: CLINIC | Age: 69
End: 2019-10-17

## 2019-10-17 PROBLEM — L97.909 VENOUS STASIS ULCER: Status: ACTIVE | Noted: 2019-10-17

## 2019-10-17 PROBLEM — I83.009 VENOUS STASIS ULCER: Status: ACTIVE | Noted: 2019-10-17

## 2019-10-17 PROBLEM — J96.20 ACUTE ON CHRONIC RESPIRATORY FAILURE: Status: ACTIVE | Noted: 2019-10-11

## 2019-10-17 LAB
ALLENS TEST: ABNORMAL
ANION GAP SERPL CALC-SCNC: 10 MMOL/L (ref 8–16)
BASOPHILS # BLD AUTO: 0.02 K/UL (ref 0–0.2)
BASOPHILS NFR BLD: 0.1 % (ref 0–1.9)
BUN SERPL-MCNC: 51 MG/DL (ref 8–23)
CALCIUM SERPL-MCNC: 10.2 MG/DL (ref 8.7–10.5)
CHLORIDE SERPL-SCNC: 96 MMOL/L (ref 95–110)
CO2 SERPL-SCNC: 33 MMOL/L (ref 23–29)
CREAT SERPL-MCNC: 2 MG/DL (ref 0.5–1.4)
DELSYS: ABNORMAL
DIFFERENTIAL METHOD: ABNORMAL
EOSINOPHIL # BLD AUTO: 0 K/UL (ref 0–0.5)
EOSINOPHIL NFR BLD: 0.1 % (ref 0–8)
ERYTHROCYTE [DISTWIDTH] IN BLOOD BY AUTOMATED COUNT: 18.6 % (ref 11.5–14.5)
ERYTHROCYTE [SEDIMENTATION RATE] IN BLOOD BY WESTERGREN METHOD: 15 MM/H
EST. GFR  (AFRICAN AMERICAN): 29 ML/MIN/1.73 M^2
EST. GFR  (NON AFRICAN AMERICAN): 25 ML/MIN/1.73 M^2
FIO2: 21
GLUCOSE SERPL-MCNC: 242 MG/DL (ref 70–110)
HCO3 UR-SCNC: 37.6 MMOL/L (ref 24–28)
HCT VFR BLD AUTO: 43.3 % (ref 37–48.5)
HGB BLD-MCNC: 12.8 G/DL (ref 12–16)
IMM GRANULOCYTES # BLD AUTO: 0.11 K/UL (ref 0–0.04)
IMM GRANULOCYTES NFR BLD AUTO: 0.6 % (ref 0–0.5)
LYMPHOCYTES # BLD AUTO: 1.1 K/UL (ref 1–4.8)
LYMPHOCYTES NFR BLD: 6.2 % (ref 18–48)
MCH RBC QN AUTO: 19.7 PG (ref 27–31)
MCHC RBC AUTO-ENTMCNC: 29.6 G/DL (ref 32–36)
MCV RBC AUTO: 67 FL (ref 82–98)
MIN VOL: 7.7
MODE: ABNORMAL
MONOCYTES # BLD AUTO: 1.9 K/UL (ref 0.3–1)
MONOCYTES NFR BLD: 10.8 % (ref 4–15)
NEUTROPHILS # BLD AUTO: 14.3 K/UL (ref 1.8–7.7)
NEUTROPHILS NFR BLD: 82.2 % (ref 38–73)
NRBC BLD-RTO: 0 /100 WBC
PCO2 BLDA: 49.3 MMHG (ref 35–45)
PEEP: 5
PH SMN: 7.49 [PH] (ref 7.35–7.45)
PIP: 12
PLATELET # BLD AUTO: 165 K/UL (ref 150–350)
PMV BLD AUTO: ABNORMAL FL (ref 9.2–12.9)
PO2 BLDA: 65 MMHG (ref 80–100)
POC BE: 12 MMOL/L
POC SATURATED O2: 94 % (ref 95–100)
POC TCO2: 39 MMOL/L (ref 23–27)
POCT GLUCOSE: 233 MG/DL (ref 70–110)
POCT GLUCOSE: 241 MG/DL (ref 70–110)
POTASSIUM SERPL-SCNC: 3.9 MMOL/L (ref 3.5–5.1)
RBC # BLD AUTO: 6.51 M/UL (ref 4–5.4)
SAMPLE: ABNORMAL
SITE: ABNORMAL
SODIUM SERPL-SCNC: 139 MMOL/L (ref 136–145)
SP02: 95
VT: 320
WBC # BLD AUTO: 17.39 K/UL (ref 3.9–12.7)

## 2019-10-17 PROCEDURE — 99222 PR INITIAL HOSPITAL CARE,LEVL II: ICD-10-PCS | Mod: ,,, | Performed by: OTOLARYNGOLOGY

## 2019-10-17 PROCEDURE — 99222 1ST HOSP IP/OBS MODERATE 55: CPT | Mod: ,,, | Performed by: OTOLARYNGOLOGY

## 2019-10-17 PROCEDURE — 25000003 PHARM REV CODE 250: Performed by: INTERNAL MEDICINE

## 2019-10-17 PROCEDURE — 99291 CRITICAL CARE FIRST HOUR: CPT | Mod: ,,, | Performed by: INTERNAL MEDICINE

## 2019-10-17 PROCEDURE — 27200966 HC CLOSED SUCTION SYSTEM

## 2019-10-17 PROCEDURE — 25000003 PHARM REV CODE 250: Performed by: HOSPITALIST

## 2019-10-17 PROCEDURE — 94761 N-INVAS EAR/PLS OXIMETRY MLT: CPT

## 2019-10-17 PROCEDURE — 80048 BASIC METABOLIC PNL TOTAL CA: CPT

## 2019-10-17 PROCEDURE — 99291 PR CRITICAL CARE, E/M 30-74 MINUTES: ICD-10-PCS | Mod: ,,, | Performed by: INTERNAL MEDICINE

## 2019-10-17 PROCEDURE — 25000003 PHARM REV CODE 250: Performed by: PSYCHIATRY & NEUROLOGY

## 2019-10-17 PROCEDURE — 94640 AIRWAY INHALATION TREATMENT: CPT

## 2019-10-17 PROCEDURE — 63600175 PHARM REV CODE 636 W HCPCS: Performed by: HOSPITALIST

## 2019-10-17 PROCEDURE — 20000000 HC ICU ROOM

## 2019-10-17 PROCEDURE — 25000242 PHARM REV CODE 250 ALT 637 W/ HCPCS: Performed by: NURSE PRACTITIONER

## 2019-10-17 PROCEDURE — 36415 COLL VENOUS BLD VENIPUNCTURE: CPT

## 2019-10-17 PROCEDURE — 25000003 PHARM REV CODE 250: Performed by: NURSE PRACTITIONER

## 2019-10-17 PROCEDURE — 85025 COMPLETE CBC W/AUTO DIFF WBC: CPT

## 2019-10-17 PROCEDURE — S0028 INJECTION, FAMOTIDINE, 20 MG: HCPCS | Performed by: HOSPITALIST

## 2019-10-17 PROCEDURE — 99900035 HC TECH TIME PER 15 MIN (STAT)

## 2019-10-17 RX ORDER — AMLODIPINE BESYLATE 5 MG/1
5 TABLET ORAL DAILY
Status: DISCONTINUED | OUTPATIENT
Start: 2019-10-17 | End: 2019-10-17

## 2019-10-17 RX ORDER — METOPROLOL TARTRATE 50 MG/1
100 TABLET ORAL 2 TIMES DAILY
Status: DISCONTINUED | OUTPATIENT
Start: 2019-10-17 | End: 2019-10-29 | Stop reason: HOSPADM

## 2019-10-17 RX ORDER — METOPROLOL TARTRATE 50 MG/1
50 TABLET ORAL ONCE
Status: DISCONTINUED | OUTPATIENT
Start: 2019-10-17 | End: 2019-10-17

## 2019-10-17 RX ORDER — METOPROLOL TARTRATE 50 MG/1
50 TABLET ORAL ONCE
Status: COMPLETED | OUTPATIENT
Start: 2019-10-17 | End: 2019-10-17

## 2019-10-17 RX ORDER — AMLODIPINE BESYLATE 5 MG/1
10 TABLET ORAL DAILY
Status: DISCONTINUED | OUTPATIENT
Start: 2019-10-18 | End: 2019-10-29 | Stop reason: HOSPADM

## 2019-10-17 RX ADMIN — LACOSAMIDE 50 MG: 50 TABLET, FILM COATED ORAL at 08:10

## 2019-10-17 RX ADMIN — INSULIN ASPART 4 UNITS: 100 INJECTION, SOLUTION INTRAVENOUS; SUBCUTANEOUS at 06:10

## 2019-10-17 RX ADMIN — ROSUVASTATIN CALCIUM 20 MG: 10 TABLET, COATED ORAL at 08:10

## 2019-10-17 RX ADMIN — IPRATROPIUM BROMIDE AND ALBUTEROL SULFATE 3 ML: .5; 3 SOLUTION RESPIRATORY (INHALATION) at 02:10

## 2019-10-17 RX ADMIN — METOPROLOL TARTRATE 50 MG: 50 TABLET ORAL at 09:10

## 2019-10-17 RX ADMIN — SENNOSIDES, DOCUSATE SODIUM 1 TABLET: 50; 8.6 TABLET, FILM COATED ORAL at 08:10

## 2019-10-17 RX ADMIN — METOPROLOL TARTRATE 50 MG: 50 TABLET ORAL at 08:10

## 2019-10-17 RX ADMIN — INSULIN ASPART 6 UNITS: 100 INJECTION, SOLUTION INTRAVENOUS; SUBCUTANEOUS at 11:10

## 2019-10-17 RX ADMIN — FENTANYL CITRATE 25 MCG: 50 INJECTION INTRAMUSCULAR; INTRAVENOUS at 08:10

## 2019-10-17 RX ADMIN — ASPIRIN 325 MG ORAL TABLET 325 MG: 325 PILL ORAL at 08:10

## 2019-10-17 RX ADMIN — CEFAZOLIN SODIUM 1 G: 1 SOLUTION INTRAVENOUS at 07:10

## 2019-10-17 RX ADMIN — FENTANYL CITRATE 25 MCG: 50 INJECTION INTRAMUSCULAR; INTRAVENOUS at 11:10

## 2019-10-17 RX ADMIN — FAMOTIDINE 20 MG: 10 INJECTION INTRAVENOUS at 08:10

## 2019-10-17 RX ADMIN — IPRATROPIUM BROMIDE AND ALBUTEROL SULFATE 3 ML: .5; 3 SOLUTION RESPIRATORY (INHALATION) at 12:10

## 2019-10-17 RX ADMIN — IPRATROPIUM BROMIDE AND ALBUTEROL SULFATE 3 ML: .5; 3 SOLUTION RESPIRATORY (INHALATION) at 07:10

## 2019-10-17 RX ADMIN — CHLORHEXIDINE GLUCONATE 0.12% ORAL RINSE 15 ML: 1.2 LIQUID ORAL at 08:10

## 2019-10-17 RX ADMIN — INSULIN ASPART 3 UNITS: 100 INJECTION, SOLUTION INTRAVENOUS; SUBCUTANEOUS at 12:10

## 2019-10-17 RX ADMIN — METOPROLOL TARTRATE 100 MG: 50 TABLET ORAL at 08:10

## 2019-10-17 RX ADMIN — POLYETHYLENE GLYCOL 3350 17 G: 17 POWDER, FOR SOLUTION ORAL at 08:10

## 2019-10-17 RX ADMIN — CLONIDINE HYDROCHLORIDE 0.3 MG: 0.1 TABLET ORAL at 08:10

## 2019-10-17 RX ADMIN — HEPARIN SODIUM 5000 UNITS: 5000 INJECTION INTRAVENOUS; SUBCUTANEOUS at 08:10

## 2019-10-17 RX ADMIN — FENTANYL CITRATE 25 MCG: 50 INJECTION INTRAMUSCULAR; INTRAVENOUS at 12:10

## 2019-10-17 RX ADMIN — CEFAZOLIN SODIUM 1 G: 1 SOLUTION INTRAVENOUS at 03:10

## 2019-10-17 RX ADMIN — CEFAZOLIN SODIUM 1 G: 1 SOLUTION INTRAVENOUS at 11:10

## 2019-10-17 RX ADMIN — LABETALOL HYDROCHLORIDE 20 MG: 5 INJECTION INTRAVENOUS at 07:10

## 2019-10-17 RX ADMIN — AMLODIPINE BESYLATE 5 MG: 5 TABLET ORAL at 08:10

## 2019-10-17 RX ADMIN — INSULIN ASPART 4 UNITS: 100 INJECTION, SOLUTION INTRAVENOUS; SUBCUTANEOUS at 07:10

## 2019-10-17 NOTE — CARE UPDATE
[facility]  ICU Multidisciplinary Bedside Rounds     UPDATE     Date: 10/17/2019      Plan of care reviewed with the following, Nurse, Charge Nurse, Physician, Pulm CC, Resp. Therapist and .       Needs/ Goals for the day:Consult ENT for trach. BP remains elevated even with meds given and prn meds       Level of Care: Critical Care

## 2019-10-17 NOTE — CARE UPDATE
Pt received orally intubated with a size 6.5 ET tube, secured at 24 cm at the lips. Pt on Servo I ventilator on documented settings. Alarms are set and functional, Ambu bag and mask at the bedside. NARN at this time, will continue to monitor and wean as tolerated.

## 2019-10-17 NOTE — ASSESSMENT & PLAN NOTE
Intubated for airway protection given concern for angioedema  Patient remains intubated secondary to acute CVA and and patient did not have to angioedema  Patient pauses due to an cultures with increased secretions  Pulmonary following  Patient is morbidly obese with obstructive sleep apnea which may hinder extubation  Pulmonary discuss possible need for trach and PEG with son  Cephazolin added for positive Staph in the sputum and nebulizer treatments  As per Pulmonary

## 2019-10-17 NOTE — CONSULTS
Otolaryngology - Head and Neck Surgery  Consultation Report    Consultation From: Vee Arnett MD    Chief Complaint: Acute on chronic respiratory failure, failure to extubate    History of Present Illness: Sherrie Alex is a 69 y.o. female who initially presented to the emergency department with complaints of new onset weakness at home with difficulty getting out of a chair that was 1st felt to be related to dehydration and she was discharged, but then the patient returned with new tongue swelling and perioral weakness with drooling and difficulty with her speech.  Given the new and fairly quick onset of these symptoms as well as a past history of angioedema related to an Ace inhibitor that she was no longer taking intubation for airway protection was performed at that time. She was successfully intubated and reportedly atraumatically without any adverse events but after four separate attempts including nasotracheal and fiberoptic intubations.  She was then transferred to the ICU where she has remained receiving ongoing management from the ICU and since had multiple spontaneous breathing trials and attempts of weaning her to extubation.  She remains intubated with a 6.5 endotracheal tube and has continued to have a cuff leak currently.  Reason for ongoing requirement of intubation is suspected most likely to be related to CVA rather than to angioedema which is considered unlikely at this point.  Subacute infarct confirmed by MRI with no evidence for hemorrhage. She has had some macroglossia reported but no significant changes in this or significant oral edema, and no laryngeal or airway edema reported from intubations.  She received IV steroids and was recently started on antibiotics for a positive sputum culture.  Consultation placed to ENT by ICU team for consideration of tracheostomy, and patient also being considered for gastrostomy tube placement.    History was obtained from discussion with family  member present with patient, her ICU nursing care, and review of her chart documentation as the patient is currently intubated and unable to communicate.    Review of Systems   Unable to obtain from patient as she is currently intubated in the ICU and unable to communicate.    Past Medical History: Patient  has a past medical history of Anemia, Anxiety, Diabetes mellitus, Hyperlipidemia, Hypertension, Obesity, Proteinuria, and Sleep apnea.    Past Surgical History: Patient  has a past surgical history that includes Cholecystectomy; Dilation and curettage of uterus; Appendectomy; Tubal ligation; and  section.    Social History: Patient  reports that she has never smoked. She does not have any smokeless tobacco history on file. She reports that she does not drink alcohol or use drugs.    Family History: family history includes Diabetes in her brother and sister; Hypertension in her brother; Liver disease in her father.    Medications:   Current Facility-Administered Medications:     acetaminophen tablet 650 mg, 650 mg, Oral, Q8H PRN, Elena Bauer MD    albuterol-ipratropium 2.5 mg-0.5 mg/3 mL nebulizer solution 3 mL, 3 mL, Nebulization, Q6H, Saumya H. Cole, NP, 3 mL at 10/17/19 1406    [START ON 10/18/2019] amLODIPine tablet 10 mg, 10 mg, Per OG tube, Daily, Darrel Castillo MD    aspirin tablet 325 mg, 325 mg, Per OG tube, Daily, Jcarlos Holguin MD, 325 mg at 10/17/19 0810    ceFAZolin (ANCEF) 1 gram in dextrose 5 % 50 mL IVPB (premix), 1 g, Intravenous, Q8H, Vee Arnett MD, Last Rate: 100 mL/hr at 10/17/19 1552, 1 g at 10/17/19 1552    chlorhexidine 0.12 % solution 15 mL, 15 mL, Mouth/Throat, BID, Vee Arnett MD, 15 mL at 10/17/19 0811    cloNIDine tablet 0.3 mg, 0.3 mg, Oral, BID, Elena Bauer MD, 0.3 mg at 10/17/19 0811    dexmedetomidine (PRECEDEX) 400mcg/100mL 0.9% NaCL infusion, 0.2 mcg/kg/hr, Intravenous, Continuous, Elena Bauer MD, Stopped at 10/15/19 0900    famotidine  (PF) injection 20 mg, 20 mg, Intravenous, Daily, Elena Bauer MD, 20 mg at 10/17/19 0812    fentaNYL injection 25 mcg, 25 mcg, Intravenous, Q1H PRN, Vee Arnett MD, 25 mcg at 10/17/19 0831    heparin (porcine) injection 5,000 Units, 5,000 Units, Subcutaneous, Q12H, Elena Bauer MD, 5,000 Units at 10/17/19 0811    insulin aspart U-100 pen 1-10 Units, 1-10 Units, Subcutaneous, Q4H PRN, Elena Bauer MD, 6 Units at 10/17/19 1151    insulin detemir U-100 pen 20 Units, 20 Units, Subcutaneous, QHS, Vee Arnett MD    labetalol injection 20 mg, 20 mg, Intravenous, Q6H PRN, Saumya ALARCON. Cole, NP, 20 mg at 10/17/19 0744    lacosamide tablet 50 mg, 50 mg, Oral, BID, Elena Bauer MD, 50 mg at 10/17/19 0810    metoprolol tartrate (LOPRESSOR) tablet 100 mg, 100 mg, Per OG tube, BID, Darrel Castillo MD    ondansetron injection 4 mg, 4 mg, Intravenous, Q8H PRN, Elena Bauer MD    polyethylene glycol packet 17 g, 17 g, Per NG tube, Daily, Vee Arnett MD, 17 g at 10/17/19 0811    rosuvastatin tablet 20 mg, 20 mg, Per OG tube, QHS, Jcarlos Holguin MD, 20 mg at 10/16/19 2048    senna-docusate 8.6-50 mg per tablet 1 tablet, 1 tablet, Oral, BID, Elena Bauer MD, 1 tablet at 10/17/19 0811    sodium chloride 0.9% flush 10 mL, 10 mL, Intravenous, PRN, Elena Bauer MD   albuterol-ipratropium  3 mL Nebulization Q6H    [START ON 10/18/2019] amLODIPine  10 mg Per OG tube Daily    aspirin  325 mg Per OG tube Daily    ceFAZolin (ANCEF) IVPB  1 g Intravenous Q8H    chlorhexidine  15 mL Mouth/Throat BID    cloNIDine  0.3 mg Oral BID    famotidine (PF)  20 mg Intravenous Daily    heparin (porcine)  5,000 Units Subcutaneous Q12H    insulin detemir U-100  20 Units Subcutaneous QHS    lacosamide  50 mg Oral BID    metoprolol tartrate  100 mg Per OG tube BID    polyethylene glycol  17 g Per NG tube Daily    rosuvastatin  20 mg Per OG tube QHS    senna-docusate 8.6-50 mg  1 tablet Oral  BID        Allergies: Patient is allergic to codeine; ace inhibitors; and diphenhydramine hcl.    Physical Exam:  Temp:  [97.8 °F (36.6 °C)-100 °F (37.8 °C)] 97.8 °F (36.6 °C)  Pulse:  [] 76  Resp:  [12-27] 18  SpO2:  [94 %-100 %] 98 %  BP: ()/() 122/72    General:  Obese woman in ICU bed intubated and sedated appearing but off sedation and in no distress  Voice:  Currently intubated in unable to assess  Head/Face: Normal inspection. Salivary glands WNL.  Eyes: EOMI; pupils equal; reactive to light; round  Right Ear: Auricle WNL. EAC WNL.      Left Ear: Auricle WNL. EAC WNL.   Nose: Normal external appearance and palpation; Normal septum;  no epistaxis; no abnormal nasal drainage  OC:  Endotracheal tube present in place. Lips and gingiva normal. Anterior tongue protruding under endotracheal tube which does not appear significantly macroglossic, and no significant oral edema is noted on exam.  OP:  Endotracheal tube in place.  Mucosa moist.  Unable to evaluate further with intubation  Neck/Lymphatic: No LAD. No thyromegaly. Landmarks palpated of anterior neck along trachea which is midline and without palpable abnormality.  Chest:  Currently intubated with minimal vent settings, positive end-expiratory pressure currently set at 5 on ventilator. No stridor or stertor.   Neuro:  Patient does not engage or follow commands currently, but does demonstrate conjugate gaze and eye movements.  No significant facial expressions generated to  facial nerve function completely or symmetry but no resting asymmetries are noted.  Skin: No lesions; warm; dry; well-perfused    Intake/Output    Intake/Output Summary (Last 24 hours) at 10/17/2019 1810  Last data filed at 10/17/2019 1101  Gross per 24 hour   Intake 1000 ml   Output 625 ml   Net 375 ml       PROCEDURES  None    LABORATORY  CBC  Recent Labs   Lab 10/15/19  0339 10/16/19  0209 10/17/19  0235   WBC 11.11 15.57* 17.39*   HGB 12.5 12.6 12.8   HCT 42.3  42.3 43.3   MCV 67* 66* 67*   PLT SEE COMMENT 147* 165     BMP  Recent Labs   Lab 10/15/19  0339 10/16/19  0209 10/17/19  0235   * 235* 242*    140 139   K 4.0 3.8 3.9    99 96   CO2 30* 33* 33*   BUN 25* 32* 51*   CREATININE 1.8* 1.8* 2.0*   CALCIUM 9.8 10.1 10.2     COAGS  No results for input(s): PROTIME, INR, PTT in the last 72 hours.    IMAGING    CT soft tissue neck without contrast performed October 11, 2019 was personally reviewed and interpreted demonstrating intubated status at that point with evidence of mucosal edema and partial opacifications throughout the nasal cavity likely related to attempts at transnasal intubation.  Endotracheal tube is in place with opacification within the trachea surrounding the endotracheal tube presumed to represent secretions above the level of the cuff of the endotracheal tube which appears to be inflated.  An orogastric tube was also seen bifurcating from the course of the endotracheal tube in the oropharynx and coursing within the expected path of the esophagus posterior to the intubated trachea.  Endotracheal tube is seen terminated a few cm prior to the cici.        Most recent chest x-ray performed October 15, 2019 was also reviewed demonstrating no evidence of significant opacifications or consolidation to my interpretation.  The endotracheal tube is seen to be in place in similar position on this film as well.    Impression:  Sherrie Alex is a 69 y.o. female w/ acute on chronic respiratory failure and failure to wean from mechanical ventilator support.  Suspected reason for requirement of intubation and mechanical ventilation is acute CVA.  Complicating factors include the patient's morbid obesity and obstructive sleep apnea.    Recommendations:   Given the patient's ongoing requirement for intubation with multiple attempts at spontaneous breathing trials and inability to extubate consideration of an elective tracheotomy is appropriate  at this time. Further supporting the benefit of an elective tracheotomy is the suspected at the etiology being acute CVA and mental status changes which are not anticipated to be rapidly reversible and likely require prolonged intubation.  She currently is on minimal vent settings (PEEP 5) and appropriate for tracheotomy.    Tracheotomy was advised by the patient's care team and has been discussed with family already.  I have also discussed this with the family member present during my examination of the patient today.  Consent for this procedure was unable to be obtained from the patient given her current inability to communicate and intubated status, and will need to be obtained from appropriate power of medical decision-making on her behalf.  Tentatively I have arranged for time in the operating room on Monday, October 21st to perform this procedure. Please notify me of any acute changes or new considerations that may require modification to this current plan for elective of tracheotomy placement.    Thank you for involving me in this patient's care. Please do not hesitate to contact me for any additional questions or concerns.        Olu Rice MD    Rhinology, Allergy, and Sinus-Skull Base Surgery    Department of Otorhinolaryngology    [facility]    Phone  [phone]    Fax      [phone]

## 2019-10-17 NOTE — ASSESSMENT & PLAN NOTE
No identified precipitant of angioedema. Seems more likely that drooling and dysarthria are reflective of CVA  - Oropharyngeal exam without edema on assessment but poor oral tone. + cuff leak.   - s/p 48 hours of corticosteroids and H2 antagonist, off therapy with no change in macroglossia  - ENT eval as above

## 2019-10-17 NOTE — ASSESSMENT & PLAN NOTE
Suspect this is the reason she was drooling and was macroglossic on arrival, and not angioedema. Tongue size is unchanged.   SETscore >8 on arrival, indicating higher likelihood of extubation failure. In the setting of no improvement in right sided strength, neck weakness, continued inability to manipulate her tongue, and difficulty with initial intubation, I favor proceeding with early tracheostomy.  - ENT consulted for evaluation of tracheostomy  - Discussed with family, who are amenable.   - Informed family the possibility that she does not regain function and to discuss her wishes amongst themselves, especially if she is unable to communicate.

## 2019-10-17 NOTE — CONSULTS
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 Lower leg with darkened hemosiderin appearance. No edema. Strong palpable DP pulse.   Right lateral calf- Clean 5 mm red partial thickness wound with small amount serosangineous drainage. Lower leg with darkened hemosiderin appearance. Small amount of edema with palpable DP pulse.   Treatment:  Continue local wound care with foam dressings. Continue Z flex boots to offload pressure from calves. Apply light compression to lower legs when resuming dependent position.

## 2019-10-17 NOTE — SUBJECTIVE & OBJECTIVE
Interval History: Remains hypertensive. Mental status is improved but patient is still unable to move right side or oropharynx.    Objective:     Vital Signs (Most Recent):  Temp: 99.8 °F (37.7 °C) (10/17/19 0301)  Pulse: 91 (10/17/19 0754)  Resp: 12 (10/17/19 0754)  BP: 133/84 (10/17/19 0600)  SpO2: 96 % (10/17/19 0754) Vital Signs (24h Range):  Temp:  [98 °F (36.7 °C)-100 °F (37.8 °C)] 99.8 °F (37.7 °C)  Pulse:  [] 91  Resp:  [12-32] 12  SpO2:  [93 %-100 %] 96 %  BP: (131-223)/() 133/84     Weight: 126.6 kg (279 lb 1.6 oz)  Body mass index is 46.45 kg/m².      Intake/Output Summary (Last 24 hours) at 10/17/2019 0923  Last data filed at 10/17/2019 0001  Gross per 24 hour   Intake 2020 ml   Output 2000 ml   Net 20 ml       Physical Exam   Constitutional: She appears well-nourished. No distress.   HENT:   Macroglossic. 7.5 ETT in place   Eyes:   EOM unable to cross midline to the right   Neck: No JVD present. No tracheal deviation present.   Cardiovascular: Normal rate, regular rhythm and intact distal pulses.   Pulmonary/Chest: Effort normal and breath sounds normal. No stridor. She has no wheezes. She has no rales. She exhibits no tenderness.   Abdominal: Soft. Bowel sounds are normal.   Musculoskeletal:   Left hand and left foot movement on command.    Lymphadenopathy:     She has no cervical adenopathy.   Neurological:   Squeezes left hand and moves left foot on command. Unable to move right side. Unable to shake head yes or no. Unable to move tongue.    Skin: Skin is warm. Capillary refill takes less than 2 seconds.   Psychiatric:   intubated       Vents:  Vent Mode: PRVC (10/17/19 0800)  Ventilator Initiated: Yes (10/11/19 0213)  Set Rate: 15 bmp (10/17/19 0420)  Vt Set: 320 mL (10/17/19 0420)  PEEP/CPAP: 5 cmH20 (10/17/19 0420)  Oxygen Concentration (%): 25 (10/17/19 0600)  Peak Airway Pressure: 13.2 cmH2O (10/17/19 0420)  Total Ve: 5.6 mL (10/17/19 0420)  F/VT Ratio<105 (RSBI): 343.28 (10/17/19  0420)    Lines/Drains/Airways     Drain                 NG/OG Tube 10/11/19 0215 orogastric 18 Fr. Left mouth 6 days         Urethral Catheter 10/11/19 0230 16 Fr. 6 days          Airway                 Airway - Non-Surgical 10/11/19 0204 Endotracheal Tube-Hi/Lo 6 days          Peripheral Intravenous Line                 Peripheral IV - Single Lumen 10/15/19 0800 20 G Anterior;Left Upper Arm 2 days         Peripheral IV - Single Lumen 10/16/19 0730 20 G Anterior;Right Forearm 1 day                Significant Labs:    CBC/Anemia Profile:  Recent Labs   Lab 10/16/19  0209 10/17/19  0235   WBC 15.57* 17.39*   HGB 12.6 12.8   HCT 42.3 43.3   * 165   MCV 66* 67*   RDW 18.4* 18.6*        Chemistries:  Recent Labs   Lab 10/16/19  0209 10/17/19  0235    139   K 3.8 3.9   CL 99 96   CO2 33* 33*   BUN 32* 51*   CREATININE 1.8* 2.0*   CALCIUM 10.1 10.2       All pertinent labs within the past 24 hours have been reviewed.    Significant Imaging:  I have reviewed and interpreted all pertinent imaging results/findings within the past 24 hours.

## 2019-10-17 NOTE — PLAN OF CARE
Opens eyes spontaneously, follows commands, Rt side weakness and neglect noted, VSS, Tmax 100.0 Ax, no family at bedside.  On continuous O2 sat and cardiac monitoring.  Telemetry reading NSR / sinus tachycardia without ectopy noted.   On ventilator with 6.5 ETT taped 24 cm at lip.   Pain noted - administered Fentanyl with good relief.   Guevara catheter intact with yellow urine noted, no BM this shift, no skin breakdown.   Will possibly attempt another CPAP trial in AM.   Will continue to monitor.

## 2019-10-17 NOTE — H&P (VIEW-ONLY)
Otolaryngology - Head and Neck Surgery  Consultation Report    Consultation From: Vee Arnett MD    Chief Complaint: Acute on chronic respiratory failure, failure to extubate    History of Present Illness: Sherrie Alex is a 69 y.o. female who initially presented to the emergency department with complaints of new onset weakness at home with difficulty getting out of a chair that was 1st felt to be related to dehydration and she was discharged, but then the patient returned with new tongue swelling and perioral weakness with drooling and difficulty with her speech.  Given the new and fairly quick onset of these symptoms as well as a past history of angioedema related to an Ace inhibitor that she was no longer taking intubation for airway protection was performed at that time. She was successfully intubated and reportedly atraumatically without any adverse events but after four separate attempts including nasotracheal and fiberoptic intubations.  She was then transferred to the ICU where she has remained receiving ongoing management from the ICU and since had multiple spontaneous breathing trials and attempts of weaning her to extubation.  She remains intubated with a 6.5 endotracheal tube and has continued to have a cuff leak currently.  Reason for ongoing requirement of intubation is suspected most likely to be related to CVA rather than to angioedema which is considered unlikely at this point.  Subacute infarct confirmed by MRI with no evidence for hemorrhage. She has had some macroglossia reported but no significant changes in this or significant oral edema, and no laryngeal or airway edema reported from intubations.  She received IV steroids and was recently started on antibiotics for a positive sputum culture.  Consultation placed to ENT by ICU team for consideration of tracheostomy, and patient also being considered for gastrostomy tube placement.    History was obtained from discussion with family  member present with patient, her ICU nursing care, and review of her chart documentation as the patient is currently intubated and unable to communicate.    Review of Systems   Unable to obtain from patient as she is currently intubated in the ICU and unable to communicate.    Past Medical History: Patient  has a past medical history of Anemia, Anxiety, Diabetes mellitus, Hyperlipidemia, Hypertension, Obesity, Proteinuria, and Sleep apnea.    Past Surgical History: Patient  has a past surgical history that includes Cholecystectomy; Dilation and curettage of uterus; Appendectomy; Tubal ligation; and  section.    Social History: Patient  reports that she has never smoked. She does not have any smokeless tobacco history on file. She reports that she does not drink alcohol or use drugs.    Family History: family history includes Diabetes in her brother and sister; Hypertension in her brother; Liver disease in her father.    Medications:   Current Facility-Administered Medications:     acetaminophen tablet 650 mg, 650 mg, Oral, Q8H PRN, Elena Bauer MD    albuterol-ipratropium 2.5 mg-0.5 mg/3 mL nebulizer solution 3 mL, 3 mL, Nebulization, Q6H, Saumya H. Cole, NP, 3 mL at 10/17/19 1406    [START ON 10/18/2019] amLODIPine tablet 10 mg, 10 mg, Per OG tube, Daily, Darrel Castillo MD    aspirin tablet 325 mg, 325 mg, Per OG tube, Daily, Jcarlos Holguin MD, 325 mg at 10/17/19 0810    ceFAZolin (ANCEF) 1 gram in dextrose 5 % 50 mL IVPB (premix), 1 g, Intravenous, Q8H, Vee Arnett MD, Last Rate: 100 mL/hr at 10/17/19 1552, 1 g at 10/17/19 1552    chlorhexidine 0.12 % solution 15 mL, 15 mL, Mouth/Throat, BID, Vee Arnett MD, 15 mL at 10/17/19 0811    cloNIDine tablet 0.3 mg, 0.3 mg, Oral, BID, Elena Bauer MD, 0.3 mg at 10/17/19 0811    dexmedetomidine (PRECEDEX) 400mcg/100mL 0.9% NaCL infusion, 0.2 mcg/kg/hr, Intravenous, Continuous, Elena Bauer MD, Stopped at 10/15/19 0900    famotidine  (PF) injection 20 mg, 20 mg, Intravenous, Daily, Elena Bauer MD, 20 mg at 10/17/19 0812    fentaNYL injection 25 mcg, 25 mcg, Intravenous, Q1H PRN, Vee Arnett MD, 25 mcg at 10/17/19 0831    heparin (porcine) injection 5,000 Units, 5,000 Units, Subcutaneous, Q12H, Elena Bauer MD, 5,000 Units at 10/17/19 0811    insulin aspart U-100 pen 1-10 Units, 1-10 Units, Subcutaneous, Q4H PRN, Elena Bauer MD, 6 Units at 10/17/19 1151    insulin detemir U-100 pen 20 Units, 20 Units, Subcutaneous, QHS, Vee Arnett MD    labetalol injection 20 mg, 20 mg, Intravenous, Q6H PRN, Saumya ALARCON. Cole, NP, 20 mg at 10/17/19 0744    lacosamide tablet 50 mg, 50 mg, Oral, BID, Elena Bauer MD, 50 mg at 10/17/19 0810    metoprolol tartrate (LOPRESSOR) tablet 100 mg, 100 mg, Per OG tube, BID, Darrel Castillo MD    ondansetron injection 4 mg, 4 mg, Intravenous, Q8H PRN, Elena Bauer MD    polyethylene glycol packet 17 g, 17 g, Per NG tube, Daily, Vee Arnett MD, 17 g at 10/17/19 0811    rosuvastatin tablet 20 mg, 20 mg, Per OG tube, QHS, Jcarlos Holguin MD, 20 mg at 10/16/19 2048    senna-docusate 8.6-50 mg per tablet 1 tablet, 1 tablet, Oral, BID, Elena Bauer MD, 1 tablet at 10/17/19 0811    sodium chloride 0.9% flush 10 mL, 10 mL, Intravenous, PRN, Elena Bauer MD   albuterol-ipratropium  3 mL Nebulization Q6H    [START ON 10/18/2019] amLODIPine  10 mg Per OG tube Daily    aspirin  325 mg Per OG tube Daily    ceFAZolin (ANCEF) IVPB  1 g Intravenous Q8H    chlorhexidine  15 mL Mouth/Throat BID    cloNIDine  0.3 mg Oral BID    famotidine (PF)  20 mg Intravenous Daily    heparin (porcine)  5,000 Units Subcutaneous Q12H    insulin detemir U-100  20 Units Subcutaneous QHS    lacosamide  50 mg Oral BID    metoprolol tartrate  100 mg Per OG tube BID    polyethylene glycol  17 g Per NG tube Daily    rosuvastatin  20 mg Per OG tube QHS    senna-docusate 8.6-50 mg  1 tablet Oral  BID        Allergies: Patient is allergic to codeine; ace inhibitors; and diphenhydramine hcl.    Physical Exam:  Temp:  [97.8 °F (36.6 °C)-100 °F (37.8 °C)] 97.8 °F (36.6 °C)  Pulse:  [] 76  Resp:  [12-27] 18  SpO2:  [94 %-100 %] 98 %  BP: ()/() 122/72    General:  Obese woman in ICU bed intubated and sedated appearing but off sedation and in no distress  Voice:  Currently intubated in unable to assess  Head/Face: Normal inspection. Salivary glands WNL.  Eyes: EOMI; pupils equal; reactive to light; round  Right Ear: Auricle WNL. EAC WNL.      Left Ear: Auricle WNL. EAC WNL.   Nose: Normal external appearance and palpation; Normal septum;  no epistaxis; no abnormal nasal drainage  OC:  Endotracheal tube present in place. Lips and gingiva normal. Anterior tongue protruding under endotracheal tube which does not appear significantly macroglossic, and no significant oral edema is noted on exam.  OP:  Endotracheal tube in place.  Mucosa moist.  Unable to evaluate further with intubation  Neck/Lymphatic: No LAD. No thyromegaly. Landmarks palpated of anterior neck along trachea which is midline and without palpable abnormality.  Chest:  Currently intubated with minimal vent settings, positive end-expiratory pressure currently set at 5 on ventilator. No stridor or stertor.   Neuro:  Patient does not engage or follow commands currently, but does demonstrate conjugate gaze and eye movements.  No significant facial expressions generated to  facial nerve function completely or symmetry but no resting asymmetries are noted.  Skin: No lesions; warm; dry; well-perfused    Intake/Output    Intake/Output Summary (Last 24 hours) at 10/17/2019 1810  Last data filed at 10/17/2019 1101  Gross per 24 hour   Intake 1000 ml   Output 625 ml   Net 375 ml       PROCEDURES  None    LABORATORY  CBC  Recent Labs   Lab 10/15/19  0339 10/16/19  0209 10/17/19  0235   WBC 11.11 15.57* 17.39*   HGB 12.5 12.6 12.8   HCT 42.3  42.3 43.3   MCV 67* 66* 67*   PLT SEE COMMENT 147* 165     BMP  Recent Labs   Lab 10/15/19  0339 10/16/19  0209 10/17/19  0235   * 235* 242*    140 139   K 4.0 3.8 3.9    99 96   CO2 30* 33* 33*   BUN 25* 32* 51*   CREATININE 1.8* 1.8* 2.0*   CALCIUM 9.8 10.1 10.2     COAGS  No results for input(s): PROTIME, INR, PTT in the last 72 hours.    IMAGING    CT soft tissue neck without contrast performed October 11, 2019 was personally reviewed and interpreted demonstrating intubated status at that point with evidence of mucosal edema and partial opacifications throughout the nasal cavity likely related to attempts at transnasal intubation.  Endotracheal tube is in place with opacification within the trachea surrounding the endotracheal tube presumed to represent secretions above the level of the cuff of the endotracheal tube which appears to be inflated.  An orogastric tube was also seen bifurcating from the course of the endotracheal tube in the oropharynx and coursing within the expected path of the esophagus posterior to the intubated trachea.  Endotracheal tube is seen terminated a few cm prior to the cici.        Most recent chest x-ray performed October 15, 2019 was also reviewed demonstrating no evidence of significant opacifications or consolidation to my interpretation.  The endotracheal tube is seen to be in place in similar position on this film as well.    Impression:  Sherrie Alex is a 69 y.o. female w/ acute on chronic respiratory failure and failure to wean from mechanical ventilator support.  Suspected reason for requirement of intubation and mechanical ventilation is acute CVA.  Complicating factors include the patient's morbid obesity and obstructive sleep apnea.    Recommendations:   Given the patient's ongoing requirement for intubation with multiple attempts at spontaneous breathing trials and inability to extubate consideration of an elective tracheotomy is appropriate  at this time. Further supporting the benefit of an elective tracheotomy is the suspected at the etiology being acute CVA and mental status changes which are not anticipated to be rapidly reversible and likely require prolonged intubation.  She currently is on minimal vent settings (PEEP 5) and appropriate for tracheotomy.    Tracheotomy was advised by the patient's care team and has been discussed with family already.  I have also discussed this with the family member present during my examination of the patient today.  Consent for this procedure was unable to be obtained from the patient given her current inability to communicate and intubated status, and will need to be obtained from appropriate power of medical decision-making on her behalf.  Tentatively I have arranged for time in the operating room on Monday, October 21st to perform this procedure. Please notify me of any acute changes or new considerations that may require modification to this current plan for elective of tracheotomy placement.    Thank you for involving me in this patient's care. Please do not hesitate to contact me for any additional questions or concerns.        Olu Rice MD    Rhinology, Allergy, and Sinus-Skull Base Surgery    Department of Otorhinolaryngology    [facility]    Phone  [phone]    Fax      [phone]

## 2019-10-17 NOTE — CARE UPDATE
[facility]  ICU Multidisciplinary Bedside Rounds   SUMMARY     Date: 10/17/2019    Prehospitalization: Home  Admit Date / LOS : 10/11/2019/ 6 days    Diagnosis: Acute on chronic respiratory failure    Consults:        Active: Neuro, Pulm CC and Wound Care       Needed: N/A     Code Status: Full Code   Advanced Directive: <no information>    LDA: Guevara, PIV, TLC and Vent       Central Lines/Site/Justification:Patient Does Not Have Central Line       Urinary Cath/Order/Justification:Critically Ill in ICU    Vasopressors/Infusions:    dexMEDEtomidine in 0.9 % NaCl Stopped (10/15/19 0900)          GOALS: Volume/ Hemodynamic: N/A                     RASS: 0  alert and calm    CAM ICU: N/A  Pain Management: none       Pain Controlled: not applicable     Rhythm: NSR    Respiratory Device: Vent    Vent Mode: PRVC  Oxygen Concentration (%):  [] 25  Resp Rate Total:  [15 br/min-31 br/min] 21 br/min  Vt Set:  [320 mL-400 mL] 320 mL  PEEP/CPAP:  [5 cmH20] 5 cmH20  Mean Airway Pressure:  [7.6 xbB91-90.2 cmH20] 10.1 cmH20             Most Recent SBT/ SAT: N/A          VTE Prophylaxis: Mechanical  Mobility: Bedrest  Stress Ulcer Prophylaxis: Yes    Dietary: NPO  Tolerance: not applicable  /  Advancement: N/A    Isolation: No active isolations    Restraints: Yes    Significant Dates:  Post Op Date: N/A  Rescue Date: N/A  Imaging/ Diagnostics: 10/11 CT & MRI of head; 10/14 EEG    Noteworthy Labs:  Please review labs below    CBC/Anemia Labs: Coags:    Recent Labs   Lab 10/15/19  0339 10/16/19  0209 10/17/19  0235   WBC 11.11 15.57* 17.39*   HGB 12.5 12.6 12.8   HCT 42.3 42.3 43.3   PLT SEE COMMENT 147* 165   MCV 67* 66* 67*   RDW 19.2* 18.4* 18.6*    No results for input(s): PT, INR, APTT in the last 168 hours.     Chemistries:   Recent Labs   Lab 10/10/19  1015  10/15/19  0339 10/16/19  0209 10/17/19  0235      < > 142 140 139   K 3.9   < > 4.0 3.8 3.9      < > 103 99 96   CO2 25   < > 30* 33*  33*   BUN 28*   < > 25* 32* 51*   CREATININE 1.9*   < > 1.8* 1.8* 2.0*   CALCIUM 10.2   < > 9.8 10.1 10.2   PROT 8.0  --   --   --   --    BILITOT 0.7  --   --   --   --    ALKPHOS 94  --   --   --   --    ALT 10  --   --   --   --    AST 11  --   --   --   --    MG 2.0  --   --   --   --     < > = values in this interval not displayed.        Cardiac Enzymes: Ejection Fractions:    No results for input(s): CPK, CPKMB, MB, TROPONINI in the last 72 hours. No results found for: EF     POCT Glucose: HbA1c:    Recent Labs   Lab 10/15/19  1133 10/15/19  1747 10/16/19  0014 10/16/19  0530 10/16/19  0533 10/17/19  0719   POCTGLUCOSE 206* 213* 231* 289* 298* 233*    Hemoglobin A1C   Date Value Ref Range Status   10/02/2019 6.9 (H) 4.0 - 5.6 % Final     Comment:     ADA Screening Guidelines:  5.7-6.4%  Consistent with prediabetes  >or=6.5%  Consistent with diabetes  High levels of fetal hemoglobin interfere with the HbA1C  assay. Heterozygous hemoglobin variants (HbS, HgC, etc)do  not significantly interfere with this assay.   However, presence of multiple variants may affect accuracy.     06/03/2008 6.7 (H) 4.0 - 6.2 % Final   02/13/2006 6.2 4.5 - 6.2 % Final        Needs from Care Team: none     ICU LOS 6d 2h  Level of Care: Critical Care

## 2019-10-17 NOTE — PROGRESS NOTES
[facility]  Pulmonology  Progress Note    Patient Name: Sherrie Alex  MRN: [id number]  Admission Date: 10/11/2019  Hospital Length of Stay: 6 days  Code Status: Full Code  Attending Provider: Vee Arnett MD  Primary Care Provider: Desmond Yang MD   Principal Problem: Acute on chronic respiratory failure    Subjective:     Interval History: Remains hypertensive. Mental status is improved but patient is still unable to move right side or oropharynx.    Objective:     Vital Signs (Most Recent):  Temp: 99.8 °F (37.7 °C) (10/17/19 0301)  Pulse: 91 (10/17/19 0754)  Resp: 12 (10/17/19 0754)  BP: 133/84 (10/17/19 0600)  SpO2: 96 % (10/17/19 0754) Vital Signs (24h Range):  Temp:  [98 °F (36.7 °C)-100 °F (37.8 °C)] 99.8 °F (37.7 °C)  Pulse:  [] 91  Resp:  [12-32] 12  SpO2:  [93 %-100 %] 96 %  BP: (131-223)/() 133/84     Weight: 126.6 kg (279 lb 1.6 oz)  Body mass index is 46.45 kg/m².      Intake/Output Summary (Last 24 hours) at 10/17/2019 0923  Last data filed at 10/17/2019 0001  Gross per 24 hour   Intake 2020 ml   Output 2000 ml   Net 20 ml       Physical Exam   Constitutional: She appears well-nourished. No distress.   HENT:   Macroglossic. 7.5 ETT in place   Eyes:   EOM unable to cross midline to the right   Neck: No JVD present. No tracheal deviation present.   Cardiovascular: Normal rate, regular rhythm and intact distal pulses.   Pulmonary/Chest: Effort normal and breath sounds normal. No stridor. She has no wheezes. She has no rales. She exhibits no tenderness.   Abdominal: Soft. Bowel sounds are normal.   Musculoskeletal:   Left hand and left foot movement on command.    Lymphadenopathy:     She has no cervical adenopathy.   Neurological:   Squeezes left hand and moves left foot on command. Unable to move right side. Unable to shake head yes or no. Unable to move tongue.    Skin: Skin is warm. Capillary refill takes less than 2 seconds.   Psychiatric:   intubated        Vents:  Vent Mode: PRVC (10/17/19 0800)  Ventilator Initiated: Yes (10/11/19 0213)  Set Rate: 15 bmp (10/17/19 0420)  Vt Set: 320 mL (10/17/19 0420)  PEEP/CPAP: 5 cmH20 (10/17/19 0420)  Oxygen Concentration (%): 25 (10/17/19 0600)  Peak Airway Pressure: 13.2 cmH2O (10/17/19 0420)  Total Ve: 5.6 mL (10/17/19 0420)  F/VT Ratio<105 (RSBI): 343.28 (10/17/19 0420)    Lines/Drains/Airways     Drain                 NG/OG Tube 10/11/19 0215 orogastric 18 Fr. Left mouth 6 days         Urethral Catheter 10/11/19 0230 16 Fr. 6 days          Airway                 Airway - Non-Surgical 10/11/19 0204 Endotracheal Tube-Hi/Lo 6 days          Peripheral Intravenous Line                 Peripheral IV - Single Lumen 10/15/19 0800 20 G Anterior;Left Upper Arm 2 days         Peripheral IV - Single Lumen 10/16/19 0730 20 G Anterior;Right Forearm 1 day                Significant Labs:    CBC/Anemia Profile:  Recent Labs   Lab 10/16/19  0209 10/17/19  0235   WBC 15.57* 17.39*   HGB 12.6 12.8   HCT 42.3 43.3   * 165   MCV 66* 67*   RDW 18.4* 18.6*        Chemistries:  Recent Labs   Lab 10/16/19  0209 10/17/19  0235    139   K 3.8 3.9   CL 99 96   CO2 33* 33*   BUN 32* 51*   CREATININE 1.8* 2.0*   CALCIUM 10.1 10.2       All pertinent labs within the past 24 hours have been reviewed.    Significant Imaging:  I have reviewed and interpreted all pertinent imaging results/findings within the past 24 hours.    Assessment/Plan:     CVA (cerebral vascular accident)  Suspect this is the reason she was drooling and was macroglossic on arrival, and not angioedema. Tongue size is unchanged.   SETscore >8 on arrival, indicating higher likelihood of extubation failure. In the setting of no improvement in right sided strength, neck weakness, continued inability to manipulate her tongue, and difficulty with initial intubation, I favor proceeding with early tracheostomy.  - ENT consulted for evaluation for tracheostomy  - Discussed with  family, who are amenable.   - Informed family the possibility that she does not regain function and to discuss her wishes amongst themselves, especially if she is unable to communicate.    * Acute on chronic respiratory failure  Intubated for airway protection and concern for angioedema vs dysphagia related to new CVA (more likely). Good lung mechanics and has a cuff leak. Sputum w/ MSSA    - LPV. Wean FiO2 to maintain SpO2 >88%   - Started on ancef 10/16 for 7 day course  - Secretions improved, afebrile, VSS   - Hold diuresis given metabolic alkalosis  - Duonebs  - ENT eval as above      Presumed Angio-edema on arrival  No identified precipitant of angioedema. Seems more likely that drooling and dysarthria are reflective of CVA  - Oropharyngeal exam without edema on assessment but poor oral tone. + cuff leak.   - s/p 48 hours of corticosteroids and H2 antagonist, off therapy with no change in macroglossia  - ENT eval as above    Essential hypertension  --Amlodipine and Lopressor started 10/16, increased today  --BP goal 140-180  --PRN labetalol     Tube feeds  DVT/GI ppx  PIVs  PT- ok to eval while on ventilator      Critical Care Time: 45 minutes  Critical care was time spent personally by me on the following activities: evaluating this patient's organ dysfunction, development of treatment plan, discussing treatment plan with patient or surrogate and bedside caregivers, discussions with consultants, evaluation of patient's response to treatment, examination of patient, ordering and performing treatments and interventions, ordering and review of laboratory studies, ordering and review of radiographic studies, re-evaluation of patient's condition. This critical care time did not overlap with that of any other provider or involve time for any procedures.           Darrel Castillo MD  Pulmonology  [facility]

## 2019-10-17 NOTE — ASSESSMENT & PLAN NOTE
Intubated for airway protection and concern for angioedema vs dysphagia related to new CVA (more likely). Good lung mechanics and has a cuff leak. Sputum w/ MSSA    - LPV. Wean FiO2 to maintain SpO2 >88%   - Started on ancef 10/16 for 7 day course  - Secretions improved, afebrile, VSS   - Hold diuresis given metabolic alkalosis  - Duonebs  - ENT eval as above

## 2019-10-17 NOTE — ASSESSMENT & PLAN NOTE
Uncontrolled with hyperglycemia likely due to dose of dexamethasone given  HGBA1c 6.9%  Will increase detemir and continue sliding scale insulin  Will make further adjustments to get glucose and range of 140-180

## 2019-10-17 NOTE — ASSESSMENT & PLAN NOTE
Creatinine more elevated at 1.9, baseline 1.4-1.7  Had slight improvement now steadily trending up  CO2 level steadily increasing and patient negative balance  Will hold IV Lasix for now and continue to monitor with repeat labs

## 2019-10-17 NOTE — ASSESSMENT & PLAN NOTE
MRI of the brain with moderate-sized area of subacute infarction involving the left subinsular white matter extending to the periventricular white matter  Allow for permissive hypertension along with treatment with aspirin and statin  Appreciate Neurology input  Resume blood pressure medications for tighter blood pressure control  Weaning from vent may be hindered by new CVA

## 2019-10-17 NOTE — PROGRESS NOTES
"[facility]  Progress Note    Patient Name: Sherrie Alex  MRN: [id number]  Patient Class: IP- Inpatient   Admission Date: 10/11/2019  Length of Stay: 6 days  Attending Physician: Vee Arnett MD  Primary Care Provider: Desmond Yang MD        Subjective:     Principal Problem:Acute on chronic respiratory failure        HPI:  patient is a 69 y.o female who presents to the ED complaining of tongue swelling that began PTA. Patient has slurred speech, drooling, and tongue swelling. Patient denies any pain, itching, CP, nausea, vomiting, or fever. Per sisters, patient is normally talkative and active. Her sisters are unaware of any cause of onset, stating that the patient lives alone. PMHx of CVA 1 yr ago, DM, HTN, HLD, and obesity. Patient is allergic to codeine and ace inhibitors.      The history is provided by the patient and a relative. History limited by: Acuity of condition. No  was used.     Pt intubated in ED for airway protection ?laryngeal edema - not noted in ED documentation.  Pt started on IV steroids and H2 blocker on vent. In ICU, pt showed sluggish neuro response to pain and unable to follow commands. Propofol taken off and neuro status improved. Pulm consulted for vent management. Neuro consulted - h/o seizures.  Seen in ED yesterday am with "leg shaking" and weakness and dc'd home. H/o seizures on vimpat.      Overview/Hospital Course:  Ms. Alex admitted acute neuro changes and resp compromise. Intubated in ED. Serial neuro testing off sedation suggest right sided weakness > left. MRI:Moderate-sized area of subacute infarction involving the left subinsular white matter extending to the periventricular white matter. No MR evidence for hemorrhage.Neurology notified of findings. Allow for permissive HTN initially and will start to resume BP meds. Cannot extubate at this point in time, continue IV lasix diuresis, ET culture sent with " climbing WBCs, possible aspiration. Dr. Bauer met with family and discussed plan to continue attempts to wean from vent, though neuro status/effects of CVA may be limiting factor. If unable to wean from vent, a tracheostomy and gastrostomy tube would be an option.  Blood pressure medication slowly resumed.    Interval History: Unable to extubate, antibiotics started for positive sputum culture given high secretions.    Review of Systems   Unable to perform ROS: Intubated     Objective:     Vital Signs (Most Recent):  Temp: 100 °F (37.8 °C) (10/16/19 1930)  Pulse: 93 (10/17/19 0003)  Resp: 19 (10/17/19 0003)  BP: (!) 142/84 (10/17/19 0003)  SpO2: (!) 94 % (10/17/19 0003) Vital Signs (24h Range):  Temp:  [98 °F (36.7 °C)-100 °F (37.8 °C)] 100 °F (37.8 °C)  Pulse:  [] 93  Resp:  [15-32] 19  SpO2:  [93 %-100 %] 94 %  BP: (131-261)/() 142/84     Weight: 126.6 kg (279 lb 1.6 oz)  Body mass index is 46.45 kg/m².    Intake/Output Summary (Last 24 hours) at 10/17/2019 0205  Last data filed at 10/16/2019 2100  Gross per 24 hour   Intake 2400 ml   Output 3675 ml   Net -1275 ml      Physical Exam   Constitutional: She appears well-developed. No distress.   Obese   HENT:   Head: Normocephalic and atraumatic.   ET tube in place. Lips/tongue without significant edema. Mild tongue protrusion.   Eyes: Pupils are equal, round, and reactive to light. Conjunctivae and EOM are normal. Right eye exhibits no discharge. Left eye exhibits no discharge. Scleral icterus is present.   Opened eyes to voice   Neck: Normal range of motion. Neck supple.   Cardiovascular: Normal rate and regular rhythm. Exam reveals no gallop and no friction rub.   No murmur heard.  Distant   Pulmonary/Chest: No stridor. She has no wheezes. She has no rales. She exhibits no tenderness.   Intubated on MV    Abdominal: Soft. Bowel sounds are normal. She exhibits no distension and no mass. There is no tenderness. There is no rebound and no guarding. No  hernia.   Musculoskeletal: She exhibits no edema, tenderness or deformity.   Skin: Skin is warm and dry. Capillary refill takes less than 2 seconds. No rash noted. She is not diaphoretic. No erythema. No pallor.   Nursing note and vitals reviewed.      Significant Labs:   Blood Culture: No results for input(s): LABBLOO in the last 48 hours.  BMP:   Recent Labs   Lab 10/16/19  0209   *      K 3.8   CL 99   CO2 33*   BUN 32*   CREATININE 1.8*   CALCIUM 10.1     CBC:   Recent Labs   Lab 10/15/19  0339 10/16/19  0209   WBC 11.11 15.57*   HGB 12.5 12.6   HCT 42.3 42.3   PLT SEE COMMENT 147*     POCT Glucose:   Recent Labs   Lab 10/16/19  0014 10/16/19  0530 10/16/19  0533   POCTGLUCOSE 231* 289* 298*     Urine Culture: No results for input(s): LABURIN in the last 48 hours.    Significant Imaging: I have reviewed and interpreted all pertinent imaging results/findings within the past 24 hours.      Assessment/Plan:      * Acute on chronic respiratory failure  Intubated for airway protection given concern for angioedema  Patient remains intubated secondary to acute CVA and and patient did not have to angioedema  Patient pauses due to an cultures with increased secretions  Pulmonary following  Patient is morbidly obese with obstructive sleep apnea which may hinder extubation  Pulmonary discuss possible need for trach and PEG with son  Cephazolin added for positive Staph in the sputum and nebulizer treatments  As per Pulmonary    CVA (cerebral vascular accident)  MRI of the brain with moderate-sized area of subacute infarction involving the left subinsular white matter extending to the periventricular white matter  Allow for permissive hypertension along with treatment with aspirin and statin  Appreciate Neurology input  Resume blood pressure medications for tighter blood pressure control  Weaning from vent may be hindered by new CVA    Encephalopathy, metabolic  Secondary to CVA  As discussed above    RYLEE (acute  kidney injury)  Creatinine more elevated at 1.9, baseline 1.4-1.7  Had slight improvement now steadily trending up  CO2 level steadily increasing and patient negative balance  Will hold IV Lasix for now and continue to monitor with repeat labs    Seizure  Continue vimpat  No seizures seen on EEG  Appreciate Neurology following    Angio-edema  Initially thought to have angioedema  Now likely thought to have slight droop associated with stroke and not true angioedema    Acquired hypothyroidism  Resume synthroid   Thyroid function test normal    Essential hypertension  Resume home meds: norvasc, clonidine, hydralazine, toprol  IV PRN hydralazine    Type 2 diabetes mellitus with circulatory disorder, with long-term current use of insulin  Uncontrolled with hyperglycemia likely due to dose of dexamethasone given  HGBA1c 6.9%  Will increase detemir and continue sliding scale insulin  Will make further adjustments to get glucose and range of 140-180    Anxiety  Fentanyl pushes for sedation    Morbid obesity  Discuss weight management after extubation and when mentation is appropriate    Dyslipidemia  Continue rosuvastatin        VTE Risk Mitigation (From admission, onward)         Ordered     heparin (porcine) injection 5,000 Units  Every 12 hours      10/11/19 0617     IP VTE HIGH RISK PATIENT  Once      10/11/19 0532     Place ELIEZER hose  Until discontinued      10/11/19 0532     Place sequential compression device  Until discontinued      10/11/19 0532                Critical care time spent on the evaluation and treatment of severe organ dysfunction, review of pertinent labs and imaging studies, discussions with consulting providers and discussions with patient/family: 45 minutes.      Vee Arnett MD  Department of Hospital Medicine   [facility]

## 2019-10-17 NOTE — ASSESSMENT & PLAN NOTE
Initially thought to have angioedema  Now likely thought to have slight droop associated with stroke and not true angioedema

## 2019-10-17 NOTE — TELEPHONE ENCOUNTER
----- Message from Jazmin Wyatt sent at 10/17/2019  9:35 AM CDT -----  Contact: Self   Consult-     Needs Trachea placed  Room: 280  Gideon Samuel   [phone]

## 2019-10-18 DIAGNOSIS — Z12.11 COLON CANCER SCREENING: ICD-10-CM

## 2019-10-18 LAB
ALLENS TEST: ABNORMAL
ANION GAP SERPL CALC-SCNC: 10 MMOL/L (ref 8–16)
BASOPHILS # BLD AUTO: 0.01 K/UL (ref 0–0.2)
BASOPHILS NFR BLD: 0.1 % (ref 0–1.9)
BUN SERPL-MCNC: 56 MG/DL (ref 8–23)
CALCIUM SERPL-MCNC: 9.9 MG/DL (ref 8.7–10.5)
CHLORIDE SERPL-SCNC: 94 MMOL/L (ref 95–110)
CO2 SERPL-SCNC: 35 MMOL/L (ref 23–29)
CREAT SERPL-MCNC: 1.8 MG/DL (ref 0.5–1.4)
DELSYS: ABNORMAL
DIFFERENTIAL METHOD: ABNORMAL
EOSINOPHIL # BLD AUTO: 0.4 K/UL (ref 0–0.5)
EOSINOPHIL NFR BLD: 2.5 % (ref 0–8)
ERYTHROCYTE [DISTWIDTH] IN BLOOD BY AUTOMATED COUNT: 18.7 % (ref 11.5–14.5)
ERYTHROCYTE [SEDIMENTATION RATE] IN BLOOD BY WESTERGREN METHOD: 15 MM/H
EST. GFR  (AFRICAN AMERICAN): 32 ML/MIN/1.73 M^2
EST. GFR  (NON AFRICAN AMERICAN): 28 ML/MIN/1.73 M^2
FIO2: 25
GLUCOSE SERPL-MCNC: 225 MG/DL (ref 70–110)
HCO3 UR-SCNC: 39.2 MMOL/L (ref 24–28)
HCT VFR BLD AUTO: 44.4 % (ref 37–48.5)
HGB BLD-MCNC: 13.1 G/DL (ref 12–16)
IMM GRANULOCYTES # BLD AUTO: 0.08 K/UL (ref 0–0.04)
IMM GRANULOCYTES NFR BLD AUTO: 0.5 % (ref 0–0.5)
LYMPHOCYTES # BLD AUTO: 1.4 K/UL (ref 1–4.8)
LYMPHOCYTES NFR BLD: 8.2 % (ref 18–48)
MCH RBC QN AUTO: 20 PG (ref 27–31)
MCHC RBC AUTO-ENTMCNC: 29.5 G/DL (ref 32–36)
MCV RBC AUTO: 68 FL (ref 82–98)
MIN VOL: 6.9
MODE: ABNORMAL
MONOCYTES # BLD AUTO: 1.8 K/UL (ref 0.3–1)
MONOCYTES NFR BLD: 10.9 % (ref 4–15)
NEUTROPHILS # BLD AUTO: 12.8 K/UL (ref 1.8–7.7)
NEUTROPHILS NFR BLD: 77.8 % (ref 38–73)
NRBC BLD-RTO: 0 /100 WBC
PCO2 BLDA: 55.7 MMHG (ref 35–45)
PEEP: 5
PH SMN: 7.46 [PH] (ref 7.35–7.45)
PIP: 10
PLATELET # BLD AUTO: 171 K/UL (ref 150–350)
PMV BLD AUTO: ABNORMAL FL (ref 9.2–12.9)
PO2 BLDA: 68 MMHG (ref 80–100)
POC BE: 13 MMOL/L
POC SATURATED O2: 94 % (ref 95–100)
POC TCO2: 41 MMOL/L (ref 23–27)
POCT GLUCOSE: 191 MG/DL (ref 70–110)
POCT GLUCOSE: 202 MG/DL (ref 70–110)
POCT GLUCOSE: 222 MG/DL (ref 70–110)
POCT GLUCOSE: 242 MG/DL (ref 70–110)
POCT GLUCOSE: 278 MG/DL (ref 70–110)
POTASSIUM SERPL-SCNC: 3.8 MMOL/L (ref 3.5–5.1)
RBC # BLD AUTO: 6.56 M/UL (ref 4–5.4)
SAMPLE: ABNORMAL
SITE: ABNORMAL
SODIUM SERPL-SCNC: 139 MMOL/L (ref 136–145)
SP02: 96
VT: 320
WBC # BLD AUTO: 16.48 K/UL (ref 3.9–12.7)

## 2019-10-18 PROCEDURE — 63600175 PHARM REV CODE 636 W HCPCS: Performed by: HOSPITALIST

## 2019-10-18 PROCEDURE — 99233 SBSQ HOSP IP/OBS HIGH 50: CPT | Mod: ,,, | Performed by: INTERNAL MEDICINE

## 2019-10-18 PROCEDURE — 99233 PR SUBSEQUENT HOSPITAL CARE,LEVL III: ICD-10-PCS | Mod: ,,, | Performed by: INTERNAL MEDICINE

## 2019-10-18 PROCEDURE — 25000003 PHARM REV CODE 250: Performed by: HOSPITALIST

## 2019-10-18 PROCEDURE — 25000003 PHARM REV CODE 250: Performed by: NURSE PRACTITIONER

## 2019-10-18 PROCEDURE — 85025 COMPLETE CBC W/AUTO DIFF WBC: CPT

## 2019-10-18 PROCEDURE — 27200966 HC CLOSED SUCTION SYSTEM

## 2019-10-18 PROCEDURE — 36600 WITHDRAWAL OF ARTERIAL BLOOD: CPT

## 2019-10-18 PROCEDURE — 99900026 HC AIRWAY MAINTENANCE (STAT)

## 2019-10-18 PROCEDURE — 94640 AIRWAY INHALATION TREATMENT: CPT

## 2019-10-18 PROCEDURE — 25000003 PHARM REV CODE 250: Performed by: INTERNAL MEDICINE

## 2019-10-18 PROCEDURE — 94761 N-INVAS EAR/PLS OXIMETRY MLT: CPT

## 2019-10-18 PROCEDURE — 25000003 PHARM REV CODE 250: Performed by: PSYCHIATRY & NEUROLOGY

## 2019-10-18 PROCEDURE — 94003 VENT MGMT INPAT SUBQ DAY: CPT

## 2019-10-18 PROCEDURE — 80048 BASIC METABOLIC PNL TOTAL CA: CPT

## 2019-10-18 PROCEDURE — 20000000 HC ICU ROOM

## 2019-10-18 PROCEDURE — 97110 THERAPEUTIC EXERCISES: CPT

## 2019-10-18 PROCEDURE — 25000242 PHARM REV CODE 250 ALT 637 W/ HCPCS: Performed by: NURSE PRACTITIONER

## 2019-10-18 PROCEDURE — 82803 BLOOD GASES ANY COMBINATION: CPT

## 2019-10-18 PROCEDURE — 36415 COLL VENOUS BLD VENIPUNCTURE: CPT

## 2019-10-18 PROCEDURE — 99900035 HC TECH TIME PER 15 MIN (STAT)

## 2019-10-18 PROCEDURE — S0028 INJECTION, FAMOTIDINE, 20 MG: HCPCS | Performed by: HOSPITALIST

## 2019-10-18 RX ADMIN — CLONIDINE HYDROCHLORIDE 0.3 MG: 0.1 TABLET ORAL at 08:10

## 2019-10-18 RX ADMIN — LABETALOL HYDROCHLORIDE 20 MG: 5 INJECTION INTRAVENOUS at 03:10

## 2019-10-18 RX ADMIN — LABETALOL HYDROCHLORIDE 20 MG: 5 INJECTION INTRAVENOUS at 07:10

## 2019-10-18 RX ADMIN — INSULIN ASPART 4 UNITS: 100 INJECTION, SOLUTION INTRAVENOUS; SUBCUTANEOUS at 01:10

## 2019-10-18 RX ADMIN — HEPARIN SODIUM 5000 UNITS: 5000 INJECTION INTRAVENOUS; SUBCUTANEOUS at 09:10

## 2019-10-18 RX ADMIN — CEFAZOLIN SODIUM 1 G: 1 SOLUTION INTRAVENOUS at 07:10

## 2019-10-18 RX ADMIN — HEPARIN SODIUM 5000 UNITS: 5000 INJECTION INTRAVENOUS; SUBCUTANEOUS at 08:10

## 2019-10-18 RX ADMIN — CHLORHEXIDINE GLUCONATE 0.12% ORAL RINSE 15 ML: 1.2 LIQUID ORAL at 08:10

## 2019-10-18 RX ADMIN — FENTANYL CITRATE 25 MCG: 50 INJECTION INTRAMUSCULAR; INTRAVENOUS at 08:10

## 2019-10-18 RX ADMIN — ASPIRIN 325 MG ORAL TABLET 325 MG: 325 PILL ORAL at 08:10

## 2019-10-18 RX ADMIN — METOPROLOL TARTRATE 100 MG: 50 TABLET ORAL at 08:10

## 2019-10-18 RX ADMIN — IPRATROPIUM BROMIDE AND ALBUTEROL SULFATE 3 ML: .5; 3 SOLUTION RESPIRATORY (INHALATION) at 01:10

## 2019-10-18 RX ADMIN — CEFAZOLIN SODIUM 1 G: 1 SOLUTION INTRAVENOUS at 03:10

## 2019-10-18 RX ADMIN — INSULIN ASPART 2 UNITS: 100 INJECTION, SOLUTION INTRAVENOUS; SUBCUTANEOUS at 11:10

## 2019-10-18 RX ADMIN — IPRATROPIUM BROMIDE AND ALBUTEROL SULFATE 3 ML: .5; 3 SOLUTION RESPIRATORY (INHALATION) at 12:10

## 2019-10-18 RX ADMIN — SENNOSIDES, DOCUSATE SODIUM 1 TABLET: 50; 8.6 TABLET, FILM COATED ORAL at 09:10

## 2019-10-18 RX ADMIN — IPRATROPIUM BROMIDE AND ALBUTEROL SULFATE 3 ML: .5; 3 SOLUTION RESPIRATORY (INHALATION) at 07:10

## 2019-10-18 RX ADMIN — CEFAZOLIN SODIUM 1 G: 1 SOLUTION INTRAVENOUS at 11:10

## 2019-10-18 RX ADMIN — SENNOSIDES, DOCUSATE SODIUM 1 TABLET: 50; 8.6 TABLET, FILM COATED ORAL at 08:10

## 2019-10-18 RX ADMIN — LACOSAMIDE 50 MG: 50 TABLET, FILM COATED ORAL at 08:10

## 2019-10-18 RX ADMIN — POLYETHYLENE GLYCOL 3350 17 G: 17 POWDER, FOR SOLUTION ORAL at 08:10

## 2019-10-18 RX ADMIN — DEXMEDETOMIDINE HYDROCHLORIDE 0.4 MCG/KG/HR: 4 INJECTION, SOLUTION INTRAVENOUS at 09:10

## 2019-10-18 RX ADMIN — FENTANYL CITRATE 25 MCG: 50 INJECTION INTRAMUSCULAR; INTRAVENOUS at 06:10

## 2019-10-18 RX ADMIN — FENTANYL CITRATE 25 MCG: 50 INJECTION INTRAMUSCULAR; INTRAVENOUS at 03:10

## 2019-10-18 RX ADMIN — FAMOTIDINE 20 MG: 10 INJECTION INTRAVENOUS at 08:10

## 2019-10-18 RX ADMIN — INSULIN ASPART 4 UNITS: 100 INJECTION, SOLUTION INTRAVENOUS; SUBCUTANEOUS at 06:10

## 2019-10-18 RX ADMIN — ROSUVASTATIN CALCIUM 20 MG: 10 TABLET, COATED ORAL at 09:10

## 2019-10-18 RX ADMIN — AMLODIPINE BESYLATE 10 MG: 5 TABLET ORAL at 08:10

## 2019-10-18 RX ADMIN — LACOSAMIDE 50 MG: 50 TABLET, FILM COATED ORAL at 09:10

## 2019-10-18 RX ADMIN — INSULIN ASPART 4 UNITS: 100 INJECTION, SOLUTION INTRAVENOUS; SUBCUTANEOUS at 05:10

## 2019-10-18 RX ADMIN — FENTANYL CITRATE 25 MCG: 50 INJECTION INTRAMUSCULAR; INTRAVENOUS at 11:10

## 2019-10-18 NOTE — ASSESSMENT & PLAN NOTE
Uncontrolled with hyperglycemia likely due to dose of dexamethasone given  HGBA1c 6.9%  Will increase detemir to 30 units q.h.s. and continue sliding scale insulin  Will make further adjustments to get glucose and range of 140-180

## 2019-10-18 NOTE — PROGRESS NOTES
[facility]  Pulmonology  Progress Note    Patient Name: Sherrie Alex  MRN: [id number]  Admission Date: 10/11/2019  Hospital Length of Stay: 7 days  Code Status: Full Code  Attending Provider: Vee Arnett MD  Primary Care Provider: Desmond Yang MD   Principal Problem: Acute on chronic respiratory failure    Subjective:     Interval History: BP improved. Evaluated by ENT w/ tentative plan for tracheostomy on Monday. DWAIN overnight    Objective:     Vital Signs (Most Recent):  Temp: 98 °F (36.7 °C) (10/18/19 0701)  Pulse: 73 (10/18/19 1140)  Resp: 15 (10/18/19 1140)  BP: (!) 155/87 (10/18/19 1015)  SpO2: 98 % (10/18/19 1140) Vital Signs (24h Range):  Temp:  [98 °F (36.7 °C)-98.6 °F (37 °C)] 98 °F (36.7 °C)  Pulse:  [70-96] 73  Resp:  [14-23] 15  SpO2:  [92 %-100 %] 98 %  BP: (108-201)/() 155/87     Weight: 126.6 kg (279 lb 1.6 oz)  Body mass index is 46.45 kg/m².      Intake/Output Summary (Last 24 hours) at 10/18/2019 1214  Last data filed at 10/18/2019 0701  Gross per 24 hour   Intake 2280 ml   Output 525 ml   Net 1755 ml       Physical Exam   Constitutional: She appears well-nourished. No distress.   HENT:   Macroglossic. 7.5 ETT in place   Eyes:   EOM unable to cross midline to the right   Neck: No JVD present. No tracheal deviation present.   Cardiovascular: Normal rate, regular rhythm and intact distal pulses.   Pulmonary/Chest: Effort normal and breath sounds normal. No stridor. She has no wheezes. She has no rales. She exhibits no tenderness.   Abdominal: Soft. Bowel sounds are normal.   Musculoskeletal:   Left hand and left foot movement on command.    Lymphadenopathy:     She has no cervical adenopathy.   Neurological:   Squeezes left hand and moves left foot on command. Unable to move right side. Unable to shake head yes or no. Unable to move tongue.    Skin: Skin is warm. Capillary refill takes less than 2 seconds.   Psychiatric:   intubated       Vents:  Vent Mode: PRVC  (10/18/19 1140)  Ventilator Initiated: Yes (10/11/19 0213)  Set Rate: 15 bmp (10/18/19 1140)  Vt Set: 320 mL (10/18/19 1140)  PEEP/CPAP: 5 cmH20 (10/18/19 1140)  Oxygen Concentration (%): 25 (10/18/19 1140)  Peak Airway Pressure: 14.6 cmH2O (10/18/19 1140)  Total Ve: 5.3 mL (10/18/19 1140)  F/VT Ratio<105 (RSBI): (!) 46.58 (10/18/19 1140)    Lines/Drains/Airways     Drain                 NG/OG Tube 10/11/19 0215 orogastric 18 Fr. Left mouth 7 days         Urethral Catheter 10/11/19 0230 16 Fr. 7 days          Airway                 Airway - Non-Surgical 10/11/19 0204 Endotracheal Tube-Hi/Lo 7 days          Peripheral Intravenous Line                 Peripheral IV - Single Lumen 10/15/19 0800 20 G Anterior;Left Upper Arm 3 days         Peripheral IV - Single Lumen 10/16/19 0730 20 G Anterior;Right Forearm 2 days                Significant Labs:    CBC/Anemia Profile:  Recent Labs   Lab 10/17/19  0235 10/18/19  0308   WBC 17.39* 16.48*   HGB 12.8 13.1   HCT 43.3 44.4    171   MCV 67* 68*   RDW 18.6* 18.7*        Chemistries:  Recent Labs   Lab 10/17/19  0235 10/18/19  0308    139   K 3.9 3.8   CL 96 94*   CO2 33* 35*   BUN 51* 56*   CREATININE 2.0* 1.8*   CALCIUM 10.2 9.9       All pertinent labs within the past 24 hours have been reviewed.    Significant Imaging:  I have reviewed and interpreted all pertinent imaging results/findings within the past 24 hours.    Assessment/Plan:     * Acute on chronic respiratory failure  Intubated for airway protection and concern for angioedema vs dysphagia related to new CVA (more likely). Good lung mechanics and has a cuff leak. Sputum w/ MSSA    - LPV. Wean FiO2 to maintain SpO2 >88%   - Started on ancef 10/16 for 7 day course  - Secretions improved, afebrile, VSS   - Hold diuresis given metabolic alkalosis  - Duonebs  - ENT eval as above      CVA (cerebral vascular accident)  Suspect this is the reason she was drooling and was macroglossic on arrival, and not  angioedema. Tongue size is unchanged.   SETscore >8 on arrival, indicating higher likelihood of extubation failure. In the setting of no improvement in right sided strength, neck weakness, continued inability to manipulate her tongue, and difficulty with initial intubation, I favor proceeding with early tracheostomy.  - ENT consulted for evaluation of tracheostomy   - Tentative plan for Monday  - Discussed with family, who are amenable.   - Informed family the possibility that she does not regain function and to discuss her wishes amongst themselves, especially if she is unable to communicate.    Encephalopathy, metabolic  2/2 CVA. Improved        Angio-edema  No identified precipitant of angioedema. Seems more likely that drooling and dysarthria are reflective of CVA  - Oropharyngeal exam without edema on assessment but poor oral tone. + cuff leak.   - s/p 48 hours of corticosteroids and H2 antagonist, off therapy with no change in macroglossia  - ENT eval as above           Darrel Castillo MD  Pulmonology  [facility] [place]  cell - [phone]

## 2019-10-18 NOTE — PROGRESS NOTES
"[facility] Medicine  Progress Note    Patient Name: Sherrie Alex  MRN: [id number]  Patient Class: IP- Inpatient   Admission Date: 10/11/2019  Length of Stay: 7 days  Attending Physician: Vee Arnett MD  Primary Care Provider: Desmond Yang MD        Subjective:     Principal Problem:Acute on chronic respiratory failure        HPI:  patient is a 69 y.o female who presents to the ED complaining of tongue swelling that began PTA. Patient has slurred speech, drooling, and tongue swelling. Patient denies any pain, itching, CP, nausea, vomiting, or fever. Per sisters, patient is normally talkative and active. Her sisters are unaware of any cause of onset, stating that the patient lives alone. PMHx of CVA 1 yr ago, DM, HTN, HLD, and obesity. Patient is allergic to codeine and ace inhibitors.      The history is provided by the patient and a relative. History limited by: Acuity of condition. No  was used.     Pt intubated in ED for airway protection ?laryngeal edema - not noted in ED documentation.  Pt started on IV steroids and H2 blocker on vent. In ICU, pt showed sluggish neuro response to pain and unable to follow commands. Propofol taken off and neuro status improved. Pulm consulted for vent management. Neuro consulted - h/o seizures.  Seen in ED yesterday am with "leg shaking" and weakness and dc'd home. H/o seizures on vimpat.      Overview/Hospital Course:  Ms. Alex admitted acute neuro changes and resp compromise. Intubated in ED. Serial neuro testing off sedation suggest right sided weakness > left. MRI:Moderate-sized area of subacute infarction involving the left subinsular white matter extending to the periventricular white matter. No MR evidence for hemorrhage.Neurology notified of findings. Allow for permissive HTN initially and will start to resume BP meds. Cannot extubate at this point in time, continue IV lasix diuresis, ET culture sent with " climbing WBCs, possible aspiration. Dr. Bauer met with family and discussed plan to continue attempts to wean from vent, though neuro status/effects of CVA may be limiting factor. If unable to wean from vent, a tracheostomy and gastrostomy tube would be an option.  Blood pressure medication slowly resumed.    Interval History: Unable to extubate, no acute events overnight.    Review of Systems   Unable to perform ROS: Intubated     Objective:     Vital Signs (Most Recent):  Temp: 98 °F (36.7 °C) (10/17/19 2301)  Pulse: 78 (10/17/19 2301)  Resp: 20 (10/17/19 2301)  BP: (!) 187/96 (10/17/19 2301)  SpO2: 97 % (10/17/19 2301) Vital Signs (24h Range):  Temp:  [97.8 °F (36.6 °C)-99.8 °F (37.7 °C)] 98 °F (36.7 °C)  Pulse:  [] 78  Resp:  [12-27] 20  SpO2:  [92 %-100 %] 97 %  BP: ()/() 187/96     Weight: 126.6 kg (279 lb 1.6 oz)  Body mass index is 46.45 kg/m².    Intake/Output Summary (Last 24 hours) at 10/18/2019 0030  Last data filed at 10/17/2019 2301  Gross per 24 hour   Intake 2345 ml   Output 110 ml   Net 2235 ml      Physical Exam   Constitutional: She appears well-developed. No distress.   Obese   HENT:   Head: Normocephalic and atraumatic.   ET tube in place. Lips/tongue without significant edema. Mild tongue protrusion.   Eyes: Pupils are equal, round, and reactive to light. Conjunctivae and EOM are normal. Right eye exhibits no discharge. Left eye exhibits no discharge. Scleral icterus is present.   Opened eyes to voice   Neck: Normal range of motion. Neck supple.   Cardiovascular: Normal rate and regular rhythm. Exam reveals no gallop and no friction rub.   No murmur heard.  Distant   Pulmonary/Chest: No stridor. She has no wheezes. She has no rales. She exhibits no tenderness.   Intubated on MV    Abdominal: Soft. Bowel sounds are normal. She exhibits no distension and no mass. There is no tenderness. There is no rebound and no guarding. No hernia.   Musculoskeletal: She exhibits no edema,  tenderness or deformity.   Skin: Skin is warm and dry. Capillary refill takes less than 2 seconds. No rash noted. She is not diaphoretic. No erythema. No pallor.   Nursing note and vitals reviewed.      Significant Labs:   Blood Culture: No results for input(s): LABBLOO in the last 48 hours.  BMP:   Recent Labs   Lab 10/17/19  0235   *      K 3.9   CL 96   CO2 33*   BUN 51*   CREATININE 2.0*   CALCIUM 10.2     CBC:   Recent Labs   Lab 10/16/19  0209 10/17/19  0235   WBC 15.57* 17.39*   HGB 12.6 12.8   HCT 42.3 43.3   * 165     POCT Glucose:   Recent Labs   Lab 10/16/19  0533 10/17/19  0719 10/17/19  1804   POCTGLUCOSE 298* 233* 241*     Urine Culture: No results for input(s): LABURIN in the last 48 hours.    Significant Imaging: I have reviewed and interpreted all pertinent imaging results/findings within the past 24 hours.      Assessment/Plan:      * Acute on chronic respiratory failure  Intubated for airway protection given concern for angioedema initially  Patient remains intubated secondary to acute CVA and and patient did not have to angioedema  Positive cultures with increased secretions  Pulmonary following  Patient is morbidly obese with obstructive sleep apnea which may hinder extubation  Pulmonary discussed possible need for trach and PEG with son  Cephazolin added for positive Staph in the sputum and nebulizer treatments  ENT consulted for trach placement with tentative plans for procedure on Monday  As per Pulmonary    CVA (cerebral vascular accident)  MRI of the brain with moderate-sized area of subacute infarction involving the left subinsular white matter extending to the periventricular white matter  Allowed for permissive hypertension along with treatment with aspirin and statin  Appreciate Neurology input  Resumed blood pressure medications for tighter blood pressure control  Weaning from vent hindered by new CVA  And trach and PEG planned    Encephalopathy,  metabolic  Secondary to CVA  As discussed above    RYLEE (acute kidney injury)  Creatinine more elevated at 1.9, baseline 1.4-1.7  Had slight improvement now steadily trending up  CO2 level steadily increasing and patient negative balance  Will hold IV Lasix for now and continue to monitor with repeat labs    Seizure  Continue vimpat  No seizures seen on EEG  Appreciate Neurology following    Angio-edema  Initially thought to have angioedema  Now likely thought to have slight droop associated with stroke and not true angioedema    Acquired hypothyroidism  Resume synthroid   Thyroid function test normal    Essential hypertension  Resume home meds: norvasc, clonidine, hydralazine, toprol  IV PRN hydralazine    Type 2 diabetes mellitus with circulatory disorder, with long-term current use of insulin  Uncontrolled with hyperglycemia likely due to dose of dexamethasone given  HGBA1c 6.9%  Will increase detemir to 30 units q.h.s. and continue sliding scale insulin  Will make further adjustments to get glucose and range of 140-180    Anxiety  Fentanyl pushes for sedation    Morbid obesity  Discuss weight management after extubation and when mentation is appropriate    Dyslipidemia  Continue rosuvastatin        VTE Risk Mitigation (From admission, onward)         Ordered     heparin (porcine) injection 5,000 Units  Every 12 hours      10/11/19 0617     IP VTE HIGH RISK PATIENT  Once      10/11/19 0532     Place ELIEZER hose  Until discontinued      10/11/19 0532     Place sequential compression device  Until discontinued      10/11/19 0532                Critical care time spent on the evaluation and treatment of severe organ dysfunction, review of pertinent labs and imaging studies, discussions with consulting providers and discussions with patient/family: 40 minutes.      Vee Arnett MD  Department of Hospital Medicine   [facility]

## 2019-10-18 NOTE — ASSESSMENT & PLAN NOTE
MRI of the brain with moderate-sized area of subacute infarction involving the left subinsular white matter extending to the periventricular white matter  Allowed for permissive hypertension along with treatment with aspirin and statin  Appreciate Neurology input  Resumed blood pressure medications for tighter blood pressure control  Weaning from vent hindered by new CVA  And trach and PEG planned

## 2019-10-18 NOTE — CARE UPDATE
[facility]  ICU Multidisciplinary Bedside Rounds     UPDATE     Date: 10/18/2019      Plan of care reviewed with the following, Nurse, Charge Nurse and Physician.       Needs/ Goals for the day: Trach placement Monday      Level of Care: Critical Care

## 2019-10-18 NOTE — PT/OT/SLP PROGRESS
Occupational Therapy   Treatment    Name: Sherrie Alex  MRN: [id number]  Admitting Diagnosis:  Acute on chronic respiratory failure       Recommendations:     Discharge Recommendations: (TBD)  Discharge Equipment Recommendations:  (TBD)  Barriers to discharge:  Decreased caregiver support    Assessment:     Sherrie Alex is a 69 y.o. female with a medical diagnosis of Acute on chronic respiratory failure.  The patient was initially alert with her eyes open but the closed her eyes and required verbal cues to open her eyes. The patient was able to follow commands to participate in AAROM to the LUE. No volitional movement was noted in the RUE during PROM. Performance deficits affecting function are weakness, impaired endurance, impaired self care skills, visual deficits, impaired balance, impaired functional mobilty, decreased safety awareness, decreased upper extremity function, decreased lower extremity function, impaired cardiopulmonary response to activity, impaired coordination, impaired fine motor.     Rehab Prognosis:  TBD; patient would benefit from acute skilled OT services to address these deficits and reach maximum level of function.       Plan:     Patient to be seen 3 x/week, 5 x/week to address the above listed problems via self-care/home management, therapeutic activities, therapeutic exercises, neuromuscular re-education  · Plan of Care Expires: 11/15/19  · Plan of Care Reviewed with: patient    Subjective     Pain/Comfort:  · Pain Rating 1: (did not appear to have pain)    Objective:     Communicated with: nurseSmiley prior to session.  Patient found left sidelying with peripheral IV, telemetry, blood pressure cuff, shepard catheter, ventilator, pulse ox (continuous) upon OT entry to room.    General Precautions: Standard, fall, respiratory   Orthopedic Precautions:N/A   Braces: N/A     Occupational Performance:     Bed Mobility:    · N/T    Functional Mobility/Transfers:  · Functional  Mobility: N/T    Activities of Daily Living:  · The patient was dependent to wipe her mouth with suction done by nurse, Smiley.      [facility] 6 Click ADL: 6    Treatment & Education:  The patient participated in AAROM to the LUE x10 reps all planes. The patient was able to assist to give ssome residtance to left elbow flex/ext. The patient was able to actively flex/ext left hand x7 and then appeared to fatigue. The patient received PROM to the RUE x10 reps with no volitional movement/flaccid extremity. The bolton RUE was elevated on a pillow. The patient was able to turn her eyes to the right to ~mid line but did not turn her head.    Patient left left sidelying with all lines intact and call button in reachEducation:      GOALS:   Multidisciplinary Problems     Occupational Therapy Goals        Problem: Occupational Therapy Goal    Goal Priority Disciplines Outcome Interventions   Occupational Therapy Goal     OT, PT/OT Ongoing, Progressing    Description:  Goals to be met by: 11/15/19    Patient will increase functional independence with ADLs by performing:    UE Dressing with Maximum Assistance.  Grooming while in bed with Maximum Assistance.  Sitting at edge of bed : to be assessed as appropriate  Rolling to Bilateral with Maximum Assistance.   Supine to sit :to be assessed as appropriate  Upper extremity exercise program x10 reps per handout, with assistance as needed.                      Time Tracking:     OT Date of Treatment: 10/18/19  OT Start Time: 1538  OT Stop Time: 1554  OT Total Time (min): 16 min    Billable Minutes:Therapeutic Exercise 16    Annika Parson OT  10/18/2019

## 2019-10-18 NOTE — PLAN OF CARE
Pt received on ventilator settings prvc15/tv320/peep5/fio2 25% and remains stable on these settings. Will continue scheduled respiratory treatments as ordered.

## 2019-10-18 NOTE — CHAPLAIN
Trenton Mon, CRT   Respiratory Therapist   Respiratory Therapy   Care Update   Signed                       []Hide copied text    []Cheko for details  Pt received orally intubated with a size 6.5 ET tube, secured at 24 cm at the lips. Pt on Servo I ventilator on documented settings. Alarms are set and functional, Ambu bag and mask at the bedside. NARN at this time, will continue to monitor and wean as tolerated.

## 2019-10-18 NOTE — SUBJECTIVE & OBJECTIVE
Interval History: BP improved. Evaluated by ENT w/ tentative plan for tracheostomy on Monday. DWAIN overnight    Objective:     Vital Signs (Most Recent):  Temp: 98 °F (36.7 °C) (10/18/19 0701)  Pulse: 73 (10/18/19 1140)  Resp: 15 (10/18/19 1140)  BP: (!) 155/87 (10/18/19 1015)  SpO2: 98 % (10/18/19 1140) Vital Signs (24h Range):  Temp:  [98 °F (36.7 °C)-98.6 °F (37 °C)] 98 °F (36.7 °C)  Pulse:  [70-96] 73  Resp:  [14-23] 15  SpO2:  [92 %-100 %] 98 %  BP: (108-201)/() 155/87     Weight: 126.6 kg (279 lb 1.6 oz)  Body mass index is 46.45 kg/m².      Intake/Output Summary (Last 24 hours) at 10/18/2019 1214  Last data filed at 10/18/2019 0701  Gross per 24 hour   Intake 2280 ml   Output 525 ml   Net 1755 ml       Physical Exam   Constitutional: She appears well-nourished. No distress.   HENT:   Macroglossic. 7.5 ETT in place   Eyes:   EOM unable to cross midline to the right   Neck: No JVD present. No tracheal deviation present.   Cardiovascular: Normal rate, regular rhythm and intact distal pulses.   Pulmonary/Chest: Effort normal and breath sounds normal. No stridor. She has no wheezes. She has no rales. She exhibits no tenderness.   Abdominal: Soft. Bowel sounds are normal.   Musculoskeletal:   Left hand and left foot movement on command.    Lymphadenopathy:     She has no cervical adenopathy.   Neurological:   Squeezes left hand and moves left foot on command. Unable to move right side. Unable to shake head yes or no. Unable to move tongue.    Skin: Skin is warm. Capillary refill takes less than 2 seconds.   Psychiatric:   intubated       Vents:  Vent Mode: PRVC (10/18/19 1140)  Ventilator Initiated: Yes (10/11/19 0213)  Set Rate: 15 bmp (10/18/19 1140)  Vt Set: 320 mL (10/18/19 1140)  PEEP/CPAP: 5 cmH20 (10/18/19 1140)  Oxygen Concentration (%): 25 (10/18/19 1140)  Peak Airway Pressure: 14.6 cmH2O (10/18/19 1140)  Total Ve: 5.3 mL (10/18/19 1140)  F/VT Ratio<105 (RSBI): (!) 46.58 (10/18/19  1140)    Lines/Drains/Airways     Drain                 NG/OG Tube 10/11/19 0215 orogastric 18 Fr. Left mouth 7 days         Urethral Catheter 10/11/19 0230 16 Fr. 7 days          Airway                 Airway - Non-Surgical 10/11/19 0204 Endotracheal Tube-Hi/Lo 7 days          Peripheral Intravenous Line                 Peripheral IV - Single Lumen 10/15/19 0800 20 G Anterior;Left Upper Arm 3 days         Peripheral IV - Single Lumen 10/16/19 0730 20 G Anterior;Right Forearm 2 days                Significant Labs:    CBC/Anemia Profile:  Recent Labs   Lab 10/17/19  0235 10/18/19  0308   WBC 17.39* 16.48*   HGB 12.8 13.1   HCT 43.3 44.4    171   MCV 67* 68*   RDW 18.6* 18.7*        Chemistries:  Recent Labs   Lab 10/17/19  0235 10/18/19  0308    139   K 3.9 3.8   CL 96 94*   CO2 33* 35*   BUN 51* 56*   CREATININE 2.0* 1.8*   CALCIUM 10.2 9.9       All pertinent labs within the past 24 hours have been reviewed.    Significant Imaging:  I have reviewed and interpreted all pertinent imaging results/findings within the past 24 hours.

## 2019-10-18 NOTE — PLAN OF CARE
Problem: Occupational Therapy Goal  Goal: Occupational Therapy Goal  Description  Goals to be met by: 11/15/19    Patient will increase functional independence with ADLs by performing:    UE Dressing with Maximum Assistance.  Grooming while in bed with Maximum Assistance.  Sitting at edge of bed : to be assessed as appropriate  Rolling to Bilateral with Maximum Assistance.   Supine to sit :to be assessed as appropriate  Upper extremity exercise program x10 reps per handout, with assistance as needed.     Outcome: Ongoing, Progressing   The patient participated in AAROM to the LUE and PROM to the RUE x10 reps all planes. The patient was alert with verbal cues but closed her eyes frequently during treatment.

## 2019-10-18 NOTE — PLAN OF CARE
Pt remains intubated with minimal FIO2 only at 25%. Pt opens eyes and follows commands able to move toes on left and squeeze hand. Pt with no new skin breakdown noted. Pt has remained safe and free of injury today. Trach placement likely on Monday.

## 2019-10-18 NOTE — ASSESSMENT & PLAN NOTE
Intubated for airway protection given concern for angioedema initially  Patient remains intubated secondary to acute CVA and and patient did not have to angioedema  Positive cultures with increased secretions  Pulmonary following  Patient is morbidly obese with obstructive sleep apnea which may hinder extubation  Pulmonary discussed possible need for trach and PEG with son  Cephazolin added for positive Staph in the sputum and nebulizer treatments  ENT consulted for trach placement with tentative plans for procedure on Monday  As per Pulmonary

## 2019-10-18 NOTE — CARE UPDATE
[facility]  ICU Multidisciplinary Bedside Rounds   SUMMARY     Date: 10/18/2019    Prehospitalization: Home  Admit Date / LOS : 10/11/2019/ 7 days    Diagnosis: Acute on chronic respiratory failure    Consults:        Active: Cardio, Neuro and Wound Care       Needed: N/A     Code Status: Full Code   Advanced Directive: <no information>    LDA: Guevara, OG, PIV and Vent       Central Lines/Site/Justification:Patient Does Not Have Central Line       Urinary Cath/Order/Justification:Critically Ill in ICU    Vasopressors/Infusions:    dexMEDEtomidine in 0.9 % NaCl Stopped (10/15/19 0900)          GOALS: Volume/ Hemodynamic: N/A                     RASS: 0  alert and calm    CAM ICU: N/A  Pain Management: IV       Pain Controlled: yes     Rhythm: NSR / sinus tachycardia    Respiratory Device: Vent    Vent Mode: PRVC  Oxygen Concentration (%):  [25] 25  Resp Rate Total:  [15 br/min-24 br/min] 15 br/min  Vt Set:  [320 mL] 320 mL  PEEP/CPAP:  [5 cmH20] 5 cmH20  Mean Airway Pressure:  [6.6 euZ15-57.6 cmH20] 7 cmH20             Most Recent SBT/ SAT: Fail       MOVE Screen: PASS    VTE Prophylaxis: Pharm and Mechanical  Mobility: Bedrest  Stress Ulcer Prophylaxis: Yes    Dietary: TF  Tolerance: yes  /  Advancement: @ goal    Isolation: No active isolations    Restraints: No    Significant Dates:  Post Op Date: N/A  Rescue Date: N/A  Imaging/ Diagnostics: N/A    Noteworthy Labs:  Please review labs below    CBC/Anemia Labs: Coags:    Recent Labs   Lab 10/16/19  0209 10/17/19  0235 10/18/19  0308   WBC 15.57* 17.39* 16.48*   HGB 12.6 12.8 13.1   HCT 42.3 43.3 44.4   * 165 171   MCV 66* 67* 68*   RDW 18.4* 18.6* 18.7*    No results for input(s): PT, INR, APTT in the last 168 hours.     Chemistries:   Recent Labs   Lab 10/16/19  0209 10/17/19  0235 10/18/19  0308    139 139   K 3.8 3.9 3.8   CL 99 96 94*   CO2 33* 33* 35*   BUN 32* 51* 56*   CREATININE 1.8* 2.0* 1.8*   CALCIUM 10.1 10.2 9.9         Cardiac Enzymes: Ejection Fractions:    No results for input(s): CPK, CPKMB, MB, TROPONINI in the last 72 hours. No results found for: EF     POCT Glucose: HbA1c:    Recent Labs   Lab 10/16/19  0530 10/16/19  0533 10/17/19  0719 10/17/19  1804 10/18/19  0054 10/18/19  0623   POCTGLUCOSE 289* 298* 233* 241* 222* 242*    Hemoglobin A1C   Date Value Ref Range Status   10/02/2019 6.9 (H) 4.0 - 5.6 % Final     Comment:     ADA Screening Guidelines:  5.7-6.4%  Consistent with prediabetes  >or=6.5%  Consistent with diabetes  High levels of fetal hemoglobin interfere with the HbA1C  assay. Heterozygous hemoglobin variants (HbS, HgC, etc)do  not significantly interfere with this assay.   However, presence of multiple variants may affect accuracy.     06/03/2008 6.7 (H) 4.0 - 6.2 % Final   02/13/2006 6.2 4.5 - 6.2 % Final        Needs from Care Team: none     ICU LOS 7d 1h  Level of Care: Critical Care

## 2019-10-18 NOTE — ASSESSMENT & PLAN NOTE
Suspect this is the reason she was drooling and was macroglossic on arrival, and not angioedema. Tongue size is unchanged.   SETscore >8 on arrival, indicating higher likelihood of extubation failure. In the setting of no improvement in right sided strength, neck weakness, continued inability to manipulate her tongue, and difficulty with initial intubation, I favor proceeding with early tracheostomy.  - ENT consulted for evaluation of tracheostomy   - Tentative plan for Monday  - Discussed with family, who are amenable.   - Informed family the possibility that she does not regain function and to discuss her wishes amongst themselves, especially if she is unable to communicate.

## 2019-10-18 NOTE — PLAN OF CARE
Pt has remained afebrile and remains intubated. PRVC FIO2 25% rate 15 PEEP 5 and Tidal volume 320. Pt with elevated BP at times 120-170. Pt medicated several times for pain and had good results. Pt also medicated with labetalol and had good results. Pt has no new skin breakdown noted. Pt has remained safe and free of injury today. Pt repositioned q2hrs.. Sats high 90's thru out day.Pt has been in NSR today.Trach placement Monday. Tolerating TF

## 2019-10-18 NOTE — SUBJECTIVE & OBJECTIVE
Interval History: Unable to extubate, no acute events overnight.    Review of Systems   Unable to perform ROS: Intubated     Objective:     Vital Signs (Most Recent):  Temp: 98 °F (36.7 °C) (10/17/19 2301)  Pulse: 78 (10/17/19 2301)  Resp: 20 (10/17/19 2301)  BP: (!) 187/96 (10/17/19 2301)  SpO2: 97 % (10/17/19 2301) Vital Signs (24h Range):  Temp:  [97.8 °F (36.6 °C)-99.8 °F (37.7 °C)] 98 °F (36.7 °C)  Pulse:  [] 78  Resp:  [12-27] 20  SpO2:  [92 %-100 %] 97 %  BP: ()/() 187/96     Weight: 126.6 kg (279 lb 1.6 oz)  Body mass index is 46.45 kg/m².    Intake/Output Summary (Last 24 hours) at 10/18/2019 0030  Last data filed at 10/17/2019 2301  Gross per 24 hour   Intake 2345 ml   Output 110 ml   Net 2235 ml      Physical Exam   Constitutional: She appears well-developed. No distress.   Obese   HENT:   Head: Normocephalic and atraumatic.   ET tube in place. Lips/tongue without significant edema. Mild tongue protrusion.   Eyes: Pupils are equal, round, and reactive to light. Conjunctivae and EOM are normal. Right eye exhibits no discharge. Left eye exhibits no discharge. Scleral icterus is present.   Opened eyes to voice   Neck: Normal range of motion. Neck supple.   Cardiovascular: Normal rate and regular rhythm. Exam reveals no gallop and no friction rub.   No murmur heard.  Distant   Pulmonary/Chest: No stridor. She has no wheezes. She has no rales. She exhibits no tenderness.   Intubated on MV    Abdominal: Soft. Bowel sounds are normal. She exhibits no distension and no mass. There is no tenderness. There is no rebound and no guarding. No hernia.   Musculoskeletal: She exhibits no edema, tenderness or deformity.   Skin: Skin is warm and dry. Capillary refill takes less than 2 seconds. No rash noted. She is not diaphoretic. No erythema. No pallor.   Nursing note and vitals reviewed.      Significant Labs:   Blood Culture: No results for input(s): LABBLOO in the last 48 hours.  BMP:   Recent Labs    Lab 10/17/19  0235   *      K 3.9   CL 96   CO2 33*   BUN 51*   CREATININE 2.0*   CALCIUM 10.2     CBC:   Recent Labs   Lab 10/16/19  0209 10/17/19  0235   WBC 15.57* 17.39*   HGB 12.6 12.8   HCT 42.3 43.3   * 165     POCT Glucose:   Recent Labs   Lab 10/16/19  0533 10/17/19  0719 10/17/19  1804   POCTGLUCOSE 298* 233* 241*     Urine Culture: No results for input(s): LABURIN in the last 48 hours.    Significant Imaging: I have reviewed and interpreted all pertinent imaging results/findings within the past 24 hours.

## 2019-10-19 LAB
ALLENS TEST: ABNORMAL
ANION GAP SERPL CALC-SCNC: 9 MMOL/L (ref 8–16)
BASOPHILS NFR BLD: 0.2 % (ref 0–1.9)
BUN SERPL-MCNC: 57 MG/DL (ref 8–23)
CALCIUM SERPL-MCNC: 10 MG/DL (ref 8.7–10.5)
CHLORIDE SERPL-SCNC: 96 MMOL/L (ref 95–110)
CO2 SERPL-SCNC: 35 MMOL/L (ref 23–29)
CREAT SERPL-MCNC: 2 MG/DL (ref 0.5–1.4)
DELSYS: ABNORMAL
DIFFERENTIAL METHOD: ABNORMAL
EOSINOPHIL # BLD AUTO: 0.4 K/UL (ref 0–0.5)
EOSINOPHIL NFR BLD: 3.6 % (ref 0–8)
ERYTHROCYTE [DISTWIDTH] IN BLOOD BY AUTOMATED COUNT: 18.4 % (ref 11.5–14.5)
ERYTHROCYTE [SEDIMENTATION RATE] IN BLOOD BY WESTERGREN METHOD: 15 MM/H
EST. GFR  (AFRICAN AMERICAN): 29 ML/MIN/1.73 M^2
EST. GFR  (NON AFRICAN AMERICAN): 25 ML/MIN/1.73 M^2
FIO2: 25
GLUCOSE SERPL-MCNC: 221 MG/DL (ref 70–110)
HCO3 UR-SCNC: 38.4 MMOL/L (ref 24–28)
HCT VFR BLD AUTO: 41.7 % (ref 37–48.5)
HGB BLD-MCNC: 12 G/DL (ref 12–16)
IMM GRANULOCYTES # BLD AUTO: 0.07 K/UL (ref 0–0.04)
IMM GRANULOCYTES NFR BLD AUTO: 0.6 % (ref 0–0.5)
LYMPHOCYTES # BLD AUTO: 1.2 K/UL (ref 1–4.8)
LYMPHOCYTES NFR BLD: 10.7 % (ref 18–48)
MCH RBC QN AUTO: 19.7 PG (ref 27–31)
MCHC RBC AUTO-ENTMCNC: 28.8 G/DL (ref 32–36)
MCV RBC AUTO: 69 FL (ref 82–98)
MODE: ABNORMAL
MONOCYTES # BLD AUTO: 1.3 K/UL (ref 0.3–1)
MONOCYTES NFR BLD: 11.5 % (ref 4–15)
NEUTROPHILS # BLD AUTO: 8 K/UL (ref 1.8–7.7)
NEUTROPHILS NFR BLD: 73.4 % (ref 38–73)
NRBC BLD-RTO: 0 /100 WBC
PCO2 BLDA: 53.3 MMHG (ref 35–45)
PEEP: 5
PH SMN: 7.47 [PH] (ref 7.35–7.45)
PLATELET # BLD AUTO: 147 K/UL (ref 150–350)
PMV BLD AUTO: ABNORMAL FL (ref 9.2–12.9)
PO2 BLDA: 69 MMHG (ref 80–100)
POC BE: 12 MMOL/L
POC SATURATED O2: 94 % (ref 95–100)
POC TCO2: 40 MMOL/L (ref 23–27)
POCT GLUCOSE: 200 MG/DL (ref 70–110)
POCT GLUCOSE: 203 MG/DL (ref 70–110)
POCT GLUCOSE: 205 MG/DL (ref 70–110)
POCT GLUCOSE: 215 MG/DL (ref 70–110)
POCT GLUCOSE: 251 MG/DL (ref 70–110)
POCT GLUCOSE: 324 MG/DL (ref 70–110)
POCT GLUCOSE: 376 MG/DL (ref 70–110)
POTASSIUM SERPL-SCNC: 4.3 MMOL/L (ref 3.5–5.1)
RBC # BLD AUTO: 6.09 M/UL (ref 4–5.4)
SAMPLE: ABNORMAL
SITE: ABNORMAL
SODIUM SERPL-SCNC: 140 MMOL/L (ref 136–145)
VT: 320
WBC # BLD AUTO: 10.94 K/UL (ref 3.9–12.7)

## 2019-10-19 PROCEDURE — 82803 BLOOD GASES ANY COMBINATION: CPT

## 2019-10-19 PROCEDURE — 97110 THERAPEUTIC EXERCISES: CPT

## 2019-10-19 PROCEDURE — 25000003 PHARM REV CODE 250: Performed by: HOSPITALIST

## 2019-10-19 PROCEDURE — 63600175 PHARM REV CODE 636 W HCPCS: Performed by: HOSPITALIST

## 2019-10-19 PROCEDURE — 27000221 HC OXYGEN, UP TO 24 HOURS

## 2019-10-19 PROCEDURE — 36600 WITHDRAWAL OF ARTERIAL BLOOD: CPT

## 2019-10-19 PROCEDURE — 36415 COLL VENOUS BLD VENIPUNCTURE: CPT

## 2019-10-19 PROCEDURE — 80048 BASIC METABOLIC PNL TOTAL CA: CPT

## 2019-10-19 PROCEDURE — 99900035 HC TECH TIME PER 15 MIN (STAT)

## 2019-10-19 PROCEDURE — 25000003 PHARM REV CODE 250: Performed by: INTERNAL MEDICINE

## 2019-10-19 PROCEDURE — 99900026 HC AIRWAY MAINTENANCE (STAT)

## 2019-10-19 PROCEDURE — 94640 AIRWAY INHALATION TREATMENT: CPT

## 2019-10-19 PROCEDURE — 94761 N-INVAS EAR/PLS OXIMETRY MLT: CPT

## 2019-10-19 PROCEDURE — S0028 INJECTION, FAMOTIDINE, 20 MG: HCPCS | Performed by: HOSPITALIST

## 2019-10-19 PROCEDURE — 85025 COMPLETE CBC W/AUTO DIFF WBC: CPT

## 2019-10-19 PROCEDURE — 25000003 PHARM REV CODE 250: Performed by: PSYCHIATRY & NEUROLOGY

## 2019-10-19 PROCEDURE — 20000000 HC ICU ROOM

## 2019-10-19 PROCEDURE — 94003 VENT MGMT INPAT SUBQ DAY: CPT

## 2019-10-19 PROCEDURE — 25000242 PHARM REV CODE 250 ALT 637 W/ HCPCS: Performed by: NURSE PRACTITIONER

## 2019-10-19 PROCEDURE — 27200966 HC CLOSED SUCTION SYSTEM

## 2019-10-19 RX ADMIN — INSULIN ASPART 4 UNITS: 100 INJECTION, SOLUTION INTRAVENOUS; SUBCUTANEOUS at 05:10

## 2019-10-19 RX ADMIN — IPRATROPIUM BROMIDE AND ALBUTEROL SULFATE 3 ML: .5; 3 SOLUTION RESPIRATORY (INHALATION) at 01:10

## 2019-10-19 RX ADMIN — POLYETHYLENE GLYCOL 3350 17 G: 17 POWDER, FOR SOLUTION ORAL at 08:10

## 2019-10-19 RX ADMIN — HEPARIN SODIUM 5000 UNITS: 5000 INJECTION INTRAVENOUS; SUBCUTANEOUS at 08:10

## 2019-10-19 RX ADMIN — IPRATROPIUM BROMIDE AND ALBUTEROL SULFATE 3 ML: .5; 3 SOLUTION RESPIRATORY (INHALATION) at 07:10

## 2019-10-19 RX ADMIN — INSULIN ASPART 1 UNITS: 100 INJECTION, SOLUTION INTRAVENOUS; SUBCUTANEOUS at 11:10

## 2019-10-19 RX ADMIN — CLONIDINE HYDROCHLORIDE 0.3 MG: 0.1 TABLET ORAL at 08:10

## 2019-10-19 RX ADMIN — LACOSAMIDE 50 MG: 50 TABLET, FILM COATED ORAL at 08:10

## 2019-10-19 RX ADMIN — CEFAZOLIN SODIUM 1 G: 1 SOLUTION INTRAVENOUS at 03:10

## 2019-10-19 RX ADMIN — DEXMEDETOMIDINE HYDROCHLORIDE 0.4 MCG/KG/HR: 4 INJECTION, SOLUTION INTRAVENOUS at 04:10

## 2019-10-19 RX ADMIN — METOPROLOL TARTRATE 100 MG: 50 TABLET ORAL at 08:10

## 2019-10-19 RX ADMIN — CEFAZOLIN SODIUM 1 G: 1 SOLUTION INTRAVENOUS at 11:10

## 2019-10-19 RX ADMIN — INSULIN ASPART 4 UNITS: 100 INJECTION, SOLUTION INTRAVENOUS; SUBCUTANEOUS at 06:10

## 2019-10-19 RX ADMIN — DEXMEDETOMIDINE HYDROCHLORIDE 0.4 MCG/KG/HR: 4 INJECTION, SOLUTION INTRAVENOUS at 10:10

## 2019-10-19 RX ADMIN — DEXMEDETOMIDINE HYDROCHLORIDE 0.4 MCG/KG/HR: 4 INJECTION, SOLUTION INTRAVENOUS at 05:10

## 2019-10-19 RX ADMIN — SENNOSIDES, DOCUSATE SODIUM 1 TABLET: 50; 8.6 TABLET, FILM COATED ORAL at 08:10

## 2019-10-19 RX ADMIN — ASPIRIN 325 MG ORAL TABLET 325 MG: 325 PILL ORAL at 08:10

## 2019-10-19 RX ADMIN — INSULIN ASPART 4 UNITS: 100 INJECTION, SOLUTION INTRAVENOUS; SUBCUTANEOUS at 11:10

## 2019-10-19 RX ADMIN — AMLODIPINE BESYLATE 10 MG: 5 TABLET ORAL at 08:10

## 2019-10-19 RX ADMIN — ROSUVASTATIN CALCIUM 20 MG: 10 TABLET, COATED ORAL at 08:10

## 2019-10-19 RX ADMIN — FAMOTIDINE 20 MG: 10 INJECTION INTRAVENOUS at 08:10

## 2019-10-19 RX ADMIN — CHLORHEXIDINE GLUCONATE 0.12% ORAL RINSE 15 ML: 1.2 LIQUID ORAL at 08:10

## 2019-10-19 RX ADMIN — CEFAZOLIN SODIUM 1 G: 1 SOLUTION INTRAVENOUS at 08:10

## 2019-10-19 RX ADMIN — DEXMEDETOMIDINE HYDROCHLORIDE 0.4 MCG/KG/HR: 4 INJECTION, SOLUTION INTRAVENOUS at 08:10

## 2019-10-19 NOTE — PROGRESS NOTES
"[facility] Medicine  Progress Note    Patient Name: Sherrie Alex  MRN: [id number]  Patient Class: IP- Inpatient   Admission Date: 10/11/2019  Length of Stay: 8 days  Attending Physician: Vee Arnett MD  Primary Care Provider: Desmond Yang MD        Subjective:     Principal Problem:Acute on chronic respiratory failure        HPI:  patient is a 69 y.o female who presents to the ED complaining of tongue swelling that began PTA. Patient has slurred speech, drooling, and tongue swelling. Patient denies any pain, itching, CP, nausea, vomiting, or fever. Per sisters, patient is normally talkative and active. Her sisters are unaware of any cause of onset, stating that the patient lives alone. PMHx of CVA 1 yr ago, DM, HTN, HLD, and obesity. Patient is allergic to codeine and ace inhibitors.      The history is provided by the patient and a relative. History limited by: Acuity of condition. No  was used.     Pt intubated in ED for airway protection ?laryngeal edema - not noted in ED documentation.  Pt started on IV steroids and H2 blocker on vent. In ICU, pt showed sluggish neuro response to pain and unable to follow commands. Propofol taken off and neuro status improved. Pulm consulted for vent management. Neuro consulted - h/o seizures.  Seen in ED yesterday am with "leg shaking" and weakness and dc'd home. H/o seizures on vimpat.      Overview/Hospital Course:  Ms. Alex admitted acute neuro changes and resp compromise. Intubated in ED. Serial neuro testing off sedation suggest right sided weakness > left. MRI:Moderate-sized area of subacute infarction involving the left subinsular white matter extending to the periventricular white matter. No MR evidence for hemorrhage.Neurology notified of findings. Allow for permissive HTN initially and will start to resume BP meds. Cannot extubate at this point in time, continue IV lasix diuresis, ET culture sent with " climbing WBCs, possible aspiration. Dr. Bauer met with family and discussed plan to continue attempts to wean from vent, though neuro status/effects of CVA may be limiting factor. If unable to wean from vent, a tracheostomy and gastrostomy tube would be an option.  Blood pressure medication slowly resumed.  ENT plans for trach on Monday.    Interval History: No acute events overnight.    Review of Systems   Unable to perform ROS: Intubated     Objective:     Vital Signs (Most Recent):  Temp: 98 °F (36.7 °C) (10/19/19 1200)  Pulse: 62 (10/19/19 1324)  Resp: 18 (10/19/19 1324)  BP: 128/77 (10/19/19 1230)  SpO2: 99 % (10/19/19 1324) Vital Signs (24h Range):  Temp:  [97.7 °F (36.5 °C)-98.7 °F (37.1 °C)] 98 °F (36.7 °C)  Pulse:  [62-83] 62  Resp:  [15-24] 18  SpO2:  [95 %-100 %] 99 %  BP: ()/(50-98) 128/77     Weight: 119.2 kg (262 lb 12.6 oz)  Body mass index is 43.73 kg/m².    Intake/Output Summary (Last 24 hours) at 10/19/2019 1341  Last data filed at 10/19/2019 1300  Gross per 24 hour   Intake 2675.69 ml   Output 1720 ml   Net 955.69 ml      Physical Exam   Constitutional: She appears well-developed. No distress.   Obese   HENT:   Head: Normocephalic and atraumatic.   ET tube in place. Lips/tongue without significant edema. Mild tongue protrusion.   Eyes: Pupils are equal, round, and reactive to light. Conjunctivae and EOM are normal. Right eye exhibits no discharge. Left eye exhibits no discharge. Scleral icterus is present.   Opened eyes to voice   Neck: Normal range of motion. Neck supple.   Cardiovascular: Normal rate and regular rhythm. Exam reveals no gallop and no friction rub.   No murmur heard.  Distant   Pulmonary/Chest: No stridor. She has no wheezes. She has no rales. She exhibits no tenderness.   Intubated on MV    Abdominal: Soft. Bowel sounds are normal. She exhibits no distension and no mass. There is no tenderness. There is no rebound and no guarding. No hernia.   Musculoskeletal: She  exhibits no edema, tenderness or deformity.   Skin: Skin is warm and dry. Capillary refill takes less than 2 seconds. No rash noted. She is not diaphoretic. No erythema. No pallor.   Nursing note and vitals reviewed.      Significant Labs:   Blood Culture: No results for input(s): LABBLOO in the last 48 hours.  BMP:   Recent Labs   Lab 10/19/19  0526   *      K 4.3   CL 96   CO2 35*   BUN 57*   CREATININE 2.0*   CALCIUM 10.0     CBC:   Recent Labs   Lab 10/18/19  0308 10/19/19  0526   WBC 16.48* 10.94   HGB 13.1 12.0   HCT 44.4 41.7    147*     POCT Glucose:   Recent Labs   Lab 10/18/19  1729 10/18/19  2347 10/19/19  0557   POCTGLUCOSE 202* 205* 215*     Urine Culture: No results for input(s): LABURIN in the last 48 hours.    Significant Imaging: I have reviewed and interpreted all pertinent imaging results/findings within the past 24 hours.      Assessment/Plan:      * Acute on chronic respiratory failure  Intubated for airway protection given concern for angioedema initially  Patient remains intubated secondary to acute CVA and and patient did not have to angioedema  Positive cultures with increased secretions  Pulmonary following  Patient is morbidly obese with obstructive sleep apnea which may hinder extubation  Pulmonary discussed possible need for trach and PEG with son  Cephazolin added for positive Staph in the sputum and nebulizer treatments  ENT consulted for trach placement with tentative plans for procedure on Monday  Will consult GI for PEG placement, hopefully at the same time as trach  As per Pulmonary    CVA (cerebral vascular accident)  MRI of the brain with moderate-sized area of subacute infarction involving the left subinsular white matter extending to the periventricular white matter  Allowed for permissive hypertension along with treatment with aspirin and statin  Appreciate Neurology input  Resumed blood pressure medications for tighter blood pressure control  Weaning from  vent hindered by new CVA  And trach and PEG planned (will consult GI today)    Encephalopathy, metabolic  Secondary to CVA  As discussed above    RYLEE (acute kidney injury)  Baseline creatinine of 1.4-1.7  Had slight improvement now steadily trending up  CO2 level steadily increasing and patient was in negative balance  Held IV Lasix for now and continue to monitor with repeat labs    Seizure  Continue vimpat  No seizures seen on EEG  Appreciate Neurology following    Angio-edema  Initially thought to have angioedema  Now likely thought to have slight droop associated with stroke and not true angioedema    Acquired hypothyroidism  Resume synthroid   Thyroid function test normal    Essential hypertension  Resume home meds: norvasc, clonidine, hydralazine, toprol  IV PRN hydralazine    Type 2 diabetes mellitus with circulatory disorder, with long-term current use of insulin  Uncontrolled with hyperglycemia likely due to dose of dexamethasone given  HGBA1c 6.9%  Will increase detemir to 50 units q.h.s. and continue sliding scale insulin  Will make further adjustments to get glucose and range of 140-180    Anxiety  Fentanyl pushes for sedation    Morbid obesity  Discuss weight management after extubation and when mentation is appropriate    Dyslipidemia  Continue rosuvastatin      VTE Risk Mitigation (From admission, onward)         Ordered     heparin (porcine) injection 5,000 Units  Every 12 hours      10/11/19 0617     IP VTE HIGH RISK PATIENT  Once      10/11/19 0532     Place ELIEZER hose  Until discontinued      10/11/19 0532     Place sequential compression device  Until discontinued      10/11/19 0532                Critical care time spent on the evaluation and treatment of severe organ dysfunction, review of pertinent labs and imaging studies, discussions with consulting providers and discussions with patient/family: 35 minutes.      Vee Arnett MD  Department of Hospital Medicine   [facility]

## 2019-10-19 NOTE — PROGRESS NOTES
"[facility]  Progress Note    Patient Name: Sherrie Alex  MRN: [id number]  Patient Class: IP- Inpatient   Admission Date: 10/11/2019  Length of Stay: 8 days  Attending Physician: Vee Arnett MD  Primary Care Provider: Desmond Yang MD        Subjective:     Principal Problem:Acute on chronic respiratory failure        HPI:  patient is a 69 y.o female who presents to the ED complaining of tongue swelling that began PTA. Patient has slurred speech, drooling, and tongue swelling. Patient denies any pain, itching, CP, nausea, vomiting, or fever. Per sisters, patient is normally talkative and active. Her sisters are unaware of any cause of onset, stating that the patient lives alone. PMHx of CVA 1 yr ago, DM, HTN, HLD, and obesity. Patient is allergic to codeine and ace inhibitors.      The history is provided by the patient and a relative. History limited by: Acuity of condition. No  was used.     Pt intubated in ED for airway protection ?laryngeal edema - not noted in ED documentation.  Pt started on IV steroids and H2 blocker on vent. In ICU, pt showed sluggish neuro response to pain and unable to follow commands. Propofol taken off and neuro status improved. Pulm consulted for vent management. Neuro consulted - h/o seizures.  Seen in ED yesterday am with "leg shaking" and weakness and dc'd home. H/o seizures on vimpat.      Overview/Hospital Course:  Ms. Alex admitted acute neuro changes and resp compromise. Intubated in ED. Serial neuro testing off sedation suggest right sided weakness > left. MRI:Moderate-sized area of subacute infarction involving the left subinsular white matter extending to the periventricular white matter. No MR evidence for hemorrhage.Neurology notified of findings. Allow for permissive HTN initially and will start to resume BP meds. Cannot extubate at this point in time, continue IV lasix diuresis, ET culture sent with " climbing WBCs, possible aspiration. Dr. Bauer met with family and discussed plan to continue attempts to wean from vent, though neuro status/effects of CVA may be limiting factor. If unable to wean from vent, a tracheostomy and gastrostomy tube would be an option.  Blood pressure medication slowly resumed.  ENT plans for trach on Monday.    Interval History: No acute events overnight.    Review of Systems   Unable to perform ROS: Intubated     Objective:     Vital Signs (Most Recent):  Temp: 98.7 °F (37.1 °C) (10/18/19 2337)  Pulse: 72 (10/18/19 2337)  Resp: 18 (10/18/19 2337)  BP: 105/62 (10/18/19 2332)  SpO2: 99 % (10/18/19 2337) Vital Signs (24h Range):  Temp:  [98 °F (36.7 °C)-98.7 °F (37.1 °C)] 98.7 °F (37.1 °C)  Pulse:  [67-96] 72  Resp:  [15-22] 18  SpO2:  [93 %-100 %] 99 %  BP: (105-201)/() 105/62     Weight: 126.6 kg (279 lb 1.6 oz)  Body mass index is 46.45 kg/m².    Intake/Output Summary (Last 24 hours) at 10/19/2019 0034  Last data filed at 10/18/2019 2337  Gross per 24 hour   Intake 1867.89 ml   Output 915 ml   Net 952.89 ml      Physical Exam   Constitutional: She appears well-developed. No distress.   Obese   HENT:   Head: Normocephalic and atraumatic.   ET tube in place. Lips/tongue without significant edema. Mild tongue protrusion.   Eyes: Pupils are equal, round, and reactive to light. Conjunctivae and EOM are normal. Right eye exhibits no discharge. Left eye exhibits no discharge. Scleral icterus is present.   Opened eyes to voice   Neck: Normal range of motion. Neck supple.   Cardiovascular: Normal rate and regular rhythm. Exam reveals no gallop and no friction rub.   No murmur heard.  Distant   Pulmonary/Chest: No stridor. She has no wheezes. She has no rales. She exhibits no tenderness.   Intubated on MV    Abdominal: Soft. Bowel sounds are normal. She exhibits no distension and no mass. There is no tenderness. There is no rebound and no guarding. No hernia.   Musculoskeletal: She  exhibits no edema, tenderness or deformity.   Skin: Skin is warm and dry. Capillary refill takes less than 2 seconds. No rash noted. She is not diaphoretic. No erythema. No pallor.   Nursing note and vitals reviewed.      Significant Labs:   Blood Culture: No results for input(s): LABBLOO in the last 48 hours.  BMP:   Recent Labs   Lab 10/18/19  0308   *      K 3.8   CL 94*   CO2 35*   BUN 56*   CREATININE 1.8*   CALCIUM 9.9     CBC:   Recent Labs   Lab 10/17/19  0235 10/18/19  0308   WBC 17.39* 16.48*   HGB 12.8 13.1   HCT 43.3 44.4    171     POCT Glucose:   Recent Labs   Lab 10/18/19  0623 10/18/19  1152 10/18/19  1729   POCTGLUCOSE 242* 191* 202*     Urine Culture: No results for input(s): LABURIN in the last 48 hours.    Significant Imaging: I have reviewed and interpreted all pertinent imaging results/findings within the past 24 hours.      Assessment/Plan:      * Acute on chronic respiratory failure  Intubated for airway protection given concern for angioedema initially  Patient remains intubated secondary to acute CVA and and patient did not have to angioedema  Positive cultures with increased secretions  Pulmonary following  Patient is morbidly obese with obstructive sleep apnea which may hinder extubation  Pulmonary discussed possible need for trach and PEG with son  Cephazolin added for positive Staph in the sputum and nebulizer treatments  ENT consulted for trach placement with tentative plans for procedure on Monday  Will discuss with Pulmonary and family if PEG should be placed at the same time  As per Pulmonary    CVA (cerebral vascular accident)  MRI of the brain with moderate-sized area of subacute infarction involving the left subinsular white matter extending to the periventricular white matter  Allowed for permissive hypertension along with treatment with aspirin and statin  Appreciate Neurology input  Resumed blood pressure medications for tighter blood pressure  control  Weaning from vent hindered by new CVA  And trach and PEG planned    Encephalopathy, metabolic  Secondary to CVA  As discussed above    RYLEE (acute kidney injury)  Creatinine more elevated at 1.9, baseline 1.4-1.7  Had slight improvement now steadily trending up  CO2 level steadily increasing and patient negative balance  Will hold IV Lasix for now and continue to monitor with repeat labs    Seizure  Continue vimpat  No seizures seen on EEG  Appreciate Neurology following    Angio-edema  Initially thought to have angioedema  Now likely thought to have slight droop associated with stroke and not true angioedema    Acquired hypothyroidism  Resume synthroid   Thyroid function test normal    Essential hypertension  Resume home meds: norvasc, clonidine, hydralazine, toprol  IV PRN hydralazine    Type 2 diabetes mellitus with circulatory disorder, with long-term current use of insulin  Uncontrolled with hyperglycemia likely due to dose of dexamethasone given  HGBA1c 6.9%  Will increase detemir to 40 units q.h.s. and continue sliding scale insulin  Will make further adjustments to get glucose and range of 140-180    Anxiety  Fentanyl pushes for sedation    Morbid obesity  Discuss weight management after extubation and when mentation is appropriate    Dyslipidemia  Continue rosuvastatin        VTE Risk Mitigation (From admission, onward)         Ordered     heparin (porcine) injection 5,000 Units  Every 12 hours      10/11/19 0617     IP VTE HIGH RISK PATIENT  Once      10/11/19 0532     Place ELIEZER hose  Until discontinued      10/11/19 0532     Place sequential compression device  Until discontinued      10/11/19 0532                Critical care time spent on the evaluation and treatment of severe organ dysfunction, review of pertinent labs and imaging studies, discussions with consulting providers and discussions with patient/family: 35 minutes.      Vee Arnett MD  Department of Hospital Medicine   [facility]

## 2019-10-19 NOTE — ASSESSMENT & PLAN NOTE
Baseline creatinine of 1.4-1.7  Had slight improvement now steadily trending up  CO2 level steadily increasing and patient was in negative balance  Held IV Lasix for now and continue to monitor with repeat labs

## 2019-10-19 NOTE — CARE UPDATE
Patient remains on servoi ventilator. HME and inline suction catheter changed. Aerosol treatment done at this time. No distress noted.

## 2019-10-19 NOTE — PLAN OF CARE
Received with eyes open will attempt to follow commands at times ,but returns to quiet mode does not track HOB up 45' to protect airway minimal extremity movement occasionally moves lt hand tolerating TF at 40 cc v/s stable cardiac shows SR no ectopy initated percedex for comfort.see sheet for v/s.

## 2019-10-19 NOTE — PLAN OF CARE
Mrs. Alex received on servoi ventilator with current setting PRVC RATE 15, TIDAL VOLUME 320 ml, PEEP +5, FI02 25% with 6.5 ETT secured and moved to center of lip at 24 cm. All ventilator alarms on, set and functioning. ambu bag and mask at bedside. Oral care and suctioning done. Aerosol treatment completed. Will continue with ventilator management.

## 2019-10-19 NOTE — NURSING
[facility]  ICU Multidisciplinary Bedside Rounds   SUMMARY     Date: 10/19/2019    Prehospitalization: Nursing Home  Admit Date / LOS : 10/11/2019/ 8 days    Diagnosis: Acute on chronic respiratory failure    Consults:        Active: Neuro and Pulm CC       Needed:ENT, wound care     Code Status: Full Code   Advanced Directive: <no information>    LDA: Guevara, OG, PIV and Vent       Central Lines/Site/Justification:Patient Does Not Have Central Line       Urinary Cath/Order/Justification:Critically Ill in ICU    Vasopressors/Infusions:    dexMEDEtomidine in 0.9 % NaCl 0.4 mcg/kg/hr (10/19/19 0404)          GOALS: Volume/ Hemodynamic: N/A                     RASS: -1  awakes to voice (eye opening/contact) > 10 seconds    CAM ICU: N/A  Pain Management: none       Pain Controlled: not applicable     Rhythm: NSR    Respiratory Device: Vent    Vent Mode: PRVC  Oxygen Concentration (%):  [25] 25  Resp Rate Total:  [15 br/min-22 br/min] 15 br/min  Vt Set:  [320 mL] 320 mL  PEEP/CPAP:  [5 cmH20] 5 cmH20  Mean Airway Pressure:  [6.6 cmH20-9.8 cmH20] 9.8 cmH20                     VTE Prophylaxis: Pharm and Mechanical  Mobility: Bedrest  Stress Ulcer Prophylaxis: Yes    Dietary: TF  Tolerance: yes  /  Advancement: @ goal    Isolation: No active isolations    Restraints: No    Significant Dates:  Post Op Date: N/A  Rescue Date: N/A  Imaging/ Diagnostics: N/A    Noteworthy Labs:  none    CBC/Anemia Labs: Coags:    Recent Labs   Lab 10/16/19  0209 10/17/19  0235 10/18/19  0308   WBC 15.57* 17.39* 16.48*   HGB 12.6 12.8 13.1   HCT 42.3 43.3 44.4   * 165 171   MCV 66* 67* 68*   RDW 18.4* 18.6* 18.7*    No results for input(s): PT, INR, APTT in the last 168 hours.     Chemistries:   Recent Labs   Lab 10/16/19  0209 10/17/19  0235 10/18/19  0308    139 139   K 3.8 3.9 3.8   CL 99 96 94*   CO2 33* 33* 35*   BUN 32* 51* 56*   CREATININE 1.8* 2.0* 1.8*   CALCIUM 10.1 10.2 9.9        Cardiac Enzymes:  Ejection Fractions:    No results for input(s): CPK, CPKMB, MB, TROPONINI in the last 72 hours. No results found for: EF     POCT Glucose: HbA1c:    Recent Labs   Lab 10/17/19  1150 10/17/19  1804 10/18/19  0054 10/18/19  0623 10/18/19  1152 10/18/19  1729   POCTGLUCOSE 278* 241* 222* 242* 191* 202*    Hemoglobin A1C   Date Value Ref Range Status   10/02/2019 6.9 (H) 4.0 - 5.6 % Final     Comment:     ADA Screening Guidelines:  5.7-6.4%  Consistent with prediabetes  >or=6.5%  Consistent with diabetes  High levels of fetal hemoglobin interfere with the HbA1C  assay. Heterozygous hemoglobin variants (HbS, HgC, etc)do  not significantly interfere with this assay.   However, presence of multiple variants may affect accuracy.     06/03/2008 6.7 (H) 4.0 - 6.2 % Final   02/13/2006 6.2 4.5 - 6.2 % Final        Needs from Care Team: none     ICU LOS 7d 23h  Level of Care: Critical Care

## 2019-10-19 NOTE — ASSESSMENT & PLAN NOTE
MRI of the brain with moderate-sized area of subacute infarction involving the left subinsular white matter extending to the periventricular white matter  Allowed for permissive hypertension along with treatment with aspirin and statin  Appreciate Neurology input  Resumed blood pressure medications for tighter blood pressure control  Weaning from vent hindered by new CVA  And trach and PEG planned (will consult GI today)

## 2019-10-19 NOTE — ASSESSMENT & PLAN NOTE
Uncontrolled with hyperglycemia likely due to dose of dexamethasone given  HGBA1c 6.9%  Will increase detemir to 40 units q.h.s. and continue sliding scale insulin  Will make further adjustments to get glucose and range of 140-180

## 2019-10-19 NOTE — PLAN OF CARE
Problem: Physical Therapy Goal  Goal: Physical Therapy Goal  Description  Goals to be met by:      Patient will increase functional independence with mobility by performin. Supine to sit to be assessed  2. Rolling to Left with Moderate Assistance.  3. Bed to chair transfer to be assessed  4. Sitting at edge of bed to be assessed  5. Lower extremity exercise program x10  reps per handout, with assistance as needed     Outcome: Ongoing, Progressing   Pt able to track with eyes to R side past midline with head supported in neutral.  Continue with POC as able

## 2019-10-19 NOTE — ASSESSMENT & PLAN NOTE
Intubated for airway protection given concern for angioedema initially  Patient remains intubated secondary to acute CVA and and patient did not have to angioedema  Positive cultures with increased secretions  Pulmonary following  Patient is morbidly obese with obstructive sleep apnea which may hinder extubation  Pulmonary discussed possible need for trach and PEG with son  Cephazolin added for positive Staph in the sputum and nebulizer treatments  ENT consulted for trach placement with tentative plans for procedure on Monday  Will consult GI for PEG placement, hopefully at the same time as trach  As per Pulmonary

## 2019-10-19 NOTE — SUBJECTIVE & OBJECTIVE
Interval History: No acute events overnight.    Review of Systems   Unable to perform ROS: Intubated     Objective:     Vital Signs (Most Recent):  Temp: 98.7 °F (37.1 °C) (10/18/19 2337)  Pulse: 72 (10/18/19 2337)  Resp: 18 (10/18/19 2337)  BP: 105/62 (10/18/19 2332)  SpO2: 99 % (10/18/19 2337) Vital Signs (24h Range):  Temp:  [98 °F (36.7 °C)-98.7 °F (37.1 °C)] 98.7 °F (37.1 °C)  Pulse:  [67-96] 72  Resp:  [15-22] 18  SpO2:  [93 %-100 %] 99 %  BP: (105-201)/() 105/62     Weight: 126.6 kg (279 lb 1.6 oz)  Body mass index is 46.45 kg/m².    Intake/Output Summary (Last 24 hours) at 10/19/2019 0034  Last data filed at 10/18/2019 2337  Gross per 24 hour   Intake 1867.89 ml   Output 915 ml   Net 952.89 ml      Physical Exam   Constitutional: She appears well-developed. No distress.   Obese   HENT:   Head: Normocephalic and atraumatic.   ET tube in place. Lips/tongue without significant edema. Mild tongue protrusion.   Eyes: Pupils are equal, round, and reactive to light. Conjunctivae and EOM are normal. Right eye exhibits no discharge. Left eye exhibits no discharge. Scleral icterus is present.   Opened eyes to voice   Neck: Normal range of motion. Neck supple.   Cardiovascular: Normal rate and regular rhythm. Exam reveals no gallop and no friction rub.   No murmur heard.  Distant   Pulmonary/Chest: No stridor. She has no wheezes. She has no rales. She exhibits no tenderness.   Intubated on MV    Abdominal: Soft. Bowel sounds are normal. She exhibits no distension and no mass. There is no tenderness. There is no rebound and no guarding. No hernia.   Musculoskeletal: She exhibits no edema, tenderness or deformity.   Skin: Skin is warm and dry. Capillary refill takes less than 2 seconds. No rash noted. She is not diaphoretic. No erythema. No pallor.   Nursing note and vitals reviewed.      Significant Labs:   Blood Culture: No results for input(s): LABBLOO in the last 48 hours.  BMP:   Recent Labs   Lab  10/18/19  0308   *      K 3.8   CL 94*   CO2 35*   BUN 56*   CREATININE 1.8*   CALCIUM 9.9     CBC:   Recent Labs   Lab 10/17/19  0235 10/18/19  0308   WBC 17.39* 16.48*   HGB 12.8 13.1   HCT 43.3 44.4    171     POCT Glucose:   Recent Labs   Lab 10/18/19  0623 10/18/19  1152 10/18/19  1729   POCTGLUCOSE 242* 191* 202*     Urine Culture: No results for input(s): LABURIN in the last 48 hours.    Significant Imaging: I have reviewed and interpreted all pertinent imaging results/findings within the past 24 hours.

## 2019-10-19 NOTE — ASSESSMENT & PLAN NOTE
Uncontrolled with hyperglycemia likely due to dose of dexamethasone given  HGBA1c 6.9%  Will increase detemir to 50 units q.h.s. and continue sliding scale insulin  Will make further adjustments to get glucose and range of 140-180

## 2019-10-19 NOTE — PLAN OF CARE
Pt remains on vent settings PRVC 15/320/+5/FIO2 25% Ambu bag is at HOB and all alarms are set and working properly. Will continue to give aerosol tx as scheduled and will conitinue to monitor.

## 2019-10-19 NOTE — SUBJECTIVE & OBJECTIVE
Interval History: No acute events overnight.    Review of Systems   Unable to perform ROS: Intubated     Objective:     Vital Signs (Most Recent):  Temp: 98 °F (36.7 °C) (10/19/19 1200)  Pulse: 62 (10/19/19 1324)  Resp: 18 (10/19/19 1324)  BP: 128/77 (10/19/19 1230)  SpO2: 99 % (10/19/19 1324) Vital Signs (24h Range):  Temp:  [97.7 °F (36.5 °C)-98.7 °F (37.1 °C)] 98 °F (36.7 °C)  Pulse:  [62-83] 62  Resp:  [15-24] 18  SpO2:  [95 %-100 %] 99 %  BP: ()/(50-98) 128/77     Weight: 119.2 kg (262 lb 12.6 oz)  Body mass index is 43.73 kg/m².    Intake/Output Summary (Last 24 hours) at 10/19/2019 1341  Last data filed at 10/19/2019 1300  Gross per 24 hour   Intake 2675.69 ml   Output 1720 ml   Net 955.69 ml      Physical Exam   Constitutional: She appears well-developed. No distress.   Obese   HENT:   Head: Normocephalic and atraumatic.   ET tube in place. Lips/tongue without significant edema. Mild tongue protrusion.   Eyes: Pupils are equal, round, and reactive to light. Conjunctivae and EOM are normal. Right eye exhibits no discharge. Left eye exhibits no discharge. Scleral icterus is present.   Opened eyes to voice   Neck: Normal range of motion. Neck supple.   Cardiovascular: Normal rate and regular rhythm. Exam reveals no gallop and no friction rub.   No murmur heard.  Distant   Pulmonary/Chest: No stridor. She has no wheezes. She has no rales. She exhibits no tenderness.   Intubated on MV    Abdominal: Soft. Bowel sounds are normal. She exhibits no distension and no mass. There is no tenderness. There is no rebound and no guarding. No hernia.   Musculoskeletal: She exhibits no edema, tenderness or deformity.   Skin: Skin is warm and dry. Capillary refill takes less than 2 seconds. No rash noted. She is not diaphoretic. No erythema. No pallor.   Nursing note and vitals reviewed.      Significant Labs:   Blood Culture: No results for input(s): LABSTEFANIE in the last 48 hours.  BMP:   Recent Labs   Lab 10/19/19  2975    *      K 4.3   CL 96   CO2 35*   BUN 57*   CREATININE 2.0*   CALCIUM 10.0     CBC:   Recent Labs   Lab 10/18/19  0308 10/19/19  0526   WBC 16.48* 10.94   HGB 13.1 12.0   HCT 44.4 41.7    147*     POCT Glucose:   Recent Labs   Lab 10/18/19  1729 10/18/19  2347 10/19/19  0557   POCTGLUCOSE 202* 205* 215*     Urine Culture: No results for input(s): LABURIN in the last 48 hours.    Significant Imaging: I have reviewed and interpreted all pertinent imaging results/findings within the past 24 hours.

## 2019-10-19 NOTE — CARE UPDATE
[facility]  ICU Multidisciplinary Bedside Rounds     UPDATE     Date: 10/19/2019      Plan of care reviewed with the following, Nurse, Charge Nurse and Physician.       Needs/ Goals for the day: GI consult for peg tube placement on Monday; trach placement Monday      Level of Care: Critical Care

## 2019-10-19 NOTE — PT/OT/SLP PROGRESS
Physical Therapy Treatment    Patient Name:  Sherrie Alex   MRN:  [id number]    Recommendations:     Discharge Recommendations:  (Ongoing assessemnt pending pt progress)   Discharge Equipment Recommendations: (Ongoing assessment pending pt progress)   Barriers to discharge: Inaccessible home, Decreased caregiver support and Pt currently inubated in ICU    Assessment:     Sherrie Alex is a 69 y.o. female admitted with a medical diagnosis of Acute on chronic respiratory failure.  She presents with the following impairments/functional limitations:  weakness, impaired functional mobilty, decreased safety awareness, impaired cognition, gait instability, impaired endurance, decreased coordination, impaired balance, decreased upper extremity function, decreased lower extremity function, impaired self care skills pt able to track to R with eyes past midline with head supported in neutral.  Able to follow simple commands to move body parts on L side, but unable to shake head yes/no to communicate.  R neglect, lethargic, requires constant VC/Tc to remain on task at hand    Rehab Prognosis: Fair; patient would benefit from acute skilled PT services to address these deficits and reach maximum level of function.    Recent Surgery: Procedure(s) (LRB):  TRACHEOTOMY (N/A)      Plan:     During this hospitalization, patient to be seen 3 x/week to address the identified rehab impairments via therapeutic activities, therapeutic exercises, neuromuscular re-education and progress toward the following goals:    · Plan of Care Expires:  10/29/19    Subjective     Chief Complaint: N/A  Patient/Family Comments/goals: Unable to state, pt cooperative with ther ex  Pain/Comfort:  · Pain Rating 1: (no outward signs of pain noted, pt unable to communicate 2/2 vent )      Objective:     Communicated with nsg prior to session.  Patient found HOB elevated with ventilator, shepard catheter, peripheral IV, SCD, pressure relief boots(ICU  monitoring ) upon PT entry to room.     General Precautions: Standard, fall, respiratory   Orthopedic Precautions:N/A   Braces: N/A     Functional Mobility:  · Bed Mobility:     · Rolling Left:  dependence and of 2 persons  · Scooting: dependence and of 2 persons      AM-PAC 6 CLICK MOBILITY  Turning over in bed (including adjusting bedclothes, sheets and blankets)?: 2  Sitting down on and standing up from a chair with arms (e.g., wheelchair, bedside commode, etc.): 1  Moving from lying on back to sitting on the side of the bed?: 1  Moving to and from a bed to a chair (including a wheelchair)?: 1  Need to walk in hospital room?: 1  Climbing 3-5 steps with a railing?: 1  Basic Mobility Total Score: 7       Therapeutic Activities and Exercises:   Pt performed L LE AA/AROM x 10 reps in available planes with Constant VC/TC to remain on task at hand.  Pt received B HCS 3x30 sec.  Pt received R LE PROM x 10 reps in available planes.  Pt received R cervical rotation and sidebending stretches 3x30 sec.  Towel roll placed on L side to support head in more neutral position.  Pt able to track with eyes to R side past midline with head supported in neutral position    Patient left HOB elevated with all lines intact and nsg notified..    GOALS:   Multidisciplinary Problems     Physical Therapy Goals        Problem: Physical Therapy Goal    Goal Priority Disciplines Outcome Goal Variances Interventions   Physical Therapy Goal     PT, PT/OT Ongoing, Progressing     Description:  Goals to be met by:      Patient will increase functional independence with mobility by performin. Supine to sit to be assessed  2. Rolling to Left with Moderate Assistance.  3. Bed to chair transfer to be assessed  4. Sitting at edge of bed to be assessed  5. Lower extremity exercise program x10  reps per handout, with assistance as needed                      Time Tracking:     PT Received On: 10/19/19  PT Start Time: 1027     PT Stop  Time: 1054  PT Total Time (min): 27 min     Billable Minutes: Therapeutic Exercise 27    Treatment Type: Treatment  PT/PTA: PT     PTA Visit Number: 0     Susan West PT  10/19/2019

## 2019-10-19 NOTE — ASSESSMENT & PLAN NOTE
Intubated for airway protection given concern for angioedema initially  Patient remains intubated secondary to acute CVA and and patient did not have to angioedema  Positive cultures with increased secretions  Pulmonary following  Patient is morbidly obese with obstructive sleep apnea which may hinder extubation  Pulmonary discussed possible need for trach and PEG with son  Cephazolin added for positive Staph in the sputum and nebulizer treatments  ENT consulted for trach placement with tentative plans for procedure on Monday  Will discuss with Pulmonary and family if PEG should be placed at the same time  As per Pulmonary

## 2019-10-20 ENCOUNTER — ANESTHESIA EVENT (OUTPATIENT)
Dept: SURGERY | Facility: HOSPITAL | Age: 69
DRG: 004 | End: 2019-10-20
Payer: MEDICARE

## 2019-10-20 LAB
ALLENS TEST: ABNORMAL
ANION GAP SERPL CALC-SCNC: 7 MMOL/L (ref 8–16)
BASOPHILS # BLD AUTO: 0.03 K/UL (ref 0–0.2)
BASOPHILS NFR BLD: 0.3 % (ref 0–1.9)
BUN SERPL-MCNC: 55 MG/DL (ref 8–23)
CALCIUM SERPL-MCNC: 9.8 MG/DL (ref 8.7–10.5)
CHLORIDE SERPL-SCNC: 97 MMOL/L (ref 95–110)
CO2 SERPL-SCNC: 35 MMOL/L (ref 23–29)
CREAT SERPL-MCNC: 1.8 MG/DL (ref 0.5–1.4)
DELSYS: ABNORMAL
DIFFERENTIAL METHOD: ABNORMAL
EOSINOPHIL # BLD AUTO: 0.4 K/UL (ref 0–0.5)
EOSINOPHIL NFR BLD: 3.2 % (ref 0–8)
ERYTHROCYTE [DISTWIDTH] IN BLOOD BY AUTOMATED COUNT: 17.7 % (ref 11.5–14.5)
ERYTHROCYTE [SEDIMENTATION RATE] IN BLOOD BY WESTERGREN METHOD: 15 MM/H
EST. GFR  (AFRICAN AMERICAN): 32 ML/MIN/1.73 M^2
EST. GFR  (NON AFRICAN AMERICAN): 28 ML/MIN/1.73 M^2
FIO2: 25
GLUCOSE SERPL-MCNC: 217 MG/DL (ref 70–110)
HCO3 UR-SCNC: 38.3 MMOL/L (ref 24–28)
HCT VFR BLD AUTO: 39 % (ref 37–48.5)
HGB BLD-MCNC: 11.3 G/DL (ref 12–16)
IMM GRANULOCYTES # BLD AUTO: 0.07 K/UL (ref 0–0.04)
IMM GRANULOCYTES NFR BLD AUTO: 0.6 % (ref 0–0.5)
INR PPP: 1 (ref 0.8–1.2)
LYMPHOCYTES # BLD AUTO: 1.2 K/UL (ref 1–4.8)
LYMPHOCYTES NFR BLD: 10.2 % (ref 18–48)
MCH RBC QN AUTO: 19.6 PG (ref 27–31)
MCHC RBC AUTO-ENTMCNC: 29 G/DL (ref 32–36)
MCV RBC AUTO: 68 FL (ref 82–98)
MODE: ABNORMAL
MONOCYTES # BLD AUTO: 1.3 K/UL (ref 0.3–1)
MONOCYTES NFR BLD: 11.3 % (ref 4–15)
NEUTROPHILS # BLD AUTO: 8.7 K/UL (ref 1.8–7.7)
NEUTROPHILS NFR BLD: 74.4 % (ref 38–73)
NRBC BLD-RTO: 0 /100 WBC
PCO2 BLDA: 51.4 MMHG (ref 35–45)
PEEP: 5
PH SMN: 7.48 [PH] (ref 7.35–7.45)
PLATELET # BLD AUTO: 147 K/UL (ref 150–350)
PMV BLD AUTO: ABNORMAL FL (ref 9.2–12.9)
PO2 BLDA: 73 MMHG (ref 80–100)
POC BE: 13 MMOL/L
POC SATURATED O2: 95 % (ref 95–100)
POC TCO2: 40 MMOL/L (ref 23–27)
POCT GLUCOSE: 167 MG/DL (ref 70–110)
POCT GLUCOSE: 208 MG/DL (ref 70–110)
POCT GLUCOSE: 217 MG/DL (ref 70–110)
POTASSIUM SERPL-SCNC: 4.2 MMOL/L (ref 3.5–5.1)
PROTHROMBIN TIME: 10.2 SEC (ref 9–12.5)
RBC # BLD AUTO: 5.77 M/UL (ref 4–5.4)
SAMPLE: ABNORMAL
SITE: ABNORMAL
SODIUM SERPL-SCNC: 139 MMOL/L (ref 136–145)
VT: 320
WBC # BLD AUTO: 11.72 K/UL (ref 3.9–12.7)

## 2019-10-20 PROCEDURE — 63600175 PHARM REV CODE 636 W HCPCS: Performed by: HOSPITALIST

## 2019-10-20 PROCEDURE — 25000242 PHARM REV CODE 250 ALT 637 W/ HCPCS: Performed by: NURSE PRACTITIONER

## 2019-10-20 PROCEDURE — 99233 PR SUBSEQUENT HOSPITAL CARE,LEVL III: ICD-10-PCS | Mod: ,,, | Performed by: INTERNAL MEDICINE

## 2019-10-20 PROCEDURE — 94761 N-INVAS EAR/PLS OXIMETRY MLT: CPT

## 2019-10-20 PROCEDURE — 85025 COMPLETE CBC W/AUTO DIFF WBC: CPT

## 2019-10-20 PROCEDURE — 82803 BLOOD GASES ANY COMBINATION: CPT

## 2019-10-20 PROCEDURE — 25000003 PHARM REV CODE 250: Performed by: HOSPITALIST

## 2019-10-20 PROCEDURE — 94640 AIRWAY INHALATION TREATMENT: CPT

## 2019-10-20 PROCEDURE — 99900035 HC TECH TIME PER 15 MIN (STAT)

## 2019-10-20 PROCEDURE — S0028 INJECTION, FAMOTIDINE, 20 MG: HCPCS | Performed by: HOSPITALIST

## 2019-10-20 PROCEDURE — 20000000 HC ICU ROOM

## 2019-10-20 PROCEDURE — 25000003 PHARM REV CODE 250: Performed by: PSYCHIATRY & NEUROLOGY

## 2019-10-20 PROCEDURE — 36600 WITHDRAWAL OF ARTERIAL BLOOD: CPT

## 2019-10-20 PROCEDURE — 80048 BASIC METABOLIC PNL TOTAL CA: CPT

## 2019-10-20 PROCEDURE — 85610 PROTHROMBIN TIME: CPT

## 2019-10-20 PROCEDURE — 99900026 HC AIRWAY MAINTENANCE (STAT)

## 2019-10-20 PROCEDURE — 25000003 PHARM REV CODE 250: Performed by: INTERNAL MEDICINE

## 2019-10-20 PROCEDURE — 36415 COLL VENOUS BLD VENIPUNCTURE: CPT

## 2019-10-20 PROCEDURE — 94003 VENT MGMT INPAT SUBQ DAY: CPT

## 2019-10-20 PROCEDURE — 99233 SBSQ HOSP IP/OBS HIGH 50: CPT | Mod: ,,, | Performed by: INTERNAL MEDICINE

## 2019-10-20 RX ORDER — SODIUM CHLORIDE 0.9 % (FLUSH) 0.9 %
10 SYRINGE (ML) INJECTION
Status: CANCELLED | OUTPATIENT
Start: 2019-10-20

## 2019-10-20 RX ADMIN — CHLORHEXIDINE GLUCONATE 0.12% ORAL RINSE 15 ML: 1.2 LIQUID ORAL at 08:10

## 2019-10-20 RX ADMIN — IPRATROPIUM BROMIDE AND ALBUTEROL SULFATE 3 ML: .5; 3 SOLUTION RESPIRATORY (INHALATION) at 01:10

## 2019-10-20 RX ADMIN — ASPIRIN 325 MG ORAL TABLET 325 MG: 325 PILL ORAL at 08:10

## 2019-10-20 RX ADMIN — LACOSAMIDE 50 MG: 50 TABLET, FILM COATED ORAL at 08:10

## 2019-10-20 RX ADMIN — DEXMEDETOMIDINE HYDROCHLORIDE 0.7 MCG/KG/HR: 4 INJECTION, SOLUTION INTRAVENOUS at 06:10

## 2019-10-20 RX ADMIN — SENNOSIDES, DOCUSATE SODIUM 1 TABLET: 50; 8.6 TABLET, FILM COATED ORAL at 08:10

## 2019-10-20 RX ADMIN — CEFAZOLIN SODIUM 1 G: 1 SOLUTION INTRAVENOUS at 03:10

## 2019-10-20 RX ADMIN — DEXMEDETOMIDINE HYDROCHLORIDE 0.4 MCG/KG/HR: 4 INJECTION, SOLUTION INTRAVENOUS at 12:10

## 2019-10-20 RX ADMIN — IPRATROPIUM BROMIDE AND ALBUTEROL SULFATE 3 ML: .5; 3 SOLUTION RESPIRATORY (INHALATION) at 07:10

## 2019-10-20 RX ADMIN — METOPROLOL TARTRATE 100 MG: 50 TABLET ORAL at 08:10

## 2019-10-20 RX ADMIN — CEFAZOLIN SODIUM 1 G: 1 SOLUTION INTRAVENOUS at 10:10

## 2019-10-20 RX ADMIN — INSULIN ASPART 2 UNITS: 100 INJECTION, SOLUTION INTRAVENOUS; SUBCUTANEOUS at 05:10

## 2019-10-20 RX ADMIN — POLYETHYLENE GLYCOL 3350 17 G: 17 POWDER, FOR SOLUTION ORAL at 08:10

## 2019-10-20 RX ADMIN — INSULIN ASPART 4 UNITS: 100 INJECTION, SOLUTION INTRAVENOUS; SUBCUTANEOUS at 05:10

## 2019-10-20 RX ADMIN — HEPARIN SODIUM 5000 UNITS: 5000 INJECTION INTRAVENOUS; SUBCUTANEOUS at 08:10

## 2019-10-20 RX ADMIN — AMLODIPINE BESYLATE 10 MG: 5 TABLET ORAL at 08:10

## 2019-10-20 RX ADMIN — CLONIDINE HYDROCHLORIDE 0.3 MG: 0.1 TABLET ORAL at 08:10

## 2019-10-20 RX ADMIN — FAMOTIDINE 20 MG: 10 INJECTION INTRAVENOUS at 08:10

## 2019-10-20 RX ADMIN — DEXMEDETOMIDINE HYDROCHLORIDE 0.4 MCG/KG/HR: 4 INJECTION, SOLUTION INTRAVENOUS at 05:10

## 2019-10-20 RX ADMIN — CEFAZOLIN SODIUM 1 G: 1 SOLUTION INTRAVENOUS at 08:10

## 2019-10-20 RX ADMIN — DEXMEDETOMIDINE HYDROCHLORIDE 0.6 MCG/KG/HR: 4 INJECTION, SOLUTION INTRAVENOUS at 10:10

## 2019-10-20 RX ADMIN — ROSUVASTATIN CALCIUM 20 MG: 10 TABLET, COATED ORAL at 08:10

## 2019-10-20 RX ADMIN — INSULIN DETEMIR 60 UNITS: 100 INJECTION, SOLUTION SUBCUTANEOUS at 08:10

## 2019-10-20 NOTE — CONSULTS
Reason for Consult:  Feeding difficulties, evaluate for PEG tube placement    HPI:  Pt is a 69 y.o. female admitted on 10/11/2019, and currently being treated for acute CVA, with resulting respiratory failure.  Patient will require long-term ventilator management, so will need long-term alternative nutritional route.  Patient with acute onset of feeding difficulties/dysphagia, precipitated by CVA, no alleviating or exacerbating factors.  Of note history obtained from RN and chart.  Patient currently getting tube feeds via NG, tolerating well. No reports of associated abdominal tenderness, vomiting, yellowing of her eyes or skin.  No diarrhea no constipation. No blood in her stools or black tarry stools.    Unknown prior endoscopic history.    Past Medical History:   Diagnosis Date    Anemia     Anxiety     Diabetes mellitus     Hyperlipidemia     Hypertension     Obesity     Proteinuria     Sleep apnea        Past Surgical History:   Procedure Laterality Date    APPENDECTOMY       SECTION      CHOLECYSTECTOMY      DILATION AND CURETTAGE OF UTERUS      TUBAL LIGATION         Family History   Problem Relation Age of Onset    Liver disease Father     Diabetes Brother     Hypertension Brother     Diabetes Sister        Social History     Socioeconomic History    Marital status:      Spouse name: Not on file    Number of children: Not on file    Years of education: Not on file    Highest education level: Not on file   Occupational History    Not on file   Social Needs    Financial resource strain: Not on file    Food insecurity:     Worry: Not on file     Inability: Not on file    Transportation needs:     Medical: Not on file     Non-medical: Not on file   Tobacco Use    Smoking status: Never Smoker   Substance and Sexual Activity    Alcohol use: No    Drug use: No    Sexual activity: Yes     Partners: Male   Lifestyle    Physical activity:     Days per week: Not on file      "Minutes per session: Not on file    Stress: Not on file   Relationships    Social connections:     Talks on phone: Not on file     Gets together: Not on file     Attends Christian service: Not on file     Active member of club or organization: Not on file     Attends meetings of clubs or organizations: Not on file     Relationship status: Not on file   Other Topics Concern    Not on file   Social History Narrative    Not on file       Endoscopic History:  Unknown    Review of patient's allergies indicates:   Allergen Reactions    Codeine Anxiety    Ace inhibitors     Diphenhydramine hcl        No current facility-administered medications on file prior to encounter.      Current Outpatient Medications on File Prior to Encounter   Medication Sig Dispense Refill    amlodipine (NORVASC) 5 MG tablet Take 5 mg by mouth once daily.        blood sugar diagnostic (GLUCOSE BLOOD) Strp by Misc.(Non-Drug; Combo Route) route.        cetirizine (ZYRTEC) 5 MG tablet Take 5 mg by mouth once daily.      cholecalciferol, vitamin D3, 10,000 unit Tab Take 1 tablet by mouth once daily.      cloNIDine (CATAPRES) 0.3 MG tablet Take 0.3 mg by mouth 2 (two) times daily.      ergocalciferol (VITAMIN D2) 50,000 unit Cap Take 50,000 Units by mouth every 7 days.        hydrALAZINE (APRESOLINE) 100 MG tablet Take 1 tablet (100 mg total) by mouth 2 (two) times daily. 180 tablet 0    hydrALAZINE (APRESOLINE) 50 MG tablet Take 50 mg by mouth 3 (three) times daily.      insulin aspart U-100 (NOVOLOG) 100 unit/mL injection Inject 10 Units into the skin 3 (three) times daily before meals.      insulin detemir U-100 (LEVEMIR) 100 unit/mL injection Inject 23 Units into the skin every evening.      insulin glargine (LANTUS SOLOSTAR) 100 unit/mL (3 mL) InPn Inject 24 Units into the skin every evening.       insulin needles, disposable, (PEN NEEDLE) 31 X 5/16 " Ndle by Misc.(Non-Drug; Combo Route) route.        lacosamide (VIMPAT) 100 " mg Tab Take 50 mg by mouth 2 (two) times daily.        LANCETS MISC by Misc.(Non-Drug; Combo Route) route 4 (four) times daily.        levothyroxine (SYNTHROID) 137 MCG Tab tablet Take 1 tablet (137 mcg total) by mouth once daily. 90 tablet 1    linagliptin (TRADJENTA) 5 mg Tab tablet Take 5 mg by mouth once daily.        lubiprostone (AMITIZA) 24 MCG Cap Take 1 capsule (24 mcg total) by mouth 2 (two) times daily with meals. 180 capsule 0    meclizine (ANTIVERT) 25 mg tablet Take 25 mg by mouth 3 (three) times daily as needed.      metoprolol succinate (TOPROL-XL) 100 MG 24 hr tablet Take 100 mg by mouth 2 (two) times daily.        nitroGLYCERIN (NITROSTAT) 0.4 MG SL tablet Place 0.4 mg under the tongue.      rosuvastatin (CRESTOR) 20 MG tablet Take 20 mg by mouth once daily.       Scheduled Meds:   albuterol-ipratropium  3 mL Nebulization Q6H    amLODIPine  10 mg Per OG tube Daily    aspirin  325 mg Per OG tube Daily    ceFAZolin (ANCEF) IVPB  1 g Intravenous Q8H    chlorhexidine  15 mL Mouth/Throat BID    cloNIDine  0.3 mg Oral BID    famotidine (PF)  20 mg Intravenous Daily    heparin (porcine)  5,000 Units Subcutaneous Q12H    insulin detemir U-100  60 Units Subcutaneous QHS    lacosamide  50 mg Oral BID    metoprolol tartrate  100 mg Per OG tube BID    polyethylene glycol  17 g Per NG tube Daily    rosuvastatin  20 mg Per OG tube QHS    senna-docusate 8.6-50 mg  1 tablet Oral BID     Continuous Infusions:   dexMEDEtomidine in 0.9 % NaCl 0.401 mcg/kg/hr (10/20/19 0900)     PRN Meds:.acetaminophen, fentaNYL, insulin aspart U-100, labetalol, ondansetron, sodium chloride 0.9%    Review of Systems:  Unable to obtain secondary to current clinical condition    Patient Vitals for the past 24 hrs:   BP Temp Temp src Pulse Resp SpO2   10/20/19 0911 -- -- -- (!) 57 19 100 %   10/20/19 0800 (!) 154/83 98.6 °F (37 °C) Axillary (!) 59 15 100 %   10/20/19 0750 -- -- -- 61 20 99 %   10/20/19 0749 -- --  -- 60 17 99 %   10/20/19 0730 129/75 -- -- 61 18 99 %   10/20/19 0700 117/65 -- -- 65 (!) 21 99 %   10/20/19 0631 -- -- -- 65 20 99 %   10/20/19 0630 (!) 109/56 -- -- 65 (!) 21 99 %   10/20/19 0602 123/68 -- -- -- -- --   10/20/19 0600 125/67 -- -- 62 20 99 %   10/20/19 0458 -- -- -- (!) 58 18 99 %   10/20/19 0414 -- -- -- (!) 59 18 98 %   10/20/19 0411 -- -- -- 60 17 99 %   10/20/19 0400 -- 98.6 °F (37 °C) Axillary 61 17 100 %   10/20/19 0304 114/71 -- -- 62 18 99 %   10/20/19 0300 -- -- -- 62 18 99 %   10/20/19 0233 (!) 114/58 -- -- -- -- --   10/20/19 0109 -- -- -- 63 16 100 %   10/20/19 0106 (!) 111/56 -- -- 62 18 100 %   10/20/19 0102 (!) 111/56 -- -- -- -- --   10/20/19 0100 -- -- -- 64 15 99 %   10/20/19 0033 (!) 100/56 -- -- -- -- --   10/19/19 2316 -- 98.5 °F (36.9 °C) Axillary -- -- --   10/19/19 2313 -- -- -- 62 18 100 %   10/19/19 2204 -- -- -- 64 20 100 %   10/19/19 2202 128/74 -- -- -- -- --   10/19/19 2133 126/75 -- -- -- -- --   10/19/19 2110 -- -- -- 74 20 99 %   10/19/19 2006 136/73 -- -- 74 20 100 %   10/19/19 2002 136/73 -- -- -- -- --   10/19/19 1933 (!) 165/95 -- -- -- -- --   10/19/19 1923 -- -- -- 74 (!) 21 98 %   10/19/19 1900 -- 98.4 °F (36.9 °C) Axillary -- -- --   10/19/19 1830 (!) 150/93 -- -- 73 (!) 22 100 %   10/19/19 1800 (!) 160/70 -- -- 68 20 99 %   10/19/19 1731 -- -- -- 68 15 99 %   10/19/19 1730 (!) 144/78 -- -- 71 (!) 23 99 %   10/19/19 1700 126/71 -- -- 61 18 100 %   10/19/19 1630 120/69 -- -- 63 19 99 %   10/19/19 1604 -- -- -- 63 19 99 %   10/19/19 1600 120/70 98.2 °F (36.8 °C) Oral 62 19 99 %   10/19/19 1530 125/75 -- -- 63 18 99 %   10/19/19 1500 120/72 -- -- 62 18 98 %   10/19/19 1437 121/73 -- -- 64 (!) 22 (!) 93 %   10/19/19 1430 -- -- -- 63 19 99 %   10/19/19 1400 135/80 -- -- 64 18 99 %   10/19/19 1330 139/86 -- -- 60 15 100 %   10/19/19 1324 -- -- -- 62 18 99 %   10/19/19 1300 131/78 -- -- 64 (!) 22 99 %   10/19/19 1230 128/77 -- -- 63 (!) 21 98 %   10/19/19 1200  134/79 98 °F (36.7 °C) Oral 65 19 99 %   10/19/19 1130 119/70 -- -- 67 (!) 21 98 %   10/19/19 1105 135/80 -- -- 67 (!) 21 98 %   10/19/19 1100 -- -- -- 73 16 98 %   10/19/19 1030 (!) 153/85 -- -- 70 (!) 24 96 %   10/19/19 1000 (!) 150/83 -- -- 70 (!) 24 96 %   10/19/19 0930 (!) 151/87 -- -- 68 (!) 24 98 %       Gen: Well developed, well nourished, intubated, on vent  HEENT: Anicteric, PERRLA, normocephalic, neck symmetrical, +ET/OG tube  CV: S1, S2, no murmers/rubs, non-displaced PMI  Lungs: CTA-B, normal excursion  Abd: Soft, NT, ND, normal BS's, no HSM, obese  Ext: No c/c/e, 1+ DP pulses to BLE's  Neuro:  Intubated, sedated, no asterixis.  Skin: No rashes/lesions, normal texture  Psych:  Sedated, on vent    Labs:  Recent Labs   Lab 10/20/19  0345   CALCIUM 9.8      K 4.2   CO2 35*   CL 97   BUN 55*   CREATININE 1.8*     Recent Results (from the past 336 hour(s))   CBC auto differential    Collection Time: 10/20/19  3:45 AM   Result Value Ref Range    WBC 11.72 3.90 - 12.70 K/uL    Hemoglobin 11.3 (L) 12.0 - 16.0 g/dL    Hematocrit 39.0 37.0 - 48.5 %    Platelets 147 (L) 150 - 350 K/uL   CBC auto differential    Collection Time: 10/19/19  5:26 AM   Result Value Ref Range    WBC 10.94 3.90 - 12.70 K/uL    Hemoglobin 12.0 12.0 - 16.0 g/dL    Hematocrit 41.7 37.0 - 48.5 %    Platelets 147 (L) 150 - 350 K/uL   CBC auto differential    Collection Time: 10/18/19  3:08 AM   Result Value Ref Range    WBC 16.48 (H) 3.90 - 12.70 K/uL    Hemoglobin 13.1 12.0 - 16.0 g/dL    Hematocrit 44.4 37.0 - 48.5 %    Platelets 171 150 - 350 K/uL     INR = 1.1 on 10/10/2019  Last LFTs are within normal limits    Imaging:  Reviewed abdominal x-ray, gastric tube in position in stomach    Assessment:  Pt. Is a 69 y.o. female with:  1.  Feeding difficulty/dysphagia-secondary to acute neuro issues/CVA.  Currently receiving tube feeds via OG tube.  2.  Acute CVA-with respiratory failure, will require tracheostomy for long-term vent  "management.  3.  History of DM, HTN, hyperlipidemia, obesity......    Recommendations:  1.  Continue supportive care.  2.  Hold tube feeds after midnight.  3.  We will plan for EGD/PEG tomorrow.  4.  Will get updated coags.    Attempted to contact "Sister" on chart, no answer and no voicemail set up.  Will continue to reach out.        I would like to take this opportunity to thank you for this consult.  If you have any questions or concerns, please do not hesitate to contact me.         "

## 2019-10-20 NOTE — ASSESSMENT & PLAN NOTE
Intubated for airway protection given concern for angioedema initially  Patient remains intubated secondary to acute CVA and and patient did not have to angioedema  Positive cultures with increased secretions  Pulmonary following  Patient is morbidly obese with obstructive sleep apnea which may hinder extubation  Pulmonary discussed possible need for trach and PEG with son  Cephazolin added for positive Staph in the sputum and nebulizer treatments  ENT consulted for trach placement with tentative plans for procedure on Monday  GI for PEG placement, hopefully at the same time as trach  As per Pulmonary

## 2019-10-20 NOTE — PROGRESS NOTES
[facility]  Pulmonology  Progress Note    Patient Name: Sherrie Alex  MRN: [id number]  Admission Date: 10/11/2019  Hospital Length of Stay: 9 days  Code Status: Full Code  Attending Provider: Vee Arnett MD  Primary Care Provider: Desmond Yang MD   Principal Problem: Acute on chronic respiratory failure    Subjective:     Interval History: DWAIN overnight. Mobility unchanged.    Objective:     Vital Signs (Most Recent):  Temp: 98.4 °F (36.9 °C) (10/20/19 1200)  Pulse: 70 (10/20/19 1505)  Resp: 20 (10/20/19 1505)  BP: (!) 179/97 (10/20/19 1505)  SpO2: 100 % (10/20/19 1505) Vital Signs (24h Range):  Temp:  [98.2 °F (36.8 °C)-98.6 °F (37 °C)] 98.4 °F (36.9 °C)  Pulse:  [55-74] 70  Resp:  [15-23] 20  SpO2:  [98 %-100 %] 100 %  BP: (100-179)/(56-97) 179/97     Weight: 119.2 kg (262 lb 12.6 oz)  Body mass index is 43.73 kg/m².      Intake/Output Summary (Last 24 hours) at 10/20/2019 1523  Last data filed at 10/20/2019 1300  Gross per 24 hour   Intake 2568.6 ml   Output 1165 ml   Net 1403.6 ml       Physical Exam   Constitutional: She appears well-nourished. No distress.   HENT:   Macroglossic. 7.5 ETT in place   Eyes:   EOM unable to cross midline to the right   Neck: No JVD present. No tracheal deviation present.   Cardiovascular: Normal rate, regular rhythm and intact distal pulses.   Pulmonary/Chest: Effort normal and breath sounds normal. No stridor. She has no wheezes. She has no rales. She exhibits no tenderness.   Abdominal: Soft. Bowel sounds are normal.   Musculoskeletal:   Left hand and left foot movement on command.    Lymphadenopathy:     She has no cervical adenopathy.   Neurological:   Squeezes left hand and moves left foot on command. Unable to move right side. Unable to shake head yes or no. Unable to move tongue.    Skin: Skin is warm. Capillary refill takes less than 2 seconds.   Psychiatric:   intubated       Vents:  Vent Mode: [facility] (10/20/19 1505)  Ventilator Initiated: Yes  (10/11/19 0213)  Set Rate: 15 bmp (10/20/19 1505)  Vt Set: 320 mL (10/20/19 1505)  PEEP/CPAP: 5 cmH20 (10/20/19 1505)  Oxygen Concentration (%): 25 (10/20/19 1505)  Peak Airway Pressure: 12.3 cmH2O (10/20/19 1505)  Total Ve: 6.5 mL (10/20/19 1505)  F/VT Ratio<105 (RSBI): (!) 62.5 (10/20/19 1505)    Lines/Drains/Airways     Drain                 NG/OG Tube 10/11/19 0215 orogastric 18 Fr. Left mouth 9 days         Urethral Catheter 10/11/19 0230 16 Fr. 9 days          Airway                 Airway - Non-Surgical 10/11/19 0204 Endotracheal Tube-Hi/Lo 9 days          Peripheral Intravenous Line                 Peripheral IV - Single Lumen 10/15/19 0800 20 G Anterior;Left Upper Arm 5 days         Peripheral IV - Single Lumen 10/16/19 0730 20 G Anterior;Right Forearm 4 days                Significant Labs:    CBC/Anemia Profile:  Recent Labs   Lab 10/19/19  0526 10/20/19  0345   WBC 10.94 11.72   HGB 12.0 11.3*   HCT 41.7 39.0   * 147*   MCV 69* 68*   RDW 18.4* 17.7*        Chemistries:  Recent Labs   Lab 10/19/19  0526 10/20/19  0345    139   K 4.3 4.2   CL 96 97   CO2 35* 35*   BUN 57* 55*   CREATININE 2.0* 1.8*   CALCIUM 10.0 9.8       All pertinent labs within the past 24 hours have been reviewed.    Significant Imaging:  I have reviewed and interpreted all pertinent imaging results/findings within the past 24 hours.    Assessment/Plan:     * Acute on chronic respiratory failure  Intubated for airway protection and concern for angioedema vs dysphagia related to new CVA (more likely). Good lung mechanics and has a cuff leak. Sputum w/ MSSA    - LPV. Wean FiO2 to maintain SpO2 >88%   - Started on ancef 10/16 for 7 day course  - Secretions improved, afebrile, VSS   - Hold diuresis given metabolic alkalosis  - Duonebs  - ENT with plan for tracheostomy tomorrow. PEG per GI    CVA (cerebral vascular accident)  Suspect this is the reason she was drooling and was macroglossic on arrival, and not angioedema.  Tongue size is unchanged.   SETscore >8 on arrival, indicating higher likelihood of extubation failure. In the setting of no improvement in right sided strength, neck weakness, continued inability to manipulate her tongue, and difficulty with initial intubation, I favor proceeding with early tracheostomy.  - ENT consulted for evaluation of tracheostomy   - Tentative plan for tomorrow   - PEG w/ GI as well   - NPO p [facility]  - Discussed with family, who are amenable.   - Informed family the possibility that she does not regain function and to discuss her wishes amongst themselves, especially if she is unable to communicate.    Encephalopathy, metabolic  2/2 CVA. Improved        Angio-edema  No identified precipitant of angioedema. Seems more likely that drooling and dysarthria are reflective of CVA  - Oropharyngeal exam without edema on assessment but poor oral tone. + cuff leak.   - s/p 48 hours of corticosteroids and H2 antagonist, off therapy with no change in macroglossia  - ENT eval as above           Darrel Castillo MD  Pulmonology  [facility]

## 2019-10-20 NOTE — SUBJECTIVE & OBJECTIVE
Interval History: No acute events overnight.     Review of Systems   Unable to perform ROS: Intubated     Objective:     Vital Signs (Most Recent):  Temp: 98.6 °F (37 °C) (10/20/19 0800)  Pulse: (!) 57 (10/20/19 0911)  Resp: 19 (10/20/19 0911)  BP: (!) 154/83 (10/20/19 0800)  SpO2: 100 % (10/20/19 0911) Vital Signs (24h Range):  Temp:  [98 °F (36.7 °C)-98.6 °F (37 °C)] 98.6 °F (37 °C)  Pulse:  [57-74] 57  Resp:  [15-24] 19  SpO2:  [93 %-100 %] 100 %  BP: (100-165)/(56-95) 154/83     Weight: 119.2 kg (262 lb 12.6 oz)  Body mass index is 43.73 kg/m².    Intake/Output Summary (Last 24 hours) at 10/20/2019 0913  Last data filed at 10/20/2019 0800  Gross per 24 hour   Intake 2562.1 ml   Output 1455 ml   Net 1107.1 ml      Physical Exam   Constitutional: She appears well-developed. No distress.   Obese   HENT:   Head: Normocephalic and atraumatic.   ET tube in place. Lips/tongue without significant edema. Mild tongue protrusion.   Eyes: Pupils are equal, round, and reactive to light. Conjunctivae and EOM are normal. Right eye exhibits no discharge. Left eye exhibits no discharge. Scleral icterus is present.   Opened eyes to voice   Neck: Normal range of motion. Neck supple.   Cardiovascular: Normal rate and regular rhythm. Exam reveals no gallop and no friction rub.   No murmur heard.  Distant   Pulmonary/Chest: No stridor. She has no wheezes. She has no rales. She exhibits no tenderness.   Intubated on MV    Abdominal: Soft. Bowel sounds are normal. She exhibits no distension and no mass. There is no tenderness. There is no rebound and no guarding. No hernia.   Musculoskeletal: She exhibits no edema, tenderness or deformity.   Skin: Skin is warm and dry. Capillary refill takes less than 2 seconds. No rash noted. She is not diaphoretic. No erythema. No pallor.   Nursing note and vitals reviewed.      Significant Labs:   Blood Culture: No results for input(s): LABBLOO in the last 48 hours.  BMP:   Recent Labs   Lab  10/20/19  0345   *      K 4.2   CL 97   CO2 35*   BUN 55*   CREATININE 1.8*   CALCIUM 9.8     CBC:   Recent Labs   Lab 10/19/19  0526 10/20/19  0345   WBC 10.94 11.72   HGB 12.0 11.3*   HCT 41.7 39.0   * 147*     POCT Glucose:   Recent Labs   Lab 10/19/19  1741 10/19/19  2313 10/20/19  0554   POCTGLUCOSE 203* 200* 208*     Urine Culture: No results for input(s): LABURIN in the last 48 hours.    Significant Imaging: I have reviewed and interpreted all pertinent imaging results/findings within the past 24 hours.

## 2019-10-20 NOTE — PLAN OF CARE
Continues to require coverage for elevated bld sugar levels UOP good no safety issues pt has minimal movement but squeeze with LT hand . Tolerating TF no BM on my shift . Spoke briefly with family over the phone with no changes in condition ,procedures scheduled for tomorrow. No elevation in pecerdex  drip.rested well during this shift.

## 2019-10-20 NOTE — ANESTHESIA PREPROCEDURE EVALUATION
10/20/2019  Sherrie Alex is a 69 y.o., female.  Pre-operative evaluation for Procedure(s) (LRB):  TRACHEOTOMY (N/A)    68 yo F admitted 10/11/19 for facial weakness, dysphagia and tongue swelling intubated with anesthesia present in ED with GLydescope S3 blade and 6.5 ETT @23cm lips. Patient diagnosed with left MCA distribution CVA with right-sided weakness and worsening respiratory failure requiring prolonged vent management. Patient is currently intubated and sedated in the ICU.        GI planning for PEG and EGD for 10/21 per their note but family not spoken to GI yet about the procedure.    Anesthesia Consent obtained with patient's sister Pilar Salinas.  Patient Active Problem List   Diagnosis    Dyslipidemia    Morbid obesity    Anxiety    Sleep apnea    Type 2 diabetes mellitus with circulatory disorder, with long-term current use of insulin    Chronic idiopathic constipation    Essential hypertension    Chronic left shoulder pain    Acquired hypothyroidism    Angio-edema    Seizure    RYLEE (acute kidney injury)    Acute on chronic respiratory failure    Encephalopathy, metabolic    CVA (cerebral vascular accident)    Venous stasis ulcer    Required emergency intubation    Ventilator dependent       Review of patient's allergies indicates:   Allergen Reactions    Codeine Anxiety    Ace inhibitors     Diphenhydramine hcl        No current facility-administered medications on file prior to encounter.      Current Outpatient Medications on File Prior to Encounter   Medication Sig Dispense Refill    amlodipine (NORVASC) 5 MG tablet Take 5 mg by mouth once daily.        blood sugar diagnostic (GLUCOSE BLOOD) Strp by Misc.(Non-Drug; Combo Route) route.        cetirizine (ZYRTEC) 5 MG tablet Take 5 mg by mouth once daily.      cholecalciferol, vitamin D3, 10,000 unit Tab Take 1  "tablet by mouth once daily.      cloNIDine (CATAPRES) 0.3 MG tablet Take 0.3 mg by mouth 2 (two) times daily.      ergocalciferol (VITAMIN D2) 50,000 unit Cap Take 50,000 Units by mouth every 7 days.        hydrALAZINE (APRESOLINE) 100 MG tablet Take 1 tablet (100 mg total) by mouth 2 (two) times daily. 180 tablet 0    hydrALAZINE (APRESOLINE) 50 MG tablet Take 50 mg by mouth 3 (three) times daily.      insulin aspart U-100 (NOVOLOG) 100 unit/mL injection Inject 10 Units into the skin 3 (three) times daily before meals.      insulin detemir U-100 (LEVEMIR) 100 unit/mL injection Inject 23 Units into the skin every evening.      insulin glargine (LANTUS SOLOSTAR) 100 unit/mL (3 mL) InPn Inject 24 Units into the skin every evening.       insulin needles, disposable, (PEN NEEDLE) 31 X 5/16 " Ndle by Misc.(Non-Drug; Combo Route) route.        lacosamide (VIMPAT) 100 mg Tab Take 50 mg by mouth 2 (two) times daily.        LANCETS MISC by Misc.(Non-Drug; Combo Route) route 4 (four) times daily.        levothyroxine (SYNTHROID) 137 MCG Tab tablet Take 1 tablet (137 mcg total) by mouth once daily. 90 tablet 1    linagliptin (TRADJENTA) 5 mg Tab tablet Take 5 mg by mouth once daily.        lubiprostone (AMITIZA) 24 MCG Cap Take 1 capsule (24 mcg total) by mouth 2 (two) times daily with meals. 180 capsule 0    meclizine (ANTIVERT) 25 mg tablet Take 25 mg by mouth 3 (three) times daily as needed.      metoprolol succinate (TOPROL-XL) 100 MG 24 hr tablet Take 100 mg by mouth 2 (two) times daily.        nitroGLYCERIN (NITROSTAT) 0.4 MG SL tablet Place 0.4 mg under the tongue.      rosuvastatin (CRESTOR) 20 MG tablet Take 20 mg by mouth once daily.         Past Surgical History:   Procedure Laterality Date    APPENDECTOMY       SECTION      CHOLECYSTECTOMY      DILATION AND CURETTAGE OF UTERUS      TUBAL LIGATION         Social History     Socioeconomic History    Marital status:      Spouse " name: Not on file    Number of children: Not on file    Years of education: Not on file    Highest education level: Not on file   Occupational History    Not on file   Social Needs    Financial resource strain: Not on file    Food insecurity:     Worry: Not on file     Inability: Not on file    Transportation needs:     Medical: Not on file     Non-medical: Not on file   Tobacco Use    Smoking status: Never Smoker   Substance and Sexual Activity    Alcohol use: No    Drug use: No    Sexual activity: Yes     Partners: Male   Lifestyle    Physical activity:     Days per week: Not on file     Minutes per session: Not on file    Stress: Not on file   Relationships    Social connections:     Talks on phone: Not on file     Gets together: Not on file     Attends [employer] service: Not on file     Active member of club or organization: Not on file     Attends meetings of clubs or organizations: Not on file     Relationship status: Not on file   Other Topics Concern    Not on file   Social History Narrative    Not on file         CBC:   Recent Labs     10/19/19  0526 10/20/19  0345   WBC 10.94 11.72   RBC 6.09* 5.77*   HGB 12.0 11.3*   HCT 41.7 39.0   * 147*   MCV 69* 68*   MCH 19.7* 19.6*   MCHC 28.8* 29.0*       CMP:   Recent Labs     10/19/19  0526 10/20/19  0345    139   K 4.3 4.2   CL 96 97   CO2 35* 35*   BUN 57* 55*   CREATININE 2.0* 1.8*   * 217*   CALCIUM 10.0 9.8       INR  Recent Labs     10/20/19  1005   INR 1.0           Diagnostic Studies:  MRI brain 10/12:  Moderate-sized area of subacute infarction involving the left subinsular white matter extending to the periventricular white matter.  No MR evidence for hemorrhage.    Moderate involutional change and microvascular changes.    EKG:10/11/19   HR 76, NSR, low voltage, left axis deviation      2D Echo: TTE  · 10/14/19:  Increased (hyperdynamic) left ventricular systolic function. The estimated ejection fraction is  75%  · Concentric left ventricular hypertrophy. Note made of inc flow velocity across LVOT (3 m/sec). Possible HCM with obstruction vs hyperdynamic LV.  · Small left ventricular cavity size.  · No wall motion abnormalities.  · Normal right ventricular systolic function.  · Small circumferential pericardial effusion without hemodynamic compromise.  · Mechanically ventilated; cannot use inferior caval vein diameter to estimate central venous pressure.  · No intracardiac source of embolus noted on this exam. If high clinical suspicion, consider ANNALEE +/- bubble study.  Anesthesia Evaluation    I have reviewed the Patient Summary Reports.    I have reviewed the Nursing Notes.   I have reviewed the Medications.     Review of Systems  Anesthesia Hx:  No problems with previous Anesthesia  History of prior surgery of interest to airway management or planning: Denies Family Hx of Anesthesia complications.   Denies Personal Hx of Anesthesia complications.   Social:  Non-Smoker    Hematology/Oncology:  Hematology Normal   Oncology Normal     EENT/Dental:   NG tube placed for TFs.   Cardiovascular:   Exercise tolerance: poor Hypertension    Pulmonary:   Pneumonia Sleep Apnea Presumed aspiration pneumonia and respiratory failure from CVA on admission 10/11.  Currently intubated and sedated.    Vent setting: PRVC; RR 15; FiO2 25%;    Renal/:   Chronic Renal Disease, CRI Baseline Cr 1.8-2   Hepatic/GI:  Hepatic/GI Normal    Neurological:   CVA, residual symptoms Seizures Dysphagia and right sided weakness on admission; pt follows command intermittently; able to move left hand and foot   Endocrine:   Diabetes, type 2, using insulin Hypothyroidism    Psych:  Psychiatric Normal           Physical Exam  General:  Morbid Obesity    Airway/Jaw/Neck:  Airway Findings: Mouth Opening: Normal Pre-Existing Airway Tube(s): Oral Endotracheal tube  Size: 6.5 ETT at 23cm (lips)  General Airway Assessment: Adult  TM Distance: Normal, at  least 6 cm        Eyes/Ears/Nose:  Eyes/Ears/Nose Findings: NG tube and ETT present.     Chest/Lungs:  Chest/Lungs Findings: Decreased Breath Sounds Bilateral, Clear to auscultation     Heart/Vascular:  Heart Findings: Normal       Mental Status:  Mental Status Findings: (Sedated on precedex)  Somnolent     Intubated and sedated; History obtained from chart review.    Anesthesia Plan  Type of Anesthesia, risks & benefits discussed:  Anesthesia Type:  general  Patient's Preference:   Intra-op Monitoring Plan: standard ASA monitors  Intra-op Monitoring Plan Comments:   Post Op Pain Control Plan: multimodal analgesia, IV/PO Opioids PRN and per primary service following discharge from PACU  Post Op Pain Control Plan Comments:   Induction:   IV  Beta Blocker:  Patient is on a Beta-Blocker and has received one dose within the past 24 hours (No further documentation required).       Informed Consent: Patient representative understands risks and agrees with Anesthesia plan.  Questions answered. Anesthesia consent signed with patient representative.  ASA Score: 3     Day of Surgery Review of History & Physical:  There are no significant changes.      Anesthesia Plan Notes: Phone consent obtained from both patient's sister (Pilar Salinas [phone]) and patient's son (Davis Alex [phone]); TF held at [facility]        Ready For Surgery From Anesthesia Perspective.

## 2019-10-20 NOTE — SUBJECTIVE & OBJECTIVE
Interval History: DWAIN overnight. Mobility unchanged.    Objective:     Vital Signs (Most Recent):  Temp: 98.4 °F (36.9 °C) (10/20/19 1200)  Pulse: 70 (10/20/19 1505)  Resp: 20 (10/20/19 1505)  BP: (!) 179/97 (10/20/19 1505)  SpO2: 100 % (10/20/19 1505) Vital Signs (24h Range):  Temp:  [98.2 °F (36.8 °C)-98.6 °F (37 °C)] 98.4 °F (36.9 °C)  Pulse:  [55-74] 70  Resp:  [15-23] 20  SpO2:  [98 %-100 %] 100 %  BP: (100-179)/(56-97) 179/97     Weight: 119.2 kg (262 lb 12.6 oz)  Body mass index is 43.73 kg/m².      Intake/Output Summary (Last 24 hours) at 10/20/2019 1523  Last data filed at 10/20/2019 1300  Gross per 24 hour   Intake 2568.6 ml   Output 1165 ml   Net 1403.6 ml       Physical Exam   Constitutional: She appears well-nourished. No distress.   HENT:   Macroglossic. 7.5 ETT in place   Eyes:   EOM unable to cross midline to the right   Neck: No JVD present. No tracheal deviation present.   Cardiovascular: Normal rate, regular rhythm and intact distal pulses.   Pulmonary/Chest: Effort normal and breath sounds normal. No stridor. She has no wheezes. She has no rales. She exhibits no tenderness.   Abdominal: Soft. Bowel sounds are normal.   Musculoskeletal:   Left hand and left foot movement on command.    Lymphadenopathy:     She has no cervical adenopathy.   Neurological:   Squeezes left hand and moves left foot on command. Unable to move right side. Unable to shake head yes or no. Unable to move tongue.    Skin: Skin is warm. Capillary refill takes less than 2 seconds.   Psychiatric:   intubated       Vents:  Vent Mode: PRVC (10/20/19 1505)  Ventilator Initiated: Yes (10/11/19 0213)  Set Rate: 15 bmp (10/20/19 1505)  Vt Set: 320 mL (10/20/19 1505)  PEEP/CPAP: 5 cmH20 (10/20/19 1505)  Oxygen Concentration (%): 25 (10/20/19 1505)  Peak Airway Pressure: 12.3 cmH2O (10/20/19 1505)  Total Ve: 6.5 mL (10/20/19 1505)  F/VT Ratio<105 (RSBI): (!) 62.5 (10/20/19 1505)    Lines/Drains/Airways     Drain                 NG/OG  Tube 10/11/19 0215 orogastric 18 Fr. Left mouth 9 days         Urethral Catheter 10/11/19 0230 16 Fr. 9 days          Airway                 Airway - Non-Surgical 10/11/19 0204 Endotracheal Tube-Hi/Lo 9 days          Peripheral Intravenous Line                 Peripheral IV - Single Lumen 10/15/19 0800 20 G Anterior;Left Upper Arm 5 days         Peripheral IV - Single Lumen 10/16/19 0730 20 G Anterior;Right Forearm 4 days                Significant Labs:    CBC/Anemia Profile:  Recent Labs   Lab 10/19/19  0526 10/20/19  0345   WBC 10.94 11.72   HGB 12.0 11.3*   HCT 41.7 39.0   * 147*   MCV 69* 68*   RDW 18.4* 17.7*        Chemistries:  Recent Labs   Lab 10/19/19  0526 10/20/19  0345    139   K 4.3 4.2   CL 96 97   CO2 35* 35*   BUN 57* 55*   CREATININE 2.0* 1.8*   CALCIUM 10.0 9.8       All pertinent labs within the past 24 hours have been reviewed.    Significant Imaging:  I have reviewed and interpreted all pertinent imaging results/findings within the past 24 hours.

## 2019-10-20 NOTE — PROGRESS NOTES
"[facility] Medicine  Progress Note    Patient Name: Sherrie Alex  MRN: [id number]  Patient Class: IP- Inpatient   Admission Date: 10/11/2019  Length of Stay: 9 days  Attending Physician: Vee Arnett MD  Primary Care Provider: Desmond Yang MD        Subjective:     Principal Problem:Acute on chronic respiratory failure        HPI:  patient is a 69 y.o female who presents to the ED complaining of tongue swelling that began PTA. Patient has slurred speech, drooling, and tongue swelling. Patient denies any pain, itching, CP, nausea, vomiting, or fever. Per sisters, patient is normally talkative and active. Her sisters are unaware of any cause of onset, stating that the patient lives alone. PMHx of CVA 1 yr ago, DM, HTN, HLD, and obesity. Patient is allergic to codeine and ace inhibitors.      The history is provided by the patient and a relative. History limited by: Acuity of condition. No  was used.     Pt intubated in ED for airway protection ?laryngeal edema - not noted in ED documentation.  Pt started on IV steroids and H2 blocker on vent. In ICU, pt showed sluggish neuro response to pain and unable to follow commands. Propofol taken off and neuro status improved. Pulm consulted for vent management. Neuro consulted - h/o seizures.  Seen in ED yesterday am with "leg shaking" and weakness and dc'd home. H/o seizures on vimpat.      Overview/Hospital Course:  Ms. Alex admitted acute neuro changes and resp compromise. Intubated in ED. Serial neuro testing off sedation suggest right sided weakness > left. MRI:Moderate-sized area of subacute infarction involving the left subinsular white matter extending to the periventricular white matter. No MR evidence for hemorrhage.Neurology notified of findings. Allow for permissive HTN initially and will start to resume BP meds. Cannot extubate at this point in time, continue IV lasix diuresis, ET culture sent with " climbing WBCs, possible aspiration. Dr. Bauer met with family and discussed plan to continue attempts to wean from vent, though neuro status/effects of CVA may be limiting factor. If unable to wean from vent, a tracheostomy and gastrostomy tube would be an option.  Blood pressure medication slowly resumed.  ENT plans for trach on Monday. GI consulted for PEG.    Interval History: No acute events overnight.     Review of Systems   Unable to perform ROS: Intubated     Objective:     Vital Signs (Most Recent):  Temp: 98.6 °F (37 °C) (10/20/19 0800)  Pulse: (!) 57 (10/20/19 0911)  Resp: 19 (10/20/19 0911)  BP: (!) 154/83 (10/20/19 0800)  SpO2: 100 % (10/20/19 0911) Vital Signs (24h Range):  Temp:  [98 °F (36.7 °C)-98.6 °F (37 °C)] 98.6 °F (37 °C)  Pulse:  [57-74] 57  Resp:  [15-24] 19  SpO2:  [93 %-100 %] 100 %  BP: (100-165)/(56-95) 154/83     Weight: 119.2 kg (262 lb 12.6 oz)  Body mass index is 43.73 kg/m².    Intake/Output Summary (Last 24 hours) at 10/20/2019 0913  Last data filed at 10/20/2019 0800  Gross per 24 hour   Intake 2562.1 ml   Output 1455 ml   Net 1107.1 ml      Physical Exam   Constitutional: She appears well-developed. No distress.   Obese   HENT:   Head: Normocephalic and atraumatic.   ET tube in place. Lips/tongue without significant edema. Mild tongue protrusion.   Eyes: Pupils are equal, round, and reactive to light. Conjunctivae and EOM are normal. Right eye exhibits no discharge. Left eye exhibits no discharge. Scleral icterus is present.   Opened eyes to voice   Neck: Normal range of motion. Neck supple.   Cardiovascular: Normal rate and regular rhythm. Exam reveals no gallop and no friction rub.   No murmur heard.  Distant   Pulmonary/Chest: No stridor. She has no wheezes. She has no rales. She exhibits no tenderness.   Intubated on MV    Abdominal: Soft. Bowel sounds are normal. She exhibits no distension and no mass. There is no tenderness. There is no rebound and no guarding. No hernia.    Musculoskeletal: She exhibits no edema, tenderness or deformity.   Skin: Skin is warm and dry. Capillary refill takes less than 2 seconds. No rash noted. She is not diaphoretic. No erythema. No pallor.   Nursing note and vitals reviewed.      Significant Labs:   Blood Culture: No results for input(s): LABBLOO in the last 48 hours.  BMP:   Recent Labs   Lab 10/20/19  0345   *      K 4.2   CL 97   CO2 35*   BUN 55*   CREATININE 1.8*   CALCIUM 9.8     CBC:   Recent Labs   Lab 10/19/19  0526 10/20/19  0345   WBC 10.94 11.72   HGB 12.0 11.3*   HCT 41.7 39.0   * 147*     POCT Glucose:   Recent Labs   Lab 10/19/19  1741 10/19/19  2313 10/20/19  0554   POCTGLUCOSE 203* 200* 208*     Urine Culture: No results for input(s): LABURIN in the last 48 hours.    Significant Imaging: I have reviewed and interpreted all pertinent imaging results/findings within the past 24 hours.      Assessment/Plan:      * Acute on chronic respiratory failure  Intubated for airway protection given concern for angioedema initially  Patient remains intubated secondary to acute CVA and and patient did not have to angioedema  Positive cultures with increased secretions  Pulmonary following  Patient is morbidly obese with obstructive sleep apnea which may hinder extubation  Pulmonary discussed possible need for trach and PEG with son  Cephazolin added for positive Staph in the sputum and nebulizer treatments  ENT consulted for trach placement with tentative plans for procedure on Monday  GI for PEG placement, hopefully at the same time as trach  As per Pulmonary    CVA (cerebral vascular accident)  MRI of the brain with moderate-sized area of subacute infarction involving the left subinsular white matter extending to the periventricular white matter  Allowed for permissive hypertension along with treatment with aspirin and statin  Appreciate Neurology input  Resumed blood pressure medications for tighter blood pressure  control  Weaning from vent hindered by new CVA  Trach and PEG planned (hopefully for Monday)    Encephalopathy, metabolic  Secondary to CVA  As discussed above    RYLEE (acute kidney injury)  Baseline creatinine of 1.4-1.7  Had slight improvement now steadily trending up  CO2 level steadily increasing and patient was in negative balance  Held IV Lasix for now and continue to monitor with repeat labs    Seizure  Continue vimpat  No seizures seen on EEG  Appreciate Neurology following    Angio-edema  Initially thought to have angioedema  Now likely thought to have slight droop associated with stroke and not true angioedema    Acquired hypothyroidism  Resume synthroid   Thyroid function test normal    Essential hypertension  Resume home meds: norvasc, clonidine, hydralazine, toprol  IV PRN hydralazine    Type 2 diabetes mellitus with circulatory disorder, with long-term current use of insulin  Uncontrolled with hyperglycemia likely due to dose of dexamethasone given  HGBA1c 6.9%  Will increase detemir to 60 units q.h.s. and continue sliding scale insulin  Will make further adjustments to get glucose and range of 140-180    Anxiety  Fentanyl pushes for sedation    Morbid obesity  Discuss weight management after extubation and when mentation is appropriate    Dyslipidemia  Continue rosuvastatin        VTE Risk Mitigation (From admission, onward)         Ordered     heparin (porcine) injection 5,000 Units  Every 12 hours      10/11/19 0617     IP VTE HIGH RISK PATIENT  Once      10/11/19 0532     Place ELIEZER hose  Until discontinued      10/11/19 0532     Place sequential compression device  Until discontinued      10/11/19 0532                Critical care time spent on the evaluation and treatment of severe organ dysfunction, review of pertinent labs and imaging studies, discussions with consulting providers and discussions with patient/family: 35 minutes.      Vee Arnett MD  Department of Hospital Medicine   [facility]

## 2019-10-20 NOTE — NURSING
Received lying quietly with HOB up 45' no resp problems showing normal rate of breathing tolerating TF with minimal residuals 2 stools reported on day shift all bld sugar levels needed to be covered follows some simple commands but like to hold hands on the LT side . See flow sheet for v/s #.

## 2019-10-20 NOTE — ASSESSMENT & PLAN NOTE
Uncontrolled with hyperglycemia likely due to dose of dexamethasone given  HGBA1c 6.9%  Will increase detemir to 60 units q.h.s. and continue sliding scale insulin  Will make further adjustments to get glucose and range of 140-180

## 2019-10-20 NOTE — ASSESSMENT & PLAN NOTE
MRI of the brain with moderate-sized area of subacute infarction involving the left subinsular white matter extending to the periventricular white matter  Allowed for permissive hypertension along with treatment with aspirin and statin  Appreciate Neurology input  Resumed blood pressure medications for tighter blood pressure control  Weaning from vent hindered by new CVA  Trach and PEG planned (hopefully for Monday)

## 2019-10-20 NOTE — PLAN OF CARE
Pt. Remains on ventilator with PRVC setting, FIO2-25%, PEEP-15, R-15, TV-320. Has Precedex infusing at 0.7 mcg for comfort. Opens eyes spontaneously. Will follow command to  hand or move her foot on the left side. Will be NPO after MN for trach and peg tube placement in the am. Afebrile. HR has been NSR per cardiac monitor. No hospital acquired injuries this shift.

## 2019-10-20 NOTE — PROGRESS NOTES
Received patient orally intubated and on a Servo I Ventilator with settings as follows:  PRVC 15/ 320/ +5 PEEP/ 25%.  Patient has a size 6.5 ET tube in place which is secured at the 24 cm erick at the lips on the Left side of the mouth.  The Et tube was rotated to the Right side of the mouth.  The Ventilator alarms are set and functional and the AMBU bag is at the HOB.

## 2019-10-20 NOTE — ASSESSMENT & PLAN NOTE
Intubated for airway protection and concern for angioedema vs dysphagia related to new CVA (more likely). Good lung mechanics and has a cuff leak. Sputum w/ MSSA    - LPV. Wean FiO2 to maintain SpO2 >88%   - Started on ancef 10/16 for 7 day course  - Secretions improved, afebrile, VSS   - Hold diuresis given metabolic alkalosis  - Duonebs  - ENT with plan for tracheostomy tomorrow. PEG per GI

## 2019-10-20 NOTE — CARE UPDATE
[facility]  ICU Multidisciplinary Bedside Rounds     UPDATE     Date: 10/20/2019      Plan of care reviewed with the following, Nurse, Charge Nurse, Physician and Resp. Therapist.       Needs/ Goals for the day: trach and peg tube placement Monday. Hold TF after MN      Level of Care: Critical Care

## 2019-10-20 NOTE — ASSESSMENT & PLAN NOTE
Suspect this is the reason she was drooling and was macroglossic on arrival, and not angioedema. Tongue size is unchanged.   SETscore >8 on arrival, indicating higher likelihood of extubation failure. In the setting of no improvement in right sided strength, neck weakness, continued inability to manipulate her tongue, and difficulty with initial intubation, I favor proceeding with early tracheostomy.  - ENT consulted for evaluation of tracheostomy   - Tentative plan for tomorrow   - PEG w/ GI as well   - NPO p MN  - Discussed with family, who are amenable.   - Informed family the possibility that she does not regain function and to discuss her wishes amongst themselves, especially if she is unable to communicate.

## 2019-10-20 NOTE — NURSING
[facility]  ICU Multidisciplinary Bedside Rounds   SUMMARY     Date: 10/20/2019    Prehospitalization: Nursing Home  Admit Date / LOS : 10/11/2019/ 9 days    Diagnosis: Acute on chronic respiratory failure    Consults:        Active: Neuro and Pulm CC       Needed: ENT     Code Status: Full Code   Advanced Directive: <no information>    LDA: Guevara, OG, PIV and Vent       Central Lines/Site/Justification:Patient Does Not Have Central Line       Urinary Cath/Order/Justification:Critically Ill in ICU    Vasopressors/Infusions:    dexMEDEtomidine in 0.9 % NaCl 0.401 mcg/kg/hr (10/20/19 0600)          GOALS: Volume/ Hemodynamic: N/A                     RASS: -2  light sedation, briefly awakes to voice (eye opening)    CAM ICU: Positive  Pain Management: none       Pain Controlled: not applicable     Rhythm: NSR    Respiratory Device: Vent    Vent Mode: PRVC  Oxygen Concentration (%):  [25] 25  Resp Rate Total:  [15 br/min-27 br/min] 20 br/min  Vt Set:  [320 mL] 320 mL  PEEP/CPAP:  [5 cmH20] 5 cmH20  Mean Airway Pressure:  [7.7 cmH20-10.8 cmH20] 8.8 cmH20             Most Recent SBT/ SAT: N/A       VTE Prophylaxis: Pharm and Mechanical  Mobility: Bedrest  Stress Ulcer Prophylaxis: Yes    Dietary: TF  Tolerance: yes  /  Advancement: @ goal    Isolation: No active isolations    Restraints: No    Significant Dates:  Post Op Date: N/A  Rescue Date: N/A  Imaging/ Diagnostics: N/A    Noteworthy Labs:  none    CBC/Anemia Labs: Coags:    Recent Labs   Lab 10/18/19  0308 10/19/19  0526 10/20/19  0345   WBC 16.48* 10.94 11.72   HGB 13.1 12.0 11.3*   HCT 44.4 41.7 39.0    147* 147*   MCV 68* 69* 68*   RDW 18.7* 18.4* 17.7*    No results for input(s): PT, INR, APTT in the last 168 hours.     Chemistries:   Recent Labs   Lab 10/18/19  0308 10/19/19  0526 10/20/19  0345    140 139   K 3.8 4.3 4.2   CL 94* 96 97   CO2 35* 35* 35*   BUN 56* 57* 55*   CREATININE 1.8* 2.0* 1.8*   CALCIUM 9.9 10.0 9.8         Cardiac Enzymes: Ejection Fractions:    No results for input(s): CPK, CPKMB, MB, TROPONINI in the last 72 hours. No results found for: EF     POCT Glucose: HbA1c:    Recent Labs   Lab 10/18/19  1729 10/18/19  2347 10/19/19  0557 10/19/19  1741 10/19/19  2313 10/20/19  0554   POCTGLUCOSE 202* 205* 215* 203* 200* 208*    Hemoglobin A1C   Date Value Ref Range Status   10/02/2019 6.9 (H) 4.0 - 5.6 % Final     Comment:     ADA Screening Guidelines:  5.7-6.4%  Consistent with prediabetes  >or=6.5%  Consistent with diabetes  High levels of fetal hemoglobin interfere with the HbA1C  assay. Heterozygous hemoglobin variants (HbS, HgC, etc)do  not significantly interfere with this assay.   However, presence of multiple variants may affect accuracy.     06/03/2008 6.7 (H) 4.0 - 6.2 % Final   02/13/2006 6.2 4.5 - 6.2 % Final        Needs from Care Team: none     ICU LOS 9d 1h  Level of Care: Critical Care

## 2019-10-21 ENCOUNTER — ANESTHESIA (OUTPATIENT)
Dept: SURGERY | Facility: HOSPITAL | Age: 69
DRG: 004 | End: 2019-10-21
Payer: MEDICARE

## 2019-10-21 LAB
ALLENS TEST: ABNORMAL
ANION GAP SERPL CALC-SCNC: 10 MMOL/L (ref 8–16)
BASOPHILS # BLD AUTO: 0.03 K/UL (ref 0–0.2)
BASOPHILS NFR BLD: 0.3 % (ref 0–1.9)
BUN SERPL-MCNC: 48 MG/DL (ref 8–23)
CALCIUM SERPL-MCNC: 9.9 MG/DL (ref 8.7–10.5)
CHLORIDE SERPL-SCNC: 98 MMOL/L (ref 95–110)
CO2 SERPL-SCNC: 33 MMOL/L (ref 23–29)
CREAT SERPL-MCNC: 1.6 MG/DL (ref 0.5–1.4)
DELSYS: ABNORMAL
DIFFERENTIAL METHOD: ABNORMAL
EOSINOPHIL # BLD AUTO: 0.4 K/UL (ref 0–0.5)
EOSINOPHIL NFR BLD: 3.3 % (ref 0–8)
ERYTHROCYTE [DISTWIDTH] IN BLOOD BY AUTOMATED COUNT: 17.9 % (ref 11.5–14.5)
ERYTHROCYTE [SEDIMENTATION RATE] IN BLOOD BY WESTERGREN METHOD: 15 MM/H
EST. GFR  (AFRICAN AMERICAN): 37 ML/MIN/1.73 M^2
EST. GFR  (NON AFRICAN AMERICAN): 32 ML/MIN/1.73 M^2
FIO2: 25
GLUCOSE SERPL-MCNC: 163 MG/DL (ref 70–110)
HCO3 UR-SCNC: 35.3 MMOL/L (ref 24–28)
HCT VFR BLD AUTO: 41.1 % (ref 37–48.5)
HGB BLD-MCNC: 12 G/DL (ref 12–16)
IMM GRANULOCYTES # BLD AUTO: 0.06 K/UL (ref 0–0.04)
IMM GRANULOCYTES NFR BLD AUTO: 0.5 % (ref 0–0.5)
LYMPHOCYTES # BLD AUTO: 1.4 K/UL (ref 1–4.8)
LYMPHOCYTES NFR BLD: 12.3 % (ref 18–48)
MCH RBC QN AUTO: 19.9 PG (ref 27–31)
MCHC RBC AUTO-ENTMCNC: 29.2 G/DL (ref 32–36)
MCV RBC AUTO: 68 FL (ref 82–98)
MIN VOL: 6.1
MODE: ABNORMAL
MONOCYTES # BLD AUTO: 1.1 K/UL (ref 0.3–1)
MONOCYTES NFR BLD: 9.8 % (ref 4–15)
NEUTROPHILS # BLD AUTO: 8.2 K/UL (ref 1.8–7.7)
NEUTROPHILS NFR BLD: 73.8 % (ref 38–73)
NRBC BLD-RTO: 0 /100 WBC
PCO2 BLDA: 49.9 MMHG (ref 35–45)
PEEP: 5
PH SMN: 7.46 [PH] (ref 7.35–7.45)
PIP: 14
PLATELET # BLD AUTO: 150 K/UL (ref 150–350)
PMV BLD AUTO: ABNORMAL FL (ref 9.2–12.9)
PO2 BLDA: 77 MMHG (ref 80–100)
POC BE: 10 MMOL/L
POC SATURATED O2: 96 % (ref 95–100)
POC TCO2: 37 MMOL/L (ref 23–27)
POCT GLUCOSE: 119 MG/DL (ref 70–110)
POCT GLUCOSE: 133 MG/DL (ref 70–110)
POCT GLUCOSE: 169 MG/DL (ref 70–110)
POCT GLUCOSE: 182 MG/DL (ref 70–110)
POTASSIUM SERPL-SCNC: 4.3 MMOL/L (ref 3.5–5.1)
RBC # BLD AUTO: 6.04 M/UL (ref 4–5.4)
SAMPLE: ABNORMAL
SITE: ABNORMAL
SODIUM SERPL-SCNC: 141 MMOL/L (ref 136–145)
SP02: 100
VT: 320
WBC # BLD AUTO: 11.1 K/UL (ref 3.9–12.7)

## 2019-10-21 PROCEDURE — 37000008 HC ANESTHESIA 1ST 15 MINUTES: Performed by: OTOLARYNGOLOGY

## 2019-10-21 PROCEDURE — 36000707: Performed by: OTOLARYNGOLOGY

## 2019-10-21 PROCEDURE — 25000003 PHARM REV CODE 250: Performed by: HOSPITALIST

## 2019-10-21 PROCEDURE — D9220A PRA ANESTHESIA: Mod: ANES,,, | Performed by: ANESTHESIOLOGY

## 2019-10-21 PROCEDURE — 31720 CLEARANCE OF AIRWAYS: CPT

## 2019-10-21 PROCEDURE — 82803 BLOOD GASES ANY COMBINATION: CPT

## 2019-10-21 PROCEDURE — 27201423 OPTIME MED/SURG SUP & DEVICES STERILE SUPPLY: Performed by: OTOLARYNGOLOGY

## 2019-10-21 PROCEDURE — S0028 INJECTION, FAMOTIDINE, 20 MG: HCPCS | Performed by: HOSPITALIST

## 2019-10-21 PROCEDURE — 94003 VENT MGMT INPAT SUBQ DAY: CPT

## 2019-10-21 PROCEDURE — 94640 AIRWAY INHALATION TREATMENT: CPT

## 2019-10-21 PROCEDURE — 37000009 HC ANESTHESIA EA ADD 15 MINS: Performed by: OTOLARYNGOLOGY

## 2019-10-21 PROCEDURE — 36600 WITHDRAWAL OF ARTERIAL BLOOD: CPT

## 2019-10-21 PROCEDURE — 63600175 PHARM REV CODE 636 W HCPCS: Performed by: HOSPITALIST

## 2019-10-21 PROCEDURE — D9220A PRA ANESTHESIA: Mod: CRNA,,, | Performed by: NURSE ANESTHETIST, CERTIFIED REGISTERED

## 2019-10-21 PROCEDURE — 25000003 PHARM REV CODE 250: Performed by: OTOLARYNGOLOGY

## 2019-10-21 PROCEDURE — 25000003 PHARM REV CODE 250: Performed by: PSYCHIATRY & NEUROLOGY

## 2019-10-21 PROCEDURE — 99233 PR SUBSEQUENT HOSPITAL CARE,LEVL III: ICD-10-PCS | Mod: ,,, | Performed by: INTERNAL MEDICINE

## 2019-10-21 PROCEDURE — 27000221 HC OXYGEN, UP TO 24 HOURS

## 2019-10-21 PROCEDURE — 80048 BASIC METABOLIC PNL TOTAL CA: CPT

## 2019-10-21 PROCEDURE — 31600 PLANNED TRACHEOSTOMY: CPT | Mod: ,,, | Performed by: OTOLARYNGOLOGY

## 2019-10-21 PROCEDURE — 99233 SBSQ HOSP IP/OBS HIGH 50: CPT | Mod: ,,, | Performed by: INTERNAL MEDICINE

## 2019-10-21 PROCEDURE — 85025 COMPLETE CBC W/AUTO DIFF WBC: CPT

## 2019-10-21 PROCEDURE — 25000003 PHARM REV CODE 250: Performed by: INTERNAL MEDICINE

## 2019-10-21 PROCEDURE — 25000242 PHARM REV CODE 250 ALT 637 W/ HCPCS: Performed by: NURSE PRACTITIONER

## 2019-10-21 PROCEDURE — 63600175 PHARM REV CODE 636 W HCPCS: Performed by: NURSE ANESTHETIST, CERTIFIED REGISTERED

## 2019-10-21 PROCEDURE — 20000000 HC ICU ROOM

## 2019-10-21 PROCEDURE — D9220A PRA ANESTHESIA: ICD-10-PCS | Mod: CRNA,,, | Performed by: NURSE ANESTHETIST, CERTIFIED REGISTERED

## 2019-10-21 PROCEDURE — 25000003 PHARM REV CODE 250: Performed by: NURSE ANESTHETIST, CERTIFIED REGISTERED

## 2019-10-21 PROCEDURE — 27200966 HC CLOSED SUCTION SYSTEM

## 2019-10-21 PROCEDURE — 36000706: Performed by: OTOLARYNGOLOGY

## 2019-10-21 PROCEDURE — 99900026 HC AIRWAY MAINTENANCE (STAT)

## 2019-10-21 PROCEDURE — 36415 COLL VENOUS BLD VENIPUNCTURE: CPT

## 2019-10-21 PROCEDURE — D9220A PRA ANESTHESIA: ICD-10-PCS | Mod: ANES,,, | Performed by: ANESTHESIOLOGY

## 2019-10-21 PROCEDURE — 31600 PR TRACHEOSTOMY, PLANNED: ICD-10-PCS | Mod: ,,, | Performed by: OTOLARYNGOLOGY

## 2019-10-21 PROCEDURE — 97803 MED NUTRITION INDIV SUBSEQ: CPT

## 2019-10-21 PROCEDURE — 99900035 HC TECH TIME PER 15 MIN (STAT)

## 2019-10-21 RX ORDER — MIDAZOLAM HYDROCHLORIDE 1 MG/ML
INJECTION, SOLUTION INTRAMUSCULAR; INTRAVENOUS
Status: DISCONTINUED | OUTPATIENT
Start: 2019-10-21 | End: 2019-10-21

## 2019-10-21 RX ORDER — ROCURONIUM BROMIDE 10 MG/ML
INJECTION, SOLUTION INTRAVENOUS
Status: DISCONTINUED | OUTPATIENT
Start: 2019-10-21 | End: 2019-10-21

## 2019-10-21 RX ORDER — LIDOCAINE HYDROCHLORIDE AND EPINEPHRINE 10; 10 MG/ML; UG/ML
INJECTION, SOLUTION INFILTRATION; PERINEURAL
Status: DISCONTINUED | OUTPATIENT
Start: 2019-10-21 | End: 2019-10-21 | Stop reason: HOSPADM

## 2019-10-21 RX ORDER — PROPOFOL 10 MG/ML
VIAL (ML) INTRAVENOUS
Status: DISCONTINUED | OUTPATIENT
Start: 2019-10-21 | End: 2019-10-21

## 2019-10-21 RX ORDER — SODIUM CHLORIDE 9 MG/ML
INJECTION, SOLUTION INTRAVENOUS CONTINUOUS PRN
Status: DISCONTINUED | OUTPATIENT
Start: 2019-10-21 | End: 2019-10-21

## 2019-10-21 RX ORDER — PHENYLEPHRINE HYDROCHLORIDE 10 MG/ML
INJECTION INTRAVENOUS
Status: DISCONTINUED | OUTPATIENT
Start: 2019-10-21 | End: 2019-10-21

## 2019-10-21 RX ADMIN — CEFAZOLIN SODIUM 1 G: 1 SOLUTION INTRAVENOUS at 03:10

## 2019-10-21 RX ADMIN — FENTANYL CITRATE 100 MCG: 50 INJECTION INTRAMUSCULAR; INTRAVENOUS at 09:10

## 2019-10-21 RX ADMIN — DEXMEDETOMIDINE HYDROCHLORIDE 0.5 MCG/KG/HR: 4 INJECTION, SOLUTION INTRAVENOUS at 11:10

## 2019-10-21 RX ADMIN — ROCURONIUM BROMIDE 50 MG: 10 INJECTION, SOLUTION INTRAVENOUS at 09:10

## 2019-10-21 RX ADMIN — PHENYLEPHRINE HYDROCHLORIDE 100 MCG: 10 INJECTION INTRAVENOUS at 10:10

## 2019-10-21 RX ADMIN — CLONIDINE HYDROCHLORIDE 0.3 MG: 0.1 TABLET ORAL at 08:10

## 2019-10-21 RX ADMIN — PROPOFOL 30 MG: 10 INJECTION, EMULSION INTRAVENOUS at 09:10

## 2019-10-21 RX ADMIN — DEXMEDETOMIDINE HYDROCHLORIDE 0.5 MCG/KG/HR: 4 INJECTION, SOLUTION INTRAVENOUS at 03:10

## 2019-10-21 RX ADMIN — PHENYLEPHRINE HYDROCHLORIDE 100 MCG: 10 INJECTION INTRAVENOUS at 09:10

## 2019-10-21 RX ADMIN — DEXMEDETOMIDINE HYDROCHLORIDE 1 MCG/KG/HR: 4 INJECTION, SOLUTION INTRAVENOUS at 09:10

## 2019-10-21 RX ADMIN — LACOSAMIDE 50 MG: 50 TABLET, FILM COATED ORAL at 08:10

## 2019-10-21 RX ADMIN — CHLORHEXIDINE GLUCONATE 0.12% ORAL RINSE 15 ML: 1.2 LIQUID ORAL at 08:10

## 2019-10-21 RX ADMIN — MIDAZOLAM HYDROCHLORIDE 2 MG: 1 INJECTION, SOLUTION INTRAMUSCULAR; INTRAVENOUS at 09:10

## 2019-10-21 RX ADMIN — PHENYLEPHRINE HYDROCHLORIDE 100 MCG: 10 INJECTION INTRAVENOUS at 11:10

## 2019-10-21 RX ADMIN — IPRATROPIUM BROMIDE AND ALBUTEROL SULFATE 3 ML: .5; 3 SOLUTION RESPIRATORY (INHALATION) at 08:10

## 2019-10-21 RX ADMIN — INSULIN DETEMIR 60 UNITS: 100 INJECTION, SOLUTION SUBCUTANEOUS at 08:10

## 2019-10-21 RX ADMIN — INSULIN ASPART 1 UNITS: 100 INJECTION, SOLUTION INTRAVENOUS; SUBCUTANEOUS at 12:10

## 2019-10-21 RX ADMIN — SODIUM CHLORIDE: 0.9 INJECTION, SOLUTION INTRAVENOUS at 09:10

## 2019-10-21 RX ADMIN — ROCURONIUM BROMIDE 30 MG: 10 INJECTION, SOLUTION INTRAVENOUS at 09:10

## 2019-10-21 RX ADMIN — ROCURONIUM BROMIDE 20 MG: 10 INJECTION, SOLUTION INTRAVENOUS at 09:10

## 2019-10-21 RX ADMIN — SODIUM CHLORIDE: 0.9 INJECTION, SOLUTION INTRAVENOUS at 10:10

## 2019-10-21 RX ADMIN — ROSUVASTATIN CALCIUM 20 MG: 10 TABLET, COATED ORAL at 08:10

## 2019-10-21 RX ADMIN — ROCURONIUM BROMIDE 30 MG: 10 INJECTION, SOLUTION INTRAVENOUS at 10:10

## 2019-10-21 RX ADMIN — IPRATROPIUM BROMIDE AND ALBUTEROL SULFATE 3 ML: .5; 3 SOLUTION RESPIRATORY (INHALATION) at 01:10

## 2019-10-21 RX ADMIN — SENNOSIDES, DOCUSATE SODIUM 1 TABLET: 50; 8.6 TABLET, FILM COATED ORAL at 08:10

## 2019-10-21 RX ADMIN — IPRATROPIUM BROMIDE AND ALBUTEROL SULFATE 3 ML: .5; 3 SOLUTION RESPIRATORY (INHALATION) at 12:10

## 2019-10-21 RX ADMIN — HEPARIN SODIUM 5000 UNITS: 5000 INJECTION INTRAVENOUS; SUBCUTANEOUS at 08:10

## 2019-10-21 RX ADMIN — FAMOTIDINE 20 MG: 10 INJECTION INTRAVENOUS at 11:10

## 2019-10-21 RX ADMIN — DEXMEDETOMIDINE HYDROCHLORIDE 0.5 MCG/KG/HR: 4 INJECTION, SOLUTION INTRAVENOUS at 05:10

## 2019-10-21 RX ADMIN — CEFAZOLIN SODIUM 1 G: 1 SOLUTION INTRAVENOUS at 06:10

## 2019-10-21 RX ADMIN — METOPROLOL TARTRATE 100 MG: 50 TABLET ORAL at 08:10

## 2019-10-21 NOTE — PLAN OF CARE
Patient remains in ICU overnight. VSS, afebrile, NSR on cardiac monitor. Intubated and mechanically ventilated. PRVC, FiO2 25%, RR 16, , PEEP 5. Precedex infusing at 0.5 mcg/kg/min for sedation. Pt follows simple commands by squeezing L hand and moving L leg, tracks with eyes, but will not nod head to answer questions. Moisture dermatitis/skin tear to R inner perianal area with sanguinous drainage. Area cleansed and zinc barrier cream applied. Preventative sacral dressing applied. Pt turned q2 hr per protocol. SCDs and heel boots in use. UO adeqaute, one small BM. No falls, injury, or additional skin breakdown this shift. Plan of care reviewed with pt and pt family at bedside. Plan is for trach and PEG surgery today, pending consent for the PEG obtained.

## 2019-10-21 NOTE — PLAN OF CARE
Pt received intubated and on the servo-i vent. With the following settings:PRVC 15/320/+5/25%. AMBU bag and mask are at the HOB, All alarms are set and functioning. Will continue to monitor and wean as tolerated.

## 2019-10-21 NOTE — INTERVAL H&P NOTE
The patient has been examined and the H&P has been reviewed:    I concur with the findings and no changes have occurred since H&P was written.    Evaluated patient this morning and discussed with ICU team. No changes in status and remains on minimal vent settings with only 5 of PEEP today. Plan remains to proceed with tracheostomy. Discussed risks of procedure with the patient's son (Davis Alex) by phone this morning as well.     Anesthesia/Surgery risks, benefits and alternative options discussed and understood by patient/family.          Olu Rice MD    Rhinology, Allergy, and Sinus-Skull Base Surgery    Department of Otorhinolaryngology    [facility]    Phone  [phone]    Fax      [phone]          Active Hospital Problems    Diagnosis  POA    *Acute on chronic respiratory failure [J96.20]  Yes    Venous stasis ulcer [I83.009, L97.909]  Yes    Ventilator dependent [Z99.11]  Not Applicable    CVA (cerebral vascular accident) [I63.9]  Yes    Angio-edema [T78.3XXA]  Yes    Seizure [R56.9]  Yes    RYLEE (acute kidney injury) [N17.9]  Yes    Encephalopathy, metabolic [G93.41]  Yes    Acquired hypothyroidism [E03.9]  Yes     10/2019 TSH 5.7 - increased Synthroid to 137 mcg daily  Repeat TSH Dec 2019      Essential hypertension [I10]  Yes    Type 2 diabetes mellitus with circulatory disorder, with long-term current use of insulin [E11.59, Z79.4]  Not Applicable    Anxiety [F41.9]  Yes    Morbid obesity [E66.01]  Yes    Dyslipidemia [E78.5]  Yes      Resolved Hospital Problems   No resolved problems to display.

## 2019-10-21 NOTE — ASSESSMENT & PLAN NOTE
Uncontrolled with hyperglycemia likely due to dose of dexamethasone given  HGBA1c 6.9%  Glucose better controlled on detemir to 60 units q.h.s. and continue sliding scale insulin  Will make further adjustments to get glucose and range of 140-180

## 2019-10-21 NOTE — ANESTHESIA POSTPROCEDURE EVALUATION
Anesthesia Post Evaluation    Patient: Sherrie Alex    Procedure(s) Performed: Procedure(s) (LRB):  TRACHEOSTOMY (N/A)    Final Anesthesia Type: general  Patient location during evaluation: ICU  Patient participation: No - Unable to Participate, Intubation  Level of consciousness: sedated  Post-procedure vital signs: reviewed and stable  Pain management: adequate  Airway patency: patent  PONV status at discharge: No PONV  Anesthetic complications: no      Cardiovascular status: hemodynamically stable  Respiratory status: ventilator  Hydration status: euvolemic  Follow-up not needed.      Pt placed back on previous vent settings: PRVC , RR: 15, FiO2 25%; PEEP 5    Vitals Value Taken Time   /75 10/21/2019 11:47 AM   Temp 36.7 °C (98.1 °F) 10/21/2019 11:30 AM   Pulse 80 10/21/2019 11:52 AM   Resp 27 10/21/2019 11:52 AM   SpO2 75 % 10/21/2019 11:52 AM   Vitals shown include unvalidated device data.      No case tracking events are documented in the log.      Pain/Chris Score: No data recorded

## 2019-10-21 NOTE — PLAN OF CARE
GI Note    GI was planning on PEG placement today, but patient was taken for trach prior to getting GI consent, so we were unable to coordinate placement of PEG following trach in the OR.  Anesthesia requested that we not sedate the patient twice in one day.    Will re-schedule PEG placement for tomorrow.

## 2019-10-21 NOTE — PROGRESS NOTES
[facility]  Pulmonology  Progress Note    Patient Name: Sherrie Alex  MRN: [id number]  Admission Date: 10/11/2019  Hospital Length of Stay: 10 days  Code Status: Full Code  Attending Provider: Vee Arnett MD  Primary Care Provider: Desmond Yang MD   Principal Problem: Acute on chronic respiratory failure    Subjective:     Interval History: s/p trache today    Objective:     Vital Signs (Most Recent):  Temp: 98.6 °F (37 °C) (10/21/19 1500)  Pulse: 81 (10/21/19 1545)  Resp: (!) 26 (10/21/19 1545)  BP: (!) 172/81 (10/21/19 1545)  SpO2: 97 % (10/21/19 1545) Vital Signs (24h Range):  Temp:  [98.1 °F (36.7 °C)-98.9 °F (37.2 °C)] 98.6 °F (37 °C)  Pulse:  [58-85] 81  Resp:  [15-27] 26  SpO2:  [95 %-100 %] 97 %  BP: ()/(50-92) 172/81     Weight: 119.2 kg (262 lb 12.6 oz)  Body mass index is 43.73 kg/m².      Intake/Output Summary (Last 24 hours) at 10/21/2019 1611  Last data filed at 10/21/2019 1545  Gross per 24 hour   Intake 1614.77 ml   Output 1775 ml   Net -160.23 ml       Physical Exam   Constitutional: She appears well-nourished. No distress.   HENT:   S/p trache   Eyes: Pupils are equal, round, and reactive to light.   EOM unable to cross midline to the right   Neck: No JVD present. No tracheal deviation present.   Cardiovascular: Normal rate, regular rhythm and intact distal pulses.   Pulmonary/Chest: Effort normal and breath sounds normal. No stridor. She has no wheezes. She has no rales. She exhibits no tenderness.   Abdominal: Soft. Bowel sounds are normal.   Musculoskeletal:   Left hand and left foot movement on command.    Lymphadenopathy:     She has no cervical adenopathy.   Neurological:   Squeezes left hand and moves left foot on command. Unable to move right side. Unable to shake head yes or no. Unable to move tongue.    Skin: Skin is warm. Capillary refill takes less than 2 seconds.   Psychiatric:   intubated       Vents:  Vent Mode: [facility] (10/21/19 1332)  Ventilator  Initiated: Yes (10/11/19 0213)  Set Rate: 15 bmp (10/21/19 1332)  Vt Set: 320 mL (10/21/19 1332)  PEEP/CPAP: 5 cmH20 (10/21/19 1332)  Oxygen Concentration (%): 25 (10/21/19 1545)  Peak Airway Pressure: 24.2 cmH2O (10/21/19 1332)  Total Ve: 8 mL (10/21/19 1332)  F/VT Ratio<105 (RSBI): (!) 77.64 (10/21/19 1332)    Lines/Drains/Airways     Drain                 NG/OG Tube 10/11/19 0215 orogastric 18 Fr. Left mouth 10 days         Urethral Catheter 10/11/19 0230 16 Fr. 10 days          Airway                 Surgical Airway 10/21/19 Shiley Cuffed less than 1 day          Peripheral Intravenous Line                 Peripheral IV - Single Lumen 10/20/19 1400 20 G Distal;Left;Posterior Forearm 1 day         Peripheral IV - Single Lumen 10/20/19 1400 Left;Posterior Forearm 1 day                Significant Labs:    CBC/Anemia Profile:  Recent Labs   Lab 10/20/19  0345 10/21/19  0311   WBC 11.72 11.10   HGB 11.3* 12.0   HCT 39.0 41.1   * 150   MCV 68* 68*   RDW 17.7* 17.9*        Chemistries:  Recent Labs   Lab 10/20/19  0345 10/21/19  0311    141   K 4.2 4.3   CL 97 98   CO2 35* 33*   BUN 55* 48*   CREATININE 1.8* 1.6*   CALCIUM 9.8 9.9       ABGs:   Recent Labs   Lab 10/21/19  0433   PH 7.457*   PCO2 49.9*   HCO3 35.3*   POCSATURATED 96   BE 10       Echo 10/14/19  · Increased (hyperdynamic) left ventricular systolic function. The estimated ejection fraction is 75%  · Concentric left ventricular hypertrophy. Note made of inc flow velocity across LVOT (3 m/sec). Possible HCM with obstruction vs hyperdynamic LV.  · Small left ventricular cavity size.  · No wall motion abnormalities.  · Normal right ventricular systolic function.  · Small circumferential pericardial effusion without hemodynamic compromise.  · Mechanically ventilated; cannot use inferior caval vein diameter to estimate central venous pressure.  · No intracardiac source of embolus noted on this exam. If high clinical suspicion, consider ANNALEE +/-  bubble study.  ·   Significant Imaging:  CXR: I have reviewed all pertinent results/findings within the past 24 hours and my personal findings are:  10/15/19 no effusion or consolidation    Assessment/Plan:     * Acute on chronic respiratory failure  Intubated for airway protection and concern for angioedema vs dysphagia related to new CVA (more likely). Good lung mechanics and has a cuff leak. Sputum w/ MSSA     - Started on ancef 10/16 for 7 day course  -s/p trache.  Wean in the am after peg.      CVA (cerebral vascular accident)  Suspect this is the reason she was drooling and was macroglossic on arrival, and not angioedema. Tongue size is unchanged.   S/p trache today due to hemiparesis and secretion issue.  - await PEG w/ GI              Sergo Hernández MD  Pulmonology  [facility]

## 2019-10-21 NOTE — PT/OT/SLP PROGRESS
Occupational Therapy      Patient Name:  Sherrie Alex   MRN:  [id number]    Patient not seen today secondary to Other (Comment)(s/p trach placement). Will follow-up tonorrow.    Annika Parson OT  10/21/2019

## 2019-10-21 NOTE — ASSESSMENT & PLAN NOTE
Intubated for airway protection and concern for angioedema vs dysphagia related to new CVA (more likely). Good lung mechanics and has a cuff leak. Sputum w/ MSSA     - Started on ancef 10/16 for 7 day course  -s/p trache.  Wean in the am after peg.

## 2019-10-21 NOTE — PHYSICIAN QUERY
PT Name: Sherrie Alex  MR #: [id number]    Physician Query Form - Respiratory Condition Clarification      CDS/: Crystal Gipson RN, CCDS              Contact information: [email or web address]    This form is a permanent document in the medical record.    Query Date: October 21, 2019    By submitting this query, we are merely seeking further clarification of documentation. Please utilize your independent clinical judgment when addressing the question(s) below.    The Medical record contains the following   Indicators   Supporting Clinical Findings Location in Medical Record      SOB, RICHARDSON, Wheezing, Productive Cough, Use of Accessory Muscles, etc.     X   Acute/Chronic Illness   Suspected reason for requirement of intubation and mechanical ventilation is acute CVA  Morbid Obesity, DEYANIRA 10/17 ent h/p      Radiology Findings     X   Respiratory Distress or Failure Acute on chronic respiratory failure 10/16- 10/21 prog notes      Hypoxia or Hypercapnia     X   RR         ABGs         O2 sat Ph- 7.445, PCO2-48.9  PO2- 65, HCO3- 33.6   10/16 abg's   X   BiPAP/Intubation Pt intubated in ED for airway protection ?laryngeal edema    10/11 h/p   X   Supplemental O2  Unable to extubate    No changes in status and remains on minimal vent settings with only 5 of PEEP today.  10/16 prog note    10/21ent note      Home O2, Oxygen Dependence     X   Treatment Plan remains to proceed with tracheostomy. 10/21 ent note      Other       Respiratory failure can be acute, chronic or both. It is generally further specified as hypoxic, hypercapnic or both. Lastly, it is important to identify an etiology, if known or suspected.   References::  https://www.acphospitalist.org/archives/2013/10/coding.htm http://QFPay.com/acute-respiratory-failure-know     The clinical guidelines noted below are only system guidelines, and do not replace the providers clinical judgment.    Provider, please further specify Acute on Chronic  Respiratory Failure   [   ] Acute and (on) Chronic Respiratory Failure with Hypoxia - pO2 >10 mmHg below baseline OR SpO2 < 91% on usual home O2 OR O2 > 2L/min over baseline home O2    [   ] Acute and (on) Chronic Respiratory Failure with Hypercapnia - pCO2 >10 mmHg over baseline and pH < 7.35   [   ] Acute and (on) Chronic Respiratory Failure with Hypoxia and Hypercapnia - Hypoxic: pO2 >10 mmHg below baseline OR SpO2 < 91% on usual home O2 OR O2 > 2L/min over baseline home O2 and Hypercapnic:  pCO2 >10 mmHg over baseline and pH < 7.35    [ x  ] Other Acute and (on) Chronic Respiratory Failure    [   ]  Clinically Undetermined       Please document in your progress notes daily for the duration of treatment until resolved and include in your discharge summary.

## 2019-10-21 NOTE — ASSESSMENT & PLAN NOTE
Baseline creatinine of 1.4-1.7  Had slight improvement now steadily trending up  CO2 level steadily increasing and patient was in negative balance  Held IV Lasix on 10/20  Will hold for another day  Continue to monitor with repeat labs and assess volume status

## 2019-10-21 NOTE — ASSESSMENT & PLAN NOTE
Initially thought to have angioedema  Likely to have slight droop associated with stroke and not true angioedema

## 2019-10-21 NOTE — CARE UPDATE
[facility]  ICU Multidisciplinary Bedside Rounds   SUMMARY     Date: 10/21/2019    Prehospitalization: Home  Admit Date / LOS : 10/11/2019/ 10 days    Diagnosis: Acute on chronic respiratory failure    Consults:        Active: GI, Neuro and Pulm CC ENT       Needed: N/A     Code Status: Full Code   Advanced Directive: <no information>    LDA: Guevara, PIV and Vent       Central Lines/Site/Justification:Patient Does Not Have Central Line       Urinary Cath/Order/Justification:Critically Ill in ICU    Vasopressors/Infusions:    dexMEDEtomidine in 0.9 % NaCl 0.499 mcg/kg/hr (10/21/19 0600)          GOALS: Volume/ Hemodynamic: N/A                     RASS: -1  awakes to voice (eye opening/contact) > 10 seconds    CAM ICU: Positive  Pain Management: IV       Pain Controlled: not applicable    Rhythm: NSR    Respiratory Device: Vent    Vent Mode: PRVC  Oxygen Concentration (%):  [25] 25  Resp Rate Total:  [15 br/min-32 br/min] 17 br/min  Vt Set:  [320 mL] 320 mL  PEEP/CPAP:  [5 cmH20] 5 cmH20  Mean Airway Pressure:  [7.2 cmH20-9.8 cmH20] 8.6 cmH20             Most Recent SBT/ SAT: Did not perform       MOVE Screen: FAIL    VTE Prophylaxis: Pharm and Mechanical  Mobility: Bedrest and A: Assist  Stress Ulcer Prophylaxis: Yes    Dietary: TF  Tolerance: yes  /  Advancement: at goal    Isolation: No active isolations    Restraints: No    Significant Dates:  Post Op Date: N/A  Rescue Date: N/A  Imaging/ Diagnostics: EEG, CT, MRI    Noteworthy Labs:  None      CBC/Anemia Labs: Coags:    Recent Labs   Lab 10/19/19  0526 10/20/19  0345 10/21/19  0311   WBC 10.94 11.72 11.10   HGB 12.0 11.3* 12.0   HCT 41.7 39.0 41.1   * 147* 150   MCV 69* 68* 68*   RDW 18.4* 17.7* 17.9*    Recent Labs   Lab 10/20/19  1005   INR 1.0        Chemistries:   Recent Labs   Lab 10/19/19  0526 10/20/19  0345 10/21/19  0311    139 141   K 4.3 4.2 4.3   CL 96 97 98   CO2 35* 35* 33*   BUN 57* 55* 48*   CREATININE 2.0* 1.8*  "1.6*   CALCIUM 10.0 9.8 9.9        Cardiac Enzymes: Ejection Fractions:    No results for input(s): CPK, CPKMB, MB, TROPONINI in the last 72 hours. No results found for: EF     POCT Glucose: HbA1c:    Recent Labs   Lab 10/19/19  0557 10/19/19  1141 10/19/19  1741 10/19/19  2313 10/20/19  0554 10/20/19  1151   POCTGLUCOSE 215* 217* 203* 200* 208* 167*    Hemoglobin A1C   Date Value Ref Range Status   10/02/2019 6.9 (H) 4.0 - 5.6 % Final     Comment:     ADA Screening Guidelines:  5.7-6.4%  Consistent with prediabetes  >or=6.5%  Consistent with diabetes  High levels of fetal hemoglobin interfere with the HbA1C  assay. Heterozygous hemoglobin variants (HbS, HgC, etc)do  not significantly interfere with this assay.   However, presence of multiple variants may affect accuracy.     06/03/2008 6.7 (H) 4.0 - 6.2 % Final   02/13/2006 6.2 4.5 - 6.2 % Final        Needs from Care Team:consent signed for PEG, obtain HCPOA paperwork as none on file. Son is next of kin but states he sometimes "defers to my aunt galen"   ICU LOS 10d 3h  Level of Care: Critical Care    "

## 2019-10-21 NOTE — ASSESSMENT & PLAN NOTE
MRI of the brain with moderate-sized area of subacute infarction involving the left subinsular white matter extending to the periventricular white matter  Allowed for permissive hypertension along with treatment with aspirin and statin  Appreciate Neurology input  Resumed blood pressure medications for tighter blood pressure control  Weaning from vent hindered by new CVA  Trach done today, and peg rescheduled for tomorrow  Will likely need LTAC/rehab placement

## 2019-10-21 NOTE — NURSING
S/w Dr Monteror in regards to either placing ng tube d/t ogt unable to secure adequately or just keeping pt npo until Per Dr Monteror keep npo until after peg placed and ok to use

## 2019-10-21 NOTE — SUBJECTIVE & OBJECTIVE
Interval History: s/p trache today    Objective:     Vital Signs (Most Recent):  Temp: 98.6 °F (37 °C) (10/21/19 1500)  Pulse: 81 (10/21/19 1545)  Resp: (!) 26 (10/21/19 1545)  BP: (!) 172/81 (10/21/19 1545)  SpO2: 97 % (10/21/19 1545) Vital Signs (24h Range):  Temp:  [98.1 °F (36.7 °C)-98.9 °F (37.2 °C)] 98.6 °F (37 °C)  Pulse:  [58-85] 81  Resp:  [15-27] 26  SpO2:  [95 %-100 %] 97 %  BP: ()/(50-92) 172/81     Weight: 119.2 kg (262 lb 12.6 oz)  Body mass index is 43.73 kg/m².      Intake/Output Summary (Last 24 hours) at 10/21/2019 1611  Last data filed at 10/21/2019 1545  Gross per 24 hour   Intake 1614.77 ml   Output 1775 ml   Net -160.23 ml       Physical Exam   Constitutional: She appears well-nourished. No distress.   HENT:   S/p trache   Eyes: Pupils are equal, round, and reactive to light.   EOM unable to cross midline to the right   Neck: No JVD present. No tracheal deviation present.   Cardiovascular: Normal rate, regular rhythm and intact distal pulses.   Pulmonary/Chest: Effort normal and breath sounds normal. No stridor. She has no wheezes. She has no rales. She exhibits no tenderness.   Abdominal: Soft. Bowel sounds are normal.   Musculoskeletal:   Left hand and left foot movement on command.    Lymphadenopathy:     She has no cervical adenopathy.   Neurological:   Squeezes left hand and moves left foot on command. Unable to move right side. Unable to shake head yes or no. Unable to move tongue.    Skin: Skin is warm. Capillary refill takes less than 2 seconds.   Psychiatric:   intubated       Vents:  Vent Mode: PRVC (10/21/19 1332)  Ventilator Initiated: Yes (10/11/19 0213)  Set Rate: 15 bmp (10/21/19 1332)  Vt Set: 320 mL (10/21/19 1332)  PEEP/CPAP: 5 cmH20 (10/21/19 1332)  Oxygen Concentration (%): 25 (10/21/19 1545)  Peak Airway Pressure: 24.2 cmH2O (10/21/19 1332)  Total Ve: 8 mL (10/21/19 1332)  F/VT Ratio<105 (RSBI): (!) 77.64 (10/21/19 1332)    Lines/Drains/Airways     Drain                  NG/OG Tube 10/11/19 0215 orogastric 18 Fr. Left mouth 10 days         Urethral Catheter 10/11/19 0230 16 Fr. 10 days          Airway                 Surgical Airway 10/21/19 Shiley Cuffed less than 1 day          Peripheral Intravenous Line                 Peripheral IV - Single Lumen 10/20/19 1400 20 G Distal;Left;Posterior Forearm 1 day         Peripheral IV - Single Lumen 10/20/19 1400 Left;Posterior Forearm 1 day                Significant Labs:    CBC/Anemia Profile:  Recent Labs   Lab 10/20/19  0345 10/21/19  0311   WBC 11.72 11.10   HGB 11.3* 12.0   HCT 39.0 41.1   * 150   MCV 68* 68*   RDW 17.7* 17.9*        Chemistries:  Recent Labs   Lab 10/20/19  0345 10/21/19  0311    141   K 4.2 4.3   CL 97 98   CO2 35* 33*   BUN 55* 48*   CREATININE 1.8* 1.6*   CALCIUM 9.8 9.9       ABGs:   Recent Labs   Lab 10/21/19  0433   PH 7.457*   PCO2 49.9*   HCO3 35.3*   POCSATURATED 96   BE 10       Echo 10/14/19  · Increased (hyperdynamic) left ventricular systolic function. The estimated ejection fraction is 75%  · Concentric left ventricular hypertrophy. Note made of inc flow velocity across LVOT (3 m/sec). Possible HCM with obstruction vs hyperdynamic LV.  · Small left ventricular cavity size.  · No wall motion abnormalities.  · Normal right ventricular systolic function.  · Small circumferential pericardial effusion without hemodynamic compromise.  · Mechanically ventilated; cannot use inferior caval vein diameter to estimate central venous pressure.  · No intracardiac source of embolus noted on this exam. If high clinical suspicion, consider ANNALEE +/- bubble study.  ·   Significant Imaging:  CXR: I have reviewed all pertinent results/findings within the past 24 hours and my personal findings are:  10/15/19 no effusion or consolidation

## 2019-10-21 NOTE — PROGRESS NOTES
"[facility] [place]  Adult Nutrition  Progress Note    SUMMARY       Recommendations    Recommendation/Intervention:   1. Initate TF of diabetetisource @ goal rate of 40 mL/hr when medically able to meet nutrient needs.     Goals: Initate TF within 48 hrs  Nutrition Goal Status: new  Communication of RD Recs: other (comment)(POC)    Reason for Assessment    Reason For Assessment: RD follow-up  Diagnosis: other (see comments)(angio-edema)  Relevant Medical History: DM2, hypothroidism, dyslipidemia, CVA, seizures  Interdisciplinary Rounds: attended  General Information Comments: Pt s/p trach today; unable to extubate. Pt was tolerating TF prior to procedure; currently TF is held. PEG tube placement to occur soon. Pt constipation has resolved. NFPE completed 10/11, no s/s of malnutrition.  Nutrition Discharge Planning: too soon to determine     Nutrition Risk Screen    Nutrition Risk Screen: tube feeding or parenteral nutrition    Nutrition/Diet History    Spiritual, Cultural Beliefs, Anabaptist Practices, Values that Affect Care: no  Factors Affecting Nutritional Intake: NPO, on mechanical ventilation    Anthropometrics    Temp: 98.6 °F (37 °C)  Height Method: Estimated  Height: 5' 5" (165.1 cm)  Height (inches): 65 in  Weight Method: Bed Scale  Weight: 119.2 kg (262 lb 12.6 oz)  Weight (lb): 262.79 lb  Ideal Body Weight (IBW), Female: 125 lb  % Ideal Body Weight, Female (lb): 223.29 lb  BMI (Calculated): 46.5  BMI Grade: greater than 40 - morbid obesity  Usual Body Weight (UBW), k.6 kg  % Usual Body Weight: 98.11  % Weight Change From Usual Weight: -2.1 %       Lab/Procedures/Meds    Pertinent Labs Reviewed: reviewed  Pertinent Labs Comments: BUN 48, Crt 1.6, Glu 163, A1C 6.9  Pertinent Medications Reviewed: reviewed  Pertinent Medications Comments:  amLODIPine   chlorhexidine   cloNIDine   famotidine (PF)   furosemide   insulin detemir U-100   lacosamide   metoprolol tartrate   rosuvastatin "   senna-docusate 8.6-50 mg     Estimated/Assessed Needs    Weight Used For Calorie Calculations: 74.1 kg (163 lb 5.4 oz)(ABW)  Energy Calorie Requirements (kcal): 1521 kcal/day  Energy Need Method: Castor State (modified)  Protein Requirements: 114 g/day(2.0 g/kg)  Weight Used For Protein Calculations: 56.8 kg (125 lb 4.3 oz)(IBW)  Fluid Requirements (mL): 1 mL/kcal or PER MD   Estimated Fluid Requirement Method: RDA Method  RDA Method (mL): 1521  CHO Requirement: 50% of meals      Nutrition Prescription Ordered    Current Diet Order: NPO  Current Nutrition Support Formula Ordered: Diabetisource AC  Current Nutrition Support Rate Ordered: 40 (ml)  Current Nutrition Support Frequency Ordered: mL/hr    Evaluation of Received Nutrient/Fluid Intake    Enteral Calories (kcal): 0  Enteral Protein (gm): 0  Enteral (Free Water) Fluid (mL): 0  Other Calories (kcal): 0  I/O: 2243/1780  Energy Calories Required: not meeting needs  Protein Required: not meeting needs  Fluid Required: meeting needs  Comments: LBM: 10/21  % Intake of Estimated Energy Needs: 0 - 25 %   % Meal Intake: NPO    Nutrition Risk    Level of Risk/Frequency of Follow-up: (2x/week)     Assessment and Plan    Nutrition Problem  Inadequate energy intake     Related to (etiology):   Held TF for trach      Signs and Symptoms (as evidenced by):   NPO/intubation without nutrition support     Interventions:  Collaboration with other providers      Nutrition Diagnosis Status:   New    Monitor and Evaluation    Food and Nutrient Intake: enteral nutrition intake  Food and Nutrient Adminstration: enteral and parenteral nutrition administration  Physical Activity and Function: nutrition-related ADLs and IADLs  Anthropometric Measurements: weight, weight change, body mass index  Biochemical Data, Medical Tests and Procedures: electrolyte and renal panel, gastrointestinal profile, glucose/endocrine profile, inflammatory profile, lipid profile  Nutrition-Focused Physical  Findings: overall appearance     Malnutrition Assessment     Subcutaneous Fat Loss (Final Summary): well nourished  Muscle Loss Evaluation (Final Summary): well nourished       Nutrition Follow-Up    RD Follow-up?: Yes

## 2019-10-21 NOTE — ASSESSMENT & PLAN NOTE
Intubated for airway protection given concern for angioedema initially  Patient remains intubated secondary to acute CVA and and patient did not have to angioedema  Positive cultures with increased secretions  Pulmonary following  Patient is morbidly obese with obstructive sleep apnea which may hinder extubation  Pulmonary discussed possible need for trach and PEG with son  Cephazolin added for positive Staph in the sputum and nebulizer treatments  ENT consulted for trach placement and trach successfully placed today, 10/21  GI for PEG placement, but this could not be performed today, anesthesia did not want to be sedate this patient  Plan for PEG tomorrow by GI  As per Pulmonary  Will likely need LTAC placement, will begin consult to  for discharge planning

## 2019-10-21 NOTE — CARE UPDATE
Patient back from getting trach put in. She has a 6.0 Shiley cuffed trach. All disposable vent parts were changed prior to patient returning from surgery. Pt placed back on the vent on the [revious settings. AMBU bag and mask are at the HOB, All alarms are set and functioning. Will continue to monitor and wean as tolerated.

## 2019-10-21 NOTE — ASSESSMENT & PLAN NOTE
Suspect this is the reason she was drooling and was macroglossic on arrival, and not angioedema. Tongue size is unchanged.   S/p trache today due to hemiparesis and secretion issue.  - await PEG w/ GI

## 2019-10-21 NOTE — TRANSFER OF CARE
"Anesthesia Transfer of Care Note    Patient: Sherrie Alex    Procedure(s) Performed: Procedure(s) (LRB):  TRACHEOSTOMY (N/A)    Patient location: ICU    Anesthesia Type: general    Transport from OR: Upon arrival to PACU/ICU, patient attached to ventilator and auscultated to confirm bilateral breath sounds and adequate TV. Continuous SpO2 monitoring in transport. Continuous ECG monitoring in transport    Post pain: adequate analgesia    Post assessment: no apparent anesthetic complications and tolerated procedure well    Post vital signs: stable    Level of consciousness: sedated    Nausea/Vomiting: no nausea/vomiting    Complications: none    Transfer of care protocol was followedComments: PRVC [id number], RR15, Fi02 25% PEEP 5      Last vitals:   Visit Vitals  BP (!) 140/67   Pulse 69   Temp 36.7 °C (98.1 °F)   Resp (!) 24   Ht 5' 5" (1.651 m)   Wt 119.2 kg (262 lb 12.6 oz)   LMP 01/01/2015 (LMP Unknown)   SpO2 100%   Breastfeeding? No   BMI 43.73 kg/m²     "

## 2019-10-21 NOTE — PROGRESS NOTES
"[facility] Medicine  Progress Note    Patient Name: Sherrie Alex  MRN: [id number]  Patient Class: IP- Inpatient   Admission Date: 10/11/2019  Length of Stay: 10 days  Attending Physician: Vee Arnett MD  Primary Care Provider: Desmond Yang MD        Subjective:     Principal Problem:Acute on chronic respiratory failure        HPI:  patient is a 69 y.o female who presents to the ED complaining of tongue swelling that began PTA. Patient has slurred speech, drooling, and tongue swelling. Patient denies any pain, itching, CP, nausea, vomiting, or fever. Per sisters, patient is normally talkative and active. Her sisters are unaware of any cause of onset, stating that the patient lives alone. PMHx of CVA 1 yr ago, DM, HTN, HLD, and obesity. Patient is allergic to codeine and ace inhibitors.      The history is provided by the patient and a relative. History limited by: Acuity of condition. No  was used.     Pt intubated in ED for airway protection ?laryngeal edema - not noted in ED documentation.  Pt started on IV steroids and H2 blocker on vent. In ICU, pt showed sluggish neuro response to pain and unable to follow commands. Propofol taken off and neuro status improved. Pulm consulted for vent management. Neuro consulted - h/o seizures.  Seen in ED yesterday am with "leg shaking" and weakness and dc'd home. H/o seizures on vimpat.      Overview/Hospital Course:  Ms. Alex was admitted acute neurological changes and respiratory compromise. Intubated in ED. Serial neurological testing off sedation suggested right sided weakness > left. MRI showed:Moderate-sized area of subacute infarction involving the left subinsular white matter extending to the periventricular white matter. No MR evidence for hemorrhage.  Patient treated for an acute CVA and Neurology consult. Allowed for permissive HTN.  Later resumed blood pressure medication after appropriate time elapsed " for permissive hypertension.  Her neurological exam was a barrier to extubation.  She was also treated with IV lasix for diuresis, ET sputum culture sent for climbing WBCs, concerning for possible aspiration. Dr. Bauer met with family and discussed plan to continue attempts to wean from vent, though neuro status/effects of CVA may be limiting factor.  The patient was unable to be weaned from the vent, ENT consulted for trach and GI consulted for PEG.    Interval History: No acute events overnight.  Patient underwent successful trach by ENT today.    Review of Systems   Unable to perform ROS: Intubated     Objective:     Vital Signs (Most Recent):  Temp: 98.6 °F (37 °C) (10/21/19 1500)  Pulse: 81 (10/21/19 1545)  Resp: (!) 26 (10/21/19 1545)  BP: (!) 172/81 (10/21/19 1545)  SpO2: 97 % (10/21/19 1545) Vital Signs (24h Range):  Temp:  [98.1 °F (36.7 °C)-98.9 °F (37.2 °C)] 98.6 °F (37 °C)  Pulse:  [58-85] 81  Resp:  [15-27] 26  SpO2:  [95 %-100 %] 97 %  BP: ()/(50-92) 172/81     Weight: 119.2 kg (262 lb 12.6 oz)  Body mass index is 43.73 kg/m².    Intake/Output Summary (Last 24 hours) at 10/21/2019 1659  Last data filed at 10/21/2019 1545  Gross per 24 hour   Intake 1614.77 ml   Output 1775 ml   Net -160.23 ml      Physical Exam   Constitutional: She appears well-developed. No distress.   Obese   HENT:   Head: Normocephalic and atraumatic.   Trach in place   Eyes: Pupils are equal, round, and reactive to light. Conjunctivae and EOM are normal. Right eye exhibits no discharge. Left eye exhibits no discharge. Scleral icterus is present.   Opened eyes to voice   Neck: Normal range of motion. Neck supple.   Cardiovascular: Normal rate and regular rhythm. Exam reveals no gallop and no friction rub.   No murmur heard.  Distant   Pulmonary/Chest: No stridor. She has no wheezes. She has no rales. She exhibits no tenderness.   On mechanical ventilation   Abdominal: Soft. Bowel sounds are normal. She exhibits no  distension and no mass. There is no tenderness. There is no rebound and no guarding. No hernia.   Musculoskeletal: She exhibits no edema, tenderness or deformity.   Skin: Skin is warm and dry. Capillary refill takes less than 2 seconds. No rash noted. She is not diaphoretic. No erythema. No pallor.   Nursing note and vitals reviewed.      Significant Labs:   Blood Culture: No results for input(s): LABBLOO in the last 48 hours.  BMP:   Recent Labs   Lab 10/21/19  0311   *      K 4.3   CL 98   CO2 33*   BUN 48*   CREATININE 1.6*   CALCIUM 9.9     CBC:   Recent Labs   Lab 10/20/19  0345 10/21/19  0311   WBC 11.72 11.10   HGB 11.3* 12.0   HCT 39.0 41.1   * 150     POCT Glucose:   Recent Labs   Lab 10/20/19  1753 10/21/19  0004 10/21/19  0617   POCTGLUCOSE 182* 169* 119*     Urine Culture: No results for input(s): LABURIN in the last 48 hours.    Significant Imaging: I have reviewed and interpreted all pertinent imaging results/findings within the past 24 hours.      Assessment/Plan:      * Acute on chronic respiratory failure  Intubated for airway protection given concern for angioedema initially  Patient remains intubated secondary to acute CVA and and patient did not have to angioedema  Positive cultures with increased secretions  Pulmonary following  Patient is morbidly obese with obstructive sleep apnea which may hinder extubation  Pulmonary discussed possible need for trach and PEG with son  Cephazolin added for positive Staph in the sputum and nebulizer treatments  ENT consulted for trach placement and trach successfully placed today, 10/21  GI for PEG placement, but this could not be performed today, anesthesia did not want to be sedate this patient  Plan for PEG tomorrow by GI  As per Pulmonary  Will likely need LTAC placement, will begin consult to  for discharge planning    CVA (cerebral vascular accident)  MRI of the brain with moderate-sized area of subacute infarction  involving the left subinsular white matter extending to the periventricular white matter  Allowed for permissive hypertension along with treatment with aspirin and statin  Appreciate Neurology input  Resumed blood pressure medications for tighter blood pressure control  Weaning from vent hindered by new CVA  Trach done today, and peg rescheduled for tomorrow  Will likely need LTAC/rehab placement    Encephalopathy, metabolic  Secondary to CVA  As discussed above    RYLEE (acute kidney injury)  Baseline creatinine of 1.4-1.7  Had slight improvement now steadily trending up  CO2 level steadily increasing and patient was in negative balance  Held IV Lasix on 10/20  Will hold for another day  Continue to monitor with repeat labs and assess volume status    Seizure  Continue vimpat  No seizures seen on EEG  Appreciate Neurology following    Angio-edema  Initially thought to have angioedema  Likely to have slight droop associated with stroke and not true angioedema    Acquired hypothyroidism  Resume synthroid   Thyroid function test normal    Essential hypertension  Resume home meds: norvasc, clonidine, hydralazine, toprol  IV PRN hydralazine    Type 2 diabetes mellitus with circulatory disorder, with long-term current use of insulin  Uncontrolled with hyperglycemia likely due to dose of dexamethasone given  HGBA1c 6.9%  Glucose better controlled on detemir to 60 units q.h.s. and continue sliding scale insulin  Will make further adjustments to get glucose and range of 140-180    Anxiety  Fentanyl pushes for sedation    Morbid obesity  Discuss weight management after extubation and when mentation is appropriate    Dyslipidemia  Continue rosuvastatin        VTE Risk Mitigation (From admission, onward)         Ordered     heparin (porcine) injection 5,000 Units  Every 12 hours      10/11/19 0617     IP VTE HIGH RISK PATIENT  Once      10/11/19 0532     Place ELIEZER enge  Until discontinued      10/11/19 0532     Place sequential  compression device  Until discontinued      10/11/19 0532                Critical care time spent on the evaluation and treatment of severe organ dysfunction, review of pertinent labs and imaging studies, discussions with consulting providers and discussions with patient/family: 45 minutes.      Vee Arnett MD  Department of Hospital Medicine   [facility]

## 2019-10-21 NOTE — PLAN OF CARE
Recommendations    Recommendation/Intervention:   1. Initate TF of diabetetisource @ goal rate of 40 mL/hr when medically able to meet nutrient needs.     Goals: Initate TF within 48 hrs  Nutrition Goal Status: new  Communication of RD Recs: other (comment)(POC)

## 2019-10-21 NOTE — PROGRESS NOTES
0905: anesthesia team at bedside, transport pt to OR for trach placement via bed with portable monitor and oxygen.

## 2019-10-21 NOTE — SUBJECTIVE & OBJECTIVE
Interval History: No acute events overnight.  Patient underwent successful trach by ENT today.    Review of Systems   Unable to perform ROS: Intubated     Objective:     Vital Signs (Most Recent):  Temp: 98.6 °F (37 °C) (10/21/19 1500)  Pulse: 81 (10/21/19 1545)  Resp: (!) 26 (10/21/19 1545)  BP: (!) 172/81 (10/21/19 1545)  SpO2: 97 % (10/21/19 1545) Vital Signs (24h Range):  Temp:  [98.1 °F (36.7 °C)-98.9 °F (37.2 °C)] 98.6 °F (37 °C)  Pulse:  [58-85] 81  Resp:  [15-27] 26  SpO2:  [95 %-100 %] 97 %  BP: ()/(50-92) 172/81     Weight: 119.2 kg (262 lb 12.6 oz)  Body mass index is 43.73 kg/m².    Intake/Output Summary (Last 24 hours) at 10/21/2019 1659  Last data filed at 10/21/2019 1545  Gross per 24 hour   Intake 1614.77 ml   Output 1775 ml   Net -160.23 ml      Physical Exam   Constitutional: She appears well-developed. No distress.   Obese   HENT:   Head: Normocephalic and atraumatic.   Trach in place   Eyes: Pupils are equal, round, and reactive to light. Conjunctivae and EOM are normal. Right eye exhibits no discharge. Left eye exhibits no discharge. Scleral icterus is present.   Opened eyes to voice   Neck: Normal range of motion. Neck supple.   Cardiovascular: Normal rate and regular rhythm. Exam reveals no gallop and no friction rub.   No murmur heard.  Distant   Pulmonary/Chest: No stridor. She has no wheezes. She has no rales. She exhibits no tenderness.   On mechanical ventilation   Abdominal: Soft. Bowel sounds are normal. She exhibits no distension and no mass. There is no tenderness. There is no rebound and no guarding. No hernia.   Musculoskeletal: She exhibits no edema, tenderness or deformity.   Skin: Skin is warm and dry. Capillary refill takes less than 2 seconds. No rash noted. She is not diaphoretic. No erythema. No pallor.   Nursing note and vitals reviewed.      Significant Labs:   Blood Culture: No results for input(s): LABBLOO in the last 48 hours.  BMP:   Recent Labs   Lab  10/21/19  0311   *      K 4.3   CL 98   CO2 33*   BUN 48*   CREATININE 1.6*   CALCIUM 9.9     CBC:   Recent Labs   Lab 10/20/19  0345 10/21/19  0311   WBC 11.72 11.10   HGB 11.3* 12.0   HCT 39.0 41.1   * 150     POCT Glucose:   Recent Labs   Lab 10/20/19  1753 10/21/19  0004 10/21/19  0617   POCTGLUCOSE 182* 169* 119*     Urine Culture: No results for input(s): LABURIN in the last 48 hours.    Significant Imaging: I have reviewed and interpreted all pertinent imaging results/findings within the past 24 hours.

## 2019-10-21 NOTE — PT/OT/SLP PROGRESS
Physical Therapy      Patient Name:  Sherrie Alex   MRN:  [id number]    Patient not seen today secondary to trach placement. Will follow-up 10/22/2019.    Susan West, PT

## 2019-10-21 NOTE — CARE UPDATE
[facility]  ICU Multidisciplinary Bedside Rounds     UPDATE     Date: 10/21/2019      Plan of care reviewed with the following, Nurse, Charge Nurse, Physician, Infection Prevention and Dietician.       Needs/ Goals for the day: trach/PEG placement today      Level of Care: Critical Care

## 2019-10-22 ENCOUNTER — ANESTHESIA EVENT (OUTPATIENT)
Dept: ENDOSCOPY | Facility: HOSPITAL | Age: 69
DRG: 004 | End: 2019-10-22
Payer: MEDICARE

## 2019-10-22 ENCOUNTER — ANESTHESIA (OUTPATIENT)
Dept: ENDOSCOPY | Facility: HOSPITAL | Age: 69
DRG: 004 | End: 2019-10-22
Payer: MEDICARE

## 2019-10-22 LAB
ALLENS TEST: ABNORMAL
ANION GAP SERPL CALC-SCNC: 10 MMOL/L (ref 8–16)
BASOPHILS # BLD AUTO: 0.03 K/UL (ref 0–0.2)
BASOPHILS NFR BLD: 0.2 % (ref 0–1.9)
BUN SERPL-MCNC: 44 MG/DL (ref 8–23)
CALCIUM SERPL-MCNC: 10.2 MG/DL (ref 8.7–10.5)
CHLORIDE SERPL-SCNC: 103 MMOL/L (ref 95–110)
CO2 SERPL-SCNC: 29 MMOL/L (ref 23–29)
CREAT SERPL-MCNC: 1.7 MG/DL (ref 0.5–1.4)
DELSYS: ABNORMAL
DIFFERENTIAL METHOD: ABNORMAL
EOSINOPHIL # BLD AUTO: 0.2 K/UL (ref 0–0.5)
EOSINOPHIL NFR BLD: 1.2 % (ref 0–8)
ERYTHROCYTE [DISTWIDTH] IN BLOOD BY AUTOMATED COUNT: 18.1 % (ref 11.5–14.5)
ERYTHROCYTE [SEDIMENTATION RATE] IN BLOOD BY WESTERGREN METHOD: 15 MM/H
EST. GFR  (AFRICAN AMERICAN): 35 ML/MIN/1.73 M^2
EST. GFR  (NON AFRICAN AMERICAN): 30 ML/MIN/1.73 M^2
FIO2: 25
GLUCOSE SERPL-MCNC: 133 MG/DL (ref 70–110)
HCO3 UR-SCNC: 29 MMOL/L (ref 24–28)
HCT VFR BLD AUTO: 41.6 % (ref 37–48.5)
HGB BLD-MCNC: 12.3 G/DL (ref 12–16)
IMM GRANULOCYTES # BLD AUTO: 0.09 K/UL (ref 0–0.04)
IMM GRANULOCYTES NFR BLD AUTO: 0.7 % (ref 0–0.5)
LYMPHOCYTES # BLD AUTO: 1.3 K/UL (ref 1–4.8)
LYMPHOCYTES NFR BLD: 9.9 % (ref 18–48)
MCH RBC QN AUTO: 19.8 PG (ref 27–31)
MCHC RBC AUTO-ENTMCNC: 29.6 G/DL (ref 32–36)
MCV RBC AUTO: 67 FL (ref 82–98)
MIN VOL: 6.9
MODE: ABNORMAL
MONOCYTES # BLD AUTO: 1.2 K/UL (ref 0.3–1)
MONOCYTES NFR BLD: 8.8 % (ref 4–15)
NEUTROPHILS # BLD AUTO: 10.6 K/UL (ref 1.8–7.7)
NEUTROPHILS NFR BLD: 79.2 % (ref 38–73)
NRBC BLD-RTO: 0 /100 WBC
PCO2 BLDA: 37.7 MMHG (ref 35–45)
PEEP: 5
PH SMN: 7.49 [PH] (ref 7.35–7.45)
PIP: 20
PLATELET # BLD AUTO: 160 K/UL (ref 150–350)
PMV BLD AUTO: ABNORMAL FL (ref 9.2–12.9)
PO2 BLDA: 61 MMHG (ref 80–100)
POC BE: 5 MMOL/L
POC SATURATED O2: 93 % (ref 95–100)
POC TCO2: 30 MMOL/L (ref 23–27)
POCT GLUCOSE: 118 MG/DL (ref 70–110)
POCT GLUCOSE: 129 MG/DL (ref 70–110)
POCT GLUCOSE: 135 MG/DL (ref 70–110)
POCT GLUCOSE: 72 MG/DL (ref 70–110)
POTASSIUM SERPL-SCNC: 4.6 MMOL/L (ref 3.5–5.1)
RBC # BLD AUTO: 6.21 M/UL (ref 4–5.4)
SAMPLE: ABNORMAL
SITE: ABNORMAL
SODIUM SERPL-SCNC: 142 MMOL/L (ref 136–145)
SP02: 95
VT: 320
WBC # BLD AUTO: 13.38 K/UL (ref 3.9–12.7)

## 2019-10-22 PROCEDURE — 63600175 PHARM REV CODE 636 W HCPCS: Performed by: HOSPITALIST

## 2019-10-22 PROCEDURE — 37000009 HC ANESTHESIA EA ADD 15 MINS: Performed by: INTERNAL MEDICINE

## 2019-10-22 PROCEDURE — 94640 AIRWAY INHALATION TREATMENT: CPT

## 2019-10-22 PROCEDURE — 99231 SBSQ HOSP IP/OBS SF/LOW 25: CPT | Mod: ,,, | Performed by: OTOLARYNGOLOGY

## 2019-10-22 PROCEDURE — 37000008 HC ANESTHESIA 1ST 15 MINUTES: Performed by: INTERNAL MEDICINE

## 2019-10-22 PROCEDURE — 99231 PR SUBSEQUENT HOSPITAL CARE,LEVL I: ICD-10-PCS | Mod: ,,, | Performed by: OTOLARYNGOLOGY

## 2019-10-22 PROCEDURE — 82803 BLOOD GASES ANY COMBINATION: CPT

## 2019-10-22 PROCEDURE — 20000000 HC ICU ROOM

## 2019-10-22 PROCEDURE — 25000003 PHARM REV CODE 250: Performed by: INTERNAL MEDICINE

## 2019-10-22 PROCEDURE — 27201018 HC KIT, PEG (ANY): Performed by: INTERNAL MEDICINE

## 2019-10-22 PROCEDURE — D9220A PRA ANESTHESIA: ICD-10-PCS | Mod: CRNA,,, | Performed by: NURSE ANESTHETIST, CERTIFIED REGISTERED

## 2019-10-22 PROCEDURE — 25000003 PHARM REV CODE 250: Performed by: HOSPITALIST

## 2019-10-22 PROCEDURE — D9220A PRA ANESTHESIA: ICD-10-PCS | Mod: ANES,,, | Performed by: ANESTHESIOLOGY

## 2019-10-22 PROCEDURE — 36600 WITHDRAWAL OF ARTERIAL BLOOD: CPT

## 2019-10-22 PROCEDURE — 25000003 PHARM REV CODE 250: Performed by: PSYCHIATRY & NEUROLOGY

## 2019-10-22 PROCEDURE — 25000242 PHARM REV CODE 250 ALT 637 W/ HCPCS: Performed by: NURSE PRACTITIONER

## 2019-10-22 PROCEDURE — 80048 BASIC METABOLIC PNL TOTAL CA: CPT

## 2019-10-22 PROCEDURE — C9399 UNCLASSIFIED DRUGS OR BIOLOG: HCPCS | Performed by: HOSPITALIST

## 2019-10-22 PROCEDURE — D9220A PRA ANESTHESIA: Mod: ANES,,, | Performed by: ANESTHESIOLOGY

## 2019-10-22 PROCEDURE — 85025 COMPLETE CBC W/AUTO DIFF WBC: CPT

## 2019-10-22 PROCEDURE — 63600175 PHARM REV CODE 636 W HCPCS: Performed by: NURSE ANESTHETIST, CERTIFIED REGISTERED

## 2019-10-22 PROCEDURE — 99900026 HC AIRWAY MAINTENANCE (STAT)

## 2019-10-22 PROCEDURE — S0028 INJECTION, FAMOTIDINE, 20 MG: HCPCS | Performed by: HOSPITALIST

## 2019-10-22 PROCEDURE — 94003 VENT MGMT INPAT SUBQ DAY: CPT

## 2019-10-22 PROCEDURE — 99291 CRITICAL CARE FIRST HOUR: CPT | Mod: ,,, | Performed by: NURSE PRACTITIONER

## 2019-10-22 PROCEDURE — 99900035 HC TECH TIME PER 15 MIN (STAT)

## 2019-10-22 PROCEDURE — 27000221 HC OXYGEN, UP TO 24 HOURS

## 2019-10-22 PROCEDURE — 43246 EGD PLACE GASTROSTOMY TUBE: CPT | Performed by: INTERNAL MEDICINE

## 2019-10-22 PROCEDURE — 36415 COLL VENOUS BLD VENIPUNCTURE: CPT

## 2019-10-22 PROCEDURE — D9220A PRA ANESTHESIA: Mod: CRNA,,, | Performed by: NURSE ANESTHETIST, CERTIFIED REGISTERED

## 2019-10-22 PROCEDURE — 94761 N-INVAS EAR/PLS OXIMETRY MLT: CPT

## 2019-10-22 PROCEDURE — 99291 PR CRITICAL CARE, E/M 30-74 MINUTES: ICD-10-PCS | Mod: ,,, | Performed by: NURSE PRACTITIONER

## 2019-10-22 RX ORDER — SODIUM CHLORIDE 9 MG/ML
INJECTION, SOLUTION INTRAVENOUS CONTINUOUS PRN
Status: DISCONTINUED | OUTPATIENT
Start: 2019-10-22 | End: 2019-10-22

## 2019-10-22 RX ORDER — FENTANYL CITRATE 50 UG/ML
100 INJECTION, SOLUTION INTRAMUSCULAR; INTRAVENOUS ONCE
Status: DISCONTINUED | OUTPATIENT
Start: 2019-10-22 | End: 2019-10-29

## 2019-10-22 RX ORDER — PROPOFOL 10 MG/ML
VIAL (ML) INTRAVENOUS
Status: DISCONTINUED | OUTPATIENT
Start: 2019-10-22 | End: 2019-10-22

## 2019-10-22 RX ORDER — LIDOCAINE HCL/PF 100 MG/5ML
SYRINGE (ML) INTRAVENOUS
Status: DISCONTINUED | OUTPATIENT
Start: 2019-10-22 | End: 2019-10-22

## 2019-10-22 RX ADMIN — PROPOFOL 30 MG: 10 INJECTION, EMULSION INTRAVENOUS at 04:10

## 2019-10-22 RX ADMIN — IPRATROPIUM BROMIDE AND ALBUTEROL SULFATE 3 ML: .5; 3 SOLUTION RESPIRATORY (INHALATION) at 08:10

## 2019-10-22 RX ADMIN — DEXMEDETOMIDINE HYDROCHLORIDE 0.4 MCG/KG/HR: 4 INJECTION, SOLUTION INTRAVENOUS at 01:10

## 2019-10-22 RX ADMIN — IPRATROPIUM BROMIDE AND ALBUTEROL SULFATE 3 ML: .5; 3 SOLUTION RESPIRATORY (INHALATION) at 07:10

## 2019-10-22 RX ADMIN — IPRATROPIUM BROMIDE AND ALBUTEROL SULFATE 3 ML: .5; 3 SOLUTION RESPIRATORY (INHALATION) at 12:10

## 2019-10-22 RX ADMIN — INSULIN DETEMIR 60 UNITS: 100 INJECTION, SOLUTION SUBCUTANEOUS at 08:10

## 2019-10-22 RX ADMIN — HEPARIN SODIUM 5000 UNITS: 5000 INJECTION INTRAVENOUS; SUBCUTANEOUS at 08:10

## 2019-10-22 RX ADMIN — PROPOFOL 40 MG: 10 INJECTION, EMULSION INTRAVENOUS at 04:10

## 2019-10-22 RX ADMIN — SENNOSIDES, DOCUSATE SODIUM 1 TABLET: 50; 8.6 TABLET, FILM COATED ORAL at 08:10

## 2019-10-22 RX ADMIN — METOPROLOL TARTRATE 100 MG: 50 TABLET ORAL at 08:10

## 2019-10-22 RX ADMIN — DEXMEDETOMIDINE HYDROCHLORIDE 1 MCG/KG/HR: 4 INJECTION, SOLUTION INTRAVENOUS at 08:10

## 2019-10-22 RX ADMIN — CEFAZOLIN SODIUM 1 G: 1 SOLUTION INTRAVENOUS at 11:10

## 2019-10-22 RX ADMIN — CEFAZOLIN SODIUM 1 G: 1 SOLUTION INTRAVENOUS at 02:10

## 2019-10-22 RX ADMIN — CLONIDINE HYDROCHLORIDE 0.3 MG: 0.1 TABLET ORAL at 08:10

## 2019-10-22 RX ADMIN — CHLORHEXIDINE GLUCONATE 0.12% ORAL RINSE 15 ML: 1.2 LIQUID ORAL at 08:10

## 2019-10-22 RX ADMIN — SODIUM CHLORIDE: 0.9 INJECTION, SOLUTION INTRAVENOUS at 04:10

## 2019-10-22 RX ADMIN — DEXMEDETOMIDINE HYDROCHLORIDE 1 MCG/KG/HR: 4 INJECTION, SOLUTION INTRAVENOUS at 12:10

## 2019-10-22 RX ADMIN — FAMOTIDINE 20 MG: 10 INJECTION INTRAVENOUS at 08:10

## 2019-10-22 RX ADMIN — CEFAZOLIN SODIUM 1 G: 1 SOLUTION INTRAVENOUS at 07:10

## 2019-10-22 RX ADMIN — DEXMEDETOMIDINE HYDROCHLORIDE 0.4 MCG/KG/HR: 4 INJECTION, SOLUTION INTRAVENOUS at 05:10

## 2019-10-22 RX ADMIN — ROSUVASTATIN CALCIUM 20 MG: 10 TABLET, COATED ORAL at 08:10

## 2019-10-22 RX ADMIN — LACOSAMIDE 50 MG: 50 TABLET, FILM COATED ORAL at 08:10

## 2019-10-22 RX ADMIN — CEFAZOLIN SODIUM 1 G: 1 SOLUTION INTRAVENOUS at 12:10

## 2019-10-22 RX ADMIN — DEXMEDETOMIDINE HYDROCHLORIDE 1 MCG/KG/HR: 4 INJECTION, SOLUTION INTRAVENOUS at 02:10

## 2019-10-22 RX ADMIN — IPRATROPIUM BROMIDE AND ALBUTEROL SULFATE 3 ML: .5; 3 SOLUTION RESPIRATORY (INHALATION) at 01:10

## 2019-10-22 RX ADMIN — LIDOCAINE HYDROCHLORIDE 100 MG: 20 INJECTION, SOLUTION INTRAVENOUS at 04:10

## 2019-10-22 NOTE — OP NOTE
DATE OF OPERATION: 10/21/2019    SURGEON:  Olu Rice     ASSISTANT SURGEON:  None     OPERATION: Tracheostomy.     PREOPERATIVE DIAGNOSIS:    Prolonged endotracheal intubation and need for ventilatory support.     POSTOPERATIVE DIAGNOSIS:    Prolonged endotracheal intubation and need for ventilatory support.    ANESTHESIA: General.     COMPLICATIONS: None.     ESTIMATED BLOOD LOSS: 30 mL     SPECIMENS: None     FINDINGS: Normal anatomy of the upper airway. Short neck. No unusual bleeding encountered and no anomalous brachiocephalic arteries. Excessive neck adiposity, reduced with removal of excess fat at tracheostomy site during procedure.     Type of tracheostomy tube placed: Size 6 Shiley Cuffed Tracheostomy Tube (6DCT).     INDICATIONS: The patient has had ongoing requirement for intubation with multiple attempts at spontaneous breathing trials and inability to extubate. Suspected etiology being acute CVA and mental status changes which are not anticipated to be rapidly reversible and likely require prolonged intubation. After discussion with the patient's ICU care team and her family, tracheotomy was elected.      I discussed the risks, benefits and alternatives of the procedure with the patient's family, as well as the expected postoperative course.I gave them the opportunity to ask questions and I answered all of them. Prior to surgery I reviewed the indications for surgery and her son, power of medical decision making, consented to proceed.     DESCRIPTION OF PROCEDURE: The patient was brought into the operating room and placed supine on the operating table. The patient was already intubated and ventilator dependent, so general anesthesia was administered utilizing existing endotracheal tube. A shoulder roll and a donut were placed under the patient's head. A time-out was called to confirm the proper patient, site and procedure. The patient was prepped and draped in the usual fashion. The tracheostomy  tube was pre-tested by inflating the cuff and ensuring it was intact without an air leak. After carefully identify landmarks and marking the neck (thyroid notch, cricoid, and suprasternal notch), anesthetic of 1% lidocaine with 1:100,000 epinephrine was infiltrated locally at the site of planned incision.     A 2cm horizontally oriented incision following a natural neck crease was made in the midline using a #15 blade, approximately 2cm above the suprasternal notch.. A small amount of subcutaneous fat was excised. The midline raphe was identified and divided with blunt dissection. The strap muscles were lateralized using clamps to retract them to their respective ipsilateral side. The thyroid isthmus was encountered and dissected free from the underlying trachea and surrounding soft tissue. The isthmus was bluntly dissected and elevated, clamped bilaterally, and divided with bipolar and monopolar cautery. Hemostasis was achieved.     The anterior tracheal wall was identified and the soft tissue and pretracheal fascia was cleared by blunt dissection over the cricoid and first three tracheal rings. A cricoid hook was then placed beneath the cricoid cartilage to elevate and control the trachea during subsequent steps. The anesthesiologist was then notified that the airway was about to be entered and the endotracheal tube advanced.  A window was then excised from the 2nd tracheal ring using a #15 blade. The endotracheal tube was then retracted by the anesthesiologist until the tip was remaining in the airway but just superior to the tracheotomy. A 4-0 prolene suture was placed through the tracheal ring superior to the tracheostomy, a loop tied external to the tracheostomy, and brought superiorly to the tracheostomy without tension for future use if needed as a retraction suture. Similarly, an inferior retraction suture was placed through the inferior tracheal ring and brought inferiorly. The trach  was placed to  retract the tracheotomy.     A 6.0 DCT Shiley tracheostomy tube was placed into the tracheostomy site. The obturator was removed, inner canula placed, and the cuff of the tracheostomy tube inflated. Proper tracheostomy tube position was confirmed by the return of adequate end-tidal CO2 (by anesthesia ventilator monitor), chest rise, and suctioning of tracheal secretions. The cricoid hook and retractors were carefully removed, and the endotracheal tube completely removed once the airway was secured with correct placement of the tracheostomy tube. Final hemostasis was achieved and sutures of 2-0 silk x4 were used to secure the 4 corners of the phlanges of the tracheostomy tube to the skin. The prolene retraction sutures were secured to the skin above and below the stoma with steri strips. Hemostatic oxidized cellulose material was used at the stoma site to control minimal oozing and provide good hemostasis around the tracheostomy.     The procedure was complete without any apparent complications, and the patient was ventilating well via the newly placed tracheostomy tube at the conclusion of the case.     Disposition: Patient to return to ICU care. Instructions for post-operative tracheostomy care, as well as plans for post-operative tracheostomy tube change were communicated to the patient's care team and family.      Olu Rice MD    Rhinology, Allergy, and Sinus-Skull Base Surgery    Department of Otorhinolaryngology    [facility]    Phone  [phone]    Fax      [phone]

## 2019-10-22 NOTE — CONSULTS
Enteral Nutrition Recommendations     When medically able initiate TF via PEG of Glucerna 1.5 @ 50mL/hr to provide 1800 kcal, 99g protein, and 911 mL free fluid. Additional FWF of 300mL q8h to meet fluid needs or per MD discretion.

## 2019-10-22 NOTE — ASSESSMENT & PLAN NOTE
Suspect this is the reason she was drooling and was macroglossic on arrival, and not angioedema.    --PT/OT.    --s/p

## 2019-10-22 NOTE — ANESTHESIA PREPROCEDURE EVALUATION
10/22/2019  Sherrie Alex is a 69 y.o., female.    Anesthesia Evaluation    I have reviewed the Patient Summary Reports.     I have reviewed the Nursing Notes.      Review of Systems  Anesthesia Hx:  No previous Anesthesia   Denies Personal Hx of Anesthesia complications.   Social:  Non-Smoker    Hematology/Oncology:  Hematology Normal   Oncology Normal     EENT/Dental:EENT/Dental Normal   Cardiovascular:   Hypertension    Pulmonary:   Sleep Apnea    Renal/:   Chronic Renal Disease    Hepatic/GI:  Hepatic/GI Normal    Musculoskeletal:  Musculoskeletal Normal    Neurological:   CVA (found on this visit ) Seizures    Endocrine:   Diabetes Hypothyroidism    Dermatological:  Skin Normal    Psych:  Psychiatric Normal           Physical Exam  General:  Morbid Obesity    Airway/Jaw/Neck:   Tracheostomy in situ with cuffed trach     Chest/Lungs:  Chest/Lungs Clear    Heart/Vascular:  Heart Findings: Normal            Anesthesia Plan  Type of Anesthesia, risks & benefits discussed:  Anesthesia Type:  general, MAC  Patient's Preference:   Intra-op Monitoring Plan: standard ASA monitors  Intra-op Monitoring Plan Comments:   Post Op Pain Control Plan: multimodal analgesia, IV/PO Opioids PRN and per primary service following discharge from PACU  Post Op Pain Control Plan Comments:   Induction:    Beta Blocker:  Patient is not currently on a Beta-Blocker (No further documentation required).       Informed Consent: Patient understands risks and agrees with Anesthesia plan.  Questions answered. Anesthesia consent signed with patient.  ASA Score: 4     Day of Surgery Review of History & Physical:  There are no significant changes.  H&P update referred to the provider.  H&P completed by Anesthesiologist.   Anesthesia Plan Notes: npo        Ready For Surgery From Anesthesia Perspective.

## 2019-10-22 NOTE — [employer] PATIENT INSTRUCTIONS
Discharge Summary/Instructions after an Endoscopic Procedure  Patient Name: Sherrie Alex  Patient MRN: [id number]  Patient   YOB: 1950 Tuesday, October 22, 2019  Rosetta Kuhn MD  RESTRICTIONS:  During your procedure today, you received medications for sedation.  These   medications may affect your judgment, balance and coordination.  Therefore,   for 24 hours, you have the following restrictions:   - DO NOT drive a car, operate machinery, make legal/financial decisions,   sign important papers or drink alcohol.    ACTIVITY:  Today: no heavy lifting, straining or running due to procedural   sedation/anesthesia.  The following day: return to full activity including work.  DIET:  Eat and drink normally unless instructed otherwise.     TREATMENT FOR COMMON SIDE EFFECTS:  - Mild abdominal pain, nausea, belching, bloating or excessive gas:  rest,   eat lightly and use a heating pad.  - Sore Throat: treat with throat lozenges and/or gargle with warm salt   water.  - Because air was used during the procedure, expelling large amounts of air   from your rectum or belching is normal.  - If a bowel prep was taken, you may not have a bowel movement for 1-3 days.    This is normal.  SYMPTOMS TO WATCH FOR AND REPORT TO YOUR PHYSICIAN:  1. Abdominal pain or bloating, other than gas cramps.  2. Chest pain.  3. Back pain.  4. Signs of infection such as: chills or fever occurring within 24 hours   after the procedure.  5. Rectal bleeding, which would show as bright red, maroon, or black stools.   (A tablespoon of blood from the rectum is not serious, especially if   hemorrhoids are present.)  6. Vomiting.  7. Weakness or dizziness.  GO DIRECTLY TO THE NEAREST EMERGENCY ROOM IF YOU HAVE ANY OF THE FOLLOWING:      Difficulty breathing              Chills and/or fever over 101 F   Persistent vomiting and/or vomiting blood   Severe abdominal pain   Severe chest pain   Black, tarry stools   Bleeding- more than one  tablespoon   Any other symptom or condition that you feel may need urgent attention  Your doctor recommends these additional instructions:  If any biopsies were taken, your doctors clinic will contact you in 1 to 2   weeks with any results.  - Return patient to hospital ding for ongoing care.   - Continue present medications.   - Please follow the post-PEG recommendations including: Nutrition consult   for formula and volume, advance food and medications per primary care   provider, external bolster 1 cm from abdominal wall, change dressing once   per day, start using PEG today, flush PEG daily with 60 ml water and clean   site with soap and water daily and dry thoroughly.  For questions, problems or results please call your physician - Rosetta Kuhn MD at Work:  [phone].  [facility] can be reached at [phone]     IF A COMPLICATION OR EMERGENCY SITUATION ARISES AND YOU ARE UNABLE TO REACH   YOUR PHYSICIAN - GO DIRECTLY TO THE EMERGENCY ROOM.  Rosetta Kuhn MD  10/22/2019 4:55:16 PM  This report has been verified and signed electronically.  [employer]

## 2019-10-22 NOTE — PROGRESS NOTES
Results for ZANA, MS SPANN (MRN [id number]) as of 10/22/2019 05:06   Ref. Range 10/22/2019 04:24   POC PH Latest Ref Range: 7.35 - 7.45  7.494 (H)   POC PCO2 Latest Ref Range: 35 - 45 mmHg 37.7   POC PO2 Latest Ref Range: 80 - 100 mmHg 61 (L)   POC BE Latest Ref Range: -2 to 2 mmol/L 5   POC HCO3 Latest Ref Range: 24 - 28 mmol/L 29.0 (H)   POC SATURATED O2 Latest Ref Range: 95 - 100 % 93 (L)   POC TCO2 Latest Ref Range: 23 - 27 mmol/L 30 (H)   FiO2 Unknown 25   Vt Unknown 320   PiP Unknown 20   PEEP Unknown 5   Sample Unknown ARTERIAL   DelSys Unknown Adult Vent   Allens Test Unknown Pass   Site Unknown RR   Mode Unknown AC/PRVC   Rate Unknown 15

## 2019-10-22 NOTE — CARE UPDATE
[facility]  ICU Multidisciplinary Bedside Rounds     UPDATE     Date: 10/22/2019      Plan of care reviewed with the following, Nurse, Charge Nurse, Physician and Infection Prevention.       Needs/ Goals for the day: remove shepard, place purewick. Peg tube placement today at bedside      Level of Care: Critical Care

## 2019-10-22 NOTE — PROGRESS NOTES
"[facility] [place] [facility]  Progress Note    Patient Name: Sherrie Alex  MRN: [id number]  Patient Class: IP- Inpatient   Admission Date: 10/11/2019  Length of Stay: 11 days  Attending Physician: Angel Luis Aquino MD  Primary Care Provider: Desmond Yang MD        Subjective:     Principal Problem:Acute on chronic respiratory failure        HPI:  patient is a 69 y.o female who presents to the ED complaining of tongue swelling that began PTA. Patient has slurred speech, drooling, and tongue swelling. Patient denies any pain, itching, CP, nausea, vomiting, or fever. Per sisters, patient is normally talkative and active. Her sisters are unaware of any cause of onset, stating that the patient lives alone. PMHx of CVA 1 yr ago, DM, HTN, HLD, and obesity. Patient is allergic to codeine and ace inhibitors.      The history is provided by the patient and a relative. History limited by: Acuity of condition. No  was used.     Pt intubated in ED for airway protection ?laryngeal edema - not noted in ED documentation.  Pt started on IV steroids and H2 blocker on vent. In ICU, pt showed sluggish neuro response to pain and unable to follow commands. Propofol taken off and neuro status improved. Pulm consulted for vent management. Neuro consulted - h/o seizures.  Seen in ED yesterday am with "leg shaking" and weakness and dc'd home. H/o seizures on vimpat.      Overview/Hospital Course:  Ms. Alex was admitted acute neurological changes and respiratory compromise. Intubated in ED. Serial neurological testing off sedation suggested right sided weakness > left. MRI showed:Moderate-sized area of subacute infarction involving the left subinsular white matter extending to the periventricular white matter. No MR evidence for hemorrhage.  Patient treated for an acute CVA and Neurology consult. Allowed for permissive HTN.  Later resumed blood pressure medication after appropriate time " elapsed for permissive hypertension.  Her neurological exam was a barrier to extubation.  She was also treated with IV lasix for diuresis, ET sputum culture sent for climbing WBCs, concerning for possible aspiration. Dr. Bauer met with family and discussed plan to continue attempts to wean from vent, though neuro status/effects of CVA may be limiting factor.  The patient was unable to be weaned from the vent, ENT consulted for trach and GI consulted for PEG tube on 10/22.    Interval History: comfortable on vent.    Review of Systems   Unable to perform ROS: Acuity of condition     Objective:     Vital Signs (Most Recent):  Temp: 98.3 °F (36.8 °C) (10/22/19 0700)  Pulse: 70 (10/22/19 0800)  Resp: 20 (10/22/19 0800)  BP: 125/65 (10/22/19 0800)  SpO2: 96 % (10/22/19 0800) Vital Signs (24h Range):  Temp:  [98.1 °F (36.7 °C)-99.7 °F (37.6 °C)] 98.3 °F (36.8 °C)  Pulse:  [63-85] 70  Resp:  [0-27] 20  SpO2:  [94 %-100 %] 96 %  BP: ()/(54-92) 125/65     Weight: 119.2 kg (262 lb 12.6 oz)  Body mass index is 43.73 kg/m².    Intake/Output Summary (Last 24 hours) at 10/22/2019 0842  Last data filed at 10/22/2019 0800  Gross per 24 hour   Intake 1212.24 ml   Output 2425 ml   Net -1212.76 ml      Physical Exam   Constitutional: She appears well-developed and well-nourished.   HENT:   Head: Normocephalic and atraumatic.   Cardiovascular: Normal rate and regular rhythm.   Pulmonary/Chest: Effort normal and breath sounds normal. No stridor. No respiratory distress. She has no wheezes.   Abdominal: Soft. She exhibits no distension. There is no rebound and no guarding.   Neurological: She is alert.   Skin: Skin is warm and dry.   Vitals reviewed.      Significant Labs:   BMP:   Recent Labs   Lab 10/22/19  0342   *      K 4.6      CO2 29   BUN 44*   CREATININE 1.7*   CALCIUM 10.2     CBC:   Recent Labs   Lab 10/21/19  0311 10/22/19  0342   WBC 11.10 13.38*   HGB 12.0 12.3   HCT 41.1 41.6    160        Significant Imaging:       Assessment/Plan:      * Acute on chronic respiratory failure  Intubated for airway protection given concern for angioedema initially  Patient remains intubated secondary to acute CVA and and patient did not have to angioedema  Positive cultures with increased secretions  Pulmonary following  Patient is morbidly obese with obstructive sleep apnea which may hinder extubation  Pulmonary discussed possible need for trach and PEG with son  Cephazolin added for positive Staph in the sputum and nebulizer treatments  ENT consulted for trach placement and trach successfully placed today, 10/21  GI for PEG placement, but this could not be performed today, anesthesia did not want to be sedate this patient  Plan for PEG tomorrow by GI  As per Pulmonary  Will likely need LTAC placement, will begin consult to  for discharge planning    Ventilator dependent  Continues on vent       Venous stasis ulcer  No acute issues       CVA (cerebral vascular accident)  MRI of the brain with moderate-sized area of subacute infarction involving the left subinsular white matter extending to the periventricular white matter  Allowed for permissive hypertension along with treatment with aspirin and statin  Appreciate Neurology input  Resumed blood pressure medications for tighter blood pressure control  Weaning from vent hindered by new CVA  Trach done today, and peg rescheduled for tomorrow (10/22)  Will likely need LTAC/rehab placement    Encephalopathy, metabolic  Secondary to CVA  As discussed above    RYLEE (acute kidney injury)  Baseline creatinine of 1.4-1.7  Had slight improvement now steadily trending up  CO2 level steadily increasing and patient was in negative balance  Held IV Lasix on 10/20  Will hold for another day  Continue to monitor with repeat labs and assess volume status    Seizure  Continue vimpat  No seizures seen on EEG  Appreciate Neurology following    Angio-edema  Initially  thought to have angioedema  Likely to have slight droop associated with stroke and not true angioedema    Acquired hypothyroidism  Resume synthroid   Thyroid function test normal    Essential hypertension  Resume home meds: norvasc, clonidine, hydralazine, toprol  IV PRN hydralazine    Type 2 diabetes mellitus with circulatory disorder, with long-term current use of insulin  Uncontrolled with hyperglycemia likely due to dose of dexamethasone given  HGBA1c 6.9%  Glucose better controlled on detemir to 60 units q.h.s. and continue sliding scale insulin  Will make further adjustments to get glucose and range of 140-180    Anxiety  Fentanyl pushes for sedation    Morbid obesity  Discuss weight management after extubation and when mentation is appropriate  Body mass index is 43.73 kg/m².     Dyslipidemia  Continue rosuvastatin        VTE Risk Mitigation (From admission, onward)         Ordered     heparin (porcine) injection 5,000 Units  Every 12 hours      10/11/19 0617     IP VTE HIGH RISK PATIENT  Once      10/11/19 0532     Place ELIEZER hose  Until discontinued      10/11/19 0532     Place sequential compression device  Until discontinued      10/11/19 0532                Critical care time spent on the evaluation and treatment of severe organ dysfunction, review of pertinent labs and imaging studies, discussions with consulting providers and discussions with patient/family: 35 minutes.    PEG tube today. Nutrition consult for PEG tube feed recs.  To LTAC possibly later this week       Angel Luis Hanson MD  Department of Hospital Medicine   [facility]

## 2019-10-22 NOTE — PROGRESS NOTES
Pt received on Servo I vent with settings as followed: PRVC 15/320/+5 and 25%.  Size 6 Shiley trach in place and secure.  Ambu bag and mask at bedside and all alarms on and functioning. NARN. RT will continue to monitor pt status.

## 2019-10-22 NOTE — PLAN OF CARE
Dr owusu to assess pt, stated he will change to new trach next Monday the 28th. Dr Kuhn placed peg tube, pt tolerated well. Guevara was removed and purewick placed. Pt has had no skin breakdown or falls. Remains on precedex at 0.4

## 2019-10-22 NOTE — ASSESSMENT & PLAN NOTE
MRI of the brain with moderate-sized area of subacute infarction involving the left subinsular white matter extending to the periventricular white matter  Allowed for permissive hypertension along with treatment with aspirin and statin  Appreciate Neurology input  Resumed blood pressure medications for tighter blood pressure control  Weaning from vent hindered by new CVA  Trach done today, and peg rescheduled for tomorrow (10/22)  Will likely need LTAC/rehab placement

## 2019-10-22 NOTE — TRANSFER OF CARE
"Anesthesia Transfer of Care Note    Patient: Sherrie Alex    Procedure(s) Performed: Procedure(s) (LRB):  INSERTION, PEG TUBE (N/A)    Patient location: ICU (Remained in ICU for procedure)    Anesthesia Type: general    Post pain: adequate analgesia    Post assessment: no apparent anesthetic complications    Post vital signs: stable    Level of consciousness: sedated and responds to stimulation    Nausea/Vomiting: no nausea/vomiting    Complications: none    Transfer of care protocol was followed      Last vitals:   Visit Vitals  /63 (BP Location: Right arm, Patient Position: Lying)   Pulse 66   Temp 36.7 °C (98.1 °F) (Oral)   Resp 15   Ht 5' 5" (1.651 m)   Wt 119.2 kg (262 lb 12.6 oz)   LMP 01/01/2015 (LMP Unknown)   SpO2 98%   Breastfeeding? No   BMI 43.73 kg/m²     "

## 2019-10-22 NOTE — PROGRESS NOTES
[facility]  Pulmonology  Progress Note    Patient Name: Sherrie Alex  MRN: [id number]  Admission Date: 10/11/2019  Hospital Length of Stay: 11 days  Code Status: Full Code  Attending Provider: Angel Luis Aquino MD  Primary Care Provider: Desmond Yang MD   Principal Problem: Acute on chronic respiratory failure    Subjective:     Interval History: POD 1 for tracheostomy placement.  On low dose precedex infusion.     Objective:     Vital Signs (Most Recent):  Temp: 98.4 °F (36.9 °C) (10/22/19 1100)  Pulse: 63 (10/22/19 1330)  Resp: 20 (10/22/19 1330)  BP: 115/63 (10/22/19 1300)  SpO2: 98 % (10/22/19 1330) Vital Signs (24h Range):  Temp:  [98.3 °F (36.8 °C)-99.7 °F (37.6 °C)] 98.4 °F (36.9 °C)  Pulse:  [63-85] 63  Resp:  [0-27] 20  SpO2:  [94 %-99 %] 98 %  BP: ()/(54-87) 115/63     Weight: 119.2 kg (262 lb 12.6 oz)  Body mass index is 43.73 kg/m².      Intake/Output Summary (Last 24 hours) at 10/22/2019 1400  Last data filed at 10/22/2019 1200  Gross per 24 hour   Intake 610.87 ml   Output 2400 ml   Net -1789.13 ml       Physical Exam   Constitutional:   Morbid obesity BMI 43   HENT:   Head: Normocephalic.   Cardiovascular: Normal rate, regular rhythm and normal heart sounds.   Warm extremities, no peripheral edema   Pulmonary/Chest: No accessory muscle usage. No tachypnea. She has no wheezes. She has rhonchi.   Tracheostomy.  Moderate secretions.   Abdominal: Soft. Bowel sounds are normal. She exhibits no distension.   Neurological: GCS eye subscore is 4. GCS verbal subscore is 1. GCS motor subscore is 5.   Alert, appears to attempt to track examiner.  Does not follow request.  Right hemiparesis.  Spontaneous movement to LUE/LLE.    Vitals reviewed.      Vents:  Vent Mode: PRVC (10/22/19 1052)  Ventilator Initiated: Yes (10/11/19 0213)  Set Rate: 15 bmp (10/22/19 1052)  Vt Set: 320 mL (10/22/19 1052)  PEEP/CPAP: 5 cmH20 (10/22/19 1052)  Oxygen Concentration (%): 25 (10/22/19  1300)  Peak Airway Pressure: 17.4 cmH2O (10/22/19 1052)  Total Ve: 5.5 mL (10/22/19 1052)  F/VT Ratio<105 (RSBI): (!) 59.38 (10/22/19 0742)    Lines/Drains/Airways     Drain            Female External Urinary Catheter 10/22/19 0935 less than 1 day          Airway                 Surgical Airway 10/21/19 Shiley Cuffed 1 day          Peripheral Intravenous Line                 Peripheral IV - Single Lumen 10/20/19 1400 20 G Distal;Left;Posterior Forearm 2 days         Peripheral IV - Single Lumen 10/20/19 1400 Left;Posterior Forearm 2 days                Significant Labs:    CBC/Anemia Profile:  Recent Labs   Lab 10/21/19  0311 10/22/19  0342   WBC 11.10 13.38*   HGB 12.0 12.3   HCT 41.1 41.6    160   MCV 68* 67*   RDW 17.9* 18.1*        Chemistries:  Recent Labs   Lab 10/21/19  0311 10/22/19  0342    142   K 4.3 4.6   CL 98 103   CO2 33* 29   BUN 48* 44*   CREATININE 1.6* 1.7*   CALCIUM 9.9 10.2       All pertinent labs within the past 24 hours have been reviewed.    Significant Imaging:  I have reviewed and interpreted all pertinent imaging results/findings within the past 24 hours.    Assessment/Plan:     * Acute on chronic respiratory failure  Intubated initially for airway protection.  Now with new CVA. Sputum w/ MSSA on culture from 10/14/19.  Currently afebrile with mild leukocytosis.     --Continue Ancef 10/16 for 7 day course.  Small to moderate secretions noted.   --Tracheostomy placement on 10/21.   --Wean mechanical ventilation after PEG placement.    CVA (cerebral vascular accident)  Suspect this is the reason she was drooling and was macroglossic on arrival, and not angioedema.  MRI brain with left subinsular cortex infarction. No significant carotid stenosis on ultrasound.   --PT/OT.    --s/p tracheostomy.  Plans for PEG today.  --continue ASA and statin.       Encephalopathy, metabolic  Suspect secondary to CVA.  No reported seizures, on home vimpat.       Angio-edema  No identified  precipitant of angioedema. Seems more likely that drooling and dysarthria are reflective of CVA      Acquired hypothyroidism  --restart home dose of synthroid when PEG placed.    Essential hypertension  --within goal on Amlodipine, Lopressor and Clonidine.     Type 2 diabetes mellitus with circulatory disorder, with long-term current use of insulin  --within goal on detemir 60 units Q HS with frequent glucose checks and moderate dose SSI.  Glucose goal 140-180. A1c 6.9    DVT ppx: Heparin SQ    Suspect will need LTAC vs vented NH once PEG placed.     Discussed with Dr. Hernández    Critical Care Time: 35 minutes    Critical care was time spent personally by me on the following activities: evaluating this patient's organ dysfunction, development of treatment plan, discussing treatment plan with patient or surrogate and bedside caregivers, discussions with consultants, evaluation of patient's response to treatment, examination of patient, ordering and performing treatments and interventions, ordering and review of laboratory studies, ordering and review of radiographic studies, re-evaluation of patient's condition. This critical care time did not overlap with that of any other provider or involve time for any procedures.     Berkley Zuniga NP  Pulmonology  [facility] [place]

## 2019-10-22 NOTE — SUBJECTIVE & OBJECTIVE
Interval History: POD 1 for tracheostomy placement.  On low dose precedex infusion.     Objective:     Vital Signs (Most Recent):  Temp: 98.4 °F (36.9 °C) (10/22/19 1100)  Pulse: 63 (10/22/19 1330)  Resp: 20 (10/22/19 1330)  BP: 115/63 (10/22/19 1300)  SpO2: 98 % (10/22/19 1330) Vital Signs (24h Range):  Temp:  [98.3 °F (36.8 °C)-99.7 °F (37.6 °C)] 98.4 °F (36.9 °C)  Pulse:  [63-85] 63  Resp:  [0-27] 20  SpO2:  [94 %-99 %] 98 %  BP: ()/(54-87) 115/63     Weight: 119.2 kg (262 lb 12.6 oz)  Body mass index is 43.73 kg/m².      Intake/Output Summary (Last 24 hours) at 10/22/2019 1400  Last data filed at 10/22/2019 1200  Gross per 24 hour   Intake 610.87 ml   Output 2400 ml   Net -1789.13 ml       Physical Exam   Constitutional:   Morbid obesity BMI 43   HENT:   Head: Normocephalic.   Cardiovascular: Normal rate, regular rhythm and normal heart sounds.   Warm extremities, no peripheral edema   Pulmonary/Chest: No accessory muscle usage. No tachypnea. She has no wheezes. She has rhonchi.   Tracheostomy.  Moderate secretions.   Abdominal: Soft. Bowel sounds are normal. She exhibits no distension.   Neurological: GCS eye subscore is 4. GCS verbal subscore is 1. GCS motor subscore is 5.   Alert, appears to attempt to track examiner.  Does not follow request.  Right hemiparesis.  Spontaneous movement to LUE/LLE.    Vitals reviewed.      Vents:  Vent Mode: PRVC (10/22/19 1052)  Ventilator Initiated: Yes (10/11/19 0213)  Set Rate: 15 bmp (10/22/19 1052)  Vt Set: 320 mL (10/22/19 1052)  PEEP/CPAP: 5 cmH20 (10/22/19 1052)  Oxygen Concentration (%): 25 (10/22/19 1300)  Peak Airway Pressure: 17.4 cmH2O (10/22/19 1052)  Total Ve: 5.5 mL (10/22/19 1052)  F/VT Ratio<105 (RSBI): (!) 59.38 (10/22/19 0742)    Lines/Drains/Airways     Drain            Female External Urinary Catheter 10/22/19 0935 less than 1 day          Airway                 Surgical Airway 10/21/19 Arleenley Cuffed 1 day          Peripheral Intravenous Line                  Peripheral IV - Single Lumen 10/20/19 1400 20 G Distal;Left;Posterior Forearm 2 days         Peripheral IV - Single Lumen 10/20/19 1400 Left;Posterior Forearm 2 days                Significant Labs:    CBC/Anemia Profile:  Recent Labs   Lab 10/21/19  0311 10/22/19  0342   WBC 11.10 13.38*   HGB 12.0 12.3   HCT 41.1 41.6    160   MCV 68* 67*   RDW 17.9* 18.1*        Chemistries:  Recent Labs   Lab 10/21/19  0311 10/22/19  0342    142   K 4.3 4.6   CL 98 103   CO2 33* 29   BUN 48* 44*   CREATININE 1.6* 1.7*   CALCIUM 9.9 10.2       All pertinent labs within the past 24 hours have been reviewed.    Significant Imaging:  I have reviewed and interpreted all pertinent imaging results/findings within the past 24 hours.

## 2019-10-22 NOTE — ANESTHESIA POSTPROCEDURE EVALUATION
Anesthesia Post Evaluation    Patient: Sherrie Alex    Procedure(s) Performed: Procedure(s) (LRB):  INSERTION, PEG TUBE (N/A)    Final Anesthesia Type: general  Patient location during evaluation: ICU  Patient participation: Yes- Able to Participate  Level of consciousness: awake and alert  Post-procedure vital signs: reviewed and stable  Pain management: adequate  Airway patency: patent  PONV status at discharge: No PONV  Anesthetic complications: no      Cardiovascular status: hemodynamically stable and blood pressure returned to baseline  Respiratory status: ventilator (via tracheostomy)  Hydration status: euvolemic  Follow-up not needed.          Vitals Value Taken Time   /88 10/22/2019  6:31 PM   Temp 36.7 °C (98.1 °F) 10/22/2019  4:46 PM   Pulse 64 10/22/2019  6:41 PM   Resp 20 10/22/2019  6:41 PM   SpO2 99 % 10/22/2019  6:41 PM   Vitals shown include unvalidated device data.      No case tracking events are documented in the log.      Pain/Chris Score: No data recorded

## 2019-10-22 NOTE — ASSESSMENT & PLAN NOTE
Intubated for airway protection and concern for angioedema vs dysphagia related to new CVA (more likely). Good lung mechanics and has a cuff leak. Sputum w/ MSSA on culture from 10/14/19.     --Continue Ancef 10/16 for 7 day course.  Small to moderate secretions noted.   --Tracheostomy placement on 10/21.   --Wean mechanical ventilation after PEG placement.

## 2019-10-22 NOTE — ASSESSMENT & PLAN NOTE
Discuss weight management after extubation and when mentation is appropriate  Body mass index is 43.73 kg/m².

## 2019-10-22 NOTE — NURSING
[facility]  ICU Multidisciplinary Bedside Rounds   SUMMARY     Date: 10/22/2019    Prehospitalization: Nursing Home  Admit Date / LOS : 10/11/2019/ 11 days    Diagnosis: Acute on chronic respiratory failure    Consults:        Active: Pulm CC       Needed: GI     Code Status: Full Code   Advanced Directive: <no information>    LDA: Guevara, PIV and Vent       Central Lines/Site/Justification:Patient Does Not Have Central Line       Urinary Cath/Order/Justification:Critically Ill in ICU    Vasopressors/Infusions:    dexMEDEtomidine in 0.9 % NaCl 0.401 mcg/kg/hr (10/22/19 0600)          GOALS: Volume/ Hemodynamic: N/A                     RASS: -2  light sedation, briefly awakes to voice (eye opening)    CAM ICU: Positive  Pain Management: none       Pain Controlled: not applicable     Rhythm: NSR    Respiratory Device: Vent    Vent Mode: PRVC  Oxygen Concentration (%):  [25] 25  Resp Rate Total:  [15 br/min-28 br/min] 22 br/min  Vt Set:  [320 mL] 320 mL  PEEP/CPAP:  [5 cmH20] 5 cmH20  Mean Airway Pressure:  [7.8 irY97-74.6 cmH20] 9.3 cmH20             Most Recent SBT/ SAT: N/A         VTE Prophylaxis: Mechanical  Mobility: Bedrest  Stress Ulcer Prophylaxis: No    Dietary: NPO  Tolerance: not applicable  /  Advancement: no    Isolation: No active isolations    Restraints: No    Significant Dates:  Post Op Date: N/A  Rescue Date: N/A  Imaging/ Diagnostics: N/A    Noteworthy Labs:  none    CBC/Anemia Labs: Coags:    Recent Labs   Lab 10/20/19  0345 10/21/19  0311 10/22/19  0342   WBC 11.72 11.10 13.38*   HGB 11.3* 12.0 12.3   HCT 39.0 41.1 41.6   * 150 160   MCV 68* 68* 67*   RDW 17.7* 17.9* 18.1*    Recent Labs   Lab 10/20/19  1005   INR 1.0        Chemistries:   Recent Labs   Lab 10/20/19  0345 10/21/19  0311 10/22/19  0342    141 142   K 4.2 4.3 4.6   CL 97 98 103   CO2 35* 33* 29   BUN 55* 48* 44*   CREATININE 1.8* 1.6* 1.7*   CALCIUM 9.8 9.9 10.2        Cardiac Enzymes: Ejection  Fractions:    No results for input(s): CPK, CPKMB, MB, TROPONINI in the last 72 hours. No results found for: EF     POCT Glucose: HbA1c:    Recent Labs   Lab 10/20/19  1753 10/21/19  0004 10/21/19  0617 10/21/19  1819 10/22/19  0000 10/22/19  0555   POCTGLUCOSE 182* 169* 119* 133* 135* 129*    Hemoglobin A1C   Date Value Ref Range Status   10/02/2019 6.9 (H) 4.0 - 5.6 % Final     Comment:     ADA Screening Guidelines:  5.7-6.4%  Consistent with prediabetes  >or=6.5%  Consistent with diabetes  High levels of fetal hemoglobin interfere with the HbA1C  assay. Heterozygous hemoglobin variants (HbS, HgC, etc)do  not significantly interfere with this assay.   However, presence of multiple variants may affect accuracy.     06/03/2008 6.7 (H) 4.0 - 6.2 % Final   02/13/2006 6.2 4.5 - 6.2 % Final        Needs from Care Team:pt needs a better  IV source      ICU LOS 11d 1h  Level of Care: Critical Care

## 2019-10-22 NOTE — SUBJECTIVE & OBJECTIVE
Interval History: comfortable on vent.    Review of Systems   Unable to perform ROS: Acuity of condition     Objective:     Vital Signs (Most Recent):  Temp: 98.3 °F (36.8 °C) (10/22/19 0700)  Pulse: 70 (10/22/19 0800)  Resp: 20 (10/22/19 0800)  BP: 125/65 (10/22/19 0800)  SpO2: 96 % (10/22/19 0800) Vital Signs (24h Range):  Temp:  [98.1 °F (36.7 °C)-99.7 °F (37.6 °C)] 98.3 °F (36.8 °C)  Pulse:  [63-85] 70  Resp:  [0-27] 20  SpO2:  [94 %-100 %] 96 %  BP: ()/(54-92) 125/65     Weight: 119.2 kg (262 lb 12.6 oz)  Body mass index is 43.73 kg/m².    Intake/Output Summary (Last 24 hours) at 10/22/2019 0842  Last data filed at 10/22/2019 0800  Gross per 24 hour   Intake 1212.24 ml   Output 2425 ml   Net -1212.76 ml      Physical Exam   Constitutional: She appears well-developed and well-nourished.   HENT:   Head: Normocephalic and atraumatic.   Cardiovascular: Normal rate and regular rhythm.   Pulmonary/Chest: Effort normal and breath sounds normal. No stridor. No respiratory distress. She has no wheezes.   Abdominal: Soft. She exhibits no distension. There is no rebound and no guarding.   Neurological: She is alert.   Skin: Skin is warm and dry.   Vitals reviewed.      Significant Labs:   BMP:   Recent Labs   Lab 10/22/19  0342   *      K 4.6      CO2 29   BUN 44*   CREATININE 1.7*   CALCIUM 10.2     CBC:   Recent Labs   Lab 10/21/19  0311 10/22/19  0342   WBC 11.10 13.38*   HGB 12.0 12.3   HCT 41.1 41.6    160       Significant Imaging:

## 2019-10-22 NOTE — PLAN OF CARE
V/S remain unchanged no safety issues this shift UOP adequate trach care administered minimal blding opens eyes tracks and only able to move Lt hand & will squeeze on command . Remains NPO as ordered for peg tube placement .large family visits on yesterday even saw pt smile. See flow sheet for V/S

## 2019-10-22 NOTE — ASSESSMENT & PLAN NOTE
No identified precipitant of angioedema. Seems more likely that drooling and dysarthria are reflective of CVA

## 2019-10-23 LAB
ALLENS TEST: ABNORMAL
ANION GAP SERPL CALC-SCNC: 8 MMOL/L (ref 8–16)
BASOPHILS # BLD AUTO: 0.04 K/UL (ref 0–0.2)
BASOPHILS NFR BLD: 0.3 % (ref 0–1.9)
BUN SERPL-MCNC: 41 MG/DL (ref 8–23)
CALCIUM SERPL-MCNC: 10.1 MG/DL (ref 8.7–10.5)
CHLORIDE SERPL-SCNC: 106 MMOL/L (ref 95–110)
CO2 SERPL-SCNC: 29 MMOL/L (ref 23–29)
CREAT SERPL-MCNC: 1.4 MG/DL (ref 0.5–1.4)
DELSYS: ABNORMAL
DIFFERENTIAL METHOD: ABNORMAL
EOSINOPHIL # BLD AUTO: 0.3 K/UL (ref 0–0.5)
EOSINOPHIL NFR BLD: 2.5 % (ref 0–8)
ERYTHROCYTE [DISTWIDTH] IN BLOOD BY AUTOMATED COUNT: 17.7 % (ref 11.5–14.5)
ERYTHROCYTE [SEDIMENTATION RATE] IN BLOOD BY WESTERGREN METHOD: 15 MM/H
EST. GFR  (AFRICAN AMERICAN): 44 ML/MIN/1.73 M^2
EST. GFR  (NON AFRICAN AMERICAN): 38 ML/MIN/1.73 M^2
FIO2: 0.25
GLUCOSE SERPL-MCNC: 67 MG/DL (ref 70–110)
HCO3 UR-SCNC: 27.7 MMOL/L (ref 24–28)
HCT VFR BLD AUTO: 40.3 % (ref 37–48.5)
HGB BLD-MCNC: 11.7 G/DL (ref 12–16)
IMM GRANULOCYTES # BLD AUTO: 0.09 K/UL (ref 0–0.04)
IMM GRANULOCYTES NFR BLD AUTO: 0.8 % (ref 0–0.5)
LYMPHOCYTES # BLD AUTO: 1.3 K/UL (ref 1–4.8)
LYMPHOCYTES NFR BLD: 11.1 % (ref 18–48)
MCH RBC QN AUTO: 19.8 PG (ref 27–31)
MCHC RBC AUTO-ENTMCNC: 29 G/DL (ref 32–36)
MCV RBC AUTO: 68 FL (ref 82–98)
MIN VOL: 5.7
MODE: ABNORMAL
MONOCYTES # BLD AUTO: 1 K/UL (ref 0.3–1)
MONOCYTES NFR BLD: 8.7 % (ref 4–15)
NEUTROPHILS # BLD AUTO: 8.9 K/UL (ref 1.8–7.7)
NEUTROPHILS NFR BLD: 76.6 % (ref 38–73)
NRBC BLD-RTO: 0 /100 WBC
PCO2 BLDA: 43.9 MMHG (ref 35–45)
PEEP: 5
PH SMN: 7.41 [PH] (ref 7.35–7.45)
PLATELET # BLD AUTO: 152 K/UL (ref 150–350)
PMV BLD AUTO: ABNORMAL FL (ref 9.2–12.9)
PO2 BLDA: 78 MMHG (ref 80–100)
POC BE: 3 MMOL/L
POC SATURATED O2: 95 % (ref 95–100)
POC TCO2: 29 MMOL/L (ref 23–27)
POCT GLUCOSE: 112 MG/DL (ref 70–110)
POCT GLUCOSE: 62 MG/DL (ref 70–110)
POCT GLUCOSE: 89 MG/DL (ref 70–110)
POTASSIUM SERPL-SCNC: 3.8 MMOL/L (ref 3.5–5.1)
RBC # BLD AUTO: 5.92 M/UL (ref 4–5.4)
SAMPLE: ABNORMAL
SITE: ABNORMAL
SODIUM SERPL-SCNC: 143 MMOL/L (ref 136–145)
SP02: 98
VT: 320
WBC # BLD AUTO: 11.58 K/UL (ref 3.9–12.7)

## 2019-10-23 PROCEDURE — 27200966 HC CLOSED SUCTION SYSTEM

## 2019-10-23 PROCEDURE — 99291 CRITICAL CARE FIRST HOUR: CPT | Mod: ,,, | Performed by: NURSE PRACTITIONER

## 2019-10-23 PROCEDURE — 80048 BASIC METABOLIC PNL TOTAL CA: CPT

## 2019-10-23 PROCEDURE — 25000003 PHARM REV CODE 250: Performed by: INTERNAL MEDICINE

## 2019-10-23 PROCEDURE — 36600 WITHDRAWAL OF ARTERIAL BLOOD: CPT

## 2019-10-23 PROCEDURE — 25000242 PHARM REV CODE 250 ALT 637 W/ HCPCS: Performed by: NURSE PRACTITIONER

## 2019-10-23 PROCEDURE — 20000000 HC ICU ROOM

## 2019-10-23 PROCEDURE — 94003 VENT MGMT INPAT SUBQ DAY: CPT

## 2019-10-23 PROCEDURE — 25000003 PHARM REV CODE 250: Performed by: HOSPITALIST

## 2019-10-23 PROCEDURE — 99231 PR SUBSEQUENT HOSPITAL CARE,LEVL I: ICD-10-PCS | Mod: ,,, | Performed by: OTOLARYNGOLOGY

## 2019-10-23 PROCEDURE — 31720 CLEARANCE OF AIRWAYS: CPT

## 2019-10-23 PROCEDURE — 94761 N-INVAS EAR/PLS OXIMETRY MLT: CPT

## 2019-10-23 PROCEDURE — S0028 INJECTION, FAMOTIDINE, 20 MG: HCPCS | Performed by: HOSPITALIST

## 2019-10-23 PROCEDURE — 25000003 PHARM REV CODE 250: Performed by: PSYCHIATRY & NEUROLOGY

## 2019-10-23 PROCEDURE — 82803 BLOOD GASES ANY COMBINATION: CPT

## 2019-10-23 PROCEDURE — 94770 HC EXHALED C02 TEST: CPT

## 2019-10-23 PROCEDURE — 63600175 PHARM REV CODE 636 W HCPCS: Performed by: HOSPITALIST

## 2019-10-23 PROCEDURE — 97803 MED NUTRITION INDIV SUBSEQ: CPT

## 2019-10-23 PROCEDURE — 99291 PR CRITICAL CARE, E/M 30-74 MINUTES: ICD-10-PCS | Mod: ,,, | Performed by: NURSE PRACTITIONER

## 2019-10-23 PROCEDURE — 36415 COLL VENOUS BLD VENIPUNCTURE: CPT

## 2019-10-23 PROCEDURE — 94640 AIRWAY INHALATION TREATMENT: CPT

## 2019-10-23 PROCEDURE — 99900026 HC AIRWAY MAINTENANCE (STAT)

## 2019-10-23 PROCEDURE — 63600175 PHARM REV CODE 636 W HCPCS: Performed by: INTERNAL MEDICINE

## 2019-10-23 PROCEDURE — 85025 COMPLETE CBC W/AUTO DIFF WBC: CPT

## 2019-10-23 PROCEDURE — 99231 SBSQ HOSP IP/OBS SF/LOW 25: CPT | Mod: ,,, | Performed by: OTOLARYNGOLOGY

## 2019-10-23 PROCEDURE — 99900035 HC TECH TIME PER 15 MIN (STAT)

## 2019-10-23 RX ORDER — KETOROLAC TROMETHAMINE 30 MG/ML
30 INJECTION, SOLUTION INTRAMUSCULAR; INTRAVENOUS EVERY 6 HOURS PRN
Status: DISPENSED | OUTPATIENT
Start: 2019-10-23 | End: 2019-10-26

## 2019-10-23 RX ADMIN — INSULIN DETEMIR 60 UNITS: 100 INJECTION, SOLUTION SUBCUTANEOUS at 09:10

## 2019-10-23 RX ADMIN — METOPROLOL TARTRATE 100 MG: 50 TABLET ORAL at 09:10

## 2019-10-23 RX ADMIN — CHLORHEXIDINE GLUCONATE 0.12% ORAL RINSE 15 ML: 1.2 LIQUID ORAL at 09:10

## 2019-10-23 RX ADMIN — SENNOSIDES, DOCUSATE SODIUM 1 TABLET: 50; 8.6 TABLET, FILM COATED ORAL at 09:10

## 2019-10-23 RX ADMIN — ASPIRIN 325 MG ORAL TABLET 325 MG: 325 PILL ORAL at 09:10

## 2019-10-23 RX ADMIN — FENTANYL CITRATE 25 MCG: 50 INJECTION INTRAMUSCULAR; INTRAVENOUS at 07:10

## 2019-10-23 RX ADMIN — IPRATROPIUM BROMIDE AND ALBUTEROL SULFATE 3 ML: .5; 3 SOLUTION RESPIRATORY (INHALATION) at 02:10

## 2019-10-23 RX ADMIN — LACOSAMIDE 50 MG: 50 TABLET, FILM COATED ORAL at 09:10

## 2019-10-23 RX ADMIN — FENTANYL CITRATE 25 MCG: 50 INJECTION INTRAMUSCULAR; INTRAVENOUS at 09:10

## 2019-10-23 RX ADMIN — ROSUVASTATIN CALCIUM 20 MG: 10 TABLET, COATED ORAL at 09:10

## 2019-10-23 RX ADMIN — KETOROLAC TROMETHAMINE 30 MG: 30 INJECTION, SOLUTION INTRAMUSCULAR; INTRAVENOUS at 04:10

## 2019-10-23 RX ADMIN — HEPARIN SODIUM 5000 UNITS: 5000 INJECTION INTRAVENOUS; SUBCUTANEOUS at 09:10

## 2019-10-23 RX ADMIN — CEFAZOLIN SODIUM 1 G: 1 SOLUTION INTRAVENOUS at 04:10

## 2019-10-23 RX ADMIN — DEXTROSE MONOHYDRATE 25 G: 25 INJECTION, SOLUTION INTRAVENOUS at 06:10

## 2019-10-23 RX ADMIN — CEFAZOLIN SODIUM 1 G: 1 SOLUTION INTRAVENOUS at 08:10

## 2019-10-23 RX ADMIN — IPRATROPIUM BROMIDE AND ALBUTEROL SULFATE 3 ML: .5; 3 SOLUTION RESPIRATORY (INHALATION) at 12:10

## 2019-10-23 RX ADMIN — IPRATROPIUM BROMIDE AND ALBUTEROL SULFATE 3 ML: .5; 3 SOLUTION RESPIRATORY (INHALATION) at 07:10

## 2019-10-23 RX ADMIN — FAMOTIDINE 20 MG: 10 INJECTION INTRAVENOUS at 10:10

## 2019-10-23 RX ADMIN — DEXMEDETOMIDINE HYDROCHLORIDE 1 MCG/KG/HR: 4 INJECTION, SOLUTION INTRAVENOUS at 12:10

## 2019-10-23 RX ADMIN — DEXMEDETOMIDINE HYDROCHLORIDE 1 MCG/KG/HR: 4 INJECTION, SOLUTION INTRAVENOUS at 04:10

## 2019-10-23 RX ADMIN — FENTANYL CITRATE 25 MCG: 50 INJECTION INTRAMUSCULAR; INTRAVENOUS at 01:10

## 2019-10-23 RX ADMIN — ACETAMINOPHEN 650 MG: 325 TABLET, FILM COATED ORAL at 09:10

## 2019-10-23 RX ADMIN — DEXMEDETOMIDINE HYDROCHLORIDE 0.4 MCG/KG/HR: 4 INJECTION, SOLUTION INTRAVENOUS at 08:10

## 2019-10-23 RX ADMIN — DEXMEDETOMIDINE HYDROCHLORIDE 0.4 MCG/KG/HR: 4 INJECTION, SOLUTION INTRAVENOUS at 05:10

## 2019-10-23 RX ADMIN — DEXMEDETOMIDINE HYDROCHLORIDE 0.9 MCG/KG/HR: 4 INJECTION, SOLUTION INTRAVENOUS at 09:10

## 2019-10-23 NOTE — PROGRESS NOTES
"[facility] Medicine  Progress Note    Patient Name: Sherrie Alex  MRN: [id number]  Patient Class: IP- Inpatient   Admission Date: 10/11/2019  Length of Stay: 12 days  Attending Physician: Angel Luis Aquino MD  Primary Care Provider: Desmond Yang MD        Subjective:     Principal Problem:Acute on chronic respiratory failure        HPI:  patient is a 69 y.o female who presents to the ED complaining of tongue swelling that began PTA. Patient has slurred speech, drooling, and tongue swelling. Patient denies any pain, itching, CP, nausea, vomiting, or fever. Per sisters, patient is normally talkative and active. Her sisters are unaware of any cause of onset, stating that the patient lives alone. PMHx of CVA 1 yr ago, DM, HTN, HLD, and obesity. Patient is allergic to codeine and ace inhibitors.      The history is provided by the patient and a relative. History limited by: Acuity of condition. No  was used.     Pt intubated in ED for airway protection ?laryngeal edema - not noted in ED documentation.  Pt started on IV steroids and H2 blocker on vent. In ICU, pt showed sluggish neuro response to pain and unable to follow commands. Propofol taken off and neuro status improved. Pulm consulted for vent management. Neuro consulted - h/o seizures.  Seen in ED yesterday am with "leg shaking" and weakness and dc'd home. H/o seizures on vimpat.      Overview/Hospital Course:  Ms. Alex was admitted acute neurological changes and respiratory compromise. Intubated in ED. Serial neurological testing off sedation suggested right sided weakness > left. MRI showed:Moderate-sized area of subacute infarction involving the left subinsular white matter extending to the periventricular white matter. No MR evidence for hemorrhage.  Patient treated for an acute CVA and Neurology consult. Allowed for permissive HTN.  Later resumed blood pressure medication after appropriate time " elapsed for permissive hypertension.  Her neurological exam was a barrier to extubation.  She was also treated with IV lasix for diuresis, ET sputum culture sent for climbing WBCs, concerning for possible aspiration. Dr. Bauer met with family and discussed plan to continue attempts to wean from vent, though neuro status/effects of CVA may be limiting factor.  The patient was unable to be weaned from the vent, ENT consulted for trach and GI consulted for PEG tube on 10/22.    Interval History: No new issues.     Review of Systems   Unable to perform ROS: Acuity of condition     Objective:     Vital Signs (Most Recent):  Temp: 99 °F (37.2 °C) (10/23/19 0400)  Pulse: 62 (10/23/19 0744)  Resp: (!) 22 (10/23/19 0744)  BP: (!) 144/74 (10/23/19 0317)  SpO2: 98 % (10/23/19 0744) Vital Signs (24h Range):  Temp:  [98 °F (36.7 °C)-99.1 °F (37.3 °C)] 99 °F (37.2 °C)  Pulse:  [56-73] 62  Resp:  [15-25] 22  SpO2:  [94 %-100 %] 98 %  BP: (115-165)/(63-90) 144/74     Weight: 119.2 kg (262 lb 12.6 oz)  Body mass index is 43.73 kg/m².    Intake/Output Summary (Last 24 hours) at 10/23/2019 0857  Last data filed at 10/23/2019 0600  Gross per 24 hour   Intake 1482.92 ml   Output 375 ml   Net 1107.92 ml      Physical Exam   Constitutional: She appears well-developed and well-nourished.   HENT:   Head: Normocephalic and atraumatic.   Cardiovascular: Normal rate and regular rhythm.   Pulmonary/Chest: Effort normal and breath sounds normal. No stridor. No respiratory distress. She has no wheezes.   Abdominal: Soft. She exhibits no distension. There is no rebound and no guarding.   Neurological: She is alert.   Skin: Skin is warm and dry.   Vitals reviewed.      Significant Labs:   BMP:   Recent Labs   Lab 10/23/19  0312   GLU 67*      K 3.8      CO2 29   BUN 41*   CREATININE 1.4   CALCIUM 10.1     CBC:   Recent Labs   Lab 10/22/19  0342 10/23/19  0312   WBC 13.38* 11.58   HGB 12.3 11.7*   HCT 41.6 40.3    152        Significant Imaging:      Assessment/Plan:      * Acute on chronic respiratory failure  Intubated for airway protection given concern for angioedema initially  Patient remains intubated secondary to acute CVA and and patient did not have to angioedema  Positive cultures with increased secretions  Pulmonary following  Patient is morbidly obese with obstructive sleep apnea which may hinder extubation  Pulmonary discussed possible need for trach and PEG with son  Cephazolin added for positive Staph in the sputum and nebulizer treatments  ENT consulted for trach placement and trach successfully placed today, 10/21  GI for PEG placement, but this could not be performed today, anesthesia did not want to be sedate this patient  Plan for PEG tomorrow by GI (done on 10/22).  As per Pulmonary  Will likely need LTAC placement, will begin consult to  for discharge planning    Ventilator dependent  Continues on vent       Venous stasis ulcer  No acute issues       CVA (cerebral vascular accident)  MRI of the brain with moderate-sized area of subacute infarction involving the left subinsular white matter extending to the periventricular white matter  Allowed for permissive hypertension along with treatment with aspirin and statin  Appreciate Neurology input  Resumed blood pressure medications for tighter blood pressure control  Weaning from vent hindered by new CVA  Trach done today, and peg rescheduled for tomorrow (10/22)  Will likely need LTAC/rehab placement    Encephalopathy, metabolic  Secondary to CVA  As discussed above    RYLEE (acute kidney injury)  Baseline creatinine of 1.4-1.7  Had slight improvement now steadily trending up  CO2 level steadily increasing and patient was in negative balance  Held IV Lasix on 10/20  Will hold for another day  Continue to monitor with repeat labs and assess volume status    Seizure  Continue vimpat  No seizures seen on EEG  Appreciate Neurology  following    Angio-edema  Initially thought to have angioedema  Likely to have slight droop associated with stroke and not true angioedema    Acquired hypothyroidism  Resume synthroid   Thyroid function test normal    Essential hypertension  Resume home meds: norvasc, clonidine, hydralazine, toprol  IV PRN hydralazine    Type 2 diabetes mellitus with circulatory disorder, with long-term current use of insulin  Uncontrolled with hyperglycemia likely due to dose of dexamethasone given  HGBA1c 6.9%  Glucose better controlled on detemir to 60 units q.h.s. and continue sliding scale insulin  Will make further adjustments to get glucose and range of 140-180    Anxiety  Fentanyl pushes for sedation    Morbid obesity  Discuss weight management after extubation and when mentation is appropriate  Body mass index is 43.73 kg/m².     Dyslipidemia  Continue rosuvastatin        VTE Risk Mitigation (From admission, onward)         Ordered     heparin (porcine) injection 5,000 Units  Every 12 hours      10/11/19 0617     IP VTE HIGH RISK PATIENT  Once      10/11/19 0532     Place ELIEZER hose  Until discontinued      10/11/19 0532     Place sequential compression device  Until discontinued      10/11/19 0532                Critical care time spent on the evaluation and treatment of severe organ dysfunction, review of pertinent labs and imaging studies, discussions with consulting providers and discussions with patient/family: 20 minutes.    Will start PEG tube feeds.  Discussed with Pulmonary- will attempt to change to trach collar in coming days.           Angel Luis Hanson MD  Department of Hospital Medicine   [facility]

## 2019-10-23 NOTE — NURSING
[facility]  ICU Multidisciplinary Bedside Rounds   SUMMARY     Date: 10/23/2019    Prehospitalization: LTAC  Admit Date / LOS : 10/11/2019/ 12 days    Diagnosis: Acute on chronic respiratory failure    Consults:        Active: Pulm CC       Needed: GI     Code Status: Full Code   Advanced Directive: <no information>    LDA: Guevara, PEG, PIV, Trach and Vent       Central Lines/Site/Justification:Patient Does Not Have Central Line       Urinary Cath/Order/Justification:Critically Ill in ICU    Vasopressors/Infusions:    dexMEDEtomidine in 0.9 % NaCl 1.002 mcg/kg/hr (10/23/19 0500)          GOALS: Volume/ Hemodynamic:                      RASS: -2  light sedation, briefly awakes to voice (eye opening)    CAM ICU: Positive  Pain Management: none       Pain Controlled: not applicable     Rhythm: NSR    Respiratory Device: Vent    Vent Mode: PRVC  Oxygen Concentration (%):  [25] 25  Resp Rate Total:  [16 br/min-23 br/min] 21 br/min  Vt Set:  [320 mL] 320 mL  PEEP/CPAP:  [5 cmH20] 5 cmH20  Mean Airway Pressure:  [6.5 cmH20-8.4 cmH20] 8.3 cmH20             Most Recent SBT/ SAT: N/A          VTE Prophylaxis: Pharm and Mechanical  Mobility: Bedrest  Stress Ulcer Prophylaxis: No    Dietary: TF  Tolerance: yes  /  Advancement: no    Isolation: No active isolations    Restraints: No    Significant Dates:  Post Op Date: N/A  Rescue Date: N/A  Imaging/ Diagnostics: N/A    Noteworthy Labs:  none    CBC/Anemia Labs: Coags:    Recent Labs   Lab 10/21/19  0311 10/22/19  0342 10/23/19  0312   WBC 11.10 13.38* 11.58   HGB 12.0 12.3 11.7*   HCT 41.1 41.6 40.3    160 152   MCV 68* 67* 68*   RDW 17.9* 18.1* 17.7*    Recent Labs   Lab 10/20/19  1005   INR 1.0        Chemistries:   Recent Labs   Lab 10/21/19  0311 10/22/19  0342 10/23/19  0312    142 143   K 4.3 4.6 3.8   CL 98 103 106   CO2 33* 29 29   BUN 48* 44* 41*   CREATININE 1.6* 1.7* 1.4   CALCIUM 9.9 10.2 10.1        Cardiac Enzymes: Ejection  Fractions:    No results for input(s): CPK, CPKMB, MB, TROPONINI in the last 72 hours. No results found for: EF     POCT Glucose: HbA1c:    Recent Labs   Lab 10/22/19  0000 10/22/19  0555 10/22/19  1142 10/22/19  1803 10/22/19  2330 10/23/19  0539   POCTGLUCOSE 135* 129* 118* 89 72 62*    Hemoglobin A1C   Date Value Ref Range Status   10/02/2019 6.9 (H) 4.0 - 5.6 % Final     Comment:     ADA Screening Guidelines:  5.7-6.4%  Consistent with prediabetes  >or=6.5%  Consistent with diabetes  High levels of fetal hemoglobin interfere with the HbA1C  assay. Heterozygous hemoglobin variants (HbS, HgC, etc)do  not significantly interfere with this assay.   However, presence of multiple variants may affect accuracy.     06/03/2008 6.7 (H) 4.0 - 6.2 % Final   02/13/2006 6.2 4.5 - 6.2 % Final        Needs from Care Team:   Pt needs picc line !     ICU LOS 12d  Level of Care: Critical Care

## 2019-10-23 NOTE — SUBJECTIVE & OBJECTIVE
Interval History: No new issues.     Review of Systems   Unable to perform ROS: Acuity of condition     Objective:     Vital Signs (Most Recent):  Temp: 99 °F (37.2 °C) (10/23/19 0400)  Pulse: 62 (10/23/19 0744)  Resp: (!) 22 (10/23/19 0744)  BP: (!) 144/74 (10/23/19 0317)  SpO2: 98 % (10/23/19 0744) Vital Signs (24h Range):  Temp:  [98 °F (36.7 °C)-99.1 °F (37.3 °C)] 99 °F (37.2 °C)  Pulse:  [56-73] 62  Resp:  [15-25] 22  SpO2:  [94 %-100 %] 98 %  BP: (115-165)/(63-90) 144/74     Weight: 119.2 kg (262 lb 12.6 oz)  Body mass index is 43.73 kg/m².    Intake/Output Summary (Last 24 hours) at 10/23/2019 0857  Last data filed at 10/23/2019 0600  Gross per 24 hour   Intake 1482.92 ml   Output 375 ml   Net 1107.92 ml      Physical Exam   Constitutional: She appears well-developed and well-nourished.   HENT:   Head: Normocephalic and atraumatic.   Cardiovascular: Normal rate and regular rhythm.   Pulmonary/Chest: Effort normal and breath sounds normal. No stridor. No respiratory distress. She has no wheezes.   Abdominal: Soft. She exhibits no distension. There is no rebound and no guarding.   Neurological: She is alert.   Skin: Skin is warm and dry.   Vitals reviewed.      Significant Labs:   BMP:   Recent Labs   Lab 10/23/19  0312   GLU 67*      K 3.8      CO2 29   BUN 41*   CREATININE 1.4   CALCIUM 10.1     CBC:   Recent Labs   Lab 10/22/19  0342 10/23/19  0312   WBC 13.38* 11.58   HGB 12.3 11.7*   HCT 41.6 40.3    152       Significant Imaging:

## 2019-10-23 NOTE — PROGRESS NOTES
[facility]  Pulmonology  Progress Note    Patient Name: Sherrie Alex  MRN: [id number]  Admission Date: 10/11/2019  Hospital Length of Stay: 12 days  Code Status: Full Code  Attending Provider: Angel Luis Aquino MD  Primary Care Provider: Desmond Yang MD   Principal Problem: Acute on chronic respiratory failure    Subjective:     Interval History: s/p peg tube placement, plan for transition to trach collar today    Objective:     Vital Signs (Most Recent):  Temp: 99 °F (37.2 °C) (10/23/19 0400)  Pulse: 61 (10/23/19 1407)  Resp: 18 (10/23/19 1407)  BP: 136/74 (10/23/19 1405)  SpO2: 98 % (10/23/19 1407) Vital Signs (24h Range):  Temp:  [98.1 °F (36.7 °C)-99.1 °F (37.3 °C)] 99 °F (37.2 °C)  Pulse:  [56-71] 61  Resp:  [15-25] 18  SpO2:  [94 %-100 %] 98 %  BP: (117-165)/(63-90) 136/74     Weight: 119.2 kg (262 lb 12.6 oz)  Body mass index is 43.73 kg/m².      Intake/Output Summary (Last 24 hours) at 10/23/2019 1524  Last data filed at 10/23/2019 0600  Gross per 24 hour   Intake 1382.12 ml   Output 300 ml   Net 1082.12 ml       Physical Exam   Constitutional: She appears well-developed. She is cooperative. No distress. She is sedated and intubated.   obese   HENT:   Head: Normocephalic and atraumatic.   Eyes: Pupils are equal, round, and reactive to light. Conjunctivae are normal. Right eye exhibits no exudate. Left eye exhibits no exudate. Right conjunctiva has no hemorrhage. Left conjunctiva has no hemorrhage. No scleral icterus. Right eye exhibits no nystagmus. Left eye exhibits no nystagmus.   Neck: Trachea normal. No neck rigidity. No tracheal deviation present.   Cardiovascular: Normal rate, regular rhythm and normal heart sounds. PMI is not displaced. Exam reveals no gallop and no friction rub.   No murmur heard.  Pulses:       Radial pulses are 2+ on the right side, and 2+ on the left side.        Dorsalis pedis pulses are 2+ on the right side, and 2+ on the left side.    Pulmonary/Chest: Effort normal and breath sounds normal. No accessory muscle usage. She is intubated. No respiratory distress. She has no wheezes. She has no rhonchi. She has no rales.   Abdominal: Soft. Normal appearance and bowel sounds are normal. There is no tenderness.   Musculoskeletal: Normal range of motion.   Neurological: She is alert. No cranial nerve deficit. GCS eye subscore is 4. GCS verbal subscore is 1. GCS motor subscore is 6.   Pt alert and follows commands, answers Y/N questions, spontaneously moves BUE 35, BLE 2/5, no focal deficits to note.      Skin: Skin is warm and dry. Capillary refill takes 2 to 3 seconds. She is not diaphoretic. No cyanosis. Nails show no clubbing.   Nursing note and vitals reviewed.      Vents:  Vent Mode: PS/CPAP (10/23/19 1405)  Ventilator Initiated: Yes (10/11/19 0213)  Set Rate: 15 bmp (10/23/19 0916)  Vt Set: 320 mL (10/23/19 0916)  Pressure Support: 5 cmH20 (10/23/19 1405)  PEEP/CPAP: 5 cmH20 (10/23/19 1405)  Oxygen Concentration (%): 21 (10/23/19 1405)  Peak Airway Pressure: 10.7 cmH2O (10/23/19 1405)  Total Ve: 5.6 mL (10/23/19 1405)  F/VT Ratio<105 (RSBI): (!) 57.88 (10/23/19 1405)    Lines/Drains/Airways     Drain            Female External Urinary Catheter 10/22/19 0935 1 day         Gastrostomy/Enterostomy 10/22/19 1630 Percutaneous endoscopic gastrostomy (PEG) less than 1 day          Airway                 Surgical Airway 10/21/19 Shiley Cuffed 2 days          Peripheral Intravenous Line                 Peripheral IV - Single Lumen 10/20/19 1400 20 G Distal;Left;Posterior Forearm 3 days         Peripheral IV - Single Lumen 10/20/19 1400 Left;Posterior Forearm 3 days                Significant Labs:    CBC/Anemia Profile:  Recent Labs   Lab 10/22/19  0342 10/23/19  0312   WBC 13.38* 11.58   HGB 12.3 11.7*   HCT 41.6 40.3    152   MCV 67* 68*   RDW 18.1* 17.7*        Chemistries:  Recent Labs   Lab 10/22/19  0342 10/23/19  0312    143   K 4.6  3.8    106   CO2 29 29   BUN 44* 41*   CREATININE 1.7* 1.4   CALCIUM 10.2 10.1       All pertinent labs within the past 24 hours have been reviewed.    Significant Imaging:  I have reviewed all pertinent imaging results/findings within the past 24 hours.    Assessment/Plan:     * Acute on chronic respiratory failure  Intubated for airway protection and concern for dysphagia related to new CVA (more likely) vs angioedema. Good lung mechanics and has a cuff leak. Sputum w/ MSSA on culture from 10/14/19.     --Continue Ancef 10/16 for 7 day course.  Small to moderate secretions noted.   --Tracheostomy placement on 10/21.   --Wean to trach collar today s/p PEG placement.    CVA (cerebral vascular accident)  Suspect this is the reason she was drooling and was macroglossic on arrival, and not angioedema.    --PT/OT.    --s/p PEG and trach       Encephalopathy, metabolic  2/2 CVA. Improved        Seizure  By history. On vimpat. No Sz activity noted.     Angio-edema  No identified precipitant of angioedema. Seems more likely that drooling and dysarthria are reflective of CVA      Acquired hypothyroidism  --Continue synthroid     Essential hypertension  --Amlodipine and Lopressor started 10/16, increased today  --BP goal 140-180  --PRN labetalol     Type 2 diabetes mellitus with circulatory disorder, with long-term current use of insulin  SSI- goal 140-180   --likely elevated 2/2 steroids, now discontinued     PPI ppx: famotodine  VTE ppx: heparin sq    Above plan discussed with Dr. Hernández and Dr. Aquino in ICU rounds    Critical Care time 50 minutes    Osmin Dinh NP  Pulmonology  [facility] [place]

## 2019-10-23 NOTE — PLAN OF CARE
Pt remains on CPAP/PS 10/5 at 21% FIO2 with a 6.0 cuffed shiley trach. AMBU bag remains at the [facility]. Continue duoneb svn tx as ordered. Will continue to monitor. IVIS Hedrick, RRT

## 2019-10-23 NOTE — PROGRESS NOTES
Sanam, from [facility] says [facility] will come out to assess pt in a.m. For ltac..Thi Taveras RN, BSN, STN CCM  10/23/2019

## 2019-10-23 NOTE — ASSESSMENT & PLAN NOTE
Intubated for airway protection and concern for dysphagia related to new CVA (more likely) vs angioedema. Good lung mechanics and has a cuff leak. Sputum w/ MSSA on culture from 10/14/19.     --Continue Ancef 10/16 for 7 day course.  Small to moderate secretions noted.   --Tracheostomy placement on 10/21.   --Wean to trach collar today s/p PEG placement.

## 2019-10-23 NOTE — EICU
EICU    Called due to fs 62, no prn d5.  Pt 70 y/o f with pmhx sig for angioedema due to ace-I, morbid obesity intubated in setting of a CVA with failure to liberate from ventilator s/p trach/PEG.  Pt currently with bp 144/74, 98%, afebrile, hr 60s.  On tube feeds, bedside nurse requesting prn D50.  No current suggeston of sepsis, finger stick is borderline and 0312 serum gluc 67 as well/  FS slowly have been decreasing.  Pt trached synchronous with ventilator; does not respond to my voice, bedside nurse says it is variable whether she responds, even when her finger stick is ok, due to recent CVA  - D50 push, prn order left with instructions to call aristeo/md if fs low as D50 being administered; bedside nurse will assess if any change in mentation after D50 given, though based on her experience with pt after CVA, may not change her response/mentation  - need to be on watch for sepsis; if recurs, check cortisol as well  - on 60 u detemir, may need to decrease if fs remain low after pt tube feeds are at goal

## 2019-10-23 NOTE — PROGRESS NOTES
Pt received on Servo I vent with settings as followed: PRVC 15/320/+5 and 25%.  Size 6 Shiley trach in place and secure.  Ambu bag and mask at bedside and all alarms on and functioning. RT will continue to monitor pt status.

## 2019-10-23 NOTE — SUBJECTIVE & OBJECTIVE
Interval History: s/p peg tube placement, plan for transition to trach collar today    Objective:     Vital Signs (Most Recent):  Temp: 99 °F (37.2 °C) (10/23/19 0400)  Pulse: 61 (10/23/19 1407)  Resp: 18 (10/23/19 1407)  BP: 136/74 (10/23/19 1405)  SpO2: 98 % (10/23/19 1407) Vital Signs (24h Range):  Temp:  [98.1 °F (36.7 °C)-99.1 °F (37.3 °C)] 99 °F (37.2 °C)  Pulse:  [56-71] 61  Resp:  [15-25] 18  SpO2:  [94 %-100 %] 98 %  BP: (117-165)/(63-90) 136/74     Weight: 119.2 kg (262 lb 12.6 oz)  Body mass index is 43.73 kg/m².      Intake/Output Summary (Last 24 hours) at 10/23/2019 1524  Last data filed at 10/23/2019 0600  Gross per 24 hour   Intake 1382.12 ml   Output 300 ml   Net 1082.12 ml       Physical Exam   Constitutional: She appears well-developed. She is cooperative. No distress. She is sedated and intubated.   obese   HENT:   Head: Normocephalic and atraumatic.   Eyes: Pupils are equal, round, and reactive to light. Conjunctivae are normal. Right eye exhibits no exudate. Left eye exhibits no exudate. Right conjunctiva has no hemorrhage. Left conjunctiva has no hemorrhage. No scleral icterus. Right eye exhibits no nystagmus. Left eye exhibits no nystagmus.   Neck: Trachea normal. No neck rigidity. No tracheal deviation present.   Cardiovascular: Normal rate, regular rhythm and normal heart sounds. PMI is not displaced. Exam reveals no gallop and no friction rub.   No murmur heard.  Pulses:       Radial pulses are 2+ on the right side, and 2+ on the left side.        Dorsalis pedis pulses are 2+ on the right side, and 2+ on the left side.   Pulmonary/Chest: Effort normal and breath sounds normal. No accessory muscle usage. She is intubated. No respiratory distress. She has no wheezes. She has no rhonchi. She has no rales.   Abdominal: Soft. Normal appearance and bowel sounds are normal. There is no tenderness.   Musculoskeletal: Normal range of motion.   Neurological: She is alert. No cranial nerve deficit.  GCS eye subscore is 4. GCS verbal subscore is 1. GCS motor subscore is 6.   Pt alert and follows commands, answers Y/N questions, spontaneously moves BUE 35, BLE 2/5, no focal deficits to note.      Skin: Skin is warm and dry. Capillary refill takes 2 to 3 seconds. She is not diaphoretic. No cyanosis. Nails show no clubbing.   Nursing note and vitals reviewed.      Vents:  Vent Mode: PS/CPAP (10/23/19 1405)  Ventilator Initiated: Yes (10/11/19 0213)  Set Rate: 15 bmp (10/23/19 0916)  Vt Set: 320 mL (10/23/19 0916)  Pressure Support: 5 cmH20 (10/23/19 1405)  PEEP/CPAP: 5 cmH20 (10/23/19 1405)  Oxygen Concentration (%): 21 (10/23/19 1405)  Peak Airway Pressure: 10.7 cmH2O (10/23/19 1405)  Total Ve: 5.6 mL (10/23/19 1405)  F/VT Ratio<105 (RSBI): (!) 57.88 (10/23/19 1405)    Lines/Drains/Airways     Drain            Female External Urinary Catheter 10/22/19 0935 1 day         Gastrostomy/Enterostomy 10/22/19 1630 Percutaneous endoscopic gastrostomy (PEG) less than 1 day          Airway                 Surgical Airway 10/21/19 Shiley Cuffed 2 days          Peripheral Intravenous Line                 Peripheral IV - Single Lumen 10/20/19 1400 20 G Distal;Left;Posterior Forearm 3 days         Peripheral IV - Single Lumen 10/20/19 1400 Left;Posterior Forearm 3 days                Significant Labs:    CBC/Anemia Profile:  Recent Labs   Lab 10/22/19  0342 10/23/19  0312   WBC 13.38* 11.58   HGB 12.3 11.7*   HCT 41.6 40.3    152   MCV 67* 68*   RDW 18.1* 17.7*        Chemistries:  Recent Labs   Lab 10/22/19  0342 10/23/19  0312    143   K 4.6 3.8    106   CO2 29 29   BUN 44* 41*   CREATININE 1.7* 1.4   CALCIUM 10.2 10.1       All pertinent labs within the past 24 hours have been reviewed.    Significant Imaging:  I have reviewed all pertinent imaging results/findings within the past 24 hours.

## 2019-10-23 NOTE — UM SECONDARY REVIEW
Physician Advisor Internal    IP Extended Stay > 10     Verbally discussed during LLOS meeting.     LOS: approved w/o recommendations due to severity of clinical picture     CM director with recs for LTAC placement.

## 2019-10-23 NOTE — PROGRESS NOTES
Sherrie Alex is a 69 y.o. female who had presented to the ED with symptoms including weakness, drooling, and difficulty with her speech.initially thought to be possible angioedema but later favored to be related to CVA which was confirmed with MRI. She was successfully intubated, reportedly atraumatically without any adverse events but after four separate attempts including nasotracheal and fiberoptic intubations. She was transferred to the ICU where she has  received ongoing management. Multiple spontaneous breathing trials and attempts of weaning her to extubation were made unsuccessfully and she remained intubated with a 6.5 endotracheal tube at the time of my initial involvement for consideration of tracheostomy placement given her multiple failed attempts and weaning to extubation and anticipated prolonged need for intubation and ventilator support. After discussion with the patient's ICU care team and her family, tracheotomy was elected and performed on 10/21/19 successfully without complication. A size 6 Shiley Cuffed Tracheostomy Tube (6DCT) was placed and has been used successfully for ventilation since then.     Interval changes:   Since surgery yesterday the patient has remained stable and required no additional respiratory support. She is tolerating the 6 DCT trach well. Minimal oozing around the stoma site which has been reinforced with gauze sponge changes as needed. Trach care has been performed by nursing with frequent suctioning through the tracheostomy. Cuff has remained up on trach for 24 hours. Patient has been more alert no indication of pain with limited communication. Receiving adequate nutrition via OG tube, but gastrostomy tube being placed today.       1. Angioedema, initial encounter    2. Required emergency intubation    3. Allergic reaction, initial encounter    4. Angio-edema    5. Stroke    6. Encephalopathy, metabolic    7. Respiratory failure    8. Acute on chronic  respiratory failure    9. Seizure    10. Cerebrovascular accident (CVA), unspecified mechanism    11. Acute on chronic respiratory failure, unspecified whether with hypoxia or hypercapnia    12. Ventilator dependent      Past Medical History:   Diagnosis Date    Anemia     Anxiety     Diabetes mellitus     Hyperlipidemia     Hypertension     Obesity     Proteinuria     Sleep apnea      No past surgical history pertinent negatives on file.  Scheduled Meds:   albuterol-ipratropium  3 mL Nebulization Q6H    amLODIPine  10 mg Per OG tube Daily    aspirin  325 mg Per OG tube Daily    ceFAZolin (ANCEF) IVPB  1 g Intravenous Q8H    chlorhexidine  15 mL Mouth/Throat BID    cloNIDine  0.3 mg Oral BID    famotidine (PF)  20 mg Intravenous Daily    fentaNYL  100 mcg Intravenous Once    heparin (porcine)  5,000 Units Subcutaneous Q12H    insulin detemir U-100  60 Units Subcutaneous QHS    lacosamide  50 mg Oral BID    metoprolol tartrate  100 mg Per OG tube BID    polyethylene glycol  17 g Per NG tube Daily    rosuvastatin  20 mg Per OG tube QHS    senna-docusate 8.6-50 mg  1 tablet Oral BID     Continuous Infusions:   dexMEDEtomidine in 0.9 % NaCl 1.002 mcg/kg/hr (10/22/19 2200)     PRN Meds:acetaminophen, fentaNYL, insulin aspart U-100, labetalol, ondansetron, sodium chloride 0.9%    Review of patient's allergies indicates:   Allergen Reactions    Codeine Anxiety    Ace inhibitors     Diphenhydramine hcl      Active Hospital Problems    Diagnosis  POA    *Acute on chronic respiratory failure [J96.20]  Yes    Venous stasis ulcer [I83.009, L97.909]  Yes    Ventilator dependent [Z99.11]  Not Applicable    CVA (cerebral vascular accident) [I63.9]  Yes    Angio-edema [T78.3XXA]  Yes    Seizure [R56.9]  Yes    RYLEE (acute kidney injury) [N17.9]  Yes    Encephalopathy, metabolic [G93.41]  Yes    Acquired hypothyroidism [E03.9]  Yes     10/2019 TSH 5.7 - increased Synthroid to 137 mcg daily  Repeat  "TSH Dec 2019      Essential hypertension [I10]  Yes    Type 2 diabetes mellitus with circulatory disorder, with long-term current use of insulin [E11.59, Z79.4]  Not Applicable    Anxiety [F41.9]  Yes    Morbid obesity [E66.01]  Yes    Dyslipidemia [E78.5]  Yes      Resolved Hospital Problems   No resolved problems to display.     Blood pressure 122/71, pulse (!) 59, temperature 99.1 °F (37.3 °C), temperature source Oral, resp. rate (!) 22, height 5' 5" (1.651 m), weight 119.2 kg (262 lb 12.6 oz), last menstrual period 01/01/2015, SpO2 100 %, not currently breastfeeding.    Subjective:   Diet: Adequate intake.    Activity level: Impaired due to weakness.    Pain control: Well controlled.    Wound: Draining (Minimal expected level of bleeding around stoma site).      Objective:  Vital signs (most recent): Blood pressure 122/71, pulse (!) 59, temperature 99.1 °F (37.3 °C), temperature source Oral, resp. rate (!) 22, height 5' 5" (1.651 m), weight 119.2 kg (262 lb 12.6 oz), last menstrual period 01/01/2015, SpO2 100 %, not currently breastfeeding.  General appearance: Comfortable and in no acute distress.    Lungs:  (Tracheostomy in place with ventilator on minimal settings with PEEP of 5. The tracheostomy is secured with sutures at 4 quadrants of the tracheostomy flanges. Drain sponge/gauze with minimal sanguinous drainage present and no significant active bleeding. ).    Chest: Symmetric chest wall expansion.    Wound:  Clean.  There is serosanguinous drainage.    Neurological: (Patient making purposeful movement and responding with movements to verbal statements. Tracking visually. Decreased strength. No attempts at verbal communication. ).  Pupils are equal, round, and reactive to light.       Assessment:   Post-op: 1 day.    Condition: Patient condition: recovering well from tracheostomy procedure and tracheostomy iis functioning well with no concerning findings.     Plan:  Continue wound care as written.  " Check weaning parameters.  Diet Plan: G tube currently being placed.    1. Would normally deflate trach cuff at this time but patient currently undergoing G tube procedure beginning shortly so have communicated this to the nurse to deflate cuff after procedures - anticipate coughing as secretions above trach are released and instructed on suctioning after this.  2. Continue trach cares as communicated:  Instructions for trach cares and needed trach supplies:  - Size 12 Greek suction flexible catheter setup at bedside for trach suctioning  - Suction trach q15min for the first 12 hours then q30min for the next 12 hours, then q1h for the next 24 hours, then q2h PRN until d/c  - Obturator to HOB  - Extra 6.0 Shiley DCT (current type of trach) and DCFS (planned to change to) to bedside   - Please have a suture removal kit, the new 6.0 Shiley DFCS trach, soft collars for trach, drain sponges, Yankauer suction and 12 Greek flexible suction catheter, available at bedside for planned trach change which I anticipate performing 4 to 7 days from date of placement.  3. Currently no 6 DCFS (cuffless) available in stock at hospital, will follow-up on availability. Please notify me of arrival for planing of trach change.   4. At this time trach change planned for Monday 10/28, and will adjust as appropriate.          Olu Rice MD    Rhinology, Allergy, and Sinus-Skull Base Surgery    Department of Otorhinolaryngology    [facility]    Phone  [phone]    Fax      [phone]           Olu Rice MD  10/23/2019

## 2019-10-23 NOTE — PLAN OF CARE
Recommendations    Recommendation/Intervention:  1. Initate TF of Glucerna 1.5 @ 50mL/hr to provide 1800 kcal, 99g protein, and 911 mL free fluid. Additional FWF of 300mL q8h to meet fluid needs or per MD discretion.     Goals: TF to provide 100% EEN withing 24 hrs  Nutrition Goal Status: new  Communication of BUDDY Recs: other (comment)(POC)

## 2019-10-23 NOTE — ASSESSMENT & PLAN NOTE
Intubated for airway protection given concern for angioedema initially  Patient remains intubated secondary to acute CVA and and patient did not have to angioedema  Positive cultures with increased secretions  Pulmonary following  Patient is morbidly obese with obstructive sleep apnea which may hinder extubation  Pulmonary discussed possible need for trach and PEG with son  Cephazolin added for positive Staph in the sputum and nebulizer treatments  ENT consulted for trach placement and trach successfully placed today, 10/21  GI for PEG placement, but this could not be performed today, anesthesia did not want to be sedate this patient  Plan for PEG tomorrow by GI (done on 10/22).  As per Pulmonary  Will likely need LTAC placement, will begin consult to  for discharge planning

## 2019-10-23 NOTE — ASSESSMENT & PLAN NOTE
Suspect this is the reason she was drooling and was macroglossic on arrival, and not angioedema.    --PT/OT.    --s/p PEG and trach

## 2019-10-23 NOTE — PLAN OF CARE
10/23/19 1626   Post-Acute Status   Post-Acute Authorization Placement   Post-Acute Placement Status Pending Payor Review   Discharge Delays (!) Other  (Will need medical clearance and orders for ltac.)   TN sent ltac referral through [facility] to 1. Jennifer, 2. [facility], 3. [facility]..Thi Taveras RN, BSN, [facility]  10/23/2019

## 2019-10-23 NOTE — PROGRESS NOTES
"[facility]  Adult Nutrition  Progress Note    SUMMARY       Recommendations    Recommendation/Intervention:  1. Initate TF of Glucerna 1.5 @ 50mL/hr to provide 1800 kcal, 99g protein, and 911 mL free fluid. Additional FWF of 300mL q8h to meet fluid needs or per MD discretion.     Goals: TF to provide 100% EEN withing 24 hrs  Nutrition Goal Status: new  Communication of RD Recs: other (comment)(POC)    Reason for Assessment    Reason For Assessment: RD follow-up  Diagnosis: other (see comments)(angio-edema)  Relevant Medical History: DM2, hypothroidism, dyslipidemia, CVA, seizures  Interdisciplinary Rounds: attended  General Information Comments: s/p trach (10/21) and PEG (10/22); vent connected to trach. TF running @ 30mL/hr upon RD visit, advancing to goal rate. Pt tolerating TF well. Recommended to change formula to Glucerna 1.5 to provide more protein than current formula.   Nutrition Discharge Planning: too soon to determine     Nutrition Risk Screen    Nutrition Risk Screen: dysphagia or difficulty swallowing    Nutrition/Diet History    Spiritual, Cultural Beliefs, Scientologist Practices, Values that Affect Care: no  Factors Affecting Nutritional Intake: NPO, on mechanical ventilation    Anthropometrics    Temp: 99 °F (37.2 °C)  Height Method: Estimated  Height: 5' 5" (165.1 cm)  Height (inches): 65 in  Weight Method: Bed Scale  Weight: 119.2 kg (262 lb 12.6 oz)  Weight (lb): 262.79 lb  Ideal Body Weight (IBW), Female: 125 lb  % Ideal Body Weight, Female (lb): 223.29 lb  BMI (Calculated): 46.5  BMI Grade: greater than 40 - morbid obesity  Usual Body Weight (UBW), k.6 kg  % Usual Body Weight: 98.11  % Weight Change From Usual Weight: -2.1 %    Lab/Procedures/Meds    Pertinent Labs Reviewed: reviewed  Pertinent Medications Reviewed: reviewed  Pertinent Medications Comments:   amLODIPine   famotidine (PF)   fentaNYL   heparin (porcine)   insulin detemir U-100   lacosamide   metoprolol " tartrate   polyethylene glycol   rosuvastatin   senna-docusate 8.6-50 mg     Estimated/Assessed Needs    Weight Used For Calorie Calculations: 119.2 kg (262 lb 12.6 oz)  Energy Calorie Requirements (kcal): 1713 kcal/day  Energy Need Method: Elliott-St Arnoldor  Protein Requirements: 142 g/day (2.5g/kg (BMI > 40) )  Weight Used For Protein Calculations: 56.8 kg (125 lb 4.3 oz)(IBW)  Fluid Requirements (mL): 1 mL/kcal or PER MD   Estimated Fluid Requirement Method: RDA Method  RDA Method (mL): 1713    Nutrition Prescription Ordered    Current Diet Order: NPO  Current Nutrition Support Formula Ordered: Diabetisource AC  Current Nutrition Support Rate Ordered: 40 (ml)  Current Nutrition Support Frequency Ordered: mL/hr    Evaluation of Received Nutrient/Fluid Intake    Enteral Calories (kcal): 1080  Enteral Protein (gm): 43  Enteral (Free Water) Fluid (mL): 588  Other Calories (kcal): 0  I/O: 1546/450  Energy Calories Required: not meeting needs  Protein Required: not meeting needs  Fluid Required: meeting needs  Comments: LBM: 10/21  Tolerance: tolerating  % Intake of Estimated Energy Needs: 50 - 75 %   % Meal Intake: NPO    Nutrition Risk    Level of Risk/Frequency of Follow-up: (2x/week)     Assessment and Plan    Nutrition Problem  Inadequate energy intake     Related to (etiology):   S/p PEG      Signs and Symptoms (as evidenced by):   TF not at goal rate      Interventions:  Collaboration of care with other providers      Nutrition Diagnosis Status:   New    Monitor and Evaluation    Food and Nutrient Intake: enteral nutrition intake  Food and Nutrient Adminstration: enteral and parenteral nutrition administration  Physical Activity and Function: nutrition-related ADLs and IADLs  Anthropometric Measurements: weight, weight change, body mass index  Biochemical Data, Medical Tests and Procedures: electrolyte and renal panel, gastrointestinal profile, glucose/endocrine profile, inflammatory profile, lipid  profile  Nutrition-Focused Physical Findings: overall appearance     Malnutrition Assessment    Subcutaneous Fat Loss (Final Summary): well nourished  Muscle Loss Evaluation (Final Summary): well nourished       Nutrition Follow-Up    RD Follow-up?: Yes

## 2019-10-24 LAB
ALLENS TEST: ABNORMAL
ANION GAP SERPL CALC-SCNC: 10 MMOL/L (ref 8–16)
BUN SERPL-MCNC: 47 MG/DL (ref 8–23)
CALCIUM SERPL-MCNC: 10 MG/DL (ref 8.7–10.5)
CHLORIDE SERPL-SCNC: 105 MMOL/L (ref 95–110)
CO2 SERPL-SCNC: 27 MMOL/L (ref 23–29)
CREAT SERPL-MCNC: 1.7 MG/DL (ref 0.5–1.4)
DELSYS: ABNORMAL
EST. GFR  (AFRICAN AMERICAN): 35 ML/MIN/1.73 M^2
EST. GFR  (NON AFRICAN AMERICAN): 30 ML/MIN/1.73 M^2
FIO2: 21
GLUCOSE SERPL-MCNC: 150 MG/DL (ref 70–110)
HCO3 UR-SCNC: 28.2 MMOL/L (ref 24–28)
MAGNESIUM SERPL-MCNC: 2.6 MG/DL (ref 1.6–2.6)
MIN VOL: 5.4
MODE: ABNORMAL
PCO2 BLDA: 42 MMHG (ref 35–45)
PEEP: 5
PH SMN: 7.44 [PH] (ref 7.35–7.45)
PHOSPHATE SERPL-MCNC: 3.4 MG/DL (ref 2.7–4.5)
PO2 BLDA: 78 MMHG (ref 80–100)
POC BE: 4 MMOL/L
POC SATURATED O2: 96 % (ref 95–100)
POC TCO2: 30 MMOL/L (ref 23–27)
POCT GLUCOSE: 111 MG/DL (ref 70–110)
POCT GLUCOSE: 134 MG/DL (ref 70–110)
POCT GLUCOSE: 145 MG/DL (ref 70–110)
POCT GLUCOSE: 158 MG/DL (ref 70–110)
POCT GLUCOSE: 159 MG/DL (ref 70–110)
POCT GLUCOSE: 173 MG/DL (ref 70–110)
POTASSIUM SERPL-SCNC: 3.9 MMOL/L (ref 3.5–5.1)
PS: 10
SAMPLE: ABNORMAL
SITE: ABNORMAL
SODIUM SERPL-SCNC: 142 MMOL/L (ref 136–145)
SP02: 100
SPONT RATE: 20

## 2019-10-24 PROCEDURE — 94003 VENT MGMT INPAT SUBQ DAY: CPT

## 2019-10-24 PROCEDURE — 20000000 HC ICU ROOM

## 2019-10-24 PROCEDURE — 25000003 PHARM REV CODE 250: Performed by: INTERNAL MEDICINE

## 2019-10-24 PROCEDURE — 63600175 PHARM REV CODE 636 W HCPCS: Performed by: INTERNAL MEDICINE

## 2019-10-24 PROCEDURE — 25000003 PHARM REV CODE 250: Performed by: HOSPITALIST

## 2019-10-24 PROCEDURE — 25000003 PHARM REV CODE 250: Performed by: PSYCHIATRY & NEUROLOGY

## 2019-10-24 PROCEDURE — 82803 BLOOD GASES ANY COMBINATION: CPT

## 2019-10-24 PROCEDURE — 97112 NEUROMUSCULAR REEDUCATION: CPT

## 2019-10-24 PROCEDURE — 99233 PR SUBSEQUENT HOSPITAL CARE,LEVL III: ICD-10-PCS | Mod: ,,, | Performed by: INTERNAL MEDICINE

## 2019-10-24 PROCEDURE — 80048 BASIC METABOLIC PNL TOTAL CA: CPT

## 2019-10-24 PROCEDURE — 94761 N-INVAS EAR/PLS OXIMETRY MLT: CPT

## 2019-10-24 PROCEDURE — 36600 WITHDRAWAL OF ARTERIAL BLOOD: CPT

## 2019-10-24 PROCEDURE — 94640 AIRWAY INHALATION TREATMENT: CPT

## 2019-10-24 PROCEDURE — 25000003 PHARM REV CODE 250: Performed by: NURSE PRACTITIONER

## 2019-10-24 PROCEDURE — 84100 ASSAY OF PHOSPHORUS: CPT

## 2019-10-24 PROCEDURE — 63600175 PHARM REV CODE 636 W HCPCS: Performed by: HOSPITALIST

## 2019-10-24 PROCEDURE — 36415 COLL VENOUS BLD VENIPUNCTURE: CPT

## 2019-10-24 PROCEDURE — 97110 THERAPEUTIC EXERCISES: CPT

## 2019-10-24 PROCEDURE — 25000242 PHARM REV CODE 250 ALT 637 W/ HCPCS: Performed by: NURSE PRACTITIONER

## 2019-10-24 PROCEDURE — 99900035 HC TECH TIME PER 15 MIN (STAT)

## 2019-10-24 PROCEDURE — 83735 ASSAY OF MAGNESIUM: CPT

## 2019-10-24 PROCEDURE — 99233 SBSQ HOSP IP/OBS HIGH 50: CPT | Mod: ,,, | Performed by: INTERNAL MEDICINE

## 2019-10-24 PROCEDURE — S0028 INJECTION, FAMOTIDINE, 20 MG: HCPCS | Performed by: HOSPITALIST

## 2019-10-24 RX ADMIN — FENTANYL CITRATE 25 MCG: 50 INJECTION INTRAMUSCULAR; INTRAVENOUS at 12:10

## 2019-10-24 RX ADMIN — CLONIDINE HYDROCHLORIDE 0.3 MG: 0.1 TABLET ORAL at 12:10

## 2019-10-24 RX ADMIN — DEXMEDETOMIDINE HYDROCHLORIDE 0.8 MCG/KG/HR: 4 INJECTION, SOLUTION INTRAVENOUS at 06:10

## 2019-10-24 RX ADMIN — IPRATROPIUM BROMIDE AND ALBUTEROL SULFATE 3 ML: .5; 3 SOLUTION RESPIRATORY (INHALATION) at 07:10

## 2019-10-24 RX ADMIN — KETOROLAC TROMETHAMINE 30 MG: 30 INJECTION, SOLUTION INTRAMUSCULAR; INTRAVENOUS at 12:10

## 2019-10-24 RX ADMIN — FAMOTIDINE 20 MG: 10 INJECTION INTRAVENOUS at 09:10

## 2019-10-24 RX ADMIN — SENNOSIDES, DOCUSATE SODIUM 1 TABLET: 50; 8.6 TABLET, FILM COATED ORAL at 09:10

## 2019-10-24 RX ADMIN — METOPROLOL TARTRATE 100 MG: 50 TABLET ORAL at 09:10

## 2019-10-24 RX ADMIN — IPRATROPIUM BROMIDE AND ALBUTEROL SULFATE 3 ML: .5; 3 SOLUTION RESPIRATORY (INHALATION) at 01:10

## 2019-10-24 RX ADMIN — ASPIRIN 325 MG ORAL TABLET 325 MG: 325 PILL ORAL at 09:10

## 2019-10-24 RX ADMIN — FENTANYL CITRATE 25 MCG: 50 INJECTION INTRAMUSCULAR; INTRAVENOUS at 05:10

## 2019-10-24 RX ADMIN — LACOSAMIDE 50 MG: 50 TABLET, FILM COATED ORAL at 12:10

## 2019-10-24 RX ADMIN — DEXMEDETOMIDINE HYDROCHLORIDE 0.8 MCG/KG/HR: 4 INJECTION, SOLUTION INTRAVENOUS at 10:10

## 2019-10-24 RX ADMIN — INSULIN ASPART 2 UNITS: 100 INJECTION, SOLUTION INTRAVENOUS; SUBCUTANEOUS at 06:10

## 2019-10-24 RX ADMIN — CHLORHEXIDINE GLUCONATE 0.12% ORAL RINSE 15 ML: 1.2 LIQUID ORAL at 08:10

## 2019-10-24 RX ADMIN — POLYETHYLENE GLYCOL 3350 17 G: 17 POWDER, FOR SOLUTION ORAL at 09:10

## 2019-10-24 RX ADMIN — LACOSAMIDE 50 MG: 50 TABLET, FILM COATED ORAL at 09:10

## 2019-10-24 RX ADMIN — KETOROLAC TROMETHAMINE 30 MG: 30 INJECTION, SOLUTION INTRAMUSCULAR; INTRAVENOUS at 11:10

## 2019-10-24 RX ADMIN — LABETALOL HYDROCHLORIDE 20 MG: 5 INJECTION INTRAVENOUS at 12:10

## 2019-10-24 RX ADMIN — HEPARIN SODIUM 5000 UNITS: 5000 INJECTION INTRAVENOUS; SUBCUTANEOUS at 09:10

## 2019-10-24 RX ADMIN — DEXMEDETOMIDINE HYDROCHLORIDE 0.9 MCG/KG/HR: 4 INJECTION, SOLUTION INTRAVENOUS at 03:10

## 2019-10-24 RX ADMIN — CEFAZOLIN SODIUM 1 G: 1 SOLUTION INTRAVENOUS at 12:10

## 2019-10-24 RX ADMIN — INSULIN ASPART 1 UNITS: 100 INJECTION, SOLUTION INTRAVENOUS; SUBCUTANEOUS at 11:10

## 2019-10-24 RX ADMIN — DEXMEDETOMIDINE HYDROCHLORIDE 1 MCG/KG/HR: 4 INJECTION, SOLUTION INTRAVENOUS at 01:10

## 2019-10-24 RX ADMIN — AMLODIPINE BESYLATE 10 MG: 5 TABLET ORAL at 09:10

## 2019-10-24 RX ADMIN — ROSUVASTATIN CALCIUM 20 MG: 10 TABLET, COATED ORAL at 09:10

## 2019-10-24 RX ADMIN — FENTANYL CITRATE 25 MCG: 50 INJECTION INTRAMUSCULAR; INTRAVENOUS at 03:10

## 2019-10-24 RX ADMIN — CLONIDINE HYDROCHLORIDE 0.3 MG: 0.1 TABLET ORAL at 08:10

## 2019-10-24 RX ADMIN — CHLORHEXIDINE GLUCONATE 0.12% ORAL RINSE 15 ML: 1.2 LIQUID ORAL at 09:10

## 2019-10-24 RX ADMIN — INSULIN DETEMIR 60 UNITS: 100 INJECTION, SOLUTION SUBCUTANEOUS at 08:10

## 2019-10-24 RX ADMIN — DEXMEDETOMIDINE HYDROCHLORIDE 0.9 MCG/KG/HR: 4 INJECTION, SOLUTION INTRAVENOUS at 05:10

## 2019-10-24 RX ADMIN — ACETAMINOPHEN 650 MG: 325 TABLET, FILM COATED ORAL at 02:10

## 2019-10-24 RX ADMIN — CLONIDINE HYDROCHLORIDE 0.3 MG: 0.1 TABLET ORAL at 01:10

## 2019-10-24 RX ADMIN — IPRATROPIUM BROMIDE AND ALBUTEROL SULFATE 3 ML: .5; 3 SOLUTION RESPIRATORY (INHALATION) at 12:10

## 2019-10-24 NOTE — PT/OT/SLP PROGRESS
Physical Therapy Treatment    Patient Name:  Sherrie Alex   MRN:  [id number]    Recommendations:     Discharge Recommendations:  LTACH (long-term acute care hospital)   Discharge Equipment Recommendations: (per LTAC)   Barriers to discharge: Decreased caregiver support and Pt currently dependent for all mobility in ICU    Assessment:     Sherire Alex is a 69 y.o. female admitted with a medical diagnosis of Acute on chronic respiratory failure.  She presents with the following impairments/functional limitations:  weakness, impaired self care skills, decreased coordination, decreased safety awareness, impaired functional mobilty, decreased upper extremity function, impaired coordination, decreased lower extremity function, impaired cognition Pt able to folllow 2-step commands to perform exercises with L LE x 10 reps.  Pt able to track to R side with eyes and turn head to R on command.    Rehab Prognosis: Fair; patient would benefit from acute skilled PT services to address these deficits and reach maximum level of function.    Recent Surgery: Procedure(s) (LRB):  INSERTION, PEG TUBE (N/A) 2 Days Post-Op    Plan:     During this hospitalization, patient to be seen 3 x/week to address the identified rehab impairments via therapeutic activities, therapeutic exercises, neuromuscular re-education and progress toward the following goals:    · Plan of Care Expires:  10/29/19    Subjective     Chief Complaint: N/A, pt unable to verbalize.  When asked yes/no questions, pt attempting to give hand signals, but this PT unsure if appropriate.    Patient/Family Comments/goals: unable to state  Pain/Comfort:  · Pain Rating 1: 0/10      Objective:     Communicated with Nsg prior to session.  Patient found HOB elevated with PEG Tube(ICU monitoring, Trach, Trach collar, ) upon PT entry to room.     General Precautions: Standard, fall, respiratory   Orthopedic Precautions:N/A   Braces: N/A     Functional  Mobility:  · N/A      AM-PAC 6 CLICK MOBILITY  Turning over in bed (including adjusting bedclothes, sheets and blankets)?: 2  Sitting down on and standing up from a chair with arms (e.g., wheelchair, bedside commode, etc.): 1  Moving from lying on back to sitting on the side of the bed?: 1  Moving to and from a bed to a chair (including a wheelchair)?: 1  Need to walk in hospital room?: 1  Climbing 3-5 steps with a railing?: 1  Basic Mobility Total Score: 7       Therapeutic Activities and Exercises:   Pt performed L AP's, ankle circumbduction CW/CCW, HS's, Hip IR/ER, Hip ABd/ADd, TKE's x 10 reps with VC/TC to perform correctly.  Pt received B HCS 3x30 sec.  Pt received R LE PROM in available planes x 10 reps.  ROM WFL's, no Tone noted.  Pt received R Cervical rotation stretch 3x15 sec.  Towel roll placed on L side of head to maintain neutral alignment.  Pt able to track with eyes and turn head to left from this position on command.      Patient left HOB elevated with all lines intact, call button in reach, nsg notified, sundance boot to R LE, R UE elevated on Pillow    GOALS:   Multidisciplinary Problems     Physical Therapy Goals        Problem: Physical Therapy Goal    Goal Priority Disciplines Outcome Goal Variances Interventions   Physical Therapy Goal     PT, PT/OT Ongoing, Progressing     Description:  Goals to be met by:      Patient will increase functional independence with mobility by performin. Supine to sit to be assessed  2. Rolling to Left with Moderate Assistance.  3. Bed to chair transfer to be assessed  4. Sitting at edge of bed to be assessed  5. Lower extremity exercise program x10  reps per handout, with assistance as needed                      Time Tracking:     PT Received On: 10/24/19  PT Start Time: 1449     PT Stop Time: 1508  PT Total Time (min): 19 min     Billable Minutes: Therapeutic Exercise 19    Treatment Type: Treatment  PT/PTA: PT     PTA Visit Number: 0      Susan West, PT  10/24/2019

## 2019-10-24 NOTE — PROGRESS NOTES
"[facility]  Progress Note    Patient Name: Sherrie Alex  MRN: [id number]  Patient Class: IP- Inpatient   Admission Date: 10/11/2019  Length of Stay: 13 days  Attending Physician: Angel Luis Aquino MD  Primary Care Provider: Desmond Yang MD        Subjective:     Principal Problem:Acute on chronic respiratory failure        HPI:  patient is a 69 y.o female who presents to the ED complaining of tongue swelling that began PTA. Patient has slurred speech, drooling, and tongue swelling. Patient denies any pain, itching, CP, nausea, vomiting, or fever. Per sisters, patient is normally talkative and active. Her sisters are unaware of any cause of onset, stating that the patient lives alone. PMHx of CVA 1 yr ago, DM, HTN, HLD, and obesity. Patient is allergic to codeine and ace inhibitors.      The history is provided by the patient and a relative. History limited by: Acuity of condition. No  was used.     Pt intubated in ED for airway protection ?laryngeal edema - not noted in ED documentation.  Pt started on IV steroids and H2 blocker on vent. In ICU, pt showed sluggish neuro response to pain and unable to follow commands. Propofol taken off and neuro status improved. Pulm consulted for vent management. Neuro consulted - h/o seizures.  Seen in ED yesterday am with "leg shaking" and weakness and dc'd home. H/o seizures on vimpat.      Overview/Hospital Course:  Ms. Alex was admitted acute neurological changes and respiratory compromise. Intubated in ED. Serial neurological testing off sedation suggested right sided weakness > left. MRI showed:Moderate-sized area of subacute infarction involving the left subinsular white matter extending to the periventricular white matter. No MR evidence for hemorrhage.  Patient treated for an acute CVA and Neurology consult. Allowed for permissive HTN.  Later resumed blood pressure medication after appropriate time " elapsed for permissive hypertension.  Her neurological exam was a barrier to extubation.  She was also treated with IV lasix for diuresis, ET sputum culture sent for climbing WBCs, concerning for possible aspiration. Dr. Bauer met with family and discussed plan to continue attempts to wean from vent, though neuro status/effects of CVA may be limiting factor.  The patient was unable to be weaned from the vent, ENT consulted for trach and GI consulted for PEG tube on 10/22.  was consulted for LTAC    Interval History: No new issues     Review of Systems   Unable to perform ROS: Acuity of condition     Objective:     Vital Signs (Most Recent):  Temp: 98.4 °F (36.9 °C) (10/24/19 0301)  Pulse: 61 (10/24/19 0501)  Resp: 20 (10/24/19 0501)  BP: 136/72 (10/24/19 0501)  SpO2: 99 % (10/24/19 0501) Vital Signs (24h Range):  Temp:  [97.4 °F (36.3 °C)-98.6 °F (37 °C)] 98.4 °F (36.9 °C)  Pulse:  [55-70] 61  Resp:  [18-34] 20  SpO2:  [95 %-100 %] 99 %  BP: (116-160)/(58-85) 136/72     Weight: 119.2 kg (262 lb 12.6 oz)  Body mass index is 43.73 kg/m².    Intake/Output Summary (Last 24 hours) at 10/24/2019 0606  Last data filed at 10/24/2019 0501  Gross per 24 hour   Intake 2140.42 ml   Output 1150 ml   Net 990.42 ml      Physical Exam   Constitutional: She appears well-developed and well-nourished.   HENT:   Head: Normocephalic and atraumatic.   Cardiovascular: Normal rate and regular rhythm.   Pulmonary/Chest: Effort normal and breath sounds normal. No stridor. No respiratory distress. She has no wheezes.   Abdominal: Soft. She exhibits no distension. There is no rebound and no guarding.   Neurological: She is alert.   Skin: Skin is warm and dry.   Vitals reviewed.      Significant Labs:   BMP:   Recent Labs   Lab 10/24/19  0237   *      K 3.9      CO2 27   BUN 47*   CREATININE 1.7*   CALCIUM 10.0     CBC:   Recent Labs   Lab 10/23/19  0312   WBC 11.58   HGB 11.7*   HCT 40.3           Significant Imaging:       Assessment/Plan:      * Acute on chronic respiratory failure  Intubated for airway protection given concern for angioedema initially  Patient remains intubated secondary to acute CVA and and patient did not have to angioedema  Positive cultures with increased secretions  Pulmonary following  Patient is morbidly obese with obstructive sleep apnea which may hinder extubation  Pulmonary discussed possible need for trach and PEG with son  Cephazolin added for positive Staph in the sputum and nebulizer treatments  ENT consulted for trach placement and trach successfully placed today, 10/21  GI for PEG placement, but this could not be performed today, anesthesia did not want to be sedate this patient  Plan for PEG tomorrow by GI (done on 10/22).  As per Pulmonary  Will likely need LTAC placement, will begin consult to  for discharge planning    Ventilator dependent  Continues on vent       Venous stasis ulcer  No acute issues       CVA (cerebral vascular accident)  MRI of the brain with moderate-sized area of subacute infarction involving the left subinsular white matter extending to the periventricular white matter  Allowed for permissive hypertension along with treatment with aspirin and statin  Appreciate Neurology input  Resumed blood pressure medications for tighter blood pressure control  Weaning from vent hindered by new CVA  Trach done today, and peg rescheduled for tomorrow (10/22)  Will likely need LTAC/rehab placement    Encephalopathy, metabolic  Secondary to CVA  As discussed above    RYLEE (acute kidney injury)  Baseline creatinine of 1.4-1.7  Had slight improvement now steadily trending up  CO2 level steadily increasing and patient was in negative balance  Held IV Lasix on 10/20  Will hold for another day  Continue to monitor with repeat labs and assess volume status    Seizure  Continue vimpat  No seizures seen on EEG  Appreciate Neurology  following    Angio-edema  Initially thought to have angioedema  Likely to have slight droop associated with stroke and not true angioedema    Acquired hypothyroidism  Resume synthroid   Thyroid function test normal    Essential hypertension  Resume home meds: norvasc, clonidine, hydralazine, toprol  IV PRN hydralazine    Type 2 diabetes mellitus with circulatory disorder, with long-term current use of insulin  Uncontrolled with hyperglycemia likely due to dose of dexamethasone given  HGBA1c 6.9%  Glucose better controlled on detemir to 60 units q.h.s. and continue sliding scale insulin  Will make further adjustments to get glucose and range of 140-180    Anxiety  Fentanyl pushes for sedation    Morbid obesity  Discuss weight management after extubation and when mentation is appropriate  Body mass index is 43.73 kg/m².     Dyslipidemia  Continue rosuvastatin        VTE Risk Mitigation (From admission, onward)         Ordered     heparin (porcine) injection 5,000 Units  Every 12 hours      10/11/19 0617     IP VTE HIGH RISK PATIENT  Once      10/11/19 0532     Place ELIEZER hose  Until discontinued      10/11/19 0532     Place sequential compression device  Until discontinued      10/11/19 0532                Critical care time spent on the evaluation and treatment of severe organ dysfunction, review of pertinent labs and imaging studies, discussions with consulting providers and discussions with patient/family: 20  Minutes.    Wean from vent per pulmonary.  consulted for LTAC placement.        Angel Luis Hanson MD  Department of Hospital Medicine   [facility]

## 2019-10-24 NOTE — CARE UPDATE
[facility]  ICU Multidisciplinary Bedside Rounds   SUMMARY     Date: 10/24/2019    Prehospitalization: Home  Admit Date / LOS : 10/11/2019/ 13 days    Diagnosis: Acute on chronic respiratory failure    Consults:        Active: Neuro, Pulm CC and Wound Care       Needed: N/A     Code Status: Full Code   Advanced Directive: <no information>    LDA: Guevara, PEG, PIV, Trach and Vent       Central Lines/Site/Justification:Patient Does Not Have Central Line       Urinary Cath/Order/Justification:Urinary Retention    Vasopressors/Infusions:    dexMEDEtomidine in 0.9 % NaCl 0.9 mcg/kg/hr (10/24/19 4483)          GOALS: Volume/ Hemodynamic: N/A                     RASS: 0  alert and calm    CAM ICU: N/A  Pain Management: IV       Pain Controlled: yes     Rhythm: NSR / Sinus Bradycardia    Respiratory Device: Vent    Vent Mode: PS/CPAP  Oxygen Concentration (%):  [21-25] 21  Resp Rate Total:  [12 br/min-59 br/min] 16 br/min  Vt Set:  [320 mL] 320 mL  PEEP/CPAP:  [5 cmH20] 5 cmH20  Pressure Support:  [5 cmH20] 5 cmH20  Mean Airway Pressure:  [6.2 cmH20-7.4 cmH20] 6.8 cmH20             Most Recent SBT/ SAT: N/A       MOVE Screen: N/A    VTE Prophylaxis: Mechanical  Mobility: Bedrest  Stress Ulcer Prophylaxis: Yes    Dietary: TF  Tolerance: yes  /  Advancement: @ goal    Isolation: No active isolations    Restraints: No    Significant Dates:  Post Op Date: 10/21 Trach; 10/22 PEG  Rescue Date: N/A  Imaging/ Diagnostics: 10/14 US of carotid arteries; 10/11 CT/MRI of head    Noteworthy Labs:  Please review labs below    CBC/Anemia Labs: Coags:    Recent Labs   Lab 10/21/19  0311 10/22/19  0342 10/23/19  0312   WBC 11.10 13.38* 11.58   HGB 12.0 12.3 11.7*   HCT 41.1 41.6 40.3    160 152   MCV 68* 67* 68*   RDW 17.9* 18.1* 17.7*    Recent Labs   Lab 10/20/19  1005   INR 1.0        Chemistries:   Recent Labs   Lab 10/22/19  0342 10/23/19  0312 10/24/19  0237    143 142   K 4.6 3.8 3.9    106 105    CO2 29 29 27   BUN 44* 41* 47*   CREATININE 1.7* 1.4 1.7*   CALCIUM 10.2 10.1 10.0        Cardiac Enzymes: Ejection Fractions:    No results for input(s): CPK, CPKMB, MB, TROPONINI in the last 72 hours. No results found for: EF     POCT Glucose: HbA1c:    Recent Labs   Lab 10/22/19  0555 10/22/19  1142 10/22/19  1803 10/22/19  2330 10/23/19  0539 10/23/19  1125   POCTGLUCOSE 129* 118* 89 72 62* 112*    Hemoglobin A1C   Date Value Ref Range Status   10/02/2019 6.9 (H) 4.0 - 5.6 % Final     Comment:     ADA Screening Guidelines:  5.7-6.4%  Consistent with prediabetes  >or=6.5%  Consistent with diabetes  High levels of fetal hemoglobin interfere with the HbA1C  assay. Heterozygous hemoglobin variants (HbS, HgC, etc)do  not significantly interfere with this assay.   However, presence of multiple variants may affect accuracy.     06/03/2008 6.7 (H) 4.0 - 6.2 % Final   02/13/2006 6.2 4.5 - 6.2 % Final        Needs from Care Team: none     ICU LOS 13d  Level of Care: Critical Care

## 2019-10-24 NOTE — PROGRESS NOTES
-[phone] cell phone  Spoke with sons, See, Riki and pt's 3rd son, all agree that pt can go to [facility] for next level of care..Thi Taveras RN, BSN, STN [facility]  10/24/2019

## 2019-10-24 NOTE — PLAN OF CARE
1800 VSS, afebrile, pain moderately well controlled, unable to speak, dried drainage from trach, cleaned and changed dressing, dried drainage from PEG, cleaned and changed dressing, tube feeds advanced to goal of 40 mL/hr, voiding adequate otis urine to external catheter to 40 mmHg suction, BM x1, turned Q2, bed in low locked position, used hang gestures to communicate, right arm unable to move, in view of nurses station.

## 2019-10-24 NOTE — PLAN OF CARE
10/24/19 0928   Post-Acute Status   Post-Acute Authorization Placement   Post-Acute Placement Status Awaiting Internal Medical Clearance   Discharge Delays None known at this time   Thi Taveras, RN, BSN, STN [facility]  10/24/2019

## 2019-10-24 NOTE — PLAN OF CARE
1800  VSS, SpO2 100% on trach collar, was anxious and BP/HR/RR elevated, placed back on precedex at 1700, VS stabilized, voiding adequate clear yellow urine to Guevara to gravity, BM x2, turned Q2, worked with PT/OT, bed in low, locked position, able to make needs known; no needs at this time.

## 2019-10-24 NOTE — PLAN OF CARE
Problem: Physical Therapy Goal  Goal: Physical Therapy Goal  Description  Goals to be met by:      Patient will increase functional independence with mobility by performin. Supine to sit to be assessed  2. Rolling to Left with Moderate Assistance.  3. Bed to chair transfer to be assessed  4. Sitting at edge of bed to be assessed  5. Lower extremity exercise program x10  reps per handout, with assistance as needed     Outcome: Ongoing, Progressing   Pt able to follow 2-step commands, track to R with eyes. And turn head to R on command.  Continue with POC

## 2019-10-24 NOTE — PLAN OF CARE
Problem: Occupational Therapy Goal  Goal: Occupational Therapy Goal  Description  Goals to be met by: 11/15/19    Patient will increase functional independence with ADLs by performing:    UE Dressing with Maximum Assistance.  Grooming while in bed with Maximum Assistance.  Sitting at edge of bed : to be assessed as appropriate  Rolling to Bilateral with Maximum Assistance.   Supine to sit :to be assessed as appropriate  Upper extremity exercise program x10 reps per handout, with assistance as needed.     Outcome: Ongoing, Progressing   The patient was able to participate in LUE ROM. The patient was able to visually scan to the right side with cues. The patient remained alert mike cruz.

## 2019-10-24 NOTE — CARE UPDATE
Pt remains in icu on vent settings cpap/ps 10/5 fio2 21%.  Pt is without distress and.  All alarms are on and working.  Ambu bag hob.  Will continue to monitor

## 2019-10-24 NOTE — PLAN OF CARE
10/24/19 0928   Post-Acute Status   Post-Acute Authorization Placement  ([facility] accepted patient)   Post-Acute Placement Status Awaiting Internal Medical Clearance   Discharge Delays (!) Patient and Family Barriers  (Family wants to go see [facility])

## 2019-10-24 NOTE — PROGRESS NOTES
Sherrie Alex is a 69 y.o. female who had presented to the ED with symptoms including weakness, drooling, and difficulty with her speech.initially thought to be possible angioedema but later favored to be related to CVA which was confirmed with MRI. She was successfully intubated, reportedly atraumatically without any adverse events but after four separate attempts including nasotracheal and fiberoptic intubations. She was transferred to the ICU where she has  received ongoing management. Multiple spontaneous breathing trials and attempts of weaning her to extubation were made unsuccessfully and she remained intubated with a 6.5 endotracheal tube at the time of my initial involvement for consideration of tracheostomy placement given her multiple failed attempts and weaning to extubation and anticipated prolonged need for intubation and ventilator support. After discussion with the patient's ICU care team and her family, tracheotomy was elected and performed on 10/21/19 successfully without complication. A size 6 Shiley Cuffed Tracheostomy Tube (6DCT) was placed and has been used successfully for ventilation since then.     Interval changes:   Tolerating the 6 DCT trach well. Balloon cuff deflated with no issues. Had PEG placed yesterday and doing well since then as well. Minimal oozing around the stoma site which has been reinforced with gauze sponge changes as needed. Trach care has been performed by nursing with frequent suctioning through the tracheostomy.    1. Angioedema, initial encounter    2. Required emergency intubation    3. Allergic reaction, initial encounter    4. Angio-edema    5. Stroke    6. Encephalopathy, metabolic    7. Respiratory failure    8. Acute on chronic respiratory failure    9. Seizure    10. Cerebrovascular accident (CVA), unspecified mechanism    11. Acute on chronic respiratory failure, unspecified whether with hypoxia or hypercapnia    12. Ventilator dependent      Past  Medical History:   Diagnosis Date    Anemia     Anxiety     Diabetes mellitus     Hyperlipidemia     Hypertension     Obesity     Proteinuria     Sleep apnea      No past surgical history pertinent negatives on file.  Scheduled Meds:   albuterol-ipratropium  3 mL Nebulization Q6H    amLODIPine  10 mg Per OG tube Daily    aspirin  325 mg Per OG tube Daily    chlorhexidine  15 mL Mouth/Throat BID    cloNIDine  0.3 mg Oral BID    famotidine (PF)  20 mg Intravenous Daily    fentaNYL  100 mcg Intravenous Once    heparin (porcine)  5,000 Units Subcutaneous Q12H    insulin detemir U-100  60 Units Subcutaneous QHS    lacosamide  50 mg Oral BID    metoprolol tartrate  100 mg Per OG tube BID    polyethylene glycol  17 g Per NG tube Daily    rosuvastatin  20 mg Per OG tube QHS    senna-docusate 8.6-50 mg  1 tablet Oral BID     Continuous Infusions:   dexMEDEtomidine in 0.9 % NaCl 0.9 mcg/kg/hr (10/24/19 0503)     PRN Meds:acetaminophen, dextrose 50%, fentaNYL, insulin aspart U-100, ketorolac, labetalol, ondansetron, sodium chloride 0.9%    Review of patient's allergies indicates:   Allergen Reactions    Codeine Anxiety    Ace inhibitors     Diphenhydramine hcl      Active Hospital Problems    Diagnosis  POA    *Acute on chronic respiratory failure [J96.20]  Yes    Venous stasis ulcer [I83.009, L97.909]  Yes    Ventilator dependent [Z99.11]  Not Applicable    CVA (cerebral vascular accident) [I63.9]  Yes    Angio-edema [T78.3XXA]  Yes    Seizure [R56.9]  Yes    RYLEE (acute kidney injury) [N17.9]  Yes    Encephalopathy, metabolic [G93.41]  Yes    Acquired hypothyroidism [E03.9]  Yes     10/2019 TSH 5.7 - increased Synthroid to 137 mcg daily  Repeat TSH Dec 2019      Essential hypertension [I10]  Yes    Type 2 diabetes mellitus with circulatory disorder, with long-term current use of insulin [E11.59, Z79.4]  Not Applicable    Anxiety [F41.9]  Yes    Morbid obesity [E66.01]  Yes     "Dyslipidemia [E78.5]  Yes      Resolved Hospital Problems   No resolved problems to display.     Blood pressure (!) 155/80, pulse (!) 59, temperature 98.4 °F (36.9 °C), temperature source Axillary, resp. rate 19, height 5' 5" (1.651 m), weight 119.2 kg (262 lb 12.6 oz), last menstrual period 01/01/2015, SpO2 100 %, not currently breastfeeding.    Subjective:   Diet: NPO (Access for nutrition via gastrostomy tube).    Activity level: Impaired due to weakness.    Pain control: Well controlled.    Wound: Draining (Minimal expected level of bleeding around stoma site).      Objective:  Vital signs (most recent): Blood pressure (!) 155/80, pulse (!) 59, temperature 98.4 °F (36.9 °C), temperature source Axillary, resp. rate 19, height 5' 5" (1.651 m), weight 119.2 kg (262 lb 12.6 oz), last menstrual period 01/01/2015, SpO2 100 %, not currently breastfeeding.  General appearance: Comfortable and in no acute distress.    Lungs:  (Tracheostomy in place with ventilator on minimal settings with PEEP of 5. The tracheostomy is secured with sutures at 4 quadrants of the tracheostomy flanges. Drain sponge/gauze with minimal sanguinous drainage present and no significant active bleeding. ).    Chest: Symmetric chest wall expansion.    Wound:  Clean (Prolene retraction sutures exiting the tracheostomy stoma superiorly and inferiorly are secured in place with Steri-Strips.).  There is serosanguinous drainage.    Neurological: (Patient making purposeful movement and responding with movements to verbal statements. Tracking visually. Decreased strength. No attempts at verbal communication. ).  Pupils are equal, round, and reactive to light.       Assessment:   Post-op: 2 days.    Condition: Patient condition: Recovering well from tracheostomy procedure and tracheostomy iis functioning well with no concerning findings.     Plan:  Continue wound care as written.  Check weaning parameters.  Diet Plan: G tube currently being placed.  "   1. Continue trach cares as communicated:  Instructions for trach cares and needed trach supplies:  - Size 12 Luxembourger suction flexible catheter setup at bedside for trach suctioning  - Suction trach q15min for the first 12 hours then q30min for the next 12 hours, then q1h for the next 24 hours, then q2h PRN until d/c  - Obturator to HOB  - Extra 6.0 Shiley DCT (current type of trach) and DCFS (planned to change to) to bedside   - Please have a suture removal kit, the new 6.0 Shiley DFCS trach, soft collars for trach, drain sponges, Yankauer suction and 12 Luxembourger flexible suction catheter, available at bedside for planned trach change which I anticipate performing 4 to 7 days from date of placement.  2. 6 DCFS (cuffless) now available in stock at hospital and two are present at bedside. Please keep these at bedside for anticipated trach change.  3. At this time trach change planned for Monday 10/28, and will adjust as appropriate.    Please do not hesitate to contact me for any questions or concerns.          Olu Rice MD    Rhinology, Allergy, and Sinus-Skull Base Surgery    Department of Otorhinolaryngology    [facility]    Phone  [phone]    Fax      [phone]           Olu Rice MD  10/24/2019

## 2019-10-24 NOTE — ASSESSMENT & PLAN NOTE
Intubated for airway protection and concern for dysphagia related to new CVA (more likely) vs angioedema. Good lung mechanics and has a cuff leak. Sputum w/ MSSA on culture from 10/14/19.     --Continue Ancef 10/16 for 7 day course.  Small to moderate secretions noted.   --Tracheostomy placement on 10/21.   --tolerating trache colar.  Trache care per ent.

## 2019-10-24 NOTE — CARE UPDATE
Placed patient on trach collar 30% per Dr. Hernández's orders.  Keep sats above 90%.    Patient oxygen sats are 97%.

## 2019-10-24 NOTE — PROGRESS NOTES
[facility]  Pulmonology  Progress Note    Patient Name: Sherrie Aelx  MRN: [id number]  Admission Date: 10/11/2019  Hospital Length of Stay: 13 days  Code Status: Full Code  Attending Provider: Angel Luis Aquino MD  Primary Care Provider: Desmond Yang MD   Principal Problem: Acute on chronic respiratory failure    Subjective:     Interval History: no acute issue with cpap 5/5.      Objective:     Vital Signs (Most Recent):  Temp: 98.4 °F (36.9 °C) (10/24/19 0301)  Pulse: (!) 59 (10/24/19 0731)  Resp: 19 (10/24/19 0731)  BP: (!) 155/80 (10/24/19 0600)  SpO2: 96 % (10/24/19 1057) Vital Signs (24h Range):  Temp:  [97.9 °F (36.6 °C)-98.6 °F (37 °C)] 98.4 °F (36.9 °C)  Pulse:  [55-70] 59  Resp:  [18-34] 19  SpO2:  [96 %-100 %] 96 %  BP: (116-160)/(58-85) 155/80     Weight: 119.2 kg (262 lb 12.6 oz)  Body mass index is 43.73 kg/m².      Intake/Output Summary (Last 24 hours) at 10/24/2019 1205  Last data filed at 10/24/2019 0501  Gross per 24 hour   Intake 1524.22 ml   Output 1150 ml   Net 374.22 ml       Physical Exam   Constitutional: She appears well-developed. She is cooperative. No distress. She is sedated and not intubated.   obese   HENT:   Head: Normocephalic and atraumatic.   S/p trache   Eyes: Pupils are equal, round, and reactive to light. Conjunctivae are normal. Right eye exhibits no exudate. Left eye exhibits no exudate. Right conjunctiva has no hemorrhage. Left conjunctiva has no hemorrhage. No scleral icterus. Right eye exhibits no nystagmus. Left eye exhibits no nystagmus.   Neck: Trachea normal. No neck rigidity. No tracheal deviation present.   Cardiovascular: Normal rate, regular rhythm and normal heart sounds. PMI is not displaced. Exam reveals no gallop and no friction rub.   No murmur heard.  Pulses:       Radial pulses are 2+ on the right side, and 2+ on the left side.        Dorsalis pedis pulses are 2+ on the right side, and 2+ on the left side.   Pulmonary/Chest:  Effort normal and breath sounds normal. No accessory muscle usage. She is not intubated. No respiratory distress. She has no wheezes. She has no rhonchi. She has no rales.   Abdominal: Soft. Normal appearance and bowel sounds are normal. There is no tenderness.   Peg in place   Musculoskeletal: Normal range of motion.   Neurological: She is alert. No cranial nerve deficit. GCS eye subscore is 4. GCS verbal subscore is 1. GCS motor subscore is 6.   Pt alert and follows commands, answers Y/N questions, spontaneously moves BUE 35, BLE 2/5, no focal deficits to note.      Skin: Skin is warm and dry. Capillary refill takes 2 to 3 seconds. She is not diaphoretic. No cyanosis. Nails show no clubbing.   Nursing note and vitals reviewed.      Vents:  Vent Mode: PRVC (10/24/19 0928)  Ventilator Initiated: Yes (10/11/19 0213)  Set Rate: 15 bmp (10/23/19 0916)  Vt Set: 320 mL (10/23/19 0916)  Pressure Support: 5 cmH20 (10/24/19 0928)  PEEP/CPAP: 5 cmH20 (10/24/19 0928)  Oxygen Concentration (%): 30 (10/24/19 1057)  Peak Airway Pressure: 10.6 cmH2O (10/24/19 0928)  Total Ve: 6 mL (10/24/19 0928)  F/VT Ratio<105 (RSBI): (!) 64.41 (10/24/19 0731)    Lines/Drains/Airways     Drain                 Gastrostomy/Enterostomy 10/22/19 1630 Percutaneous endoscopic gastrostomy (PEG) 1 day         Urethral Catheter 10/24/19 0430 Straight-tip 16 Fr. less than 1 day          Airway                 Surgical Airway 10/21/19 Shiley Cuffed 3 days          Peripheral Intravenous Line                 Peripheral IV - Single Lumen 10/20/19 1400 Left Forearm 3 days         Peripheral IV - Single Lumen 10/23/19 22 G Right Forearm 1 day                Significant Labs:    CBC/Anemia Profile:  Recent Labs   Lab 10/23/19  0312   WBC 11.58   HGB 11.7*   HCT 40.3      MCV 68*   RDW 17.7*        Chemistries:  Recent Labs   Lab 10/23/19  0312 10/24/19  0237    142   K 3.8 3.9    105   CO2 29 27   BUN 41* 47*   CREATININE 1.4 1.7*   CALCIUM  10.1 10.0   MG  --  2.6   PHOS  --  3.4       All pertinent labs within the past 24 hours have been reviewed.    Significant Imaging:  I have reviewed all pertinent imaging results/findings within the past 24 hours.    Assessment/Plan:     * Acute on chronic respiratory failure  Intubated for airway protection and concern for dysphagia related to new CVA (more likely) vs angioedema. Good lung mechanics and has a cuff leak. Sputum w/ MSSA on culture from 10/14/19.     --Continue Ancef 10/16 for 7 day course.  Small to moderate secretions noted.   --Tracheostomy placement on 10/21.   --tolerating trache colar.  Trache care per ent.      CVA (cerebral vascular accident)  Suspect this is the reason she was drooling and was macroglossic on arrival, and not angioedema.    --PT/OT.    --s/p PEG and trach              Sergo Hernández MD  Pulmonology  [facility]    Will be available upon request.

## 2019-10-24 NOTE — PT/OT/SLP PROGRESS
Occupational Therapy   Treatment    Name: Sherrie Alex  MRN: [id number]  Admitting Diagnosis:  Acute on chronic respiratory failure  2 Days Post-Op    Recommendations:     Discharge Recommendations: LTACH (long-term acute care hospital)  Discharge Equipment Recommendations:  (TBD)  Barriers to discharge:  Decreased caregiver support    Assessment:     Sherrie Alex is a 69 y.o. female with a medical diagnosis of Acute on chronic respiratory failure.  She presents with increased alertness and ability to follow commands.. Performance deficits affecting function are weakness, impaired self care skills, impaired functional mobilty, decreased upper extremity function, decreased lower extremity function, decreased safety awareness, visual deficits, abnormal tone, decreased coordination, impaired cardiopulmonary response to activity.     Rehab Prognosis:  Fair; patient would benefit from acute skilled OT services to address these deficits and reach maximum level of function.       Plan:     Patient to be seen 3 x/week, 5 x/week to address the above listed problems via self-care/home management, therapeutic activities, therapeutic exercises, neuromuscular re-education  · Plan of Care Expires: 11/15/19  · Plan of Care Reviewed with: patient    Subjective     Pain/Comfort:  · Pain Rating 1: 0/10    Objective:     Communicated with: nurse, Lilia prior to session.  Patient found HOB elevated with PEG Tube, oxygen, blood pressure cuff, shepard catheter, telemetry(trach/trach collar) upon OT entry to room.    General Precautions: Standard, fall, respiratory   Orthopedic Precautions:N/A   Braces: N/A     Occupational Performance:     Bed Mobility:    · N/T     Functional Mobility/Transfers:    · Functional Mobility: N/T    Activities of Daily Living:  · Grooming: The patient was dependent to bring warm wash cloth to her face in the left hand. The patient was able to minimally grasp the cloth in the left hand. The  "patient required dependent/hand over hand to wipe her face  The patient tolerated wiping of eyes and mouth.      [facility] 6 Click ADL: 6    Treatment & Education:  The patient received PROM to the RUE x10 reps all planes with no resistance/flacid tone. The patient was able to assist with ROM to the LUE x10 reps. The patient was able to follow commands to visually track tto the right side. The patient used her left hand to communicate with "OK" x2.    Patient left HOB elevated with all lines intact, call button in reach and nurse, Lilia notifiedEducation:      GOALS:   Multidisciplinary Problems     Occupational Therapy Goals        Problem: Occupational Therapy Goal    Goal Priority Disciplines Outcome Interventions   Occupational Therapy Goal     OT, PT/OT Ongoing, Progressing    Description:  Goals to be met by: 11/15/19    Patient will increase functional independence with ADLs by performing:    UE Dressing with Maximum Assistance.  Grooming while in bed with Maximum Assistance.  Sitting at edge of bed : to be assessed as appropriate  Rolling to Bilateral with Maximum Assistance.   Supine to sit :to be assessed as appropriate  Upper extremity exercise program x10 reps per handout, with assistance as needed.                      Time Tracking:     OT Date of Treatment: 10/24/19  OT Start Time: 1624  OT Stop Time: 1639  OT Total Time (min): 15 min    Billable Minutes:Neuromuscular Re-education 15    Annika Parson OT  10/24/2019    "

## 2019-10-24 NOTE — SUBJECTIVE & OBJECTIVE
Interval History: no acute issue with cpap 5/5.      Objective:     Vital Signs (Most Recent):  Temp: 98.4 °F (36.9 °C) (10/24/19 0301)  Pulse: (!) 59 (10/24/19 0731)  Resp: 19 (10/24/19 0731)  BP: (!) 155/80 (10/24/19 0600)  SpO2: 96 % (10/24/19 1057) Vital Signs (24h Range):  Temp:  [97.9 °F (36.6 °C)-98.6 °F (37 °C)] 98.4 °F (36.9 °C)  Pulse:  [55-70] 59  Resp:  [18-34] 19  SpO2:  [96 %-100 %] 96 %  BP: (116-160)/(58-85) 155/80     Weight: 119.2 kg (262 lb 12.6 oz)  Body mass index is 43.73 kg/m².      Intake/Output Summary (Last 24 hours) at 10/24/2019 1205  Last data filed at 10/24/2019 0501  Gross per 24 hour   Intake 1524.22 ml   Output 1150 ml   Net 374.22 ml       Physical Exam   Constitutional: She appears well-developed. She is cooperative. No distress. She is sedated and not intubated.   obese   HENT:   Head: Normocephalic and atraumatic.   S/p trache   Eyes: Pupils are equal, round, and reactive to light. Conjunctivae are normal. Right eye exhibits no exudate. Left eye exhibits no exudate. Right conjunctiva has no hemorrhage. Left conjunctiva has no hemorrhage. No scleral icterus. Right eye exhibits no nystagmus. Left eye exhibits no nystagmus.   Neck: Trachea normal. No neck rigidity. No tracheal deviation present.   Cardiovascular: Normal rate, regular rhythm and normal heart sounds. PMI is not displaced. Exam reveals no gallop and no friction rub.   No murmur heard.  Pulses:       Radial pulses are 2+ on the right side, and 2+ on the left side.        Dorsalis pedis pulses are 2+ on the right side, and 2+ on the left side.   Pulmonary/Chest: Effort normal and breath sounds normal. No accessory muscle usage. She is not intubated. No respiratory distress. She has no wheezes. She has no rhonchi. She has no rales.   Abdominal: Soft. Normal appearance and bowel sounds are normal. There is no tenderness.   Peg in place   Musculoskeletal: Normal range of motion.   Neurological: She is alert. No cranial  nerve deficit. GCS eye subscore is 4. GCS verbal subscore is 1. GCS motor subscore is 6.   Pt alert and follows commands, answers Y/N questions, spontaneously moves BUE 35, BLE 2/5, no focal deficits to note.      Skin: Skin is warm and dry. Capillary refill takes 2 to 3 seconds. She is not diaphoretic. No cyanosis. Nails show no clubbing.   Nursing note and vitals reviewed.      Vents:  Vent Mode: PRVC (10/24/19 0928)  Ventilator Initiated: Yes (10/11/19 0213)  Set Rate: 15 bmp (10/23/19 0916)  Vt Set: 320 mL (10/23/19 0916)  Pressure Support: 5 cmH20 (10/24/19 0928)  PEEP/CPAP: 5 cmH20 (10/24/19 0928)  Oxygen Concentration (%): 30 (10/24/19 1057)  Peak Airway Pressure: 10.6 cmH2O (10/24/19 0928)  Total Ve: 6 mL (10/24/19 0928)  F/VT Ratio<105 (RSBI): (!) 64.41 (10/24/19 0731)    Lines/Drains/Airways     Drain                 Gastrostomy/Enterostomy 10/22/19 1630 Percutaneous endoscopic gastrostomy (PEG) 1 day         Urethral Catheter 10/24/19 0430 Straight-tip 16 Fr. less than 1 day          Airway                 Surgical Airway 10/21/19 Shiley Cuffed 3 days          Peripheral Intravenous Line                 Peripheral IV - Single Lumen 10/20/19 1400 Left Forearm 3 days         Peripheral IV - Single Lumen 10/23/19 22 G Right Forearm 1 day                Significant Labs:    CBC/Anemia Profile:  Recent Labs   Lab 10/23/19  0312   WBC 11.58   HGB 11.7*   HCT 40.3      MCV 68*   RDW 17.7*        Chemistries:  Recent Labs   Lab 10/23/19  0312 10/24/19  0237    142   K 3.8 3.9    105   CO2 29 27   BUN 41* 47*   CREATININE 1.4 1.7*   CALCIUM 10.1 10.0   MG  --  2.6   PHOS  --  3.4       All pertinent labs within the past 24 hours have been reviewed.    Significant Imaging:  I have reviewed all pertinent imaging results/findings within the past 24 hours.

## 2019-10-25 PROBLEM — Z51.5 PALLIATIVE CARE ENCOUNTER: Status: ACTIVE | Noted: 2019-10-25

## 2019-10-25 LAB
ANION GAP SERPL CALC-SCNC: 7 MMOL/L (ref 8–16)
BUN SERPL-MCNC: 46 MG/DL (ref 8–23)
CALCIUM SERPL-MCNC: 9.7 MG/DL (ref 8.7–10.5)
CHLORIDE SERPL-SCNC: 106 MMOL/L (ref 95–110)
CO2 SERPL-SCNC: 29 MMOL/L (ref 23–29)
CREAT SERPL-MCNC: 1.5 MG/DL (ref 0.5–1.4)
EST. GFR  (AFRICAN AMERICAN): 40 ML/MIN/1.73 M^2
EST. GFR  (NON AFRICAN AMERICAN): 35 ML/MIN/1.73 M^2
GLUCOSE SERPL-MCNC: 140 MG/DL (ref 70–110)
POCT GLUCOSE: 121 MG/DL (ref 70–110)
POCT GLUCOSE: 133 MG/DL (ref 70–110)
POCT GLUCOSE: 135 MG/DL (ref 70–110)
POCT GLUCOSE: 156 MG/DL (ref 70–110)
POCT GLUCOSE: 158 MG/DL (ref 70–110)
POCT GLUCOSE: 170 MG/DL (ref 70–110)
POTASSIUM SERPL-SCNC: 4.3 MMOL/L (ref 3.5–5.1)
SODIUM SERPL-SCNC: 142 MMOL/L (ref 136–145)

## 2019-10-25 PROCEDURE — 25000003 PHARM REV CODE 250: Performed by: INTERNAL MEDICINE

## 2019-10-25 PROCEDURE — S0028 INJECTION, FAMOTIDINE, 20 MG: HCPCS | Performed by: INTERNAL MEDICINE

## 2019-10-25 PROCEDURE — 94761 N-INVAS EAR/PLS OXIMETRY MLT: CPT

## 2019-10-25 PROCEDURE — 80048 BASIC METABOLIC PNL TOTAL CA: CPT

## 2019-10-25 PROCEDURE — 99900026 HC AIRWAY MAINTENANCE (STAT)

## 2019-10-25 PROCEDURE — 63600175 PHARM REV CODE 636 W HCPCS: Performed by: INTERNAL MEDICINE

## 2019-10-25 PROCEDURE — 36415 COLL VENOUS BLD VENIPUNCTURE: CPT

## 2019-10-25 PROCEDURE — 27000221 HC OXYGEN, UP TO 24 HOURS

## 2019-10-25 PROCEDURE — 20000000 HC ICU ROOM

## 2019-10-25 PROCEDURE — 94640 AIRWAY INHALATION TREATMENT: CPT

## 2019-10-25 PROCEDURE — 97530 THERAPEUTIC ACTIVITIES: CPT

## 2019-10-25 PROCEDURE — 97110 THERAPEUTIC EXERCISES: CPT

## 2019-10-25 PROCEDURE — 25000242 PHARM REV CODE 250 ALT 637 W/ HCPCS: Performed by: INTERNAL MEDICINE

## 2019-10-25 RX ADMIN — CLONIDINE HYDROCHLORIDE 0.3 MG: 0.1 TABLET ORAL at 08:10

## 2019-10-25 RX ADMIN — IPRATROPIUM BROMIDE AND ALBUTEROL SULFATE 3 ML: .5; 3 SOLUTION RESPIRATORY (INHALATION) at 12:10

## 2019-10-25 RX ADMIN — CHLORHEXIDINE GLUCONATE 0.12% ORAL RINSE 15 ML: 1.2 LIQUID ORAL at 08:10

## 2019-10-25 RX ADMIN — DEXMEDETOMIDINE HYDROCHLORIDE 0.6 MCG/KG/HR: 4 INJECTION, SOLUTION INTRAVENOUS at 10:10

## 2019-10-25 RX ADMIN — INSULIN DETEMIR 60 UNITS: 100 INJECTION, SOLUTION SUBCUTANEOUS at 08:10

## 2019-10-25 RX ADMIN — FENTANYL CITRATE 25 MCG: 50 INJECTION INTRAMUSCULAR; INTRAVENOUS at 08:10

## 2019-10-25 RX ADMIN — LACOSAMIDE 50 MG: 50 TABLET, FILM COATED ORAL at 08:10

## 2019-10-25 RX ADMIN — FAMOTIDINE 20 MG: 10 INJECTION INTRAVENOUS at 09:10

## 2019-10-25 RX ADMIN — HEPARIN SODIUM 5000 UNITS: 5000 INJECTION INTRAVENOUS; SUBCUTANEOUS at 08:10

## 2019-10-25 RX ADMIN — IPRATROPIUM BROMIDE AND ALBUTEROL SULFATE 3 ML: .5; 3 SOLUTION RESPIRATORY (INHALATION) at 07:10

## 2019-10-25 RX ADMIN — DEXMEDETOMIDINE HYDROCHLORIDE 0.5 MCG/KG/HR: 4 INJECTION, SOLUTION INTRAVENOUS at 04:10

## 2019-10-25 RX ADMIN — LABETALOL HYDROCHLORIDE 20 MG: 5 INJECTION INTRAVENOUS at 09:10

## 2019-10-25 RX ADMIN — DEXMEDETOMIDINE HYDROCHLORIDE 0.6 MCG/KG/HR: 4 INJECTION, SOLUTION INTRAVENOUS at 03:10

## 2019-10-25 RX ADMIN — DEXMEDETOMIDINE HYDROCHLORIDE 0.5 MCG/KG/HR: 4 INJECTION, SOLUTION INTRAVENOUS at 10:10

## 2019-10-25 RX ADMIN — AMLODIPINE BESYLATE 10 MG: 5 TABLET ORAL at 08:10

## 2019-10-25 RX ADMIN — IPRATROPIUM BROMIDE AND ALBUTEROL SULFATE 3 ML: .5; 3 SOLUTION RESPIRATORY (INHALATION) at 01:10

## 2019-10-25 RX ADMIN — SENNOSIDES, DOCUSATE SODIUM 1 TABLET: 50; 8.6 TABLET, FILM COATED ORAL at 08:10

## 2019-10-25 RX ADMIN — METOPROLOL TARTRATE 100 MG: 50 TABLET ORAL at 08:10

## 2019-10-25 RX ADMIN — ASPIRIN 325 MG ORAL TABLET 325 MG: 325 PILL ORAL at 08:10

## 2019-10-25 RX ADMIN — IPRATROPIUM BROMIDE AND ALBUTEROL SULFATE 3 ML: .5; 3 SOLUTION RESPIRATORY (INHALATION) at 06:10

## 2019-10-25 RX ADMIN — KETOROLAC TROMETHAMINE 30 MG: 30 INJECTION, SOLUTION INTRAMUSCULAR; INTRAVENOUS at 04:10

## 2019-10-25 RX ADMIN — ROSUVASTATIN CALCIUM 20 MG: 10 TABLET, COATED ORAL at 08:10

## 2019-10-25 NOTE — PT/OT/SLP PROGRESS
Physical Therapy Treatment    Patient Name:  Sherrie Alex   MRN:  [id number]    Recommendations:     Discharge Recommendations:  (ongoing pending progress (LTACH per chart))   Discharge Equipment Recommendations: (ongoing assessment pending progress)   Barriers to discharge: Inaccessible home and Decreased caregiver support    Assessment:     Sherrie Alex is a 69 y.o. female admitted with a medical diagnosis of Acute on chronic respiratory failure.  She presents with the following impairments/functional limitations:  weakness, impaired self care skills, impaired balance, decreased coordination, decreased safety awareness, decreased ROM, impaired endurance, gait instability, decreased lower extremity function, impaired fine motor, impaired cardiopulmonary response to activity, impaired functional mobilty, decreased upper extremity function, impaired muscle length.  Pt able to track with R and L eyes, perform AAROM to LLE/LUE, and attempt to communicate with hand gestures. PROM performed to RLE/RUE and cervical region.    Rehab Prognosis: Fair; patient would benefit from acute skilled PT services to address these deficits and reach maximum level of function.    Recent Surgery: Procedure(s) (LRB):  INSERTION, PEG TUBE (N/A) 3 Days Post-Op    Plan:     During this hospitalization, patient to be seen 3 x/week to address the identified rehab impairments via therapeutic activities, therapeutic exercises, neuromuscular re-education and progress toward the following goals:    · Plan of Care Expires:  10/29/19    Subjective     Chief Complaint: pt is non-verbal, however, no facial grimacing  Patient/Family Comments/goals: Pt agreed to therapy with hand sign for okay.  Pain/Comfort:  · Pain Rating 1: 0/10      Objective:     Communicated with pt's nurse, Tiffani, prior to session.  Patient found left sidelying with PEG Tube, oxygen, peripheral IV, telemetry, pulse ox (continuous), blood pressure cuff, shepard  catheter(trach/trach collar) upon PT entry to room.     General Precautions: Standard, fall, respiratory   Orthopedic Precautions:N/A   Braces: N/A     Functional Mobility:  · Bed Mobility:     · Rolling Left:  total assistance and of 2 persons  · Rolling Right: total assistance and of 2 persons  · Scooting: total assistance and of 2 persons x2 trials towards HOB      AM-PAC 6 CLICK MOBILITY  Turning over in bed (including adjusting bedclothes, sheets and blankets)?: 2  Sitting down on and standing up from a chair with arms (e.g., wheelchair, bedside commode, etc.): 1  Moving from lying on back to sitting on the side of the bed?: 1  Moving to and from a bed to a chair (including a wheelchair)?: 1  Need to walk in hospital room?: 1  Climbing 3-5 steps with a railing?: 1  Basic Mobility Total Score: 7       Therapeutic Activities and Exercises:  - Pt able to track with R and L eye.  -PROM performed to RUE/RLE in all planes with pt in supine position.  -Pt performed AAROM to LLE x12 reps including: ankle pumps, hip abduction/adduction, heel slides  -Pt performed AAROM to LUE x12 reps including: finger flexion, wrist flexion/extension, wrist pronation/supination, bicep curls, and shoulder flexion.  -Passive stretching to cervical region due to increased L lateral flexion.  Towel roll placed to decrease L lateral flexion and assist with decreasing contracture.  Pt's nurse, and family notified.  -Total A for doffing and donning of SCDs and pressure boots.  Pt declined pressure boot to L heel and pillow to elevated left heel at end of session with hand gestures.  Pt's nurse and therapist educated pt and family on reasons for heel elevation.  Family verbalized understanding.  -Family educated on PROM to BLEs/BUEs and Cervical stretching, daughter in law verbalized understanding.    Patient left L sidelying with B SCDs, R pressure boot with all lines intact, call button in reach, pt's nurseTiffani, notified and son and  daughter in law present..    GOALS:   Multidisciplinary Problems     Physical Therapy Goals        Problem: Physical Therapy Goal    Goal Priority Disciplines Outcome Goal Variances Interventions   Physical Therapy Goal     PT, PT/OT Ongoing, Progressing     Description:  Goals to be met by:      Patient will increase functional independence with mobility by performin. Supine to sit to be assessed  2. Rolling to Left with Moderate Assistance.  3. Bed to chair transfer to be assessed  4. Sitting at edge of bed to be assessed  5. Lower extremity exercise program x10  reps per handout, with assistance as needed                      Time Tracking:     PT Received On: 10/25/19  PT Start Time: 1117     PT Stop Time: 1155  PT Total Time (min): 38 min     Billable Minutes: Therapeutic Activity 15 and Therapeutic Exercise 23    Treatment Type: Treatment  PT/PTA: PTA     PTA Visit Number: 1     Lorena Call PTA  10/25/2019

## 2019-10-25 NOTE — PROGRESS NOTES
"[facility] [place]  Progress Note    Patient Name: Sherrie Alex  MRN: [id number]  Patient Class: IP- Inpatient   Admission Date: 10/11/2019  Length of Stay: 14 days  Attending Physician: Angel Luis Aquino MD  Primary Care Provider: Desmond Yang MD        Subjective:     Principal Problem:Acute on chronic respiratory failure        HPI:  patient is a 69 y.o female who presents to the ED complaining of tongue swelling that began PTA. Patient has slurred speech, drooling, and tongue swelling. Patient denies any pain, itching, CP, nausea, vomiting, or fever. Per sisters, patient is normally talkative and active. Her sisters are unaware of any cause of onset, stating that the patient lives alone. PMHx of CVA 1 yr ago, DM, HTN, HLD, and obesity. Patient is allergic to codeine and ace inhibitors.      The history is provided by the patient and a relative. History limited by: Acuity of condition. No  was used.     Pt intubated in ED for airway protection ?laryngeal edema - not noted in ED documentation.  Pt started on IV steroids and H2 blocker on vent. In ICU, pt showed sluggish neuro response to pain and unable to follow commands. Propofol taken off and neuro status improved. Pulm consulted for vent management. Neuro consulted - h/o seizures.  Seen in ED yesterday am with "leg shaking" and weakness and dc'd home. H/o seizures on vimpat.      Overview/Hospital Course:  Ms. Alex was admitted acute neurological changes and respiratory compromise. Intubated in ED. Serial neurological testing off sedation suggested right sided weakness > left. MRI showed:Moderate-sized area of subacute infarction involving the left subinsular white matter extending to the periventricular white matter. No MR evidence for hemorrhage.  Patient treated for an acute CVA and Neurology consult. Allowed for permissive HTN.  Later resumed blood pressure medication after appropriate time " elapsed for permissive hypertension.  Her neurological exam was a barrier to extubation.  She was also treated with IV lasix for diuresis, ET sputum culture sent for climbing WBCs, concerning for possible aspiration. Dr. Bauer met with family and discussed plan to continue attempts to wean from vent, though neuro status/effects of CVA may be limiting factor.  The patient was unable to be weaned from the vent, ENT consulted for trach and GI consulted for PEG tube on 10/22.  was consulted for LTAC.  The patient was changed to trach collar. The patient was accepted at [facility]    Interval History: No new issues.     Review of Systems   Unable to perform ROS: Acuity of condition   Constitutional: Negative for activity change.   HENT: Negative for congestion.    Respiratory: Negative for chest tightness.      Objective:     Vital Signs (Most Recent):  Temp: 97.8 °F (36.6 °C) (10/25/19 0315)  Pulse: 61 (10/25/19 0740)  Resp: 20 (10/25/19 0740)  BP: (!) 146/89 (10/25/19 0645)  SpO2: 99 % (10/25/19 0740) Vital Signs (24h Range):  Temp:  [97.8 °F (36.6 °C)-98.5 °F (36.9 °C)] 97.8 °F (36.6 °C)  Pulse:  [55-89] 61  Resp:  [16-39] 20  SpO2:  [89 %-100 %] 99 %  BP: (111-189)/() 146/89     Weight: 119.2 kg (262 lb 12.6 oz)  Body mass index is 43.73 kg/m².    Intake/Output Summary (Last 24 hours) at 10/25/2019 0856  Last data filed at 10/25/2019 0854  Gross per 24 hour   Intake 1391.39 ml   Output 1420 ml   Net -28.61 ml      Physical Exam   Constitutional: She appears well-developed and well-nourished.   HENT:   Head: Normocephalic and atraumatic.   Cardiovascular: Normal rate and regular rhythm.   Pulmonary/Chest: Effort normal and breath sounds normal. No stridor. No respiratory distress. She has no wheezes.   Abdominal: Soft. She exhibits no distension. There is no rebound and no guarding.   Neurological: She is alert.   Skin: Skin is warm and dry.   Vitals reviewed.      Significant Labs:    BMP:   Recent Labs   Lab 10/24/19  0237 10/25/19  0320   * 140*    142   K 3.9 4.3    106   CO2 27 29   BUN 47* 46*   CREATININE 1.7* 1.5*   CALCIUM 10.0 9.7   MG 2.6  --      CBC: No results for input(s): WBC, HGB, HCT, PLT in the last 48 hours.    Significant Imaging:       Assessment/Plan:      * Acute on chronic respiratory failure  Intubated for airway protection given concern for angioedema initially  Patient remains intubated secondary to acute CVA and and patient did not have to angioedema  Positive cultures with increased secretions  Pulmonary following  Patient is morbidly obese with obstructive sleep apnea which may hinder extubation  Pulmonary discussed possible need for trach and PEG with son  Cephazolin added for positive Staph in the sputum and nebulizer treatments  ENT consulted for trach placement and trach successfully placed today, 10/21  GI for PEG placement, but this could not be performed today, anesthesia did not want to be sedate this patient  Plan for PEG tomorrow by GI (done on 10/22).  As per Pulmonary  Will likely need LTAC placement, will begin consult to  for discharge planning    Now switched to trach collar at 28% Fio2.    Ventilator dependent  Continues on vent       Venous stasis ulcer  No acute issues       CVA (cerebral vascular accident)  MRI of the brain with moderate-sized area of subacute infarction involving the left subinsular white matter extending to the periventricular white matter  Allowed for permissive hypertension along with treatment with aspirin and statin  Appreciate Neurology input  Resumed blood pressure medications for tighter blood pressure control  Weaning from vent hindered by new CVA  Trach done today, and peg rescheduled for tomorrow (10/22)  Will likely need LTAC/rehab placement    Encephalopathy, metabolic  Secondary to CVA  As discussed above    RYLEE (acute kidney injury)  Baseline creatinine of 1.4-1.7  Had slight  improvement now steadily trending up  CO2 level steadily increasing and patient was in negative balance  Held IV Lasix on 10/20  Will hold for another day  Continue to monitor with repeat labs and assess volume status    Seizure  Continue vimpat  No seizures seen on EEG  Appreciate Neurology following    Angio-edema  Initially thought to have angioedema  Likely to have slight droop associated with stroke and not true angioedema    Acquired hypothyroidism  Resume synthroid   Thyroid function test normal    Essential hypertension  Resume home meds: norvasc, clonidine, hydralazine, toprol  IV PRN hydralazine    Type 2 diabetes mellitus with circulatory disorder, with long-term current use of insulin  Uncontrolled with hyperglycemia likely due to dose of dexamethasone given  HGBA1c 6.9%  Glucose better controlled on detemir to 60 units q.h.s. and continue sliding scale insulin  Will make further adjustments to get glucose and range of 140-180    Anxiety  Fentanyl pushes for sedation    Morbid obesity  Discuss weight management after extubation and when mentation is appropriate  Body mass index is 43.73 kg/m².     Dyslipidemia  Continue rosuvastatin        VTE Risk Mitigation (From admission, onward)         Ordered     heparin (porcine) injection 5,000 Units  Every 12 hours      10/11/19 0617     IP VTE HIGH RISK PATIENT  Once      10/11/19 0532     Place ELIEZER hose  Until discontinued      10/11/19 0532     Place sequential compression device  Until discontinued      10/11/19 0532                Critical care time spent on the evaluation and treatment of severe organ dysfunction, review of pertinent labs and imaging studies, discussions with consulting providers and discussions with patient/family: 35 minutes.    Patient looks miserable and likely will not have much more of a recovery.  More misery at LTAC.  Im going to consult Palliative care. She is more hospice appropriate.       Angel Luis Hanson MD  Department of  Hospital Medicine [facility]

## 2019-10-25 NOTE — CARE UPDATE
[facility]  ICU Multidisciplinary Bedside Rounds   SUMMARY     Date: 10/25/2019    Prehospitalization: Home  Admit Date / LOS : 10/11/2019/ 14 days    Diagnosis: Acute on chronic respiratory failure    Consults:        Active: GI, Neuro, OT, PT and SW       Needed: N/A     Code Status: Full Code   Advanced Directive: <no information>    LDA: Guevara and PIV       Central Lines/Site/Justification:Patient Does Not Have Central Line       Urinary Cath/Order/Justification:Critically Ill in ICU    Vasopressors/Infusions:    dexMEDEtomidine in 0.9 % NaCl 0.4 mcg/kg/hr (10/25/19 2099)          GOALS: Volume/ Hemodynamic: N/A                     RASS: 0  alert and calm    CAM ICU: N/A  Pain Management: IV       Pain Controlled: yes     Rhythm: NSR    Respiratory Device: trach collar   Vent Mode: PRVC  Oxygen Concentration (%):  [21-35] 35  Resp Rate Total:  [16 br/min-20 br/min] 18 br/min  PEEP/CPAP:  [5 cmH20] 5 cmH20  Pressure Support:  [5 cmH20] 5 cmH20  Mean Airway Pressure:  [6.9 cmH20-7 cmH20] 6.9 cmH20             Most Recent SBT/ SAT: Pass       MOVE Screen: FAIL and PT Consult    VTE Prophylaxis: Pharm and Mechanical  Mobility: Bedrest  Stress Ulcer Prophylaxis: Yes    Dietary: TF  Tolerance: yes  /  Advancement: @ goal    Isolation: No active isolations    Restraints: No    Significant Dates:  Post Op Date: 10/21 trach 10/22 - peg   Rescue Date: N/A  Imaging/ Diagnostics: 10/14 US of carotid arteries, 10/11 CT/MRI head     Noteworthy Labs:  none    CBC/Anemia Labs: Coags:    Recent Labs   Lab 10/21/19  0311 10/22/19  0342 10/23/19  0312   WBC 11.10 13.38* 11.58   HGB 12.0 12.3 11.7*   HCT 41.1 41.6 40.3    160 152   MCV 68* 67* 68*   RDW 17.9* 18.1* 17.7*    Recent Labs   Lab 10/20/19  1005   INR 1.0        Chemistries:   Recent Labs   Lab 10/23/19  0312 10/24/19  0237 10/25/19  0320    142 142   K 3.8 3.9 4.3    105 106   CO2 29 27 29   BUN 41* 47* 46*   CREATININE 1.4 1.7*  1.5*   CALCIUM 10.1 10.0 9.7   MG  --  2.6  --    PHOS  --  3.4  --         Cardiac Enzymes: Ejection Fractions:    No results for input(s): CPK, CPKMB, MB, TROPONINI in the last 72 hours. No results found for: EF     POCT Glucose: HbA1c:    Recent Labs   Lab 10/24/19  0009 10/24/19  0606 10/24/19  1133 10/24/19  1813 10/24/19  2055 10/24/19  2350   POCTGLUCOSE 145* 173* 159* 158* 158* 156*    Hemoglobin A1C   Date Value Ref Range Status   10/02/2019 6.9 (H) 4.0 - 5.6 % Final     Comment:     ADA Screening Guidelines:  5.7-6.4%  Consistent with prediabetes  >or=6.5%  Consistent with diabetes  High levels of fetal hemoglobin interfere with the HbA1C  assay. Heterozygous hemoglobin variants (HbS, HgC, etc)do  not significantly interfere with this assay.   However, presence of multiple variants may affect accuracy.     06/03/2008 6.7 (H) 4.0 - 6.2 % Final   02/13/2006 6.2 4.5 - 6.2 % Final        Needs from Care Team: wean precedex.    ICU LOS 13d 23h  Level of Care: Critical Care

## 2019-10-25 NOTE — PLAN OF CARE
Problem: Physical Therapy Goal  Goal: Physical Therapy Goal  Description  Goals to be met by:      Patient will increase functional independence with mobility by performin. Supine to sit to be assessed  2. Rolling to Left with Moderate Assistance.  3. Bed to chair transfer to be assessed  4. Sitting at edge of bed to be assessed  5. Lower extremity exercise program x10  reps per handout, with assistance as needed     Outcome: Ongoing, Progressing   Pt able to track with R and L eyes, perform AAROM to LLE/LUE, and attempt to communicate with hand gestures. PROM performed to RLE/RUE and cervical region.

## 2019-10-25 NOTE — PLAN OF CARE
Pt is on 35% trach collar with a 6.0 shiley, will continue to give aerosol tx as scheduled. Will continue to monitor.

## 2019-10-25 NOTE — SUBJECTIVE & OBJECTIVE
Interval History: No new issues.     Review of Systems   Unable to perform ROS: Acuity of condition   Constitutional: Negative for activity change.   HENT: Negative for congestion.    Respiratory: Negative for chest tightness.      Objective:     Vital Signs (Most Recent):  Temp: 97.8 °F (36.6 °C) (10/25/19 0315)  Pulse: 61 (10/25/19 0740)  Resp: 20 (10/25/19 0740)  BP: (!) 146/89 (10/25/19 0645)  SpO2: 99 % (10/25/19 0740) Vital Signs (24h Range):  Temp:  [97.8 °F (36.6 °C)-98.5 °F (36.9 °C)] 97.8 °F (36.6 °C)  Pulse:  [55-89] 61  Resp:  [16-39] 20  SpO2:  [89 %-100 %] 99 %  BP: (111-189)/() 146/89     Weight: 119.2 kg (262 lb 12.6 oz)  Body mass index is 43.73 kg/m².    Intake/Output Summary (Last 24 hours) at 10/25/2019 0856  Last data filed at 10/25/2019 0854  Gross per 24 hour   Intake 1391.39 ml   Output 1420 ml   Net -28.61 ml      Physical Exam   Constitutional: She appears well-developed and well-nourished.   HENT:   Head: Normocephalic and atraumatic.   Cardiovascular: Normal rate and regular rhythm.   Pulmonary/Chest: Effort normal and breath sounds normal. No stridor. No respiratory distress. She has no wheezes.   Abdominal: Soft. She exhibits no distension. There is no rebound and no guarding.   Neurological: She is alert.   Skin: Skin is warm and dry.   Vitals reviewed.      Significant Labs:   BMP:   Recent Labs   Lab 10/24/19  0237 10/25/19  0320   * 140*    142   K 3.9 4.3    106   CO2 27 29   BUN 47* 46*   CREATININE 1.7* 1.5*   CALCIUM 10.0 9.7   MG 2.6  --      CBC: No results for input(s): WBC, HGB, HCT, PLT in the last 48 hours.    Significant Imaging:

## 2019-10-25 NOTE — PLAN OF CARE
Patient continues on trach collar 4L/28%, precedex gtt @ 0.5mcgs. Palliative and spiritual care consulted. Palliative care (Dedra Red) spoke to son Rashaad and his wife at bedside. Also spoke to patient alone. The patient is awake and following commands, able to communicate via non-verbal signals. All VSS & a-febrile. BG's WNL, no insulin required. TF continue @ goal rate. Inner cannula of trach changed this am by RN. c/o pain from headache, PRN med given. No new injuries or fall, will continue to monitor.

## 2019-10-25 NOTE — ASSESSMENT & PLAN NOTE
Intubated for airway protection given concern for angioedema initially  Patient remains intubated secondary to acute CVA and and patient did not have to angioedema  Positive cultures with increased secretions  Pulmonary following  Patient is morbidly obese with obstructive sleep apnea which may hinder extubation  Pulmonary discussed possible need for trach and PEG with son  Cephazolin added for positive Staph in the sputum and nebulizer treatments  ENT consulted for trach placement and trach successfully placed today, 10/21  GI for PEG placement, but this could not be performed today, anesthesia did not want to be sedate this patient  Plan for PEG tomorrow by GI (done on 10/22).  As per Pulmonary  Will likely need LTAC placement, will begin consult to  for discharge planning    Now switched to trach collar at 28% Fio2.

## 2019-10-25 NOTE — CARE UPDATE
[facility]  ICU Multidisciplinary Bedside Rounds     UPDATE     Date: 10/25/2019      Plan of care reviewed with the following, Nurse, Charge Nurse, Physician, Pulm CC and .       Needs/ Goals for the day: Need to clarify post-admission ability to speak from a close family member, as it relates to anticipated change to cuff-less trach by ENT. Palliative and Spiritual Care consulted case. Continue precedex for comfort. Monitor for continuing or increasing diarrhea. Requires frequent trach suctioning.        Level of Care: Critical Care

## 2019-10-25 NOTE — CONSULTS
"[facility]  Palliative Medicine  Consult Note    Patient Name: Sherrie Alex  MRN: [id number]  Admission Date: 10/11/2019  Hospital Length of Stay: 14 days  Code Status: Full Code   Attending Provider: Angel Luis Aquino MD  Consulting Provider: Dedra Red NP  Primary Care Physician: Desmond Yang MD  Principal Problem:Acute on chronic respiratory failure    Patient information was obtained from relative(s), past medical records and ER records.          Thank you for your consult. I will follow-up with patient. Please contact us if you have any additional questions.    Subjective:       HPI:  patient is a 69 y.o female who presents to the ED complaining of tongue swelling that began PTA. Patient has slurred speech, drooling, and tongue swelling. Patient denies any pain, itching, CP, nausea, vomiting, or fever. Per sisters, patient is normally talkative and active. Her sisters are unaware of any cause of onset, stating that the patient lives alone. PMHx of CVA 1 yr ago, DM, HTN, HLD, and obesity. Patient is allergic to codeine and ace inhibitors.      The history is provided by the patient and a relative. History limited by: Acuity of condition. No  was used.      Pt intubated in ED for airway protection ?laryngeal edema - not noted in ED documentation.  Pt started on IV steroids and H2 blocker on vent. In ICU, pt showed sluggish neuro response to pain and unable to follow commands. Propofol taken off and neuro status improved. Pulm consulted for vent management. Neuro consulted - h/o seizures.  Seen in ED yesterday am with "leg shaking" and weakness and dc'd home. H/o seizures on vimpat.       Overview/Hospital Course:  Ms. Alex was admitted acute neurological changes and respiratory compromise. Intubated in ED. Serial neurological testing off sedation suggested right sided weakness > left. MRI showed:Moderate-sized area of subacute infarction involving the " left subinsular white matter extending to the periventricular white matter. No MR evidence for hemorrhage.  Patient treated for an acute CVA and Neurology consult. Allowed for permissive HTN.  Later resumed blood pressure medication after appropriate time elapsed for permissive hypertension.  Her neurological exam was a barrier to extubation.  She was also treated with IV lasix for diuresis, ET sputum culture sent for climbing WBCs, concerning for possible aspiration. Dr. Bauer met with family and discussed plan to continue attempts to wean from vent, though neuro status/effects of CVA may be limiting factor.  The patient was unable to be weaned from the vent, ENT consulted for trach and GI consulted for PEG tube on 10/22.  was consulted for LTAC.  The patient was changed to trach collar. The patient was accepted [facility]       Hospital Course:  No notes on file        Past Medical History:   Diagnosis Date    Anemia     Anxiety     Diabetes mellitus     Hyperlipidemia     Hypertension     Obesity     Proteinuria     Sleep apnea        Past Surgical History:   Procedure Laterality Date    APPENDECTOMY       SECTION      CHOLECYSTECTOMY      DILATION AND CURETTAGE OF UTERUS      TRACHEOTOMY N/A 10/21/2019    Procedure: TRACHEOSTOMY;  Surgeon: Olu Rice MD;  Location: [facility];  Service: ENT;  Laterality: N/A;    TUBAL LIGATION         Review of patient's allergies indicates:   Allergen Reactions    Codeine Anxiety    Ace inhibitors     Diphenhydramine hcl        Medications:  Continuous Infusions:   dexMEDEtomidine in 0.9 % NaCl 0.5 mcg/kg/hr (10/25/19 1045)     Scheduled Meds:   albuterol-ipratropium  3 mL Nebulization Q6H    amLODIPine  10 mg Per OG tube Daily    aspirin  325 mg Per OG tube Daily    chlorhexidine  15 mL Mouth/Throat BID    cloNIDine  0.3 mg Oral BID    fentaNYL  100 mcg Intravenous Once    heparin (porcine)  5,000 Units  Subcutaneous Q12H    insulin detemir U-100  60 Units Subcutaneous QHS    lacosamide  50 mg Oral BID    metoprolol tartrate  100 mg Per OG tube BID    polyethylene glycol  17 g Per NG tube Daily    rosuvastatin  20 mg Per OG tube QHS    senna-docusate 8.6-50 mg  1 tablet Oral BID     PRN Meds:acetaminophen, dextrose 50%, fentaNYL, insulin aspart U-100, ketorolac, labetalol, ondansetron, sodium chloride 0.9%    Family History     Problem Relation (Age of Onset)    Diabetes Brother, Sister    Hypertension Brother    Liver disease Father        Tobacco Use    Smoking status: Never Smoker   Substance and Sexual Activity    Alcohol use: No    Drug use: No    Sexual activity: Yes     Partners: Male       Review of Systems   Unable to perform ROS: Patient nonverbal   Respiratory: Positive for cough.      Objective:     Vital Signs (Most Recent):  Temp: 98.1 °F (36.7 °C) (10/25/19 1115)  Pulse: 61 (10/25/19 1200)  Resp: 19 (10/25/19 1200)  BP: (!) 162/108 (10/25/19 1200)  SpO2: 99 % (10/25/19 1200) Vital Signs (24h Range):  Temp:  [97.8 °F (36.6 °C)-98.9 °F (37.2 °C)] 98.1 °F (36.7 °C)  Pulse:  [53-89] 61  Resp:  [16-46] 19  SpO2:  [83 %-100 %] 99 %  BP: (111-197)/() 162/108     Weight: 119.2 kg (262 lb 12.6 oz)  Body mass index is 43.73 kg/m².    Review of Symptoms  Symptom Assessment (ESAS 0-10 scale)   ESAS 0 1 2 3 4 5 6 7 8 9 10   Pain              Dyspnea              Anxiety              Nausea              Depression               Anorexia              Fatigue              Insomnia              Restlessness               Agitation                Physical Exam   Constitutional:   obese   Cardiovascular: Normal rate and regular rhythm.   Pulmonary/Chest: Breath sounds normal. No stridor. No respiratory distress. She has no wheezes.   Tracheostomy, thick secretions    Abdominal: Soft. Bowel sounds are normal.   Musculoskeletal: She exhibits edema.   Neurological: She is alert.   Oriented to self, follows  commands   Skin: Skin is warm and dry.   Nursing note and vitals reviewed.      Significant Labs: All pertinent labs within the past 24 hours have been reviewed.  CBC:   Recent Labs   Lab 10/23/19  0312   WBC 11.58   HGB 11.7*   HCT 40.3   MCV 68*        BMP:  Recent Labs   Lab 10/25/19  0320   *      K 4.3      CO2 29   BUN 46*   CREATININE 1.5*   CALCIUM 9.7     LFT:  Lab Results   Component Value Date    AST 11 10/10/2019    ALKPHOS 94 10/10/2019    BILITOT 0.7 10/10/2019     Albumin:   Albumin   Date Value Ref Range Status   10/10/2019 3.7 3.5 - 5.2 g/dL Final     Protein:   Total Protein   Date Value Ref Range Status   10/10/2019 8.0 6.0 - 8.4 g/dL Final     Lactic acid:   No results found for: LACTATE    Significant Imaging: I have reviewed all pertinent imaging results/findings within the past 24 hours.    Advance Care Planning   Advanced Directives::  Living Will: No  LaPOST: No  Do Not Resuscitate Status:patient is a full code  Medical Power of : Yes. Agent's Name son Davis   .  Decision-Making Capacity: Family answered questions, Patient unable to communicate due to disease severity/cognitive impairment        ASSESSMENT/PLAN:  Palliative encounter:  Patient is alert and oriented. She is able to follow commands but is unable to speak due to tracheostomy. Trach collar in place and thick secretions. Patient able to follow commands and is able to nod yes and no when answering questions. She was very tearful at times when I talked with her and seemed to understand me. She was able to my hand to let me know she understood.   One of the 3 sons and his wife who is a nurse are in from out of town but state they speak with other 2 sons daily about the patient.      Advance Care Planning       [facility]  I engaged the family in a conversation about advance care planning and we specifically addressed what the goals of care would be moving forward, in light of the patient's change in  "clinical status. We talked about what QOL may look like in days ahead with current condition; rehospitalization, infections, code status/CPR and what they feel the patient would want going forward. The son states he recently had a kidney transplant after 3 years of waiting on donor and he is fully aware of QOL perspective. He would like to have a conversation with his brothers, and with his mother to see if she can help to guide them in any decisions going forward. He states they would like to speak with the medical team to get all information in order to make the best decision for their mother. Updated Dr Hanson        Literature left educate and for family to refer to "Hard choices for loving people"           > 50% of 70 min visit spent in chart review, face to face discussion of goals of care,  symptom assessment, coordination of care and emotional support.    Dedra Red, NP  Palliative Medicine  [facility] [place]            "

## 2019-10-25 NOTE — PLAN OF CARE
Pt currently on Trach Collar with FIO2 set at 28%. RT will continue to monitor pt and wean FIO2 as tolerated.

## 2019-10-25 NOTE — HPI
"HPI:  patient is a 69 y.o female who presents to the ED complaining of tongue swelling that began PTA. Patient has slurred speech, drooling, and tongue swelling. Patient denies any pain, itching, CP, nausea, vomiting, or fever. Per sisters, patient is normally talkative and active. Her sisters are unaware of any cause of onset, stating that the patient lives alone. PMHx of CVA 1 yr ago, DM, HTN, HLD, and obesity. Patient is allergic to codeine and ace inhibitors.      The history is provided by the patient and a relative. History limited by: Acuity of condition. No  was used.      Pt intubated in ED for airway protection ?laryngeal edema - not noted in ED documentation.  Pt started on IV steroids and H2 blocker on vent. In ICU, pt showed sluggish neuro response to pain and unable to follow commands. Propofol taken off and neuro status improved. Pulm consulted for vent management. Neuro consulted - h/o seizures.  Seen in ED yesterday am with "leg shaking" and weakness and dc'd home. H/o seizures on vimpat.       Overview/Hospital Course:  Ms. Alex was admitted acute neurological changes and respiratory compromise. Intubated in ED. Serial neurological testing off sedation suggested right sided weakness > left. MRI showed:Moderate-sized area of subacute infarction involving the left subinsular white matter extending to the periventricular white matter. No MR evidence for hemorrhage.  Patient treated for an acute CVA and Neurology consult. Allowed for permissive HTN.  Later resumed blood pressure medication after appropriate time elapsed for permissive hypertension.  Her neurological exam was a barrier to extubation.  She was also treated with IV lasix for diuresis, ET sputum culture sent for climbing WBCs, concerning for possible aspiration. Dr. Bauer met with family and discussed plan to continue attempts to wean from vent, though neuro status/effects of CVA may be limiting factor.  The " patient was unable to be weaned from the vent, ENT consulted for trach and GI consulted for PEG tube on 10/22.  was consulted for LTAC.  The patient was changed to trach collar. The patient was accepted at [facility]

## 2019-10-25 NOTE — SUBJECTIVE & OBJECTIVE
Past Medical History:   Diagnosis Date    Anemia     Anxiety     Diabetes mellitus     Hyperlipidemia     Hypertension     Obesity     Proteinuria     Sleep apnea        Past Surgical History:   Procedure Laterality Date    APPENDECTOMY       SECTION      CHOLECYSTECTOMY      DILATION AND CURETTAGE OF UTERUS      TRACHEOTOMY N/A 10/21/2019    Procedure: TRACHEOSTOMY;  Surgeon: Olu Rice MD;  Location: [facility];  Service: ENT;  Laterality: N/A;    TUBAL LIGATION         Review of patient's allergies indicates:   Allergen Reactions    Codeine Anxiety    Ace inhibitors     Diphenhydramine hcl        Medications:  Continuous Infusions:   dexMEDEtomidine in 0.9 % NaCl 0.5 mcg/kg/hr (10/25/19 1045)     Scheduled Meds:   albuterol-ipratropium  3 mL Nebulization Q6H    amLODIPine  10 mg Per OG tube Daily    aspirin  325 mg Per OG tube Daily    chlorhexidine  15 mL Mouth/Throat BID    cloNIDine  0.3 mg Oral BID    fentaNYL  100 mcg Intravenous Once    heparin (porcine)  5,000 Units Subcutaneous Q12H    insulin detemir U-100  60 Units Subcutaneous QHS    lacosamide  50 mg Oral BID    metoprolol tartrate  100 mg Per OG tube BID    polyethylene glycol  17 g Per NG tube Daily    rosuvastatin  20 mg Per OG tube QHS    senna-docusate 8.6-50 mg  1 tablet Oral BID     PRN Meds:acetaminophen, dextrose 50%, fentaNYL, insulin aspart U-100, ketorolac, labetalol, ondansetron, sodium chloride 0.9%    Family History     Problem Relation (Age of Onset)    Diabetes Brother, Sister    Hypertension Brother    Liver disease Father        Tobacco Use    Smoking status: Never Smoker   Substance and Sexual Activity    Alcohol use: No    Drug use: No    Sexual activity: Yes     Partners: Male       Review of Systems   Unable to perform ROS: Patient nonverbal   Respiratory: Positive for cough.      Objective:     Vital Signs (Most Recent):  Temp: 98.1 °F (36.7 °C) (10/25/19 1115)  Pulse: 61  (10/25/19 1200)  Resp: 19 (10/25/19 1200)  BP: (!) 162/108 (10/25/19 1200)  SpO2: 99 % (10/25/19 1200) Vital Signs (24h Range):  Temp:  [97.8 °F (36.6 °C)-98.9 °F (37.2 °C)] 98.1 °F (36.7 °C)  Pulse:  [53-89] 61  Resp:  [16-46] 19  SpO2:  [83 %-100 %] 99 %  BP: (111-197)/() 162/108     Weight: 119.2 kg (262 lb 12.6 oz)  Body mass index is 43.73 kg/m².    Review of Symptoms  Symptom Assessment (ESAS 0-10 scale)   ESAS 0 1 2 3 4 5 6 7 8 9 10   Pain              Dyspnea              Anxiety              Nausea              Depression               Anorexia              Fatigue              Insomnia              Restlessness               Agitation                Physical Exam   Constitutional:   obese   Cardiovascular: Normal rate and regular rhythm.   Pulmonary/Chest: Breath sounds normal. No stridor. No respiratory distress. She has no wheezes.   Tracheostomy, thick secretions    Abdominal: Soft. Bowel sounds are normal.   Musculoskeletal: She exhibits edema.   Neurological: She is alert.   Oriented to self, follows commands   Skin: Skin is warm and dry.   Nursing note and vitals reviewed.      Significant Labs: All pertinent labs within the past 24 hours have been reviewed.  CBC:   Recent Labs   Lab 10/23/19  0312   WBC 11.58   HGB 11.7*   HCT 40.3   MCV 68*        BMP:  Recent Labs   Lab 10/25/19  0320   *      K 4.3      CO2 29   BUN 46*   CREATININE 1.5*   CALCIUM 9.7     LFT:  Lab Results   Component Value Date    AST 11 10/10/2019    ALKPHOS 94 10/10/2019    BILITOT 0.7 10/10/2019     Albumin:   Albumin   Date Value Ref Range Status   10/10/2019 3.7 3.5 - 5.2 g/dL Final     Protein:   Total Protein   Date Value Ref Range Status   10/10/2019 8.0 6.0 - 8.4 g/dL Final     Lactic acid:   No results found for: LACTATE    Significant Imaging: I have reviewed all pertinent imaging results/findings within the past 24 hours.    Advance Care Planning   Advanced Directives::  Living Will:  No  LaPOST: No  Do Not Resuscitate Status:patient is a full code  Medical Power of : Yes. Agent's Name son Davis   .  Decision-Making Capacity: Family answered questions, Patient unable to communicate due to disease severity/cognitive impairment        ASSESSMENT/PLAN:  Palliative encounter:  Patient is alert and oriented. She is able to follow commands but is unable to speak due to tracheostomy. Trach collar in place and thick secretions. Patient able to follow commands and is able to nod yes and no when answering questions. She was very tearful at times when I talked with her and seemed to understand me. She was able to my hand to let me know she understood.   One of the 3 sons and his wife who is a nurse are in from out of town but state they speak with other 2 sons daily about the patient.      Advance Care Planning       [facility]  I engaged the family in a conversation about advance care planning and we specifically addressed what the goals of care would be moving forward, in light of the patient's change in clinical status. We talked about what QOL may look like in days ahead with current condition; rehospitalization, infections, code status/CPR and what they feel the patient would want going forward. The son states he recently had a kidney transplant after 3 years of waiting on donor and he is fully aware of QOL perspective. He would like to have a conversation with his brothers, and with his mother to see if she can help to guide them in any decisions going forward. He states they would like to speak with the medical team to get all information in order to make the best decision for their mother. Updated Dr Hanson

## 2019-10-25 NOTE — PLAN OF CARE
Pt on trach collar overnight sats 97% tolerated well. Precedex weaned during the night. Glucerna at goal of 40 ml/hr via PEG. Pt NS on monitor, blood pressure stable and afebrile. Guevara in place. Accuckecks monitored Q 6 hours. One large BM this shift. No falls, injuries, or skin breakdown.

## 2019-10-26 LAB
ANION GAP SERPL CALC-SCNC: 8 MMOL/L (ref 8–16)
BUN SERPL-MCNC: 44 MG/DL (ref 8–23)
CALCIUM SERPL-MCNC: 10 MG/DL (ref 8.7–10.5)
CHLORIDE SERPL-SCNC: 108 MMOL/L (ref 95–110)
CO2 SERPL-SCNC: 27 MMOL/L (ref 23–29)
CREAT SERPL-MCNC: 1.4 MG/DL (ref 0.5–1.4)
EST. GFR  (AFRICAN AMERICAN): 44 ML/MIN/1.73 M^2
EST. GFR  (NON AFRICAN AMERICAN): 38 ML/MIN/1.73 M^2
GLUCOSE SERPL-MCNC: 139 MG/DL (ref 70–110)
POCT GLUCOSE: 125 MG/DL (ref 70–110)
POCT GLUCOSE: 135 MG/DL (ref 70–110)
POCT GLUCOSE: 143 MG/DL (ref 70–110)
POCT GLUCOSE: 147 MG/DL (ref 70–110)
POCT GLUCOSE: 148 MG/DL (ref 70–110)
POCT GLUCOSE: 149 MG/DL (ref 70–110)
POTASSIUM SERPL-SCNC: 4.5 MMOL/L (ref 3.5–5.1)
SODIUM SERPL-SCNC: 143 MMOL/L (ref 136–145)

## 2019-10-26 PROCEDURE — 25000003 PHARM REV CODE 250: Performed by: INTERNAL MEDICINE

## 2019-10-26 PROCEDURE — 63600175 PHARM REV CODE 636 W HCPCS: Performed by: INTERNAL MEDICINE

## 2019-10-26 PROCEDURE — 36415 COLL VENOUS BLD VENIPUNCTURE: CPT

## 2019-10-26 PROCEDURE — 99900026 HC AIRWAY MAINTENANCE (STAT)

## 2019-10-26 PROCEDURE — 27000221 HC OXYGEN, UP TO 24 HOURS

## 2019-10-26 PROCEDURE — 25000242 PHARM REV CODE 250 ALT 637 W/ HCPCS: Performed by: INTERNAL MEDICINE

## 2019-10-26 PROCEDURE — 80048 BASIC METABOLIC PNL TOTAL CA: CPT

## 2019-10-26 PROCEDURE — 94761 N-INVAS EAR/PLS OXIMETRY MLT: CPT

## 2019-10-26 PROCEDURE — 94640 AIRWAY INHALATION TREATMENT: CPT

## 2019-10-26 PROCEDURE — 20000000 HC ICU ROOM

## 2019-10-26 PROCEDURE — C9399 UNCLASSIFIED DRUGS OR BIOLOG: HCPCS | Performed by: INTERNAL MEDICINE

## 2019-10-26 RX ORDER — SODIUM CHLORIDE 9 MG/ML
INJECTION, SOLUTION INTRAVENOUS CONTINUOUS
Status: DISCONTINUED | OUTPATIENT
Start: 2019-10-26 | End: 2019-10-29

## 2019-10-26 RX ADMIN — CHLORHEXIDINE GLUCONATE 0.12% ORAL RINSE 15 ML: 1.2 LIQUID ORAL at 08:10

## 2019-10-26 RX ADMIN — CHLORHEXIDINE GLUCONATE 0.12% ORAL RINSE 15 ML: 1.2 LIQUID ORAL at 09:10

## 2019-10-26 RX ADMIN — IPRATROPIUM BROMIDE AND ALBUTEROL SULFATE 3 ML: .5; 3 SOLUTION RESPIRATORY (INHALATION) at 07:10

## 2019-10-26 RX ADMIN — SENNOSIDES, DOCUSATE SODIUM 1 TABLET: 50; 8.6 TABLET, FILM COATED ORAL at 08:10

## 2019-10-26 RX ADMIN — IPRATROPIUM BROMIDE AND ALBUTEROL SULFATE 3 ML: .5; 3 SOLUTION RESPIRATORY (INHALATION) at 02:10

## 2019-10-26 RX ADMIN — LABETALOL HYDROCHLORIDE 20 MG: 5 INJECTION INTRAVENOUS at 11:10

## 2019-10-26 RX ADMIN — AMLODIPINE BESYLATE 10 MG: 5 TABLET ORAL at 08:10

## 2019-10-26 RX ADMIN — METOPROLOL TARTRATE 100 MG: 50 TABLET ORAL at 08:10

## 2019-10-26 RX ADMIN — INSULIN DETEMIR 60 UNITS: 100 INJECTION, SOLUTION SUBCUTANEOUS at 09:10

## 2019-10-26 RX ADMIN — KETOROLAC TROMETHAMINE 30 MG: 30 INJECTION, SOLUTION INTRAMUSCULAR; INTRAVENOUS at 03:10

## 2019-10-26 RX ADMIN — SODIUM CHLORIDE: 0.9 INJECTION, SOLUTION INTRAVENOUS at 10:10

## 2019-10-26 RX ADMIN — ROSUVASTATIN CALCIUM 20 MG: 10 TABLET, COATED ORAL at 09:10

## 2019-10-26 RX ADMIN — IPRATROPIUM BROMIDE AND ALBUTEROL SULFATE 3 ML: .5; 3 SOLUTION RESPIRATORY (INHALATION) at 12:10

## 2019-10-26 RX ADMIN — DEXMEDETOMIDINE HYDROCHLORIDE 0.5 MCG/KG/HR: 4 INJECTION, SOLUTION INTRAVENOUS at 03:10

## 2019-10-26 RX ADMIN — IPRATROPIUM BROMIDE AND ALBUTEROL SULFATE 3 ML: .5; 3 SOLUTION RESPIRATORY (INHALATION) at 08:10

## 2019-10-26 RX ADMIN — HEPARIN SODIUM 5000 UNITS: 5000 INJECTION INTRAVENOUS; SUBCUTANEOUS at 09:10

## 2019-10-26 RX ADMIN — KETOROLAC TROMETHAMINE 30 MG: 30 INJECTION, SOLUTION INTRAMUSCULAR; INTRAVENOUS at 11:10

## 2019-10-26 RX ADMIN — LACOSAMIDE 50 MG: 50 TABLET, FILM COATED ORAL at 09:10

## 2019-10-26 RX ADMIN — CLONIDINE HYDROCHLORIDE 0.3 MG: 0.1 TABLET ORAL at 08:10

## 2019-10-26 RX ADMIN — POLYETHYLENE GLYCOL 3350 17 G: 17 POWDER, FOR SOLUTION ORAL at 08:10

## 2019-10-26 RX ADMIN — FENTANYL CITRATE 25 MCG: 50 INJECTION INTRAMUSCULAR; INTRAVENOUS at 08:10

## 2019-10-26 RX ADMIN — DEXMEDETOMIDINE HYDROCHLORIDE 0.4 MCG/KG/HR: 4 INJECTION, SOLUTION INTRAVENOUS at 10:10

## 2019-10-26 RX ADMIN — SENNOSIDES, DOCUSATE SODIUM 1 TABLET: 50; 8.6 TABLET, FILM COATED ORAL at 09:10

## 2019-10-26 RX ADMIN — DEXMEDETOMIDINE HYDROCHLORIDE 0.4 MCG/KG/HR: 4 INJECTION, SOLUTION INTRAVENOUS at 07:10

## 2019-10-26 RX ADMIN — METOPROLOL TARTRATE 100 MG: 50 TABLET ORAL at 09:10

## 2019-10-26 RX ADMIN — ASPIRIN 325 MG ORAL TABLET 325 MG: 325 PILL ORAL at 08:10

## 2019-10-26 RX ADMIN — HEPARIN SODIUM 5000 UNITS: 5000 INJECTION INTRAVENOUS; SUBCUTANEOUS at 08:10

## 2019-10-26 RX ADMIN — LACOSAMIDE 50 MG: 50 TABLET, FILM COATED ORAL at 08:10

## 2019-10-26 RX ADMIN — CLONIDINE HYDROCHLORIDE 0.3 MG: 0.1 TABLET ORAL at 09:10

## 2019-10-26 RX ADMIN — FENTANYL CITRATE 25 MCG: 50 INJECTION INTRAMUSCULAR; INTRAVENOUS at 04:10

## 2019-10-26 NOTE — PLAN OF CARE
Pt overnight in ICU. PRN pain med given once. Precedex infusing and weaned. Pt becomes anxious and upset easily. No BM this shift. Guevara in place. PEG in place with Tube feeds infusing at goal rate. Pt on trach collar no difficulties overnight. No falls, injuires, or skin breakdown.

## 2019-10-26 NOTE — ASSESSMENT & PLAN NOTE
Discussed with palliative care on 10/25.  Will likely meet with family and patient on Monday and suggest hospice. Patient looks miserable

## 2019-10-26 NOTE — SUBJECTIVE & OBJECTIVE
Interval History: No new issues     Review of Systems   Unable to perform ROS: Acuity of condition   Constitutional: Negative for activity change.   HENT: Negative for congestion.    Respiratory: Negative for chest tightness.      Objective:     Vital Signs (Most Recent):  Temp: 98.6 °F (37 °C) (10/26/19 0315)  Pulse: 67 (10/26/19 0803)  Resp: (!) 23 (10/26/19 0803)  BP: (!) 165/85 (10/26/19 0630)  SpO2: 99 % (10/26/19 0803) Vital Signs (24h Range):  Temp:  [98.1 °F (36.7 °C)-98.6 °F (37 °C)] 98.6 °F (37 °C)  Pulse:  [52-68] 67  Resp:  [13-23] 23  SpO2:  [83 %-100 %] 99 %  BP: (125-201)/() 165/85     Weight: 119.2 kg (262 lb 12.6 oz)  Body mass index is 43.73 kg/m².    Intake/Output Summary (Last 24 hours) at 10/26/2019 0920  Last data filed at 10/26/2019 0600  Gross per 24 hour   Intake 923.42 ml   Output 775 ml   Net 148.42 ml      Physical Exam   Constitutional: She appears well-developed and well-nourished.   HENT:   Head: Normocephalic and atraumatic.   Cardiovascular: Normal rate and regular rhythm.   Pulmonary/Chest: Effort normal and breath sounds normal. No stridor. No respiratory distress. She has no wheezes.   Abdominal: Soft. She exhibits no distension. There is no rebound and no guarding.   Neurological: She is alert.   Skin: Skin is warm and dry.   Vitals reviewed.      Significant Labs:   BMP:   Recent Labs   Lab 10/26/19  0414   *      K 4.5      CO2 27   BUN 44*   CREATININE 1.4   CALCIUM 10.0     CBC: No results for input(s): WBC, HGB, HCT, PLT in the last 48 hours.    Significant Imaging:

## 2019-10-26 NOTE — PROGRESS NOTES
"[facility]  Progress Note    Patient Name: Sherrie Alex  MRN: [id number]  Patient Class: IP- Inpatient   Admission Date: 10/11/2019  Length of Stay: 15 days  Attending Physician: Angel Luis Aquino MD  Primary Care Provider: Desmond Yang MD        Subjective:     Principal Problem:Acute on chronic respiratory failure        HPI:  patient is a 69 y.o female who presents to the ED complaining of tongue swelling that began PTA. Patient has slurred speech, drooling, and tongue swelling. Patient denies any pain, itching, CP, nausea, vomiting, or fever. Per sisters, patient is normally talkative and active. Her sisters are unaware of any cause of onset, stating that the patient lives alone. PMHx of CVA 1 yr ago, DM, HTN, HLD, and obesity. Patient is allergic to codeine and ace inhibitors.      The history is provided by the patient and a relative. History limited by: Acuity of condition. No  was used.     Pt intubated in ED for airway protection ?laryngeal edema - not noted in ED documentation.  Pt started on IV steroids and H2 blocker on vent. In ICU, pt showed sluggish neuro response to pain and unable to follow commands. Propofol taken off and neuro status improved. Pulm consulted for vent management. Neuro consulted - h/o seizures.  Seen in ED yesterday am with "leg shaking" and weakness and dc'd home. H/o seizures on vimpat.      Overview/Hospital Course:  Ms. Alex was admitted acute neurological changes and respiratory compromise. Intubated in ED. Serial neurological testing off sedation suggested right sided weakness > left. MRI showed:Moderate-sized area of subacute infarction involving the left subinsular white matter extending to the periventricular white matter. No MR evidence for hemorrhage.  Patient treated for an acute CVA and Neurology consult. Allowed for permissive HTN.  Later resumed blood pressure medication after appropriate time " elapsed for permissive hypertension.  Her neurological exam was a barrier to extubation.  She was also treated with IV lasix for diuresis, ET sputum culture sent for climbing WBCs, concerning for possible aspiration. Dr. Bauer met with family and discussed plan to continue attempts to wean from vent, though neuro status/effects of CVA may be limiting factor.  The patient was unable to be weaned from the vent, ENT consulted for trach and GI consulted for PEG tube on 10/22.  was consulted for LTAC.  The patient was changed to trach collar. The patient was accepted at [facility]. Discussion was held again with palliative care to consider hospice.    Interval History: No new issues     Review of Systems   Unable to perform ROS: Acuity of condition   Constitutional: Negative for activity change.   HENT: Negative for congestion.    Respiratory: Negative for chest tightness.      Objective:     Vital Signs (Most Recent):  Temp: 98.6 °F (37 °C) (10/26/19 0315)  Pulse: 67 (10/26/19 0803)  Resp: (!) 23 (10/26/19 0803)  BP: (!) 165/85 (10/26/19 0630)  SpO2: 99 % (10/26/19 0803) Vital Signs (24h Range):  Temp:  [98.1 °F (36.7 °C)-98.6 °F (37 °C)] 98.6 °F (37 °C)  Pulse:  [52-68] 67  Resp:  [13-23] 23  SpO2:  [83 %-100 %] 99 %  BP: (125-201)/() 165/85     Weight: 119.2 kg (262 lb 12.6 oz)  Body mass index is 43.73 kg/m².    Intake/Output Summary (Last 24 hours) at 10/26/2019 0920  Last data filed at 10/26/2019 0600  Gross per 24 hour   Intake 923.42 ml   Output 775 ml   Net 148.42 ml      Physical Exam   Constitutional: She appears well-developed and well-nourished.   HENT:   Head: Normocephalic and atraumatic.   Cardiovascular: Normal rate and regular rhythm.   Pulmonary/Chest: Effort normal and breath sounds normal. No stridor. No respiratory distress. She has no wheezes.   Abdominal: Soft. She exhibits no distension. There is no rebound and no guarding.   Neurological: She is alert.   Skin: Skin  is warm and dry.   Vitals reviewed.      Significant Labs:   BMP:   Recent Labs   Lab 10/26/19  0414   *      K 4.5      CO2 27   BUN 44*   CREATININE 1.4   CALCIUM 10.0     CBC: No results for input(s): WBC, HGB, HCT, PLT in the last 48 hours.    Significant Imaging:       Assessment/Plan:      * Acute on chronic respiratory failure  Intubated for airway protection given concern for angioedema initially  Patient remains intubated secondary to acute CVA and and patient did not have to angioedema  Positive cultures with increased secretions  Pulmonary following  Patient is morbidly obese with obstructive sleep apnea which may hinder extubation  Pulmonary discussed possible need for trach and PEG with son  Cephazolin added for positive Staph in the sputum and nebulizer treatments  ENT consulted for trach placement and trach successfully placed today, 10/21  GI for PEG placement, but this could not be performed today, anesthesia did not want to be sedate this patient  Plan for PEG tomorrow by GI (done on 10/22).  As per Pulmonary  Will likely need LTAC placement, will begin consult to  for discharge planning    Now switched to trach collar at 28% Fio2.      Palliative care encounter  Discussed with palliative care on 10/25.  Will likely meet with family and patient on Monday and suggest hospice. Patient looks miserable      Ventilator dependent  Continues on vent       Venous stasis ulcer  No acute issues       CVA (cerebral vascular accident)  MRI of the brain with moderate-sized area of subacute infarction involving the left subinsular white matter extending to the periventricular white matter  Allowed for permissive hypertension along with treatment with aspirin and statin  Appreciate Neurology input  Resumed blood pressure medications for tighter blood pressure control  Weaning from vent hindered by new CVA  Trach done today, and peg rescheduled for tomorrow (10/22)  Will likely  need LTAC/rehab placement    Encephalopathy, metabolic  Secondary to CVA  As discussed above    RYLEE (acute kidney injury)  Baseline creatinine of 1.4-1.7  Had slight improvement now steadily trending up  CO2 level steadily increasing and patient was in negative balance  Held IV Lasix on 10/20  Will hold for another day  Continue to monitor with repeat labs and assess volume status    Seizure  Continue vimpat  No seizures seen on EEG  Appreciate Neurology following    Angio-edema  Initially thought to have angioedema  Likely to have slight droop associated with stroke and not true angioedema    Acquired hypothyroidism  Resume synthroid   Thyroid function test normal    Essential hypertension  Resume home meds: norvasc, clonidine, hydralazine, toprol  IV PRN hydralazine    Type 2 diabetes mellitus with circulatory disorder, with long-term current use of insulin  Uncontrolled with hyperglycemia likely due to dose of dexamethasone given  HGBA1c 6.9%  Glucose better controlled on detemir to 60 units q.h.s. and continue sliding scale insulin  Will make further adjustments to get glucose and range of 140-180    Anxiety  Fentanyl pushes for sedation    Morbid obesity  Discuss weight management after extubation and when mentation is appropriate  Body mass index is 43.73 kg/m².     Dyslipidemia  Continue rosuvastatin        VTE Risk Mitigation (From admission, onward)         Ordered     heparin (porcine) injection 5,000 Units  Every 12 hours      10/11/19 0617     IP VTE HIGH RISK PATIENT  Once      10/11/19 0532     Place ELIEZER hose  Until discontinued      10/11/19 0532     Place sequential compression device  Until discontinued      10/11/19 0532                Critical care time spent on the evaluation and treatment of severe organ dysfunction, review of pertinent labs and imaging studies, discussions with consulting providers and discussions with patient/family: 20  Minutes.    Advanced care planning addendum  1:40pm  Spent 15 minutes meeting with family and suggesting hospice. They will consider    Advanced care planning addendum 4:55pm  Another 15 minutes meeting with family.       Angel Luis Hanson MD  Department of Hospital Medicine   [facility]

## 2019-10-26 NOTE — CARE UPDATE
[facility]  ICU Multidisciplinary Bedside Rounds   SUMMARY     Date: 10/26/2019    Prehospitalization: Home  Admit Date / LOS : 10/11/2019/ 15 days    Diagnosis: Acute on chronic respiratory failure    Consults:        Active: GI, Neuro, OT, PT and SW       Needed: N/A     Code Status: Full Code   Advanced Directive: <no information>    LDA: Guevara and PIV       Central Lines/Site/Justification:Patient Does Not Have Central Line       Urinary Cath/Order/Justification:Critically Ill in ICU    Vasopressors/Infusions:    dexMEDEtomidine in 0.9 % NaCl 0.401 mcg/kg/hr (10/26/19 0600)          GOALS: Volume/ Hemodynamic: N/A                     RASS: 0  alert and calm    CAM ICU: N/A  Pain Management: IV       Pain Controlled: yes     Rhythm: NSR    Respiratory Device: Vent    Oxygen Concentration (%):  [28] 28             Most Recent SBT/ SAT: N/A       MOVE Screen: FAIL and PT Consult    VTE Prophylaxis: Pharm and Mechanical  Mobility: Bedrest  Stress Ulcer Prophylaxis: Yes    Dietary: TF  Tolerance: yes  /  Advancement: @ goal    Isolation: No active isolations    Restraints: No    Significant Dates:  Post Op Date: N/A  Rescue Date: N/A  Imaging/ Diagnostics: N/A    Noteworthy Labs:  none    CBC/Anemia Labs: Coags:    Recent Labs   Lab 10/21/19  0311 10/22/19  0342 10/23/19  0312   WBC 11.10 13.38* 11.58   HGB 12.0 12.3 11.7*   HCT 41.1 41.6 40.3    160 152   MCV 68* 67* 68*   RDW 17.9* 18.1* 17.7*    Recent Labs   Lab 10/20/19  1005   INR 1.0        Chemistries:   Recent Labs   Lab 10/24/19  0237 10/25/19  0320 10/26/19  0414    142 143   K 3.9 4.3 4.5    106 108   CO2 27 29 27   BUN 47* 46* 44*   CREATININE 1.7* 1.5* 1.4   CALCIUM 10.0 9.7 10.0   MG 2.6  --   --    PHOS 3.4  --   --         Cardiac Enzymes: Ejection Fractions:    No results for input(s): CPK, CPKMB, MB, TROPONINI in the last 72 hours. No results found for: EF     POCT Glucose: HbA1c:    Recent Labs   Lab  10/25/19  0550 10/25/19  1238 10/25/19  1729 10/25/19  2018 10/26/19  0018 10/26/19  0533   POCTGLUCOSE 133* 170* 135* 121* 147* 125*    Hemoglobin A1C   Date Value Ref Range Status   10/02/2019 6.9 (H) 4.0 - 5.6 % Final     Comment:     ADA Screening Guidelines:  5.7-6.4%  Consistent with prediabetes  >or=6.5%  Consistent with diabetes  High levels of fetal hemoglobin interfere with the HbA1C  assay. Heterozygous hemoglobin variants (HbS, HgC, etc)do  not significantly interfere with this assay.   However, presence of multiple variants may affect accuracy.     06/03/2008 6.7 (H) 4.0 - 6.2 % Final   02/13/2006 6.2 4.5 - 6.2 % Final        Needs from Care Team: none     ICU LOS 15d 1h  Level of Care: Critical Care

## 2019-10-27 LAB
ANION GAP SERPL CALC-SCNC: 8 MMOL/L (ref 8–16)
BUN SERPL-MCNC: 39 MG/DL (ref 8–23)
CALCIUM SERPL-MCNC: 9.9 MG/DL (ref 8.7–10.5)
CHLORIDE SERPL-SCNC: 109 MMOL/L (ref 95–110)
CO2 SERPL-SCNC: 26 MMOL/L (ref 23–29)
CREAT SERPL-MCNC: 1.2 MG/DL (ref 0.5–1.4)
EST. GFR  (AFRICAN AMERICAN): 53 ML/MIN/1.73 M^2
EST. GFR  (NON AFRICAN AMERICAN): 46 ML/MIN/1.73 M^2
GLUCOSE SERPL-MCNC: 157 MG/DL (ref 70–110)
POCT GLUCOSE: 122 MG/DL (ref 70–110)
POCT GLUCOSE: 123 MG/DL (ref 70–110)
POCT GLUCOSE: 155 MG/DL (ref 70–110)
POCT GLUCOSE: 157 MG/DL (ref 70–110)
POCT GLUCOSE: 159 MG/DL (ref 70–110)
POTASSIUM SERPL-SCNC: 4.8 MMOL/L (ref 3.5–5.1)
SODIUM SERPL-SCNC: 143 MMOL/L (ref 136–145)

## 2019-10-27 PROCEDURE — 80048 BASIC METABOLIC PNL TOTAL CA: CPT

## 2019-10-27 PROCEDURE — 20000000 HC ICU ROOM

## 2019-10-27 PROCEDURE — 94640 AIRWAY INHALATION TREATMENT: CPT

## 2019-10-27 PROCEDURE — 94761 N-INVAS EAR/PLS OXIMETRY MLT: CPT

## 2019-10-27 PROCEDURE — 63600175 PHARM REV CODE 636 W HCPCS: Performed by: INTERNAL MEDICINE

## 2019-10-27 PROCEDURE — 25000003 PHARM REV CODE 250: Performed by: INTERNAL MEDICINE

## 2019-10-27 PROCEDURE — 27000221 HC OXYGEN, UP TO 24 HOURS

## 2019-10-27 PROCEDURE — 25000242 PHARM REV CODE 250 ALT 637 W/ HCPCS: Performed by: INTERNAL MEDICINE

## 2019-10-27 PROCEDURE — 25000003 PHARM REV CODE 250: Performed by: HOSPITALIST

## 2019-10-27 PROCEDURE — 99900026 HC AIRWAY MAINTENANCE (STAT)

## 2019-10-27 PROCEDURE — 36415 COLL VENOUS BLD VENIPUNCTURE: CPT

## 2019-10-27 RX ORDER — MICONAZOLE NITRATE 2 %
POWDER (GRAM) TOPICAL 2 TIMES DAILY
Status: DISCONTINUED | OUTPATIENT
Start: 2019-10-27 | End: 2019-10-29 | Stop reason: HOSPADM

## 2019-10-27 RX ADMIN — DEXMEDETOMIDINE HYDROCHLORIDE 0.3 MCG/KG/HR: 4 INJECTION, SOLUTION INTRAVENOUS at 12:10

## 2019-10-27 RX ADMIN — LACOSAMIDE 50 MG: 50 TABLET, FILM COATED ORAL at 09:10

## 2019-10-27 RX ADMIN — SENNOSIDES, DOCUSATE SODIUM 1 TABLET: 50; 8.6 TABLET, FILM COATED ORAL at 08:10

## 2019-10-27 RX ADMIN — MICONAZOLE NITRATE: 20 POWDER TOPICAL at 08:10

## 2019-10-27 RX ADMIN — LABETALOL HYDROCHLORIDE 20 MG: 5 INJECTION INTRAVENOUS at 06:10

## 2019-10-27 RX ADMIN — IPRATROPIUM BROMIDE AND ALBUTEROL SULFATE 3 ML: .5; 3 SOLUTION RESPIRATORY (INHALATION) at 01:10

## 2019-10-27 RX ADMIN — POLYETHYLENE GLYCOL 3350 17 G: 17 POWDER, FOR SOLUTION ORAL at 08:10

## 2019-10-27 RX ADMIN — IPRATROPIUM BROMIDE AND ALBUTEROL SULFATE 3 ML: .5; 3 SOLUTION RESPIRATORY (INHALATION) at 07:10

## 2019-10-27 RX ADMIN — ROSUVASTATIN CALCIUM 20 MG: 10 TABLET, COATED ORAL at 08:10

## 2019-10-27 RX ADMIN — FENTANYL CITRATE 25 MCG: 50 INJECTION INTRAMUSCULAR; INTRAVENOUS at 02:10

## 2019-10-27 RX ADMIN — SODIUM CHLORIDE: 0.9 INJECTION, SOLUTION INTRAVENOUS at 12:10

## 2019-10-27 RX ADMIN — CHLORHEXIDINE GLUCONATE 0.12% ORAL RINSE 15 ML: 1.2 LIQUID ORAL at 08:10

## 2019-10-27 RX ADMIN — CLONIDINE HYDROCHLORIDE 0.3 MG: 0.1 TABLET ORAL at 08:10

## 2019-10-27 RX ADMIN — AMLODIPINE BESYLATE 10 MG: 5 TABLET ORAL at 09:10

## 2019-10-27 RX ADMIN — INSULIN ASPART 2 UNITS: 100 INJECTION, SOLUTION INTRAVENOUS; SUBCUTANEOUS at 05:10

## 2019-10-27 RX ADMIN — MICONAZOLE NITRATE: 20 POWDER TOPICAL at 02:10

## 2019-10-27 RX ADMIN — IPRATROPIUM BROMIDE AND ALBUTEROL SULFATE 3 ML: .5; 3 SOLUTION RESPIRATORY (INHALATION) at 08:10

## 2019-10-27 RX ADMIN — INSULIN ASPART 2 UNITS: 100 INJECTION, SOLUTION INTRAVENOUS; SUBCUTANEOUS at 12:10

## 2019-10-27 RX ADMIN — METOPROLOL TARTRATE 100 MG: 50 TABLET ORAL at 08:10

## 2019-10-27 RX ADMIN — IPRATROPIUM BROMIDE AND ALBUTEROL SULFATE 3 ML: .5; 3 SOLUTION RESPIRATORY (INHALATION) at 12:10

## 2019-10-27 RX ADMIN — INSULIN DETEMIR 60 UNITS: 100 INJECTION, SOLUTION SUBCUTANEOUS at 11:10

## 2019-10-27 RX ADMIN — LACOSAMIDE 50 MG: 50 TABLET, FILM COATED ORAL at 08:10

## 2019-10-27 RX ADMIN — CLONIDINE HYDROCHLORIDE 0.3 MG: 0.1 TABLET ORAL at 09:10

## 2019-10-27 RX ADMIN — FENTANYL CITRATE 25 MCG: 50 INJECTION INTRAMUSCULAR; INTRAVENOUS at 09:10

## 2019-10-27 RX ADMIN — HEPARIN SODIUM 5000 UNITS: 5000 INJECTION INTRAVENOUS; SUBCUTANEOUS at 08:10

## 2019-10-27 RX ADMIN — FENTANYL CITRATE 25 MCG: 50 INJECTION INTRAMUSCULAR; INTRAVENOUS at 06:10

## 2019-10-27 RX ADMIN — METOPROLOL TARTRATE 100 MG: 50 TABLET ORAL at 09:10

## 2019-10-27 RX ADMIN — DEXMEDETOMIDINE HYDROCHLORIDE 0.4 MCG/KG/HR: 4 INJECTION, SOLUTION INTRAVENOUS at 05:10

## 2019-10-27 RX ADMIN — ASPIRIN 325 MG ORAL TABLET 325 MG: 325 PILL ORAL at 08:10

## 2019-10-27 NOTE — PLAN OF CARE
Pt remains in ICU. PRN Fent given once when patient became upset. Precedex infusing. Tube feedings at goal rate with no problems. Guevara in place with 850 cc of urine. Pt tolerated trach collar overnight. NS at 75 mL/hr infusing. No falls, injuries, or skin breakdown No falls, injures, or skin breakdown.

## 2019-10-27 NOTE — PROGRESS NOTES
"[facility] Medicine  Progress Note    Patient Name: Sherrie Alex  MRN: [id number]  Patient Class: IP- Inpatient   Admission Date: 10/11/2019  Length of Stay: 16 days  Attending Physician: Angel Luis Aquino MD  Primary Care Provider: Desmond Yang MD        Subjective:     Principal Problem:Acute on chronic respiratory failure        HPI:  patient is a 69 y.o female who presents to the ED complaining of tongue swelling that began PTA. Patient has slurred speech, drooling, and tongue swelling. Patient denies any pain, itching, CP, nausea, vomiting, or fever. Per sisters, patient is normally talkative and active. Her sisters are unaware of any cause of onset, stating that the patient lives alone. PMHx of CVA 1 yr ago, DM, HTN, HLD, and obesity. Patient is allergic to codeine and ace inhibitors.      The history is provided by the patient and a relative. History limited by: Acuity of condition. No  was used.     Pt intubated in ED for airway protection ?laryngeal edema - not noted in ED documentation.  Pt started on IV steroids and H2 blocker on vent. In ICU, pt showed sluggish neuro response to pain and unable to follow commands. Propofol taken off and neuro status improved. Pulm consulted for vent management. Neuro consulted - h/o seizures.  Seen in ED yesterday am with "leg shaking" and weakness and dc'd home. H/o seizures on vimpat.      Overview/Hospital Course:  Ms. Alex was admitted acute neurological changes and respiratory compromise. Intubated in ED. Serial neurological testing off sedation suggested right sided weakness > left. MRI showed:Moderate-sized area of subacute infarction involving the left subinsular white matter extending to the periventricular white matter. No MR evidence for hemorrhage.  Patient treated for an acute CVA and Neurology consult. Allowed for permissive HTN.  Later resumed blood pressure medication after appropriate time " elapsed for permissive hypertension.  Her neurological exam was a barrier to extubation.  She was also treated with IV lasix for diuresis, ET sputum culture sent for climbing WBCs, concerning for possible aspiration. Dr. Bauer met with family and discussed plan to continue attempts to wean from vent, though neuro status/effects of CVA may be limiting factor.  The patient was unable to be weaned from the vent, ENT consulted for trach and GI consulted for PEG tube on 10/22.  was consulted for LTAC.  The patient was changed to trach collar. The patient was accepted at [facility]. Discussion was held again with palliative care to consider hospice.    Interval History:  No changes       Review of Systems   Unable to perform ROS: Acuity of condition   Constitutional: Negative for activity change.   HENT: Negative for congestion.    Respiratory: Negative for chest tightness.      Objective:     Vital Signs (Most Recent):  Temp: 97.7 °F (36.5 °C) (10/27/19 0800)  Pulse: 67 (10/27/19 0800)  Resp: 18 (10/27/19 0800)  BP: 133/69 (10/27/19 0800)  SpO2: 98 % (10/27/19 0800) Vital Signs (24h Range):  Temp:  [97.3 °F (36.3 °C)-98.5 °F (36.9 °C)] 97.7 °F (36.5 °C)  Pulse:  [54-75] 67  Resp:  [13-31] 18  SpO2:  [93 %-100 %] 98 %  BP: (124-210)/() 133/69     Weight: 119.2 kg (262 lb 12.6 oz)  Body mass index is 43.73 kg/m².    Intake/Output Summary (Last 24 hours) at 10/27/2019 0855  Last data filed at 10/27/2019 0800  Gross per 24 hour   Intake 3049.5 ml   Output 2010 ml   Net 1039.5 ml      Physical Exam   Constitutional: She appears well-developed and well-nourished.   HENT:   Head: Normocephalic and atraumatic.   Cardiovascular: Normal rate and regular rhythm.   Pulmonary/Chest: Effort normal and breath sounds normal. No stridor. No respiratory distress. She has no wheezes.   Abdominal: Soft. She exhibits no distension. There is no rebound and no guarding.   Neurological: She is alert.   Skin: Skin  is warm and dry.   Vitals reviewed.      Significant Labs:   BMP:   Recent Labs   Lab 10/27/19  0351   *      K 4.8      CO2 26   BUN 39*   CREATININE 1.2   CALCIUM 9.9     CBC: No results for input(s): WBC, HGB, HCT, PLT in the last 48 hours.    Significant Imaging:       Assessment/Plan:      * Acute on chronic respiratory failure  Intubated for airway protection given concern for angioedema initially  Patient remains intubated secondary to acute CVA and and patient did not have to angioedema  Positive cultures with increased secretions  Pulmonary following  Patient is morbidly obese with obstructive sleep apnea which may hinder extubation  Pulmonary discussed possible need for trach and PEG with son  Cephazolin added for positive Staph in the sputum and nebulizer treatments  ENT consulted for trach placement and trach successfully placed today, 10/21  GI for PEG placement, but this could not be performed today, anesthesia did not want to be sedate this patient  Plan for PEG tomorrow by GI (done on 10/22).  As per Pulmonary  Will likely need LTAC placement, will begin consult to  for discharge planning    Now switched to trach collar at 28% Fio2.      Palliative care encounter  Discussed with palliative care on 10/25.  Will likely meet with family and patient on Monday and suggest hospice. Patient looks miserable      Ventilator dependent  Continues on vent       Venous stasis ulcer  No acute issues       CVA (cerebral vascular accident)  MRI of the brain with moderate-sized area of subacute infarction involving the left subinsular white matter extending to the periventricular white matter  Allowed for permissive hypertension along with treatment with aspirin and statin  Appreciate Neurology input  Resumed blood pressure medications for tighter blood pressure control  Weaning from vent hindered by new CVA  Trach done today, and peg rescheduled for tomorrow (10/22)  Will likely  need LTAC/rehab placement    Encephalopathy, metabolic  Secondary to CVA  As discussed above    RYLEE (acute kidney injury)  Baseline creatinine of 1.4-1.7  Had slight improvement now steadily trending up  CO2 level steadily increasing and patient was in negative balance  Held IV Lasix on 10/20  Will hold for another day  Continue to monitor with repeat labs and assess volume status    Seizure  Continue vimpat  No seizures seen on EEG  Appreciate Neurology following    Angio-edema  Initially thought to have angioedema  Likely to have slight droop associated with stroke and not true angioedema    Acquired hypothyroidism  Resume synthroid   Thyroid function test normal    Essential hypertension  Resume home meds: norvasc, clonidine, hydralazine, toprol  IV PRN hydralazine    Type 2 diabetes mellitus with circulatory disorder, with long-term current use of insulin  Uncontrolled with hyperglycemia likely due to dose of dexamethasone given  HGBA1c 6.9%  Glucose better controlled on detemir to 60 units q.h.s. and continue sliding scale insulin  Will make further adjustments to get glucose and range of 140-180    Anxiety  Fentanyl pushes for sedation    Morbid obesity  Discuss weight management after extubation and when mentation is appropriate  Body mass index is 43.73 kg/m².     Dyslipidemia  Continue rosuvastatin        VTE Risk Mitigation (From admission, onward)         Ordered     heparin (porcine) injection 5,000 Units  Every 12 hours      10/11/19 0617     IP VTE HIGH RISK PATIENT  Once      10/11/19 0532     Place ELIEZER hose  Until discontinued      10/11/19 0532     Place sequential compression device  Until discontinued      10/11/19 0532                Critical care time spent on the evaluation and treatment of severe organ dysfunction, review of pertinent labs and imaging studies, discussions with consulting providers and discussions with patient/family: 20  Minutes.    Multiple family conferences on 10/26. Family  considering hospice.       Angel Luis Hanson MD  Department of Hospital Medicine   [facility]

## 2019-10-27 NOTE — SUBJECTIVE & OBJECTIVE
Interval History:  No changes       Review of Systems   Unable to perform ROS: Acuity of condition   Constitutional: Negative for activity change.   HENT: Negative for congestion.    Respiratory: Negative for chest tightness.      Objective:     Vital Signs (Most Recent):  Temp: 97.7 °F (36.5 °C) (10/27/19 0800)  Pulse: 67 (10/27/19 0800)  Resp: 18 (10/27/19 0800)  BP: 133/69 (10/27/19 0800)  SpO2: 98 % (10/27/19 0800) Vital Signs (24h Range):  Temp:  [97.3 °F (36.3 °C)-98.5 °F (36.9 °C)] 97.7 °F (36.5 °C)  Pulse:  [54-75] 67  Resp:  [13-31] 18  SpO2:  [93 %-100 %] 98 %  BP: (124-210)/() 133/69     Weight: 119.2 kg (262 lb 12.6 oz)  Body mass index is 43.73 kg/m².    Intake/Output Summary (Last 24 hours) at 10/27/2019 0855  Last data filed at 10/27/2019 0800  Gross per 24 hour   Intake 3049.5 ml   Output 2010 ml   Net 1039.5 ml      Physical Exam   Constitutional: She appears well-developed and well-nourished.   HENT:   Head: Normocephalic and atraumatic.   Cardiovascular: Normal rate and regular rhythm.   Pulmonary/Chest: Effort normal and breath sounds normal. No stridor. No respiratory distress. She has no wheezes.   Abdominal: Soft. She exhibits no distension. There is no rebound and no guarding.   Neurological: She is alert.   Skin: Skin is warm and dry.   Vitals reviewed.      Significant Labs:   BMP:   Recent Labs   Lab 10/27/19  0351   *      K 4.8      CO2 26   BUN 39*   CREATININE 1.2   CALCIUM 9.9     CBC: No results for input(s): WBC, HGB, HCT, PLT in the last 48 hours.    Significant Imaging:

## 2019-10-27 NOTE — CARE UPDATE
[facility]  ICU Multidisciplinary Bedside Rounds   SUMMARY     Date: 10/27/2019    Prehospitalization: Home  Admit Date / LOS : 10/11/2019/ 16 days    Diagnosis: Acute on chronic respiratory failure    Consults:        Active: GI, Neuro, OT, PT and SW       Needed: Wound Care     Code Status: Full Code   Advanced Directive: <no information>    LDA: Guevara and PIV       Central Lines/Site/Justification:Patient Does Not Have Central Line       Urinary Cath/Order/Justification:Critically Ill in ICU    Vasopressors/Infusions:    sodium chloride 0.9% 75 mL/hr at 10/27/19 0600    dexMEDEtomidine in 0.9 % NaCl 0.401 mcg/kg/hr (10/27/19 0600)          GOALS: Volume/ Hemodynamic: N/A                     RASS: 0  alert and calm    CAM ICU: Negative  Pain Management: IV       Pain Controlled: yes     Rhythm: NSR    Respiratory Device: Vent    Oxygen Concentration (%):  [28] 28             Most Recent SBT/ SAT: N/A       MOVE Screen: FAIL    VTE Prophylaxis: Pharm and Mechanical  Mobility: Bedrest  Stress Ulcer Prophylaxis: Yes    Dietary: TF  Tolerance: yes  /  Advancement: @ goal    Isolation: No active isolations    Restraints: No    Significant Dates:  Post Op Date: N/A  Rescue Date: N/A  Imaging/ Diagnostics: N/A    Noteworthy Labs:  none    CBC/Anemia Labs: Coags:    Recent Labs   Lab 10/21/19  0311 10/22/19  0342 10/23/19  0312   WBC 11.10 13.38* 11.58   HGB 12.0 12.3 11.7*   HCT 41.1 41.6 40.3    160 152   MCV 68* 67* 68*   RDW 17.9* 18.1* 17.7*    Recent Labs   Lab 10/20/19  1005   INR 1.0        Chemistries:   Recent Labs   Lab 10/24/19  0237 10/25/19  0320 10/26/19  0414 10/27/19  0351    142 143 143   K 3.9 4.3 4.5 4.8    106 108 109   CO2 27 29 27 26   BUN 47* 46* 44* 39*   CREATININE 1.7* 1.5* 1.4 1.2   CALCIUM 10.0 9.7 10.0 9.9   MG 2.6  --   --   --    PHOS 3.4  --   --   --         Cardiac Enzymes: Ejection Fractions:    No results for input(s): CPK, CPKMB, MB, TROPONINI  in the last 72 hours. No results found for: EF     POCT Glucose: HbA1c:    Recent Labs   Lab 10/26/19  0018 10/26/19  0533 10/26/19  1112 10/26/19  1812 10/26/19  2118 10/26/19  2328   POCTGLUCOSE 147* 125* 143* 148* 135* 149*    Hemoglobin A1C   Date Value Ref Range Status   10/02/2019 6.9 (H) 4.0 - 5.6 % Final     Comment:     ADA Screening Guidelines:  5.7-6.4%  Consistent with prediabetes  >or=6.5%  Consistent with diabetes  High levels of fetal hemoglobin interfere with the HbA1C  assay. Heterozygous hemoglobin variants (HbS, HgC, etc)do  not significantly interfere with this assay.   However, presence of multiple variants may affect accuracy.     06/03/2008 6.7 (H) 4.0 - 6.2 % Final   02/13/2006 6.2 4.5 - 6.2 % Final        Needs from Care Team: none     ICU LOS 16d 1h  Level of Care: OK to Transfer

## 2019-10-28 LAB
ANION GAP SERPL CALC-SCNC: 6 MMOL/L (ref 8–16)
BUN SERPL-MCNC: 31 MG/DL (ref 8–23)
CALCIUM SERPL-MCNC: 9.9 MG/DL (ref 8.7–10.5)
CHLORIDE SERPL-SCNC: 111 MMOL/L (ref 95–110)
CO2 SERPL-SCNC: 27 MMOL/L (ref 23–29)
CREAT SERPL-MCNC: 1.1 MG/DL (ref 0.5–1.4)
EST. GFR  (AFRICAN AMERICAN): 59 ML/MIN/1.73 M^2
EST. GFR  (NON AFRICAN AMERICAN): 51 ML/MIN/1.73 M^2
GLUCOSE SERPL-MCNC: 178 MG/DL (ref 70–110)
POCT GLUCOSE: 152 MG/DL (ref 70–110)
POCT GLUCOSE: 155 MG/DL (ref 70–110)
POCT GLUCOSE: 177 MG/DL (ref 70–110)
POTASSIUM SERPL-SCNC: 4.6 MMOL/L (ref 3.5–5.1)
SODIUM SERPL-SCNC: 144 MMOL/L (ref 136–145)

## 2019-10-28 PROCEDURE — 63600175 PHARM REV CODE 636 W HCPCS: Performed by: INTERNAL MEDICINE

## 2019-10-28 PROCEDURE — 25000003 PHARM REV CODE 250: Performed by: INTERNAL MEDICINE

## 2019-10-28 PROCEDURE — 94640 AIRWAY INHALATION TREATMENT: CPT

## 2019-10-28 PROCEDURE — 20000000 HC ICU ROOM

## 2019-10-28 PROCEDURE — 99232 SBSQ HOSP IP/OBS MODERATE 35: CPT | Mod: ,,, | Performed by: OTOLARYNGOLOGY

## 2019-10-28 PROCEDURE — 97530 THERAPEUTIC ACTIVITIES: CPT

## 2019-10-28 PROCEDURE — 80048 BASIC METABOLIC PNL TOTAL CA: CPT

## 2019-10-28 PROCEDURE — 31502: ICD-10-PCS | Mod: ,,, | Performed by: OTOLARYNGOLOGY

## 2019-10-28 PROCEDURE — 94761 N-INVAS EAR/PLS OXIMETRY MLT: CPT

## 2019-10-28 PROCEDURE — 97535 SELF CARE MNGMENT TRAINING: CPT

## 2019-10-28 PROCEDURE — 36415 COLL VENOUS BLD VENIPUNCTURE: CPT

## 2019-10-28 PROCEDURE — 31502 CHANGE OF WINDPIPE AIRWAY: CPT | Mod: ,,, | Performed by: OTOLARYNGOLOGY

## 2019-10-28 PROCEDURE — 27000221 HC OXYGEN, UP TO 24 HOURS

## 2019-10-28 PROCEDURE — 25000242 PHARM REV CODE 250 ALT 637 W/ HCPCS: Performed by: INTERNAL MEDICINE

## 2019-10-28 PROCEDURE — 99900026 HC AIRWAY MAINTENANCE (STAT)

## 2019-10-28 PROCEDURE — 97110 THERAPEUTIC EXERCISES: CPT

## 2019-10-28 PROCEDURE — 99232 PR SUBSEQUENT HOSPITAL CARE,LEVL II: ICD-10-PCS | Mod: ,,, | Performed by: OTOLARYNGOLOGY

## 2019-10-28 RX ADMIN — INSULIN ASPART 2 UNITS: 100 INJECTION, SOLUTION INTRAVENOUS; SUBCUTANEOUS at 03:10

## 2019-10-28 RX ADMIN — INSULIN ASPART 2 UNITS: 100 INJECTION, SOLUTION INTRAVENOUS; SUBCUTANEOUS at 06:10

## 2019-10-28 RX ADMIN — METOPROLOL TARTRATE 100 MG: 50 TABLET ORAL at 09:10

## 2019-10-28 RX ADMIN — IPRATROPIUM BROMIDE AND ALBUTEROL SULFATE 3 ML: .5; 3 SOLUTION RESPIRATORY (INHALATION) at 12:10

## 2019-10-28 RX ADMIN — IPRATROPIUM BROMIDE AND ALBUTEROL SULFATE 3 ML: .5; 3 SOLUTION RESPIRATORY (INHALATION) at 01:10

## 2019-10-28 RX ADMIN — LACOSAMIDE 50 MG: 50 TABLET, FILM COATED ORAL at 09:10

## 2019-10-28 RX ADMIN — CLONIDINE HYDROCHLORIDE 0.3 MG: 0.1 TABLET ORAL at 09:10

## 2019-10-28 RX ADMIN — CHLORHEXIDINE GLUCONATE 0.12% ORAL RINSE 15 ML: 1.2 LIQUID ORAL at 09:10

## 2019-10-28 RX ADMIN — INSULIN ASPART 2 UNITS: 100 INJECTION, SOLUTION INTRAVENOUS; SUBCUTANEOUS at 12:10

## 2019-10-28 RX ADMIN — DEXMEDETOMIDINE HYDROCHLORIDE 0.5 MCG/KG/HR: 4 INJECTION, SOLUTION INTRAVENOUS at 07:10

## 2019-10-28 RX ADMIN — INSULIN DETEMIR 60 UNITS: 100 INJECTION, SOLUTION SUBCUTANEOUS at 09:10

## 2019-10-28 RX ADMIN — POLYETHYLENE GLYCOL 3350 17 G: 17 POWDER, FOR SOLUTION ORAL at 09:10

## 2019-10-28 RX ADMIN — DEXMEDETOMIDINE HYDROCHLORIDE 0.5 MCG/KG/HR: 4 INJECTION, SOLUTION INTRAVENOUS at 12:10

## 2019-10-28 RX ADMIN — AMLODIPINE BESYLATE 10 MG: 5 TABLET ORAL at 09:10

## 2019-10-28 RX ADMIN — MICONAZOLE NITRATE: 20 POWDER TOPICAL at 09:10

## 2019-10-28 RX ADMIN — IPRATROPIUM BROMIDE AND ALBUTEROL SULFATE 3 ML: .5; 3 SOLUTION RESPIRATORY (INHALATION) at 07:10

## 2019-10-28 RX ADMIN — HEPARIN SODIUM 5000 UNITS: 5000 INJECTION INTRAVENOUS; SUBCUTANEOUS at 09:10

## 2019-10-28 RX ADMIN — LABETALOL HYDROCHLORIDE 20 MG: 5 INJECTION INTRAVENOUS at 01:10

## 2019-10-28 RX ADMIN — ROSUVASTATIN CALCIUM 20 MG: 10 TABLET, COATED ORAL at 09:10

## 2019-10-28 RX ADMIN — DEXMEDETOMIDINE HYDROCHLORIDE 0.4 MCG/KG/HR: 4 INJECTION, SOLUTION INTRAVENOUS at 01:10

## 2019-10-28 RX ADMIN — SODIUM CHLORIDE: 0.9 INJECTION, SOLUTION INTRAVENOUS at 01:10

## 2019-10-28 RX ADMIN — SODIUM CHLORIDE: 0.9 INJECTION, SOLUTION INTRAVENOUS at 03:10

## 2019-10-28 RX ADMIN — SENNOSIDES, DOCUSATE SODIUM 1 TABLET: 50; 8.6 TABLET, FILM COATED ORAL at 09:10

## 2019-10-28 RX ADMIN — ASPIRIN 325 MG ORAL TABLET 325 MG: 325 PILL ORAL at 09:10

## 2019-10-28 NOTE — SUBJECTIVE & OBJECTIVE
Interval History: No new issues     Review of Systems   Unable to perform ROS: Acuity of condition   Constitutional: Negative for activity change.   HENT: Negative for congestion.    Respiratory: Negative for chest tightness.      Objective:     Vital Signs (Most Recent):  Temp: 98.5 °F (36.9 °C) (10/28/19 0400)  Pulse: 60 (10/28/19 0556)  Resp: 18 (10/28/19 0556)  BP: (!) 143/76 (10/28/19 0556)  SpO2: 97 % (10/28/19 0556) Vital Signs (24h Range):  Temp:  [97.3 °F (36.3 °C)-98.5 °F (36.9 °C)] 98.5 °F (36.9 °C)  Pulse:  [54-70] 60  Resp:  [13-18] 18  SpO2:  [97 %-100 %] 97 %  BP: (111-198)/(65-96) 143/76     Weight: 119.2 kg (262 lb 12.6 oz)  Body mass index is 43.73 kg/m².    Intake/Output Summary (Last 24 hours) at 10/28/2019 0608  Last data filed at 10/28/2019 0556  Gross per 24 hour   Intake 3193.24 ml   Output 2750 ml   Net 443.24 ml      Physical Exam   Constitutional: She appears well-developed and well-nourished.   HENT:   Head: Normocephalic and atraumatic.   Cardiovascular: Normal rate and regular rhythm.   Pulmonary/Chest: Effort normal and breath sounds normal. No stridor. No respiratory distress. She has no wheezes.   Abdominal: Soft. She exhibits no distension. There is no rebound and no guarding.   Neurological: She is alert.   Skin: Skin is warm and dry.   Vitals reviewed.      Significant Labs:   BMP:   Recent Labs   Lab 10/28/19  0340   *      K 4.6   *   CO2 27   BUN 31*   CREATININE 1.1   CALCIUM 9.9     CBC: No results for input(s): WBC, HGB, HCT, PLT in the last 48 hours.    Significant Imaging:

## 2019-10-28 NOTE — PLAN OF CARE
Problem: Physical Therapy Goal  Goal: Physical Therapy Goal  Description  Goals to be met by:      Patient will increase functional independence with mobility by performin. Supine to sit to be assessed  2. Rolling to Left with Moderate Assistance.  3. Bed to chair transfer to be assessed  4. Sitting at edge of bed to be assessed  5. Lower extremity exercise program x10  reps per handout, with assistance as needed     Outcome: Ongoing, Progressing   Pt able to perform AAROM LLE/LUE and PROM LLE/LUE .

## 2019-10-28 NOTE — PLAN OF CARE
Problem: Occupational Therapy Goal  Goal: Occupational Therapy Goal  Description  Goals to be met by: 11/15/19    Patient will increase functional independence with ADLs by performing:    UE Dressing with Maximum Assistance.  Grooming while in bed with Maximum Assistance.  Sitting at edge of bed : to be assessed as appropriate  Rolling to Bilateral with Maximum Assistance.   Supine to sit :to be assessed as appropriate  Upper extremity exercise program x10 reps per handout, with assistance as needed.     Outcome: Ongoing, Progressing   The patient is alert and able to visual track to left and right visual fields with verbal cues. The patient is able to follow commands to perform AAROM to the LUE. The patient is progressing slowly.

## 2019-10-28 NOTE — PT/OT/SLP PROGRESS
Physical Therapy Treatment    Patient Name:  Sherrie Alex   MRN:  [id number]    Recommendations:     Discharge Recommendations:  (ongoing pending progress (LTACH per chart))   Discharge Equipment Recommendations: (ongoing assessment pending progress)   Barriers to discharge: Inaccessible home and Decreased caregiver support    Assessment:     Sherrie Alex is a 69 y.o. female admitted with a medical diagnosis of Acute on chronic respiratory failure.  She presents with the following impairments/functional limitations:  weakness, impaired endurance, impaired sensation, impaired self care skills, impaired functional mobilty, gait instability, impaired balance, decreased lower extremity function, decreased upper extremity function, impaired cognition, decreased safety awareness, pain, decreased ROM, edema, impaired skin, impaired coordination, impaired cardiopulmonary response to activity, decreased coordination, impaired fine motor, impaired joint extensibility, impaired muscle length .    Rehab Prognosis: Fair; patient would benefit from acute skilled PT services to address these deficits and reach maximum level of function.    Recent Surgery: Procedure(s) (LRB):  INSERTION, PEG TUBE (N/A) 6 Days Post-Op    Plan:     During this hospitalization, patient to be seen 3 x/week to address the identified rehab impairments via therapeutic activities, therapeutic exercises, neuromuscular re-education and progress toward the following goals:    · Plan of Care Expires:  10/29/19    Subjective     Chief Complaint: pt is non verbal , no facial pastor   Patient/Family Comments/goals: pt agreeable to therapy with hand gesture   Pain/Comfort:  · Pain Rating 1: 0/10  · Pain Rating Post-Intervention 1: 0/10      Objective:     Communicated with nurse  prior to session.  Patient found HOB elevated with tracheostomy, telemetry, peripheral IV, oxygen, pulse ox (continuous), SCD, PEG Tube, shepard catheter, blood pressure cuff  upon PT entry to room.     General Precautions: Standard, fall, respiratory, aspiration, NPO   Orthopedic Precautions:N/A   Braces: N/A       AM-PAC 6 CLICK MOBILITY  Turning over in bed (including adjusting bedclothes, sheets and blankets)?: 1  Sitting down on and standing up from a chair with arms (e.g., wheelchair, bedside commode, etc.): 1  Moving from lying on back to sitting on the side of the bed?: 1  Moving to and from a bed to a chair (including a wheelchair)?: 1  Need to walk in hospital room?: 1  Climbing 3-5 steps with a railing?: 1  Basic Mobility Total Score: 6       Therapeutic Activities and Exercises:   Pt performed in bed LLE 10 reps (AAROM) :  AP, SAQ, HS,  Hip abd/add , SLR and LUE (AAROM) x 10 reps : finger/wrist/elblow/shoulder flexion/extention/ abd/add/supinantion/pronation.   V/T's cues for technique and sequence. Pt able to follow simple commands to actively participate in LLE ex's.  Performed PROM RUE/RLE in all available planes x 10 reps . Pt tolerated well.     Patient left HOB elevated with SCD and pressure relief boots placed to BLE  all lines intact, call button in reach and nurse notified..    GOALS:   Multidisciplinary Problems     Physical Therapy Goals        Problem: Physical Therapy Goal    Goal Priority Disciplines Outcome Goal Variances Interventions   Physical Therapy Goal     PT, PT/OT Ongoing, Progressing     Description:  Goals to be met by:      Patient will increase functional independence with mobility by performin. Supine to sit to be assessed  2. Rolling to Left with Moderate Assistance.  3. Bed to chair transfer to be assessed  4. Sitting at edge of bed to be assessed  5. Lower extremity exercise program x10  reps per handout, with assistance as needed                      Time Tracking:     PT Received On: 10/28/19  PT Start Time: 1200     PT Stop Time: 1228  PT Total Time (min): 28 min     Billable Minutes: Therapeutic Exercise 28    Treatment Type:  Treatment  PT/PTA: PTA     PTA Visit Number: 2     Heena Anne, PTA  10/28/2019

## 2019-10-28 NOTE — PT/OT/SLP PROGRESS
Occupational Therapy   Treatment    Name: Sherrie Alex  MRN: [id number]  Admitting Diagnosis:  Acute on chronic respiratory failure  6 Days Post-Op    Recommendations:     Discharge Recommendations: LTACH (long-term acute care hospital)  Discharge Equipment Recommendations:  (TBD at next level of care)  Barriers to discharge:  Decreased caregiver support    Assessment:     Sherrie Alex is a 69 y.o. female with a medical diagnosis of Acute on chronic respiratory failure.     The patient is alert and able to visual track to left and right visual fields with verbal cues. The patient became tearful at start of OT but stopped after a few minutes. The patient is able to follow commands to perform AAROM to the LUE. The patient is progressing slowly.            Performance deficits affecting function are weakness, impaired endurance, impaired self care skills, impaired sensation, impaired functional mobilty, decreased safety awareness, abnormal tone, decreased upper extremity function, decreased lower extremity function, visual deficits, edema, impaired cardiopulmonary response to activity, impaired fine motor, decreased coordination.     Rehab Prognosis:  Fair; patient would benefit from acute skilled OT services to address these deficits and reach maximum level of function.       Plan:     Patient to be seen 3 x/week, 5 x/week to address the above listed problems via self-care/home management, neuromuscular re-education, therapeutic activities, therapeutic exercises  · Plan of Care Expires: 11/15/19  · Plan of Care Reviewed with: patient    Subjective     Pain/Comfort:  · Pain Rating 1: 0/10    Objective:     Communicated with: Irene mcclellan prior to session.  Patient found HOB elevated with tracheostomy, telemetry, shepard catheter, PEG Tube, oxygen, peripheral IV, SCD(pulse ox fell off right ear) upon OT entry to room.    General Precautions: Standard, fall, respiratory, aspiration, NPO   Orthopedic  Precautions:N/A   Braces: N/A     Occupational Performance:     Activities of Daily Living:  · Grooming: dependence to wipe her mouth and use swab to wipe her lips. The patient was presented with warm cloth but did not initiate/follow command to take the cloth in her left hand. The patient was dependent to place cloth in left hand and bring to her face. The patient patient was (D) to wipe mouth. The patient grasped a swab/toothette when placed in her left hand. The patient was (D) to bring swab to lips. The patient closed her lips to allow swab of mouth x3-4 attempts.      [facility] 6 Click ADL: 6    Treatment & Education:  The patient was alert ans able to visually attend to the left and right fields via moving eyes. The patient was (D) to turn her head to the right. The patient was able to assist with AAROM to left UE x10 reps in all planes. The patient recieved PROM to LUE with flaccid extremity.    Patient left HOB elevated with all lines intact, call button in reach and nurse, Irene presentEducation:      GOALS:   Multidisciplinary Problems     Occupational Therapy Goals        Problem: Occupational Therapy Goal    Goal Priority Disciplines Outcome Interventions   Occupational Therapy Goal     OT, PT/OT Ongoing, Progressing    Description:  Goals to be met by: 11/15/19    Patient will increase functional independence with ADLs by performing:    UE Dressing with Maximum Assistance.  Grooming while in bed with Maximum Assistance.  Sitting at edge of bed : to be assessed as appropriate  Rolling to Bilateral with Maximum Assistance.   Supine to sit :to be assessed as appropriate  Upper extremity exercise program x10 reps per handout, with assistance as needed.                      Time Tracking:     OT Date of Treatment: 10/28/19  OT Start Time: 1510  OT Stop Time: 1533  OT Total Time (min): 23 min    Billable Minutes:Self Care/Home Management 10  Therapeutic Activity 13  Total Time 23    Annika Parson  OT  10/28/2019

## 2019-10-28 NOTE — PLAN OF CARE
Resting without distress, vss.no apparent distress noted. Pt has shiley trach to O2. TF tolerating well, moves upper and lower left extremity only. Will monitor status.

## 2019-10-28 NOTE — PLAN OF CARE
Pt remains in the ICU this shift. Precedex remains infusing for comfort. Labetalol given x1 for SBP > 160. Fentanyl PRN given x2 for pain due to pt grimacing. 2 BMs this shift. No falls, injuries or new skin breakdown, precautions maintained for all.

## 2019-10-28 NOTE — PROGRESS NOTES
"[facility]  Progress Note    Patient Name: Sherrie Alex  MRN: [id number]  Patient Class: IP- Inpatient   Admission Date: 10/11/2019  Length of Stay: 17 days  Attending Physician: Angel Luis Aquino MD  Primary Care Provider: Desmond Yang MD        Subjective:     Principal Problem:Acute on chronic respiratory failure        HPI:  patient is a 69 y.o female who presents to the ED complaining of tongue swelling that began PTA. Patient has slurred speech, drooling, and tongue swelling. Patient denies any pain, itching, CP, nausea, vomiting, or fever. Per sisters, patient is normally talkative and active. Her sisters are unaware of any cause of onset, stating that the patient lives alone. PMHx of CVA 1 yr ago, DM, HTN, HLD, and obesity. Patient is allergic to codeine and ace inhibitors.      The history is provided by the patient and a relative. History limited by: Acuity of condition. No  was used.     Pt intubated in ED for airway protection ?laryngeal edema - not noted in ED documentation.  Pt started on IV steroids and H2 blocker on vent. In ICU, pt showed sluggish neuro response to pain and unable to follow commands. Propofol taken off and neuro status improved. Pulm consulted for vent management. Neuro consulted - h/o seizures.  Seen in ED yesterday am with "leg shaking" and weakness and dc'd home. H/o seizures on vimpat.      Overview/Hospital Course:  Ms. Alex was admitted acute neurological changes and respiratory compromise. Intubated in ED. Serial neurological testing off sedation suggested right sided weakness > left. MRI showed:Moderate-sized area of subacute infarction involving the left subinsular white matter extending to the periventricular white matter. No MR evidence for hemorrhage.  Patient treated for an acute CVA and Neurology consult. Allowed for permissive HTN.  Later resumed blood pressure medication after appropriate time " elapsed for permissive hypertension.  Her neurological exam was a barrier to extubation.  She was also treated with IV lasix for diuresis, ET sputum culture sent for climbing WBCs, concerning for possible aspiration. Dr. Bauer met with family and discussed plan to continue attempts to wean from vent, though neuro status/effects of CVA may be limiting factor.  The patient was unable to be weaned from the vent, ENT consulted for trach and GI consulted for PEG tube on 10/22.  was consulted for LTAC.  The patient was changed to trach collar. The patient was accepted at [facility]. Discussion was held again with palliative care to consider hospice.  Discussions held with family on 10/26 to discuss option of hospice. The patient's DVT prophylaxis was held on 10/28 due to hematuria     Interval History: No new issues     Review of Systems   Unable to perform ROS: Acuity of condition   Constitutional: Negative for activity change.   HENT: Negative for congestion.    Respiratory: Negative for chest tightness.      Objective:     Vital Signs (Most Recent):  Temp: 98.5 °F (36.9 °C) (10/28/19 0400)  Pulse: 60 (10/28/19 0556)  Resp: 18 (10/28/19 0556)  BP: (!) 143/76 (10/28/19 0556)  SpO2: 97 % (10/28/19 0556) Vital Signs (24h Range):  Temp:  [97.3 °F (36.3 °C)-98.5 °F (36.9 °C)] 98.5 °F (36.9 °C)  Pulse:  [54-70] 60  Resp:  [13-18] 18  SpO2:  [97 %-100 %] 97 %  BP: (111-198)/(65-96) 143/76     Weight: 119.2 kg (262 lb 12.6 oz)  Body mass index is 43.73 kg/m².    Intake/Output Summary (Last 24 hours) at 10/28/2019 0608  Last data filed at 10/28/2019 0556  Gross per 24 hour   Intake 3193.24 ml   Output 2750 ml   Net 443.24 ml      Physical Exam   Constitutional: She appears well-developed and well-nourished.   HENT:   Head: Normocephalic and atraumatic.   Cardiovascular: Normal rate and regular rhythm.   Pulmonary/Chest: Effort normal and breath sounds normal. No stridor. No respiratory distress. She has  no wheezes.   Abdominal: Soft. She exhibits no distension. There is no rebound and no guarding.   Neurological: She is alert.   Skin: Skin is warm and dry.   Vitals reviewed.      Significant Labs:   BMP:   Recent Labs   Lab 10/28/19  0340   *      K 4.6   *   CO2 27   BUN 31*   CREATININE 1.1   CALCIUM 9.9     CBC: No results for input(s): WBC, HGB, HCT, PLT in the last 48 hours.    Significant Imaging:       Assessment/Plan:      * Acute on chronic respiratory failure  Intubated for airway protection given concern for angioedema initially  Patient remains intubated secondary to acute CVA and and patient did not have to angioedema  Positive cultures with increased secretions  Pulmonary following  Patient is morbidly obese with obstructive sleep apnea which may hinder extubation  Pulmonary discussed possible need for trach and PEG with son  Cephazolin added for positive Staph in the sputum and nebulizer treatments  ENT consulted for trach placement and trach successfully placed today, 10/21  GI for PEG placement, but this could not be performed today, anesthesia did not want to be sedate this patient  Plan for PEG tomorrow by GI (done on 10/22).  As per Pulmonary  Will likely need LTAC placement, will begin consult to  for discharge planning    Now switched to trach collar at 28% Fio2.    LTAC vs. Hospice. Will talk again to family. ENT may change out trach collar today so patient can speak       Palliative care encounter  Discussed with palliative care on 10/25.  Will likely meet with family and patient on Monday and suggest hospice. Patient looks miserable      Ventilator dependent  Extubated.      Venous stasis ulcer  No acute issues       CVA (cerebral vascular accident)  MRI of the brain with moderate-sized area of subacute infarction involving the left subinsular white matter extending to the periventricular white matter  Allowed for permissive hypertension along with  treatment with aspirin and statin  Appreciate Neurology input  Resumed blood pressure medications for tighter blood pressure control  Weaning from vent hindered by new CVA  Trach done today, and peg rescheduled for tomorrow (10/22)  Will likely need LTAC/rehab placement    Encephalopathy, metabolic  Secondary to CVA  As discussed above    No acute issues     RYLEE (acute kidney injury)  Baseline creatinine of 1.4-1.7  Had slight improvement now steadily trending up  CO2 level steadily increasing and patient was in negative balance  Held IV Lasix on 10/20  Will hold for another day  Continue to monitor with repeat labs and assess volume status    Seizure  Continue vimpat  No seizures seen on EEG  Appreciate Neurology following    Angio-edema  Initially thought to have angioedema  Likely to have slight droop associated with stroke and not true angioedema    Acquired hypothyroidism  Resume synthroid   Thyroid function test normal    Essential hypertension  Resume home meds: norvasc, clonidine, hydralazine, toprol  IV PRN hydralazine    Type 2 diabetes mellitus with circulatory disorder, with long-term current use of insulin  Uncontrolled with hyperglycemia likely due to dose of dexamethasone given  HGBA1c 6.9%  Glucose better controlled on detemir to 60 units q.h.s. and continue sliding scale insulin  Will make further adjustments to get glucose and range of 140-180    Anxiety  Fentanyl pushes for sedation    Morbid obesity  Discuss weight management after extubation and when mentation is appropriate  Body mass index is 43.73 kg/m².     Dyslipidemia  Continue rosuvastatin        VTE Risk Mitigation (From admission, onward)         Ordered     heparin (porcine) injection 5,000 Units  Every 12 hours      10/11/19 0617     IP VTE HIGH RISK PATIENT  Once      10/11/19 0532     Place ELIEZER hose  Until discontinued      10/11/19 0532     Place sequential compression device  Until discontinued      10/11/19 0532                 Critical care time spent on the evaluation and treatment of severe organ dysfunction, review of pertinent labs and imaging studies, discussions with consulting providers and discussions with patient/family: 20  Minutes.    LTAC? Vs. Hospice. ENT will change trach out today reportedly.      Angel Luis Hanson MD  Department of Hospital Medicine   [facility]

## 2019-10-28 NOTE — CARE UPDATE
[facility]  ICU Multidisciplinary Bedside Rounds     UPDATE     Date: 10/28/2019      Plan of care reviewed with the following, Nurse, Charge Nurse, Physician, Resp. Therapist and Infection Prevention.       Needs/ Goals for the day: physicians to speak with family in regards to LTAC vs hospice. Hold heparin d/t hematuria.       Level of Care: Critical Care

## 2019-10-28 NOTE — ASSESSMENT & PLAN NOTE
Intubated for airway protection given concern for angioedema initially  Patient remains intubated secondary to acute CVA and and patient did not have to angioedema  Positive cultures with increased secretions  Pulmonary following  Patient is morbidly obese with obstructive sleep apnea which may hinder extubation  Pulmonary discussed possible need for trach and PEG with son  Cephazolin added for positive Staph in the sputum and nebulizer treatments  ENT consulted for trach placement and trach successfully placed today, 10/21  GI for PEG placement, but this could not be performed today, anesthesia did not want to be sedate this patient  Plan for PEG tomorrow by GI (done on 10/22).  As per Pulmonary  Will likely need LTAC placement, will begin consult to  for discharge planning    Now switched to trach collar at 28% Fio2.    LTAC vs. Hospice. Will talk again to family. ENT may change out trach collar today so patient can speak

## 2019-10-29 VITALS
BODY MASS INDEX: 43.79 KG/M2 | WEIGHT: 262.81 LBS | HEIGHT: 65 IN | TEMPERATURE: 98 F | HEART RATE: 78 BPM | OXYGEN SATURATION: 99 % | DIASTOLIC BLOOD PRESSURE: 99 MMHG | SYSTOLIC BLOOD PRESSURE: 210 MMHG | RESPIRATION RATE: 19 BRPM

## 2019-10-29 LAB
ANION GAP SERPL CALC-SCNC: 6 MMOL/L (ref 8–16)
BUN SERPL-MCNC: 28 MG/DL (ref 8–23)
CALCIUM SERPL-MCNC: 10.1 MG/DL (ref 8.7–10.5)
CHLORIDE SERPL-SCNC: 110 MMOL/L (ref 95–110)
CO2 SERPL-SCNC: 27 MMOL/L (ref 23–29)
CREAT SERPL-MCNC: 1.1 MG/DL (ref 0.5–1.4)
EST. GFR  (AFRICAN AMERICAN): 59 ML/MIN/1.73 M^2
EST. GFR  (NON AFRICAN AMERICAN): 51 ML/MIN/1.73 M^2
GLUCOSE SERPL-MCNC: 159 MG/DL (ref 70–110)
POCT GLUCOSE: 154 MG/DL (ref 70–110)
POCT GLUCOSE: 173 MG/DL (ref 70–110)
POCT GLUCOSE: 178 MG/DL (ref 70–110)
POTASSIUM SERPL-SCNC: 4.7 MMOL/L (ref 3.5–5.1)
SODIUM SERPL-SCNC: 143 MMOL/L (ref 136–145)

## 2019-10-29 PROCEDURE — 99900035 HC TECH TIME PER 15 MIN (STAT)

## 2019-10-29 PROCEDURE — 25000003 PHARM REV CODE 250: Performed by: INTERNAL MEDICINE

## 2019-10-29 PROCEDURE — 94761 N-INVAS EAR/PLS OXIMETRY MLT: CPT

## 2019-10-29 PROCEDURE — 80048 BASIC METABOLIC PNL TOTAL CA: CPT

## 2019-10-29 PROCEDURE — 36415 COLL VENOUS BLD VENIPUNCTURE: CPT

## 2019-10-29 PROCEDURE — 63600175 PHARM REV CODE 636 W HCPCS: Performed by: INTERNAL MEDICINE

## 2019-10-29 PROCEDURE — 94640 AIRWAY INHALATION TREATMENT: CPT

## 2019-10-29 PROCEDURE — 27000221 HC OXYGEN, UP TO 24 HOURS

## 2019-10-29 PROCEDURE — 99900026 HC AIRWAY MAINTENANCE (STAT)

## 2019-10-29 PROCEDURE — 25000242 PHARM REV CODE 250 ALT 637 W/ HCPCS: Performed by: INTERNAL MEDICINE

## 2019-10-29 RX ORDER — HYDROXYZINE HYDROCHLORIDE 10 MG/1
10 TABLET, FILM COATED ORAL 3 TIMES DAILY PRN
Start: 2019-10-29

## 2019-10-29 RX ORDER — AMLODIPINE BESYLATE 10 MG/1
10 TABLET ORAL DAILY
Qty: 30 TABLET | Refills: 11
Start: 2019-10-30 | End: 2020-10-29

## 2019-10-29 RX ORDER — CLONIDINE HYDROCHLORIDE 0.3 MG/1
0.3 TABLET ORAL 2 TIMES DAILY
Start: 2019-10-29

## 2019-10-29 RX ORDER — ROSUVASTATIN CALCIUM 20 MG/1
20 TABLET, COATED ORAL NIGHTLY
Qty: 90 TABLET | Refills: 3
Start: 2019-10-29 | End: 2020-10-28

## 2019-10-29 RX ORDER — ONDANSETRON 2 MG/ML
4 INJECTION INTRAMUSCULAR; INTRAVENOUS EVERY 8 HOURS PRN
Start: 2019-10-29

## 2019-10-29 RX ORDER — AMOXICILLIN 250 MG
1 CAPSULE ORAL 2 TIMES DAILY PRN
Status: DISCONTINUED | OUTPATIENT
Start: 2019-10-29 | End: 2019-10-29 | Stop reason: HOSPADM

## 2019-10-29 RX ORDER — MICONAZOLE NITRATE 2 %
POWDER (GRAM) TOPICAL 2 TIMES DAILY
Refills: 0
Start: 2019-10-29

## 2019-10-29 RX ORDER — LACOSAMIDE 50 MG/1
50 TABLET ORAL 2 TIMES DAILY
Qty: 60 TABLET | Refills: 11
Start: 2019-10-29 | End: 2020-10-28

## 2019-10-29 RX ORDER — AMOXICILLIN 250 MG
1 CAPSULE ORAL 2 TIMES DAILY PRN
Start: 2019-10-29

## 2019-10-29 RX ORDER — ACETAMINOPHEN 325 MG/1
650 TABLET ORAL EVERY 8 HOURS PRN
Refills: 0 | COMMUNITY
Start: 2019-10-29

## 2019-10-29 RX ORDER — IPRATROPIUM BROMIDE AND ALBUTEROL SULFATE 2.5; .5 MG/3ML; MG/3ML
3 SOLUTION RESPIRATORY (INHALATION) EVERY 6 HOURS
Qty: 1 BOX | Refills: 0
Start: 2019-10-29 | End: 2020-10-28

## 2019-10-29 RX ORDER — HYDRALAZINE HYDROCHLORIDE 25 MG/1
25 TABLET, FILM COATED ORAL 3 TIMES DAILY
Start: 2019-10-29

## 2019-10-29 RX ORDER — ASPIRIN 325 MG
325 TABLET ORAL DAILY
Refills: 0
Start: 2019-10-30 | End: 2020-10-29

## 2019-10-29 RX ORDER — METOPROLOL TARTRATE 100 MG/1
100 TABLET ORAL 2 TIMES DAILY
Qty: 60 TABLET | Refills: 11
Start: 2019-10-29 | End: 2020-10-28

## 2019-10-29 RX ORDER — INSULIN ASPART 100 [IU]/ML
1-10 INJECTION, SOLUTION INTRAVENOUS; SUBCUTANEOUS EVERY 4 HOURS PRN
Refills: 0
Start: 2019-10-29 | End: 2020-10-28

## 2019-10-29 RX ADMIN — DEXMEDETOMIDINE HYDROCHLORIDE 0.2 MCG/KG/HR: 4 INJECTION, SOLUTION INTRAVENOUS at 04:10

## 2019-10-29 RX ADMIN — CHLORHEXIDINE GLUCONATE 0.12% ORAL RINSE 15 ML: 1.2 LIQUID ORAL at 08:10

## 2019-10-29 RX ADMIN — SODIUM CHLORIDE: 0.9 INJECTION, SOLUTION INTRAVENOUS at 04:10

## 2019-10-29 RX ADMIN — IPRATROPIUM BROMIDE AND ALBUTEROL SULFATE 3 ML: .5; 3 SOLUTION RESPIRATORY (INHALATION) at 07:10

## 2019-10-29 RX ADMIN — METOPROLOL TARTRATE 100 MG: 50 TABLET ORAL at 08:10

## 2019-10-29 RX ADMIN — LABETALOL HYDROCHLORIDE 20 MG: 5 INJECTION INTRAVENOUS at 01:10

## 2019-10-29 RX ADMIN — AMLODIPINE BESYLATE 10 MG: 5 TABLET ORAL at 08:10

## 2019-10-29 RX ADMIN — ACETAMINOPHEN 650 MG: 325 TABLET, FILM COATED ORAL at 12:10

## 2019-10-29 RX ADMIN — ASPIRIN 325 MG ORAL TABLET 325 MG: 325 PILL ORAL at 08:10

## 2019-10-29 RX ADMIN — INSULIN ASPART 1 UNITS: 100 INJECTION, SOLUTION INTRAVENOUS; SUBCUTANEOUS at 12:10

## 2019-10-29 RX ADMIN — IPRATROPIUM BROMIDE AND ALBUTEROL SULFATE 3 ML: .5; 3 SOLUTION RESPIRATORY (INHALATION) at 01:10

## 2019-10-29 RX ADMIN — FENTANYL CITRATE 25 MCG: 50 INJECTION INTRAMUSCULAR; INTRAVENOUS at 08:10

## 2019-10-29 RX ADMIN — IPRATROPIUM BROMIDE AND ALBUTEROL SULFATE 3 ML: .5; 3 SOLUTION RESPIRATORY (INHALATION) at 12:10

## 2019-10-29 RX ADMIN — MICONAZOLE NITRATE: 20 POWDER TOPICAL at 08:10

## 2019-10-29 RX ADMIN — ACETAMINOPHEN 650 MG: 325 TABLET, FILM COATED ORAL at 08:10

## 2019-10-29 RX ADMIN — LABETALOL HYDROCHLORIDE 20 MG: 5 INJECTION INTRAVENOUS at 12:10

## 2019-10-29 RX ADMIN — CLONIDINE HYDROCHLORIDE 0.3 MG: 0.1 TABLET ORAL at 08:10

## 2019-10-29 RX ADMIN — INSULIN ASPART 2 UNITS: 100 INJECTION, SOLUTION INTRAVENOUS; SUBCUTANEOUS at 06:10

## 2019-10-29 RX ADMIN — LACOSAMIDE 50 MG: 50 TABLET, FILM COATED ORAL at 10:10

## 2019-10-29 RX ADMIN — INSULIN ASPART 2 UNITS: 100 INJECTION, SOLUTION INTRAVENOUS; SUBCUTANEOUS at 11:10

## 2019-10-29 NOTE — CARE UPDATE
[facility]  ICU Multidisciplinary Bedside Rounds   SUMMARY     Date: 10/29/2019    Prehospitalization: Home  Admit Date / LOS : 10/11/2019/ 18 days    Diagnosis: Acute on chronic respiratory failure    Consults:        Active: ENT, Palliative care       Needed: N/A     Code Status: Full Code   Advanced Directive: <no information>    LDA: Guevara and PIV       Central Lines/Site/Justification:Patient Does Not Have Central Line       Urinary Cath/Order/Justification:Critically Ill in ICU    Vasopressors/Infusions:    sodium chloride 0.9% 75 mL/hr at 10/29/19 0500    dexMEDEtomidine in 0.9 % NaCl 0.2 mcg/kg/hr (10/29/19 0500)          GOALS: Volume/ Hemodynamic: N/A                     RASS: 0  alert and calm    CAM ICU: Negative  Pain Management: none       Pain Controlled: not applicable     Rhythm: NSR    Respiratory Device: %L Trach Collar  Oxygen Concentration (%):  [28] 28             Most Recent SBT/ SAT: N/A       VTE Prophylaxis: Mechanical  Mobility: Bedrest  Stress Ulcer Prophylaxis: Yes    Dietary: TF  Tolerance: yes  /  Advancement: @ goal    Isolation: No active isolations    Restraints: No    Significant Dates:  Post Op Date: N/A  Rescue Date: N/A  Imaging/ Diagnostics: N/A    Noteworthy Labs:  none    CBC/Anemia Labs: Coags:    Recent Labs   Lab 10/23/19  0312   WBC 11.58   HGB 11.7*   HCT 40.3      MCV 68*   RDW 17.7*    No results for input(s): PT, INR, APTT in the last 168 hours.     Chemistries:   Recent Labs   Lab 10/24/19  0237  10/27/19  0351 10/28/19  0340 10/29/19  0410      < > 143 144 143   K 3.9   < > 4.8 4.6 4.7      < > 109 111* 110   CO2 27   < > 26 27 27   BUN 47*   < > 39* 31* 28*   CREATININE 1.7*   < > 1.2 1.1 1.1   CALCIUM 10.0   < > 9.9 9.9 10.1   MG 2.6  --   --   --   --    PHOS 3.4  --   --   --   --     < > = values in this interval not displayed.        Cardiac Enzymes: Ejection Fractions:    No results for input(s): CPK, CPKMB, MB,  TROPONINI in the last 72 hours. No results found for: EF     POCT Glucose: HbA1c:    Recent Labs   Lab 10/27/19  1836 10/27/19  2026 10/27/19  2342 10/28/19  0347 10/28/19  1203 10/28/19  1828   POCTGLUCOSE 122* 123* 155* 177* 152* 155*    Hemoglobin A1C   Date Value Ref Range Status   10/02/2019 6.9 (H) 4.0 - 5.6 % Final     Comment:     ADA Screening Guidelines:  5.7-6.4%  Consistent with prediabetes  >or=6.5%  Consistent with diabetes  High levels of fetal hemoglobin interfere with the HbA1C  assay. Heterozygous hemoglobin variants (HbS, HgC, etc)do  not significantly interfere with this assay.   However, presence of multiple variants may affect accuracy.     06/03/2008 6.7 (H) 4.0 - 6.2 % Final   02/13/2006 6.2 4.5 - 6.2 % Final        Needs from Care Team: Please d/c Miralax and stool softener, patient w/ multiple loose stools daily.     ICU LOS 18d  Level of Care: Critical Care

## 2019-10-29 NOTE — PLAN OF CARE
1400  [facility] non disposable #6 Shiley trach had been disposed of and a disposable in its place; unable to procure a replacement, receiving RN at [facility] (Nata) is aware, BP elevated while preparing for transport, SpO2 99% on 5L trach collar, unable to speak, tolerating tube feeds well, BM x2, voiding adequate clear yellow urine to Guevara catheter to gravity, turned Q2, no further skin breakdown noted, transported safely onto EMS [facility] and discharged at 1435.

## 2019-10-29 NOTE — HPI
Sherrie Alex is a 69 y.o. female who had presented to the ED with symptoms including weakness, drooling, and difficulty with her speech.initially thought to be possible angioedema but later favored to be related to CVA which was confirmed with MRI. She was successfully intubated, reportedly atraumatically without any adverse events but after four separate attempts including nasotracheal and fiberoptic intubations. She was transferred to the ICU where she has  received ongoing management. Multiple spontaneous breathing trials and attempts of weaning her to extubation were made unsuccessfully and she remained intubated with a 6.5 endotracheal tube at the time of my initial involvement for consideration of tracheostomy placement given her multiple failed attempts and weaning to extubation and anticipated prolonged need for intubation and ventilator support. After discussion with the patient's ICU care team and her family, tracheotomy was elected and performed on 10/21/19 successfully without complication. A size 6 Shiley Cuffed Tracheostomy Tube (6DCT) was placed and has been used successfully for ventilation since then.

## 2019-10-29 NOTE — PROCEDURES
"Sherrie Alex is a 69 y.o. female patient.    Temp: 98.2 °F (36.8 °C) (10/28/19 1915)  Pulse: 68 (10/28/19 1930)  Resp: (!) 23 (10/28/19 1930)  BP: (!) 154/84 (10/28/19 1900)  SpO2: (!) 93 % (10/28/19 1930)  Weight: 119.2 kg (262 lb 12.6 oz) (10/19/19 0300)  Height: 5' 5" (165.1 cm) (10/11/19 1901)       Tracheostomy Replacement  Date/Time: 10/28/2019 10:29 PM  Location procedure was performed: [facility] ICU  Performed by: Olu Rice MD  Authorized by: Olu Rice MD   Pre-operative diagnosis: Tracheostomy dependence, acute on chronic respiratory failure  Post-operative diagnosis: Tracheostomy dependence, acute on chronic respiratory failure  Consent Done: Not Needed  Indications: type change  Fistula tract established: Well healing with well formed fistula tract to tracheotomy.  Local anesthesia used: no    Anesthesia:  Local anesthesia used: no  Patient sedated: no  Description of findings: Well healing with no concerning findings.   Tube type: single cannula  Tube cuff: cuffless  Tube size: 6.0 mm  Technical procedures used: Tracheostomy change and examination of tracheotomy site with debridement of stoma and suctioning of secretions from airway  Complications: No  Estimated blood loss (mL): 0  Specimens: No  Implants: No  Patient tolerance: Patient tolerated the procedure well with no immediate complications  Comments: Otolaryngology - Head and Neck Surgery  Trach Change Note    Date of Procedure: 10/28/2019    Procedure: Bedside tracheostomy tube change    Surgeon: Olu Rice    Procedure in Detail: Tracheostomy tube change was performed at bedside. Patient was placed in the supine position with neck in extension. Sutures were cut and the trach ties were removed. The trach was then removed from the stoma. The tracheostomy stoma and airway at the site of the tracheostomy was examined and an crusting and secretions were removed. A 6 DCFS tracheostomy tube was placed atraumatically using the " obturator. The obturator was removed and correct placement was verified by spontaneous ventilation and pulse oximetry confirmed no significant desaturations during or following the trach placement. The inner cannula was then inserted and the trach was secured with a soft collar. The tracheostomy was then suctioned using a flexible suction catheter. The patient tolerated the procedure well without complications.    Complications: None    Findings: Tracheotomy site appropriate appearing and tracheostomy tube exchange is able to be performed normally without complication.    Previous tracheostomy tube: 6.0 Shiley DCT  New tracheostomy tube, now in place: 6.0 Shiley DCFS (cuffless)    Assessment: Uncomplicated tracheostomy change with well healing tracheotomy site. Patient overall doing well with tracheostomy.    Instructions:   -Continue trach cares while admitted and instruct patient/family/caregivers on trach care   -Suction trach every 2 hours until discharged  -Please leave obturator at the HOB   -Keep extra 6 Shiley DCFS at bedside in case of need  -Additional trach changes are turned over to the primary inpatient care team and eventually to the patient/caregivers at this time, as there are no specific concerns regarding future tracheostomy tube exchanges and the tracheotomy site is well healing and encouraging    Please contact me for any questions or concerns.       Olu Rice MD    Rhinology, Allergy, and Sinus-Skull Base Surgery    Department of Otorhinolaryngology    [facility]    Phone  [phone]    Fax      [phone]                             Olu Rice  10/28/2019

## 2019-10-29 NOTE — PLAN OF CARE
Pt remains on precedex, was decreased. Reported hematuria to Dr Aquino, d/c heparin for now. Urine output adequate, no new skin breakdown, no falls, trach changed out by Dr Rice today

## 2019-10-29 NOTE — PLAN OF CARE
[facility]          NURSING HOME ORDERS    10/29/2019    Admit to LTAC    Diagnoses:  Active Hospital Problems    Diagnosis  POA    *Acute on chronic respiratory failure [J96.20]  Yes    Palliative care encounter [Z51.5]  Not Applicable    Venous stasis ulcer [I83.009, L97.909]  Yes    Ventilator dependent [Z99.11]  Not Applicable    CVA (cerebral vascular accident) [I63.9]  Yes    Angio-edema [T78.3XXA]  Yes    Seizure [R56.9]  Yes    RYLEE (acute kidney injury) [N17.9]  Yes    Encephalopathy, metabolic [G93.41]  Yes    Acquired hypothyroidism [E03.9]  Yes     10/2019 TSH 5.7 - increased Synthroid to 137 mcg daily  Repeat TSH Dec 2019      Essential hypertension [I10]  Yes    Type 2 diabetes mellitus with circulatory disorder, with long-term current use of insulin [E11.59, Z79.4]  Not Applicable    Anxiety [F41.9]  Yes    Morbid obesity [E66.01]  Yes    Dyslipidemia [E78.5]  Yes      Resolved Hospital Problems   No resolved problems to display.       Patient is homebound due to:  Acute on chronic respiratory failure    Allergies:  Review of patient's allergies indicates:   Allergen Reactions    Codeine Anxiety    Ace inhibitors     Diphenhydramine hcl        Code Status: FULL CODE.  LEFTY Alex (son): [phone]    Vitals: Q4 hours    Diet:  Tube Feeding: Glucerna 1.5 Mannie      - Continuous at 50 mL per hour    - Flush tube with 200 mL water every 6 hours    LABS:  Per facility protocol. Recommend BMP daily.     Nursing Precautions:   - Seizure precaution per protocol   - Decubitus precautions:        -  for positioning   - Pressure reducing foam mattress   - Turn patient every two hours. Use wedge pillows to anchor patient    CONSULTS:     Physical Therapy to evaluate and treat     Occupational Therapy to evaluate and treat     Speech Therapy  to evaluate and treat. Also for speaking valve evaluation.      Urology for urinary retention        MISCELLANEOUS CARE:       Oxygen: Trach collar 5L at 28%. Wean as tolerated for SpO2>92%     PEG Care:  Clean site every 24 hours     Guevara Care: Empty Guevara bag every shift.  Change Guevara every month     Routine Skin for Bedridden Patients:  Apply moisture barrier cream to all skin folds and wet areas in perineal area daily and after baths and all bowel movements.            DIABETES CARE:       Check blood sugar:    Fingerstick blood sugar every 6 hours      Report CBG < 60 or > 400 to physician.                                          Insulin Sliding Scale per facility protocol     Medications: Discontinue all previous medication orders, if any. See new list below.     Abi Ms Alex   Home Medication Instructions DIDI:[id number]    Printed on:10/29/19 101   Medication Information                      acetaminophen (TYLENOL) 325 MG tablet  2 tablets (650 mg total) by Per G Tube route every 8 (eight) hours as needed for Pain or Temperature greater than (100.4F).             albuterol-ipratropium (DUO-NEB) 2.5 mg-0.5 mg/3 mL nebulizer solution  Take 3 mLs by nebulization every 6 (six) hours.              amLODIPine (NORVASC) 10 MG tablet  1 tablet (10 mg total) by Per G Tube route once daily.             aspirin 325 MG tablet  1 tablet (325 mg total) by Per G Tube route once daily.             cloNIDine (CATAPRES) 0.3 MG tablet  1 tablet (0.3 mg total) by Per G Tube route 2 (two) times daily.             hydrALAZINE (APRESOLINE) 25 MG tablet  1 tablet (25 mg total) by Per G Tube route 3 (three) times daily.             hydrOXYzine HCl (ATARAX) 10 MG Tab  1 tablet (10 mg total) by Per G Tube route 3 (three) times daily as needed (anxiety).             insulin aspart U-100 (NOVOLOG) 100 unit/mL (3 mL) InPn pen  Inject 1-10 Units into the skin every 4 (four) hours as needed (Hyperglycemia).             insulin detemir U-100 (LEVEMIR FLEXTOUCH) 100 unit/mL (3 mL) SubQ InPn pen  Inject 60 Units into the skin every evening.              lacosamide (VIMPAT) 50 mg Tab  1 tablet (50 mg total) by Per G Tube route 2 (two) times daily.             levothyroxine (SYNTHROID) 137 MCG Tab tablet  Take 1 tablet (137 mcg total) by Per G Tube once daily.             metoprolol tartrate (LOPRESSOR) 100 MG tablet  1 tablet (100 mg total) by Per G Tube route 2 (two) times daily.             miconazole NITRATE 2 % (MICOTIN) 2 % top powder  Apply topically 2 (two) times daily. Under skin folds             ondansetron 4 mg/2 mL Soln  Inject 4 mg into the vein every 8 (eight) hours as needed (nausea, vomiting).             rosuvastatin (CRESTOR) 20 MG tablet  1 tablet (20 mg total) by Per G Tube route every evening.             senna-docusate 8.6-50 mg (PERICOLACE) 8.6-50 mg per tablet  1 tablet by Per G Tube route 2 (two) times daily as needed for Constipation.                 _________________________________  Tamara Pratt MD  10/29/2019

## 2019-10-29 NOTE — PROGRESS NOTES
EMILY spoke with Ms. Vazquez from [facility], the pt was accepted, SW faxed orders through [facility]. Ms. Vazquez stated for the SW to schedule transportation for 2:00pm. Nurse can call report after 11:30am.

## 2019-10-29 NOTE — PLAN OF CARE
Remains in ICU w/ O2 5L via trach collar, Precedex infusing at 0.2 mcg/hour. Guevara w/ adequate urine output. Right side hemiparesis. NS infusing at 75 ml/hour. PEG w/ Glucerna at 50ml/hour, tolerating feedings. Afebrile. Remains free of falls and injuries.

## 2019-10-29 NOTE — DISCHARGE SUMMARY
"[facility] [place]  Discharge Summary      Patient Name: Sherrie Alex  MRN: [id number]  Admission Date: 10/11/2019  Hospital Length of Stay: 18 days  Discharge Date and Time:  10/29/2019 10:23 AM  Attending Physician: Tamara Pratt MD   Discharging Provider: Tamara Pratt MD  Primary Care Provider: Desmond Yang MD      HPI:   patient is a 69 y.o female who presents to the ED complaining of tongue swelling that began PTA. Patient has slurred speech, drooling, and tongue swelling. Patient denies any pain, itching, CP, nausea, vomiting, or fever. Per sisters, patient is normally talkative and active. Her sisters are unaware of any cause of onset, stating that the patient lives alone. PMHx of CVA 1 yr ago, DM, HTN, HLD, and obesity. Patient is allergic to codeine and ace inhibitors.      The history is provided by the patient and a relative. History limited by: Acuity of condition. No  was used.     Pt intubated in ED for airway protection ?laryngeal edema - not noted in ED documentation.  Pt started on IV steroids and H2 blocker on vent. In ICU, pt showed sluggish neuro response to pain and unable to follow commands. Propofol taken off and neuro status improved. Pulm consulted for vent management. Neuro consulted - h/o seizures.  Seen in ED yesterday am with "leg shaking" and weakness and dc'd home. H/o seizures on vimpat.      Procedure(s) (LRB):  INSERTION, PEG TUBE (N/A)      Hospital Course:   Ms. Alex was admitted acute neurological changes and respiratory compromise. Intubated in ED. Serial neurological testing off sedation suggested right sided weakness > left. MRI showed:Moderate-sized area of subacute infarction involving the left subinsular white matter extending to the periventricular white matter. No MR evidence for hemorrhage.  Patient treated for an acute CVA and Neurology consult. Allowed for permissive HTN.  Later resumed blood " pressure medication after appropriate time elapsed for permissive hypertension.  Her neurological exam was a barrier to extubation.  She was also treated with IV lasix for diuresis, ET sputum culture sent for climbing WBCs, concerning for possible aspiration. Dr. Bauer met with family and discussed plan to continue attempts to wean from vent, though neuro status/effects of CVA may be limiting factor.  The patient was unable to be weaned from the vent, ENT consulted for trach and GI consulted for PEG tube on 10/22.  was consulted for LTAC.  The patient was changed to trach collar which she is tolerating well. She is tolerating tube feeding. Palliative care met with the family to discuss quality of life going forward and goals of care. Family has decided on LTAC and [facility]. The patient was accepted at [facility] and transferred after discussion with son and LEFTY Alex on 10/29. She will need ongoing trach care and weaning, tube feeding and free water via PEG, consults with PT/OT/Speech, and consult with Urology for urinary retention.      Consults:   Consults (From admission, onward)        Status Ordering Provider     Inpatient consult to Critical Care Medicine  Once     Provider:  Gold Ricketts MD    Completed MARIE BAUER     Inpatient consult to ENT  Once     Provider:  Olu Rice MD    Completed CARMEL FOX     Inpatient consult to Gastroenterology  Once     Provider:  Mahamed Alvarez MD    Completed CARMEL FOX     Inpatient consult to Neurology  Once     Provider:  Jcarlos Holguin MD    Completed MARIE BAUER     Inpatient consult to Palliative Care  Once     Provider:  Dedra Red NP    Completed SHERRY MIKE     Inpatient consult to Registered Dietitian/Nutritionist  Once     Provider:  (Not yet assigned)    MARIE Chau     Inpatient consult to Registered Dietitian/Nutritionist  Once     Provider:  (Not yet assigned)     Completed SHERRY MIKE     Inpatient consult to Social Work  Once     Provider:  (Not yet assigned)    Completed CARMEL FOX     Inpatient consult to Social Work  Once     Provider:  (Not yet assigned)    Completed SHERRY MIKE     Inpatient consult to Spiritual Care  Once     Provider:  (Not yet assigned)    Completed SHERRY MIKE          No new Assessment & Plan notes have been filed under this hospital service since the last note was generated.  Service: Hospital Medicine    Final Active Diagnoses:    Diagnosis Date Noted POA    PRINCIPAL PROBLEM:  Acute on chronic respiratory failure [J96.20] 10/11/2019 Yes    Palliative care encounter [Z51.5] 10/25/2019 Not Applicable    Venous stasis ulcer [I83.009, L97.909] 10/17/2019 Yes    Ventilator dependent [Z99.11]  Not Applicable    CVA (cerebral vascular accident) [I63.9] 10/12/2019 Yes    Angio-edema [T78.3XXA] 10/11/2019 Yes    Seizure [R56.9] 10/11/2019 Yes    RYLEE (acute kidney injury) [N17.9] 10/11/2019 Yes    Encephalopathy, metabolic [G93.41] 10/11/2019 Yes    Acquired hypothyroidism [E03.9] 10/03/2019 Yes    Essential hypertension [I10] 10/02/2019 Yes    Type 2 diabetes mellitus with circulatory disorder, with long-term current use of insulin [E11.59, Z79.4] 10/02/2019 Not Applicable    Anxiety [F41.9]  Yes    Morbid obesity [E66.01]  Yes    Dyslipidemia [E78.5]  Yes      Problems Resolved During this Admission:       Discharged Condition: stable    Disposition: Long Term Care-- [facility]    Follow Up:  Follow-up Information     [facility].    Contact information:  [address] [place] [id number]               Patient Instructions:      Notify your health care provider if you experience any of the following:  temperature >100.4     Notify your health care provider if you experience any of the following:  persistent nausea and vomiting or diarrhea     Notify your  health care provider if you experience any of the following:  severe uncontrolled pain     Notify your health care provider if you experience any of the following:  redness, tenderness, or signs of infection (pain, swelling, redness, odor or green/yellow discharge around incision site)     Notify your health care provider if you experience any of the following:  difficulty breathing or increased cough     Notify your health care provider if you experience any of the following:  severe persistent headache     Notify your health care provider if you experience any of the following:  worsening rash     Notify your health care provider if you experience any of the following:  persistent dizziness, light-headedness, or visual disturbances     Notify your health care provider if you experience any of the following:  increased confusion or weakness     Activity as tolerated       Significant Diagnostic Studies: Labs: All labs within the past 24 hours have been reviewed    Pending Diagnostic Studies:     None         Medications:  Reconciled Home Medications:      Medication List      START taking these medications    acetaminophen 325 MG tablet  Commonly known as:  TYLENOL  2 tablets (650 mg total) by Per G Tube route every 8 (eight) hours as needed for Pain or Temperature greater than (100.4F).     albuterol-ipratropium 2.5 mg-0.5 mg/3 mL nebulizer solution  Commonly known as:  DUO-NEB  Take 3 mLs by nebulization every 6 (six) hours. Rescue     aspirin 325 MG tablet  1 tablet (325 mg total) by Per G Tube route once daily.  Start taking on:  October 30, 2019     hydrOXYzine HCl 10 MG Tab  Commonly known as:  ATARAX  1 tablet (10 mg total) by Per G Tube route 3 (three) times daily as needed (anxiety).     insulin aspart U-100 100 unit/mL (3 mL) Inpn pen  Commonly known as:  NovoLOG  Inject 1-10 Units into the skin every 4 (four) hours as needed (Hyperglycemia).  Replaces:  insulin aspart U-100 100 unit/mL injection      insulin detemir U-100 100 unit/mL (3 mL) Inpn pen  Commonly known as:  LEVEMIR FLEXTOUCH  Inject 60 Units into the skin every evening.  Replaces:  insulin detemir U-100 100 unit/mL injection     metoprolol tartrate 100 MG tablet  Commonly known as:  LOPRESSOR  1 tablet (100 mg total) by Per G Tube route 2 (two) times daily.     miconazole NITRATE 2 % 2 % top powder  Commonly known as:  MICOTIN  Apply topically 2 (two) times daily. Under skin folds     ondansetron 4 mg/2 mL Soln  Inject 4 mg into the vein every 8 (eight) hours as needed (nausea, vomiting).     senna-docusate 8.6-50 mg 8.6-50 mg per tablet  Commonly known as:  PERICOLACE  1 tablet by Per G Tube route 2 (two) times daily as needed for Constipation.        CHANGE how you take these medications    amLODIPine 10 MG tablet  Commonly known as:  NORVASC  1 tablet (10 mg total) by Per G Tube route once daily.  Start taking on:  October 30, 2019  What changed:    · medication strength  · how much to take  · how to take this     cloNIDine 0.3 MG tablet  Commonly known as:  CATAPRES  1 tablet (0.3 mg total) by Per G Tube route 2 (two) times daily.  What changed:  how to take this     hydrALAZINE 25 MG tablet  Commonly known as:  APRESOLINE  1 tablet (25 mg total) by Per G Tube route 3 (three) times daily.  What changed:    · medication strength  · how much to take  · how to take this  · Another medication with the same name was removed. Continue taking this medication, and follow the directions you see here.     lacosamide 50 mg Tab  Commonly known as:  VIMPAT  1 tablet (50 mg total) by Per G Tube route 2 (two) times daily.  What changed:    · medication strength  · how to take this     rosuvastatin 20 MG tablet  Commonly known as:  CRESTOR  1 tablet (20 mg total) by Per G Tube route every evening.  What changed:    · how to take this  · when to take this        CONTINUE taking these medications    levothyroxine 137 MCG Tab tablet  Commonly known as:   "SYNTHROID  Take 1 tablet (137 mcg total) by mouth once daily.        STOP taking these medications    BLOOD GLUCOSE TEST Strp  Generic drug:  blood sugar diagnostic     cetirizine 5 MG tablet  Commonly known as:  ZYRTEC     cholecalciferol (vitamin D3) 10,000 unit Tab     insulin aspart U-100 100 unit/mL injection  Commonly known as:  NOVOLOG  Replaced by:  insulin aspart U-100 100 unit/mL (3 mL) Inpn pen     insulin detemir U-100 100 unit/mL injection  Commonly known as:  LEVEMIR  Replaced by:  insulin detemir U-100 100 unit/mL (3 mL) Inpn pen     LANCETS MISC     LANTUS SOLOSTAR U-100 INSULIN glargine 100 units/mL (3mL) SubQ pen  Generic drug:  insulin     linaGLIPtin 5 mg Tab tablet  Commonly known as:  TRADJENTA     lubiprostone 24 MCG Cap  Commonly known as:  AMITIZA     meclizine 25 mg tablet  Commonly known as:  ANTIVERT     metoprolol succinate 100 MG 24 hr tablet  Commonly known as:  TOPROL-XL     nitroGLYCERIN 0.4 MG SL tablet  Commonly known as:  NITROSTAT     PEN NEEDLE 31 gauge x 5/16" Ndle  Generic drug:  pen needle, diabetic     VITAMIN D2 50,000 unit Cap  Generic drug:  ergocalciferol            Indwelling Lines/Drains at time of discharge:   Lines/Drains/Airways     Drain                 Gastrostomy/Enterostomy 10/22/19 1630 Percutaneous endoscopic gastrostomy (PEG) 6 days         Urethral Catheter 10/24/19 0430 Straight-tip 16 Fr. 5 days          Airway                 Surgical Airway 10/28/19 1200 Shiley Uncuffed less than 1 day                Time spent on the discharge of patient: 35 minutes  Patient was seen and examined on the date of discharge and determined to be suitable for discharge.    Critical care time spent on the evaluation and treatment of severe organ dysfunction, review of pertinent labs and imaging studies, discussions with consulting providers and discussions with patient/family: 30 minutes.     Tamara Pratt MD  Department of Hospital Medicine  [facility]

## 2019-10-29 NOTE — HOSPITAL COURSE
Tracheostomy on 10/21/19: Size 6 Shiley Cuffed Tracheostomy Tube (6DCT) was placed.  Tolerating the 6 DCT trach well. Balloon cuff deflated with no issues.   Had PEG placed for nutrition as well. Has been ventilating well with trach with no issues.  Trach care has been performed by nursing with frequent suctioning through the tracheostomy.  Trach change performed on 10/28 and was uneventful with well healing stoma - I replaced the trach with a 6 CFS (cuffless) secured with a soft trach collar.

## 2019-10-29 NOTE — PT/OT/SLP DISCHARGE
Occupational Therapy Discharge Summary    Sherrie Alex  MRN: [id number]   Principal Problem: Acute on chronic respiratory failure      Patient Discharged from acute Occupational Therapy on 10/29/19.  Please refer to prior OT notes for functional status.    Assessment:      Patient appropriate for care in another setting.    Objective:     GOALS:   Multidisciplinary Problems     Occupational Therapy Goals        Problem: Occupational Therapy Goal    Goal Priority Disciplines Outcome Interventions   Occupational Therapy Goal     OT, PT/OT Ongoing, Progressing    Description:  Goals to be met by: 11/15/19    Patient will increase functional independence with ADLs by performing:    UE Dressing with Maximum Assistance.  Grooming while in bed with Maximum Assistance.  Sitting at edge of bed : to be assessed as appropriate  Rolling to Bilateral with Maximum Assistance.   Supine to sit :to be assessed as appropriate  Upper extremity exercise program x10 reps per handout, with assistance as needed.                      Reasons for Discontinuation of Therapy Services  Transfer to alternate level of care.      Plan:     Patient Discharged to: Long Term Acute Care    Annika Parson OT  10/29/2019

## 2019-10-29 NOTE — SUBJECTIVE & OBJECTIVE
Medications:  Continuous Infusions:   sodium chloride 0.9% 75 mL/hr at 10/28/19 1800    dexMEDEtomidine in 0.9 % NaCl 0.4 mcg/kg/hr (10/28/19 2038)     Scheduled Meds:   albuterol-ipratropium  3 mL Nebulization Q6H    amLODIPine  10 mg Per OG tube Daily    aspirin  325 mg Per OG tube Daily    chlorhexidine  15 mL Mouth/Throat BID    cloNIDine  0.3 mg Oral BID    fentaNYL  100 mcg Intravenous Once    insulin detemir U-100  60 Units Subcutaneous QHS    lacosamide  50 mg Oral BID    metoprolol tartrate  100 mg Per OG tube BID    miconazole NITRATE 2 %   Topical (Top) BID    polyethylene glycol  17 g Per NG tube Daily    rosuvastatin  20 mg Per OG tube QHS    senna-docusate 8.6-50 mg  1 tablet Oral BID     PRN Meds:acetaminophen, fentaNYL, insulin aspart U-100, labetalol, ondansetron, sodium chloride 0.9%     No current facility-administered medications on file prior to encounter.      Current Outpatient Medications on File Prior to Encounter   Medication Sig    amlodipine (NORVASC) 5 MG tablet Take 5 mg by mouth once daily.      blood sugar diagnostic (GLUCOSE BLOOD) Strp by Misc.(Non-Drug; Combo Route) route.      cetirizine (ZYRTEC) 5 MG tablet Take 5 mg by mouth once daily.    cholecalciferol, vitamin D3, 10,000 unit Tab Take 1 tablet by mouth once daily.    cloNIDine (CATAPRES) 0.3 MG tablet Take 0.3 mg by mouth 2 (two) times daily.    ergocalciferol (VITAMIN D2) 50,000 unit Cap Take 50,000 Units by mouth every 7 days.      hydrALAZINE (APRESOLINE) 100 MG tablet Take 1 tablet (100 mg total) by mouth 2 (two) times daily.    hydrALAZINE (APRESOLINE) 50 MG tablet Take 50 mg by mouth 3 (three) times daily.    insulin aspart U-100 (NOVOLOG) 100 unit/mL injection Inject 10 Units into the skin 3 (three) times daily before meals.    insulin detemir U-100 (LEVEMIR) 100 unit/mL injection Inject 23 Units into the skin every evening.    insulin glargine (LANTUS SOLOSTAR) 100 unit/mL (3 mL) InPn Inject  "24 Units into the skin every evening.     insulin needles, disposable, (PEN NEEDLE) 31 X 5/16 " Ndle by Misc.(Non-Drug; Combo Route) route.      lacosamide (VIMPAT) 100 mg Tab Take 50 mg by mouth 2 (two) times daily.      LANCETS MISC by Misc.(Non-Drug; Combo Route) route 4 (four) times daily.      levothyroxine (SYNTHROID) 137 MCG Tab tablet Take 1 tablet (137 mcg total) by mouth once daily.    linagliptin (TRADJENTA) 5 mg Tab tablet Take 5 mg by mouth once daily.      lubiprostone (AMITIZA) 24 MCG Cap Take 1 capsule (24 mcg total) by mouth 2 (two) times daily with meals.    meclizine (ANTIVERT) 25 mg tablet Take 25 mg by mouth 3 (three) times daily as needed.    metoprolol succinate (TOPROL-XL) 100 MG 24 hr tablet Take 100 mg by mouth 2 (two) times daily.      nitroGLYCERIN (NITROSTAT) 0.4 MG SL tablet Place 0.4 mg under the tongue.    rosuvastatin (CRESTOR) 20 MG tablet Take 20 mg by mouth once daily.       Review of patient's allergies indicates:   Allergen Reactions    Codeine Anxiety    Ace inhibitors     Diphenhydramine hcl        Past Medical History:   Diagnosis Date    Anemia     Anxiety     Diabetes mellitus     Hyperlipidemia     Hypertension     Obesity     Proteinuria     Sleep apnea      Past Surgical History:   Procedure Laterality Date    APPENDECTOMY       SECTION      CHOLECYSTECTOMY      DILATION AND CURETTAGE OF UTERUS      TRACHEOTOMY N/A 10/21/2019    Procedure: TRACHEOSTOMY;  Surgeon: Olu Rcie MD;  Location: [facility];  Service: ENT;  Laterality: N/A;    TUBAL LIGATION       Family History     Problem Relation (Age of Onset)    Diabetes Brother, Sister    Hypertension Brother    Liver disease Father        Tobacco Use    Smoking status: Never Smoker   Substance and Sexual Activity    Alcohol use: No    Drug use: No    Sexual activity: Yes     Partners: Male     Review of Systems   Unable to perform ROS: Patient nonverbal     Objective:     Vital " Signs (Most Recent):  Temp: 98.2 °F (36.8 °C) (10/28/19 1915)  Pulse: 68 (10/28/19 1930)  Resp: (!) 23 (10/28/19 1930)  BP: (!) 154/84 (10/28/19 1900)  SpO2: (!) 93 % (10/28/19 1930) Vital Signs (24h Range):  Temp:  [97.7 °F (36.5 °C)-98.7 °F (37.1 °C)] 98.2 °F (36.8 °C)  Pulse:  [50-74] 68  Resp:  [14-36] 23  SpO2:  [90 %-100 %] 93 %  BP: (137-199)/(69-93) 154/84     Weight: 119.2 kg (262 lb 12.6 oz)  Body mass index is 43.73 kg/m².    Date 10/28/19 0700 - 10/29/19 0659   Shift 3639-8820 4444-8400 2477-8287 24 Hour Total   INTAKE   I.V.(mL/kg) 729.7(6.1) 350.8(2.9)  1080.5(9.1)   NG/ 200  600   Shift Total(mL/kg) 1129.7(9.5) 550.8(4.6)  1680.5(14.1)   OUTPUT   Urine(mL/kg/hr) 250(0.3) 1375  1625   Shift Total(mL/kg) 250(2.1) 1375(11.5)  1625(13.6)   Weight (kg) 119.2 119.2 119.2 119.2       Physical Exam   Constitutional: She appears well-developed and well-nourished.   HENT:   Head: Normocephalic and atraumatic.   Nose: Nose normal.   Mouth/Throat: Oropharynx is clear and moist.   Eyes: Pupils are equal, round, and reactive to light. EOM are normal.   Neck: Neck supple. No tracheal tenderness and no muscular tenderness present. No neck rigidity. No tracheal deviation, no edema, no erythema and normal range of motion present.       Humidified oxygen by trach mask being worn and good ventilation via tracheostomy.   Pulmonary/Chest: No stridor. No apnea. No respiratory distress.   Neurological: She is alert.   Skin: Skin is warm and dry.       Significant Labs:  ABGs:   Recent Labs   Lab 10/24/19  0419   PH 7.436   PCO2 42.0   HCO3 28.2*   POCSATURATED 96   BE 4     CBC:   Recent Labs   Lab 10/23/19  0312   WBC 11.58   RBC 5.92*   HGB 11.7*   HCT 40.3      MCV 68*   MCH 19.8*   MCHC 29.0*     CMP:   Recent Labs   Lab 10/28/19  0340   *   CALCIUM 9.9      K 4.6   CO2 27   *   BUN 31*   CREATININE 1.1       Significant Diagnostics:  None

## 2019-10-29 NOTE — PLAN OF CARE
10/29/19 1144   Final Note   Assessment Type Final Discharge Note   Anticipated Discharge Disposition LTAC   Hospital Follow Up  Appt(s) scheduled? No   Discharge plans and expectations educations in teach back method with documentation complete? No       SW informed Nurse Steffany lan was cleared from case management.

## 2019-10-29 NOTE — ASSESSMENT & PLAN NOTE
Assessment:   Post-op: 7 days.    Condition: Patient condition: Recovering well from tracheostomy procedure and tracheostomy iis functioning well with no concerning findings. Trach change performed without complication and without concerning findings today 10/28, and exchanged trach for 6 CFS uncuffed trach which is now in place secured with soft trach collar. Retraction sutures that were in place for first trach change are now removed.     Plan:  Continue wound care as written. Diet Plan: G tube currently being placed.    1. Continue trach cares as communicated:  Instructions for trach cares and needed trach supplies:  - Size 12 Swedish suction flexible catheter setup at bedside for trach suctioning  - Suction trach  q2h PRN until d/c  - Obturator to HOB  - Extra 6.0 Shiley DCFS (current type of trach) to bedside   2.   Trach change and examination of stoma reassuring for ability to change if needed by primary team while in hospital or for next phase of care with proper trach teaching following discharge.     Please do not hesitate to contact me for any questions or concerns.           Olu Rice MD    Rhinology, Allergy, and Sinus-Skull Base Surgery    Department of Otorhinolaryngology    [facility]    Phone  [phone]    Fax      [phone]

## 2019-10-29 NOTE — PLAN OF CARE
10/29/19 1142   Medicare Message   Important Message from Medicare regarding Discharge Appeal Rights Explained to patient/caregiver;Other (comments)   Date IMM was signed 10/29/19   Time IMM was signed 1138     EMILY spoke with pt thalia Cannon via telephone, he stated he didn't want to appeal the discharge to Ltac.

## 2019-10-30 NOTE — PT/OT/SLP DISCHARGE
Physical Therapy Discharge Summary    Name: Sherrie Alex  MRN: [id number]   Principal Problem: Acute on chronic respiratory failure     Patient Discharged from acute Physical Therapy on 10/29/2019.  Please refer to prior PT noted date on 10/28/2019 for functional status.     Assessment:     Patient appropriate for care in another setting.    Objective:     GOALS:   Multidisciplinary Problems     Physical Therapy Goals        Problem: Physical Therapy Goal    Goal Priority Disciplines Outcome Goal Variances Interventions   Physical Therapy Goal     PT, PT/OT Ongoing, Progressing     Description:  Goals to be met by:      Patient will increase functional independence with mobility by performin. Supine to sit to be assessed  2. Rolling to Left with Moderate Assistance.  3. Bed to chair transfer to be assessed  4. Sitting at edge of bed to be assessed  5. Lower extremity exercise program x10  reps per handout, with assistance as needed                      Reasons for Discontinuation of Therapy Services  Transfer to alternate level of care.      Plan:     Patient Discharged to: Long Term Acute Care.    Susan West, PT  10/30/2019

## 2020-05-07 DIAGNOSIS — E11.9 TYPE 2 DIABETES MELLITUS WITHOUT COMPLICATION: ICD-10-CM

## 2021-05-07 NOTE — ASSESSMENT & PLAN NOTE
--Statin/ASA.   --Neurology following.   --treat BP, no longer need for permissive HTN.    Patient is calling in regards to oral medication for hair loss     Would like to speak with nurse     Please advise

## 2023-12-27 NOTE — SUBJECTIVE & OBJECTIVE
Interval History: Unable to extubate, antibiotics started for positive sputum culture given high secretions.    Review of Systems   Unable to perform ROS: Intubated     Objective:     Vital Signs (Most Recent):  Temp: 100 °F (37.8 °C) (10/16/19 1930)  Pulse: 93 (10/17/19 0003)  Resp: 19 (10/17/19 0003)  BP: (!) 142/84 (10/17/19 0003)  SpO2: (!) 94 % (10/17/19 0003) Vital Signs (24h Range):  Temp:  [98 °F (36.7 °C)-100 °F (37.8 °C)] 100 °F (37.8 °C)  Pulse:  [] 93  Resp:  [15-32] 19  SpO2:  [93 %-100 %] 94 %  BP: (131-261)/() 142/84     Weight: 126.6 kg (279 lb 1.6 oz)  Body mass index is 46.45 kg/m².    Intake/Output Summary (Last 24 hours) at 10/17/2019 0205  Last data filed at 10/16/2019 2100  Gross per 24 hour   Intake 2400 ml   Output 3675 ml   Net -1275 ml      Physical Exam   Constitutional: She appears well-developed. No distress.   Obese   HENT:   Head: Normocephalic and atraumatic.   ET tube in place. Lips/tongue without significant edema. Mild tongue protrusion.   Eyes: Pupils are equal, round, and reactive to light. Conjunctivae and EOM are normal. Right eye exhibits no discharge. Left eye exhibits no discharge. Scleral icterus is present.   Opened eyes to voice   Neck: Normal range of motion. Neck supple.   Cardiovascular: Normal rate and regular rhythm. Exam reveals no gallop and no friction rub.   No murmur heard.  Distant   Pulmonary/Chest: No stridor. She has no wheezes. She has no rales. She exhibits no tenderness.   Intubated on MV    Abdominal: Soft. Bowel sounds are normal. She exhibits no distension and no mass. There is no tenderness. There is no rebound and no guarding. No hernia.   Musculoskeletal: She exhibits no edema, tenderness or deformity.   Skin: Skin is warm and dry. Capillary refill takes less than 2 seconds. No rash noted. She is not diaphoretic. No erythema. No pallor.   Nursing note and vitals reviewed.      Significant Labs:   Blood Culture: No results for input(s):  LABBLOO in the last 48 hours.  BMP:   Recent Labs   Lab 10/16/19  0209   *      K 3.8   CL 99   CO2 33*   BUN 32*   CREATININE 1.8*   CALCIUM 10.1     CBC:   Recent Labs   Lab 10/15/19  0339 10/16/19  0209   WBC 11.11 15.57*   HGB 12.5 12.6   HCT 42.3 42.3   PLT SEE COMMENT 147*     POCT Glucose:   Recent Labs   Lab 10/16/19  0014 10/16/19  0530 10/16/19  0533   POCTGLUCOSE 231* 289* 298*     Urine Culture: No results for input(s): LABURIN in the last 48 hours.    Significant Imaging: I have reviewed and interpreted all pertinent imaging results/findings within the past 24 hours.   Time-based billing (NON-critical care)

## 2025-07-17 NOTE — CARE UPDATE
Patient has been informed of Dr. West's results and recommendations. The patient verbalized understanding of the results and recommendations. Patient states she does not need any refills at this time and will reach out when she does need a refill. The patient denies any further questions or concerns at this time.     Pt received orally intubated with a 6.5 ET tube, secured at 24 cm at the lips. Pt on Servo I ventilator, on documented settings. Alarms are set and functional, Ambu bag and mask at the bedside. NARN at this time, will continue to monitor and wean as tolerated.

## (undated) DEVICE — JELLY LUBRICANT STERILE 4 OZ

## (undated) DEVICE — SEE MEDLINE ITEM 157194

## (undated) DEVICE — SUPPORT ULNA NERVE PROTECTOR

## (undated) DEVICE — GLOVE SURG BIOGEL LATEX SZ 7.5

## (undated) DEVICE — HEMOSTAT SURGICEL 4X8IN

## (undated) DEVICE — CLOSURE SKIN STERI STRIP 1/2X4

## (undated) DEVICE — CORD FOR BIPOLAR FORCEPS 12

## (undated) DEVICE — TRACH TUBE CUFF FLEX DISP 7.5

## (undated) DEVICE — SEE MEDLINE ITEM 154981

## (undated) DEVICE — Device

## (undated) DEVICE — UNDERGLOVES BIOGEL PI SZ 7 LF

## (undated) DEVICE — SYR 10CC LUER LOCK

## (undated) DEVICE — MATRIX HEMOSTATIC FLOSEAL 5ML

## (undated) DEVICE — SUT SILK 3-0 BLK BR SH 30IN

## (undated) DEVICE — TRANSFER MATTRES LATERAL 34

## (undated) DEVICE — ELECTRODE BLADE INSULATED 1 IN

## (undated) DEVICE — APPLICATOR CHLORAPREP ORN 26ML

## (undated) DEVICE — ADHESIVE MASTISOL VIAL 48/BX

## (undated) DEVICE — GLOVE BIOGEL PI MICRO SZ 7

## (undated) DEVICE — SUT SILK 2-0 PS 18IN BLACK

## (undated) DEVICE — SEE L#120831

## (undated) DEVICE — SUT PROLENE 3-0 SH DA 36 BL

## (undated) DEVICE — PACK HEAD & NECK

## (undated) DEVICE — NDL 27G X 1 1/4

## (undated) DEVICE — SEE MEDLINE ITEM 157117

## (undated) DEVICE — ELECTRODE REM PLYHSV RETURN 9